# Patient Record
Sex: FEMALE | Race: BLACK OR AFRICAN AMERICAN | NOT HISPANIC OR LATINO | URBAN - METROPOLITAN AREA
[De-identification: names, ages, dates, MRNs, and addresses within clinical notes are randomized per-mention and may not be internally consistent; named-entity substitution may affect disease eponyms.]

---

## 2024-09-08 ENCOUNTER — INPATIENT (INPATIENT)
Facility: HOSPITAL | Age: 64
LOS: 18 days | Discharge: HOME | End: 2024-09-27
Attending: NEUROLOGICAL SURGERY | Admitting: NEUROLOGICAL SURGERY
Payer: SELF-PAY

## 2024-09-08 VITALS
RESPIRATION RATE: 18 BRPM | DIASTOLIC BLOOD PRESSURE: 88 MMHG | TEMPERATURE: 97 F | SYSTOLIC BLOOD PRESSURE: 176 MMHG | OXYGEN SATURATION: 93 % | HEART RATE: 57 BPM

## 2024-09-08 DIAGNOSIS — Z98.891 HISTORY OF UTERINE SCAR FROM PREVIOUS SURGERY: Chronic | ICD-10-CM

## 2024-09-08 DIAGNOSIS — I60.9 NONTRAUMATIC SUBARACHNOID HEMORRHAGE, UNSPECIFIED: ICD-10-CM

## 2024-09-08 LAB
A1C WITH ESTIMATED AVERAGE GLUCOSE RESULT: 5.8 % — HIGH (ref 4–5.6)
ALBUMIN SERPL ELPH-MCNC: 3.6 G/DL — SIGNIFICANT CHANGE UP (ref 3.4–5)
ALP SERPL-CCNC: 139 U/L — HIGH (ref 40–120)
ALT FLD-CCNC: 18 U/L — SIGNIFICANT CHANGE UP (ref 12–42)
ANION GAP SERPL CALC-SCNC: 12 MMOL/L — SIGNIFICANT CHANGE UP (ref 9–16)
ANISOCYTOSIS BLD QL: SLIGHT — SIGNIFICANT CHANGE UP
APTT BLD: 30.7 SEC — SIGNIFICANT CHANGE UP (ref 24.5–35.6)
AST SERPL-CCNC: 16 U/L — SIGNIFICANT CHANGE UP (ref 15–37)
BASE EXCESS BLDA CALC-SCNC: -1.7 MMOL/L — SIGNIFICANT CHANGE UP (ref -2–3)
BASOPHILS # BLD AUTO: 0.04 K/UL — SIGNIFICANT CHANGE UP (ref 0–0.2)
BASOPHILS NFR BLD AUTO: 0.4 % — SIGNIFICANT CHANGE UP (ref 0–2)
BILIRUB SERPL-MCNC: 0.3 MG/DL — SIGNIFICANT CHANGE UP (ref 0.2–1.2)
BLD GP AB SCN SERPL QL: NEGATIVE — SIGNIFICANT CHANGE UP
BUN SERPL-MCNC: 22 MG/DL — SIGNIFICANT CHANGE UP (ref 7–23)
CALCIUM SERPL-MCNC: 8.6 MG/DL — SIGNIFICANT CHANGE UP (ref 8.5–10.5)
CHLORIDE SERPL-SCNC: 106 MMOL/L — SIGNIFICANT CHANGE UP (ref 96–108)
CHOLEST SERPL-MCNC: 165 MG/DL — SIGNIFICANT CHANGE UP
CO2 BLDA-SCNC: 26 MMOL/L — HIGH (ref 19–24)
CO2 SERPL-SCNC: 26 MMOL/L — SIGNIFICANT CHANGE UP (ref 22–31)
CREAT SERPL-MCNC: 0.95 MG/DL — SIGNIFICANT CHANGE UP (ref 0.5–1.3)
EGFR: 66 ML/MIN/1.73M2 — SIGNIFICANT CHANGE UP
EOSINOPHIL # BLD AUTO: 0.17 K/UL — SIGNIFICANT CHANGE UP (ref 0–0.5)
EOSINOPHIL NFR BLD AUTO: 1.9 % — SIGNIFICANT CHANGE UP (ref 0–6)
ESTIMATED AVERAGE GLUCOSE: 120 MG/DL — HIGH (ref 68–114)
FERRITIN SERPL-MCNC: 32 NG/ML — SIGNIFICANT CHANGE UP (ref 13–330)
GAS PNL BLDA: SIGNIFICANT CHANGE UP
GLUCOSE BLDC GLUCOMTR-MCNC: 123 MG/DL — HIGH (ref 70–99)
GLUCOSE BLDC GLUCOMTR-MCNC: 123 MG/DL — HIGH (ref 70–99)
GLUCOSE BLDC GLUCOMTR-MCNC: 142 MG/DL — HIGH (ref 70–99)
GLUCOSE SERPL-MCNC: 154 MG/DL — HIGH (ref 70–99)
HAPTOGLOB SERPL-MCNC: 154 MG/DL — SIGNIFICANT CHANGE UP (ref 34–200)
HAV IGM SER-ACNC: SIGNIFICANT CHANGE UP
HBV CORE AB SER-ACNC: SIGNIFICANT CHANGE UP
HBV CORE IGM SER-ACNC: SIGNIFICANT CHANGE UP
HBV SURFACE AB SER-ACNC: SIGNIFICANT CHANGE UP
HBV SURFACE AG SER-ACNC: SIGNIFICANT CHANGE UP
HCO3 BLDA-SCNC: 24 MMOL/L — SIGNIFICANT CHANGE UP (ref 21–28)
HCT VFR BLD CALC: 35.6 % — SIGNIFICANT CHANGE UP (ref 34.5–45)
HCV AB S/CO SERPL IA: 0.06 S/CO — SIGNIFICANT CHANGE UP
HCV AB SERPL-IMP: SIGNIFICANT CHANGE UP
HDLC SERPL-MCNC: 58 MG/DL — SIGNIFICANT CHANGE UP
HGB BLD-MCNC: 10.3 G/DL — LOW (ref 11.5–15.5)
HIV 1+2 AB+HIV1 P24 AG SERPL QL IA: SIGNIFICANT CHANGE UP
HYPOCHROMIA BLD QL: SIGNIFICANT CHANGE UP
IMM GRANULOCYTES NFR BLD AUTO: 1.3 % — HIGH (ref 0–0.9)
INR BLD: 0.97 — SIGNIFICANT CHANGE UP (ref 0.85–1.18)
INR BLD: 1.02 — SIGNIFICANT CHANGE UP (ref 0.85–1.18)
IRON SATN MFR SERPL: 20 UG/DL — LOW (ref 30–160)
IRON SATN MFR SERPL: 5 % — LOW (ref 14–50)
LIPID PNL WITH DIRECT LDL SERPL: 99 MG/DL — SIGNIFICANT CHANGE UP
LYMPHOCYTES # BLD AUTO: 2.62 K/UL — SIGNIFICANT CHANGE UP (ref 1–3.3)
LYMPHOCYTES # BLD AUTO: 28.5 % — SIGNIFICANT CHANGE UP (ref 13–44)
MACROCYTES BLD QL: SLIGHT — SIGNIFICANT CHANGE UP
MANUAL SMEAR VERIFICATION: SIGNIFICANT CHANGE UP
MCHC RBC-ENTMCNC: 21.3 PG — LOW (ref 27–34)
MCHC RBC-ENTMCNC: 28.9 GM/DL — LOW (ref 32–36)
MCV RBC AUTO: 73.6 FL — LOW (ref 80–100)
MONOCYTES # BLD AUTO: 0.84 K/UL — SIGNIFICANT CHANGE UP (ref 0–0.9)
MONOCYTES NFR BLD AUTO: 9.2 % — SIGNIFICANT CHANGE UP (ref 2–14)
NEUTROPHILS # BLD AUTO: 5.39 K/UL — SIGNIFICANT CHANGE UP (ref 1.8–7.4)
NEUTROPHILS NFR BLD AUTO: 58.7 % — SIGNIFICANT CHANGE UP (ref 43–77)
NON HDL CHOLESTEROL: 107 MG/DL — SIGNIFICANT CHANGE UP
NRBC # BLD: 0 /100 WBCS — SIGNIFICANT CHANGE UP (ref 0–0)
OVALOCYTES BLD QL SMEAR: SLIGHT — SIGNIFICANT CHANGE UP
PCO2 BLDA: 45 MMHG — SIGNIFICANT CHANGE UP (ref 32–45)
PH BLDA: 7.34 — LOW (ref 7.35–7.45)
PLAT MORPH BLD: NORMAL — SIGNIFICANT CHANGE UP
PLATELET # BLD AUTO: 272 K/UL — SIGNIFICANT CHANGE UP (ref 150–400)
PO2 BLDA: 239 MMHG — HIGH (ref 83–108)
POLYCHROMASIA BLD QL SMEAR: SLIGHT — SIGNIFICANT CHANGE UP
POTASSIUM SERPL-MCNC: 3.3 MMOL/L — LOW (ref 3.5–5.3)
POTASSIUM SERPL-SCNC: 3.3 MMOL/L — LOW (ref 3.5–5.3)
PROT SERPL-MCNC: 8 G/DL — SIGNIFICANT CHANGE UP (ref 6.4–8.2)
PROTHROM AB SERPL-ACNC: 11.1 SEC — SIGNIFICANT CHANGE UP (ref 9.5–13)
PROTHROM AB SERPL-ACNC: 11.2 SEC — SIGNIFICANT CHANGE UP (ref 9.5–13)
RBC # BLD: 4.84 M/UL — SIGNIFICANT CHANGE UP (ref 3.8–5.2)
RBC # FLD: 16.6 % — HIGH (ref 10.3–14.5)
RBC BLD AUTO: ABNORMAL
RH IG SCN BLD-IMP: NEGATIVE — SIGNIFICANT CHANGE UP
SAO2 % BLDA: 98.2 % — HIGH (ref 94–98)
SODIUM SERPL-SCNC: 144 MMOL/L — SIGNIFICANT CHANGE UP (ref 132–145)
TIBC SERPL-MCNC: 415 UG/DL — SIGNIFICANT CHANGE UP (ref 220–430)
TRANSFERRIN SERPL-MCNC: 348 MG/DL — SIGNIFICANT CHANGE UP (ref 200–360)
TRIGL SERPL-MCNC: 37 MG/DL — SIGNIFICANT CHANGE UP
TROPONIN I, HIGH SENSITIVITY RESULT: 8.7 NG/L — SIGNIFICANT CHANGE UP
UIBC SERPL-MCNC: 395 UG/DL — HIGH (ref 110–370)
WBC # BLD: 9.18 K/UL — SIGNIFICANT CHANGE UP (ref 3.8–10.5)
WBC # FLD AUTO: 9.18 K/UL — SIGNIFICANT CHANGE UP (ref 3.8–10.5)

## 2024-09-08 PROCEDURE — 71045 X-RAY EXAM CHEST 1 VIEW: CPT | Mod: 26

## 2024-09-08 PROCEDURE — 36224 PLACE CATH CAROTD ART: CPT | Mod: 50

## 2024-09-08 PROCEDURE — 99291 CRITICAL CARE FIRST HOUR: CPT

## 2024-09-08 PROCEDURE — 36226 PLACE CATH VERTEBRAL ART: CPT | Mod: 50

## 2024-09-08 PROCEDURE — 0042T: CPT | Mod: MC

## 2024-09-08 PROCEDURE — 70450 CT HEAD/BRAIN W/O DYE: CPT | Mod: 26,77

## 2024-09-08 PROCEDURE — 71045 X-RAY EXAM CHEST 1 VIEW: CPT | Mod: 26,77

## 2024-09-08 PROCEDURE — 70496 CT ANGIOGRAPHY HEAD: CPT | Mod: 26,MC

## 2024-09-08 PROCEDURE — 36227 PLACE CATH XTRNL CAROTID: CPT | Mod: 50

## 2024-09-08 PROCEDURE — 76377 3D RENDER W/INTRP POSTPROCES: CPT | Mod: 26

## 2024-09-08 PROCEDURE — 61210 BURR HOLE IMPLT VENTR CATH: CPT | Mod: AS

## 2024-09-08 PROCEDURE — 70498 CT ANGIOGRAPHY NECK: CPT | Mod: 26,MC

## 2024-09-08 RX ORDER — CHLORHEXIDINE GLUCONATE ORAL RINSE 1.2 MG/ML
1 SOLUTION DENTAL
Refills: 0 | Status: DISCONTINUED | OUTPATIENT
Start: 2024-09-08 | End: 2024-09-27

## 2024-09-08 RX ORDER — ONDANSETRON HCL/PF 4 MG/2 ML
4 VIAL (ML) INJECTION EVERY 6 HOURS
Refills: 0 | Status: DISCONTINUED | OUTPATIENT
Start: 2024-09-08 | End: 2024-09-11

## 2024-09-08 RX ORDER — ALCOHOL ANTISEPTIC PADS
25 PADS, MEDICATED (EA) TOPICAL ONCE
Refills: 0 | Status: DISCONTINUED | OUTPATIENT
Start: 2024-09-08 | End: 2024-09-08

## 2024-09-08 RX ORDER — LEVETIRACETAM 1000 MG
1000 TABLET ORAL EVERY 12 HOURS
Refills: 0 | Status: DISCONTINUED | OUTPATIENT
Start: 2024-09-08 | End: 2024-09-11

## 2024-09-08 RX ORDER — MIDAZOLAM HCL 1 MG/ML
2 VIAL (ML) INJECTION ONCE
Refills: 0 | Status: DISCONTINUED | OUTPATIENT
Start: 2024-09-08 | End: 2024-09-08

## 2024-09-08 RX ORDER — ALCOHOL ANTISEPTIC PADS
15 PADS, MEDICATED (EA) TOPICAL ONCE
Refills: 0 | Status: DISCONTINUED | OUTPATIENT
Start: 2024-09-08 | End: 2024-09-08

## 2024-09-08 RX ORDER — ACETAMINOPHEN 325 MG
1000 TABLET ORAL ONCE
Refills: 0 | Status: COMPLETED | OUTPATIENT
Start: 2024-09-08 | End: 2024-09-08

## 2024-09-08 RX ORDER — FENTANYL CITRATE-0.9 % NACL/PF 300MCG/30
50 PATIENT CONTROLLED ANALGESIA VIAL INJECTION ONCE
Refills: 0 | Status: DISCONTINUED | OUTPATIENT
Start: 2024-09-08 | End: 2024-09-08

## 2024-09-08 RX ORDER — LEVETIRACETAM 1000 MG
1000 TABLET ORAL
Refills: 0 | Status: DISCONTINUED | OUTPATIENT
Start: 2024-09-08 | End: 2024-09-08

## 2024-09-08 RX ORDER — GLUCAGON INJECTION, SOLUTION 0.5 MG/.1ML
1 INJECTION, SOLUTION SUBCUTANEOUS ONCE
Refills: 0 | Status: DISCONTINUED | OUTPATIENT
Start: 2024-09-08 | End: 2024-09-08

## 2024-09-08 RX ORDER — PROPOFOL 10 MG/ML
10 INJECTION, EMULSION INTRAVENOUS
Qty: 1000 | Refills: 0 | Status: DISCONTINUED | OUTPATIENT
Start: 2024-09-08 | End: 2024-09-08

## 2024-09-08 RX ORDER — SENNOSIDES 8.6 MG
2 TABLET ORAL AT BEDTIME
Refills: 0 | Status: DISCONTINUED | OUTPATIENT
Start: 2024-09-08 | End: 2024-09-10

## 2024-09-08 RX ORDER — LEVETIRACETAM 1000 MG
1000 TABLET ORAL ONCE
Refills: 0 | Status: COMPLETED | OUTPATIENT
Start: 2024-09-08 | End: 2024-09-08

## 2024-09-08 RX ORDER — SODIUM CHLORIDE IRRIG SOLUTION 0.9 %
1000 SOLUTION, IRRIGATION IRRIGATION
Refills: 0 | Status: DISCONTINUED | OUTPATIENT
Start: 2024-09-08 | End: 2024-09-08

## 2024-09-08 RX ORDER — OXYCODONE HYDROCHLORIDE 30 MG/1
5 TABLET, FILM COATED, EXTENDED RELEASE ORAL EVERY 4 HOURS
Refills: 0 | Status: DISCONTINUED | OUTPATIENT
Start: 2024-09-08 | End: 2024-09-12

## 2024-09-08 RX ORDER — FENTANYL CITRATE-0.9 % NACL/PF 300MCG/30
25 PATIENT CONTROLLED ANALGESIA VIAL INJECTION ONCE
Refills: 0 | Status: DISCONTINUED | OUTPATIENT
Start: 2024-09-08 | End: 2024-09-08

## 2024-09-08 RX ORDER — CHLORHEXIDINE GLUCONATE ORAL RINSE 1.2 MG/ML
15 SOLUTION DENTAL EVERY 12 HOURS
Refills: 0 | Status: DISCONTINUED | OUTPATIENT
Start: 2024-09-08 | End: 2024-09-08

## 2024-09-08 RX ORDER — INSULIN LISPRO 100/ML
VIAL (ML) SUBCUTANEOUS EVERY 6 HOURS
Refills: 0 | Status: DISCONTINUED | OUTPATIENT
Start: 2024-09-08 | End: 2024-09-09

## 2024-09-08 RX ORDER — CHLORHEXIDINE GLUCONATE ORAL RINSE 1.2 MG/ML
1 SOLUTION DENTAL
Refills: 0 | Status: DISCONTINUED | OUTPATIENT
Start: 2024-09-08 | End: 2024-09-08

## 2024-09-08 RX ORDER — SODIUM CHLORIDE 0.9 % (FLUSH) 0.9 %
1000 SYRINGE (ML) INJECTION
Refills: 0 | Status: DISCONTINUED | OUTPATIENT
Start: 2024-09-08 | End: 2024-09-09

## 2024-09-08 RX ORDER — NIMODIPINE 30 MG/1
60 CAPSULE, LIQUID FILLED ORAL EVERY 4 HOURS
Refills: 0 | Status: DISCONTINUED | OUTPATIENT
Start: 2024-09-08 | End: 2024-09-09

## 2024-09-08 RX ORDER — CEFAZOLIN SODIUM 1 G
2000 VIAL (EA) INJECTION ONCE
Refills: 0 | Status: COMPLETED | OUTPATIENT
Start: 2024-09-08 | End: 2024-09-08

## 2024-09-08 RX ORDER — NICARDIPINE HCL 25 MG/10ML
5 AMPUL (ML) INTRAVENOUS
Qty: 40 | Refills: 0 | Status: DISCONTINUED | OUTPATIENT
Start: 2024-09-08 | End: 2024-09-10

## 2024-09-08 RX ORDER — PANTOPRAZOLE SODIUM 40 MG/1
40 TABLET, DELAYED RELEASE ORAL ONCE
Refills: 0 | Status: COMPLETED | OUTPATIENT
Start: 2024-09-08 | End: 2024-09-08

## 2024-09-08 RX ORDER — NOREPINEPHRINE BITARTRATE/D5W 16MG/250ML
0.05 PLASTIC BAG, INJECTION (ML) INTRAVENOUS
Qty: 8 | Refills: 0 | Status: DISCONTINUED | OUTPATIENT
Start: 2024-09-08 | End: 2024-09-08

## 2024-09-08 RX ORDER — ACETAMINOPHEN 325 MG
650 TABLET ORAL EVERY 6 HOURS
Refills: 0 | Status: DISCONTINUED | OUTPATIENT
Start: 2024-09-08 | End: 2024-09-12

## 2024-09-08 RX ORDER — MIDAZOLAM HCL 1 MG/ML
4 VIAL (ML) INJECTION ONCE
Refills: 0 | Status: DISCONTINUED | OUTPATIENT
Start: 2024-09-08 | End: 2024-09-08

## 2024-09-08 RX ORDER — ALCOHOL ANTISEPTIC PADS
12.5 PADS, MEDICATED (EA) TOPICAL ONCE
Refills: 0 | Status: DISCONTINUED | OUTPATIENT
Start: 2024-09-08 | End: 2024-09-08

## 2024-09-08 RX ORDER — FENTANYL CITRATE-0.9 % NACL/PF 300MCG/30
25 PATIENT CONTROLLED ANALGESIA VIAL INJECTION
Refills: 0 | Status: DISCONTINUED | OUTPATIENT
Start: 2024-09-08 | End: 2024-09-09

## 2024-09-08 RX ADMIN — Medication 50 MICROGRAM(S): at 18:12

## 2024-09-08 RX ADMIN — Medication 4 MILLIGRAM(S): at 18:03

## 2024-09-08 RX ADMIN — Medication 400 MILLIGRAM(S): at 11:51

## 2024-09-08 RX ADMIN — Medication 4 MILLIGRAM(S): at 23:16

## 2024-09-08 RX ADMIN — CHLORHEXIDINE GLUCONATE ORAL RINSE 15 MILLILITER(S): 1.2 SOLUTION DENTAL at 17:10

## 2024-09-08 RX ADMIN — PROPOFOL 7.2 MICROGRAM(S)/KG/MIN: 10 INJECTION, EMULSION INTRAVENOUS at 17:10

## 2024-09-08 RX ADMIN — Medication 50 MILLIGRAM(S): at 17:14

## 2024-09-08 RX ADMIN — Medication 50 MICROGRAM(S): at 18:45

## 2024-09-08 RX ADMIN — NIMODIPINE 60 MILLIGRAM(S): 30 CAPSULE, LIQUID FILLED ORAL at 23:27

## 2024-09-08 RX ADMIN — Medication 440 MILLIGRAM(S): at 09:52

## 2024-09-08 RX ADMIN — Medication 1000 MILLIGRAM(S): at 12:10

## 2024-09-08 RX ADMIN — Medication 2 MILLIGRAM(S): at 17:49

## 2024-09-08 RX ADMIN — Medication 50 MICROGRAM(S): at 16:44

## 2024-09-08 RX ADMIN — Medication 50 MICROGRAM(S): at 17:37

## 2024-09-08 RX ADMIN — Medication 25 MG/HR: at 11:35

## 2024-09-08 RX ADMIN — CHLORHEXIDINE GLUCONATE ORAL RINSE 1 APPLICATION(S): 1.2 SOLUTION DENTAL at 21:58

## 2024-09-08 RX ADMIN — Medication 60 MILLILITER(S): at 20:38

## 2024-09-08 RX ADMIN — Medication 4 MILLIGRAM(S): at 11:53

## 2024-09-08 RX ADMIN — Medication 400 MILLIGRAM(S): at 21:00

## 2024-09-08 RX ADMIN — PANTOPRAZOLE SODIUM 40 MILLIGRAM(S): 40 TABLET, DELAYED RELEASE ORAL at 17:10

## 2024-09-08 RX ADMIN — Medication 25 MICROGRAM(S): at 18:19

## 2024-09-08 RX ADMIN — Medication 4 MILLIGRAM(S): at 17:10

## 2024-09-08 RX ADMIN — Medication 25 MG/HR: at 17:35

## 2024-09-08 RX ADMIN — Medication 25 MG/HR: at 09:58

## 2024-09-08 NOTE — ED PROVIDER NOTE - OBJECTIVE STATEMENT
63-year-old female history of uncontrolled hypertension now coming in for acute onset headache while at work this morning.  Patient states she woke up normally and felt well and then she went to work and started having this intense headache.  Patient arrives with an NIH of 0.  Stroke code called for suspected ICH.  Patient denies chest pain, shortness of breath, fevers

## 2024-09-08 NOTE — ED PROVIDER NOTE - CRITICAL CARE ATTENDING CONTRIBUTION TO CARE
The patient was seen immediately upon arrival due to a high probability of imminent or life-threatening deterioration secondary to SAH which required my direct attention, intervention, and personal management at the bedside. I have personally provided critical care time exclusive of time spent on separately billable procedures. Time includes review of laboratory data, radiology results, discussion with consultants, discussion with the patient's family, and monitoring for potential decompensation.    63-year-old female history of uncontrolled hypertension now coming in for acute onset headache with neck pain while at work this morning.  Patient states she woke up normally and felt well and then she went to work and started having this intense headache.  Patient arrives with an NIH of 0.    Stroke code called for suspected ICH. on arrival in ambulance triage   CT + SAH  Exam unchanged -  Earl Rojo 2    Patient managed in cooperation with MATIAS Son neurosurgery consulted - keppra & cardene SBP to 140,   L & S to [facility]     @ 1030AM CTA results pending The patient was seen immediately upon arrival due to a high probability of imminent or life-threatening deterioration secondary to SAH which required my direct attention, intervention, and personal management at the bedside. I have personally provided critical care time exclusive of time spent on separately billable procedures. Time includes review of laboratory data, radiology results, discussion with consultants, discussion with the patient's family, and monitoring for potential decompensation.    63-year-old female history of uncontrolled hypertension now coming in for acute onset headache with neck pain while at work this morning.  Patient states she woke up normally and felt well and then she went to work and started having this intense headache.  Patient arrives with an NIH of 0.    Stroke code called for suspected ICH. on arrival in ambulance triage   CT + SAH  Exam unchanged -  Earl Rojo 2    Patient managed in cooperation with MATIAS Son neurosurgery consulted - keppra & cardene SBP to 140,   L & S to [facility]     @ 1030AM CTA results pending  @ approx 1045am - pt left ED with EMS L & S to [facility]   neuro exam stable / unchanged and SPB to 140's on rashard higgins spoke to  by phone to provide info for dx of SAH  and plan to transfer to [facility]

## 2024-09-08 NOTE — PROGRESS NOTE ADULT - SUBJECTIVE AND OBJECTIVE BOX
===========================  NSICU ATTENDING PROGRESS NOTE  ===========================    HPI:  Patient is a    On admission, the patient was:  GCS:  Earl-Rojo: 2  modified Liu: 3  ICH score:  NIHSS:    *** HIGH RISK OF DVT PRESENT ON ADMISSION ***      ICU Vital Signs Last 24 Hrs  T(C): 36.3 (08 Sep 2024 09:20), Max: 36.3 (08 Sep 2024 09:20)  T(F): 97.3 (08 Sep 2024 09:20), Max: 97.3 (08 Sep 2024 09:20)  HR: 81 (08 Sep 2024 10:47) (57 - 85)  BP: 150/82 (08 Sep 2024 10:47) (125/73 - 198/91)  RR: 16 (08 Sep 2024 10:47) (16 - 20)  SpO2: 96% (08 Sep 2024 10:47) (93% - 99%)      EXAMINATION:  General: Calm  HEENT:  MMM  Neuro:    Cards: S1/S2, RRR  Respiratory:  Clear, no wheeze  Abdomen: Soft, non-tender  Extremities: No edema  Skin:  Warm/dry      MEDICATIONS;   acetaminophen   IVPB .. 1000 milliGRAM(s) IV Intermittent once  niCARdipine Infusion 5 mG/Hr (25 mL/Hr) IV Continuous <Continuous>      LABS:                         10.3   9.18  )-----------( 272      ( 08 Sep 2024 09:20 )             35.6     09-08    144  |  106  |  22  ----------------------------<  154<H>  3.3<L>   |  26  |  0.95    Ca    8.6      08 Sep 2024 09:20    TPro  8.0  /  Alb  3.6  /  TBili  0.3  /  DBili  x   /  AST  16  /  ALT  18  /  AlkPhos  139<H>  09-08    LIVER FUNCTIONS - ( 08 Sep 2024 09:20 )  Alb: 3.6 g/dL / Pro: 8.0 g/dL / ALK PHOS: 139 U/L / ALT: 18 U/L / AST: 16 U/L / GGT: x            ===========================  NSICU ATTENDING PROGRESS NOTE  ===========================    HPI:  63F PMH uncontrolled HTN, does not take any medications at home presented to [facility] after developing acute onset severe HA. Once arrived, patient had episode of nausea with emesis. CTH showed SAH, HH: 2, MF: 3. NIHSS: 0. CTA neg for aneurysm. Transferred to [facility] for further mgmt. Upon arrival to [facility], NIHSS noted to be 0. Patient reports a headache rated at 7/10 in severity. Of note, patient's daughter notes pt seems off and is sleepier than her baseline. Pt denies SOB, chest pain, vision changes, nausea, weakness/numbness/tingling, dizziness, photophobia.  (08 Sep 2024 13:29)      On admission, the patient was:  GCS:  Earl-Rojo: 2  modified Liu: 3  ICH score:  NIHSS:      ICU Vital Signs Last 24 Hrs  T(C): 36.4 (08 Sep 2024 14:14), Max: 37.1 (08 Sep 2024 12:00)  T(F): 97.5 (08 Sep 2024 14:14), Max: 98.8 (08 Sep 2024 12:00)  HR: 88 (08 Sep 2024 13:00) (57 - 88)  BP: 148/62 (08 Sep 2024 13:00) (125/73 - 198/91)  BP(mean): 88 (08 Sep 2024 13:00) (88 - 128)  RR: 16 (08 Sep 2024 13:00) (16 - 20)  SpO2: 99% (08 Sep 2024 13:00) (93% - 99%)      09-08-24 @ 07:01  -  09-08-24 @ 15:39  --------------------------------------------------------  IN: 477.5 mL / OUT: 400 mL / NET: 77.5 mL      MEDICATIONS:  acetaminophen     Tablet .. 650 milliGRAM(s) Oral every 6 hours PRN  chlorhexidine 4% Liquid 1 Application(s) Topical <User Schedule>  glucagon  Injectable 1 milliGRAM(s) IntraMuscular once  insulin lispro (ADMELOG) corrective regimen sliding scale   SubCutaneous every 6 hours  levETIRAcetam 1000 milliGRAM(s) Oral two times a day  niCARdipine Infusion 5 mG/Hr (25 mL/Hr) IV Continuous <Continuous>  ondansetron Injectable 4 milliGRAM(s) IV Push every 6 hours  oxyCODONE    IR 5 milliGRAM(s) Oral every 4 hours PRN  potassium chloride    Tablet ER 40 milliEquivalent(s) Oral every 4 hours  senna 2 Tablet(s) Oral at bedtime  sodium chloride 0.9%. 1000 milliLiter(s) (60 mL/Hr) IV Continuous <Continuous>      EXAMINATION:  General: Restless  HEENT:  MMM  Neuro: Awake, at times restless, alert, oriented x 3, follows commands, R facial droop (patient states at her baseline)  Moves all extremities with power 5/5  Sensation intact  Cards: S1/S2, RRR  Respiratory: Clear, no wheeze  Abdomen: Soft, non- tender  Extremities:  no edema  Skin:  warm/dry      LABS:                     10.3   9.18  )-----------( 272      ( 08 Sep 2024 09:20 )             35.6     09-08    144  |  106  |  22  ----------------------------<  154<H>  3.3<L>   |  26  |  0.95    Ca    8.6      08 Sep 2024 09:20    TPro  8.0  /  Alb  3.6  /  TBili  0.3  /  DBili  x   /  AST  16  /  ALT  18  /  AlkPhos  139<H>  09-08    LIVER FUNCTIONS - ( 08 Sep 2024 09:20 )  Alb: 3.6 g/dL / Pro: 8.0 g/dL / ALK PHOS: 139 U/L / ALT: 18 U/L / AST: 16 U/L / GGT: x

## 2024-09-08 NOTE — PRE-ANESTHESIA EVALUATION ADULT - NSANTHPMHFT_GEN_ALL_CORE
Per primary team, 63 year old F with a PMHx uncontrolled HTN who presented to [facility] to [facility] c/o sudden onset HA. CTH showed SAH HH: 2, MF: 3. NIHSS: 0. CTA neg for aneurysm. Transferred to [facility] for further mgmt. She now presents for diagnostic cerebral angiogram. History otherwise remarkable by morbid obesity.    Otherwise, no CP/SOB/N/V/GERD. No numbness or weakness. METS < 4. Per primary team, 63 year old F with a PMHx uncontrolled HTN who presented to [facility] to [facility] c/o sudden onset HA. CTH showed SAH HH: 2, MF: 3. NIHSS: 0. CTA neg for aneurysm. Transferred to [facility] for further mgmt. She now presents for diagnostic cerebral angiogram. History otherwise remarkable by morbid obesity.    Otherwise, no CP/SOB/N/V/GERD. No numbness or weakness. METS < 4. Patient able to converse and state how she feels, but daughter primary historian for medical history. Per daughter, she is prescribed meds for HTN but has not been taking them. No prior surgical history other than C-sections.

## 2024-09-08 NOTE — PRE-ANESTHESIA EVALUATION ADULT - NSANTHOSAYNRD_GEN_A_CORE
Yes No. FRANNY screening performed.  STOP BANG Legend: 0-2 = LOW Risk; 3-4 = INTERMEDIATE Risk; 5-8 = HIGH Risk

## 2024-09-08 NOTE — H&P ADULT - NSHPPHYSICALEXAM_GEN_ALL_CORE
General: Pt is sitting up comfortably in bed, in NAD, on RA  HEENT: CN II-XII grossly intact, PERRL 3mm, EOMI B/L, face symmetric, tongue midline, neck FROM  Cardiovascular: RRR, normal S1 and S2   Respiratory: non-labored breathing, symmetric chest rise   GI: abd soft, NTND   Neuro: A&O x 3, no aphasia, speech clear, no dysmetria, no pronator drift  Strength 5/5 throughout all 4 extremities  Sensation intact to light touch throughout   Vascular: Distal pulses 2+ x4, no calf edema or erythema  Wounds: C/D/I, no evidence of hematoma General: Pt is sitting up comfortably in bed, in NAD, on RA  HEENT: CN II-XII grossly intact, PERRL 3mm, EOMI B/L, +slight R facial (patient reports as her baseline)  Cardiovascular: RRR, normal S1 and S2   Respiratory: non-labored breathing, symmetric chest rise   GI: abd soft, NTND   Neuro: A&O x 3, slightly somnolent but alert, no aphasia, speech clear, no dysmetria, no pronator drift  Strength 5/5 throughout all 4 extremities  Sensation intact to light touch throughout   Vascular: Distal pulses 2+ x4, no calf edema, +discoloration of BL calves

## 2024-09-08 NOTE — BRIEF OPERATIVE NOTE - OPERATION/FINDINGS
Patient was brought to the angio suite, placed on x-ray table, placed on standard monitor by anesthesiologist. B/l groins were prepped and draped in usual sterile fashion.   5 [place] short sheath was used in the right common femoral artery for access. A diagnostic angiogram was performed under general anesthesia care. B/l ICAs, b/l ECAs and b/l vertebral artery were injected and studied.     Findings:   There is no aneurysm, no vasculitis, or any other vascular abnormality.     Full report to follow  Patient tolerated the procedure well and at baseline neurological condition.   Right groin vascular access site was closed with vascade and applied manual compression.   There is no hematoma.   Patient was transferred to NSICU.     Above discussed with Dr. Lomas

## 2024-09-08 NOTE — PROGRESS NOTE ADULT - ASSESSMENT
63y/F with  subarachnoid hemorrhage HH2 MF3, brain compression, cerebral edema s/p angiogram, carotid & cerebral, b/l (09/08/2024, Alfredo Lomas)  Hypertension    PLAN:   NEURO:  REHAB:  physical therapy evaluation and management    EARLY MOB:      PULM:  Room air, incentive spirometry  CARDIO:  SBP goal 100-140mm Hg  ENDO:  Blood sugar goals 140-180 mg/dL, continue insulin sliding scale  GI:  PPI for GI prophylaxis  DIET:  RENAL:    HEM/ONC:  VTE Prophylaxis:     ID: afebrile, no leukocytosis  ====================   T(F): , Max: 98.8 (09-08-24 @ 12:00)    WBC Count: 9.18 (09-08)    ====================  Social: will update family    Active issues:  What's keeping patient in the ICU?  What is this patient's dispo plan?    ATTENDING ATTESTATION:  I was physically present for the key portions of the evaluation and management (E/M) service provided.  I agree with the above history, physical and plan, which I have reviewed and edited where appropriate.    Patient at high risk for neurological deterioration or death due to:  ICU delirium, aspiration PNA, DVT / PE.  Critical care time:  I have personally provided 45 minutes of critical care time, excluding time spent on separate procedures.      Plan discussed with RN, house staff. 63y/F with  subarachnoid hemorrhage HH2 MF3, brain compression, cerebral edema s/p angiogram, carotid & cerebral, b/l (09/08/2024, Alfredo Lomas)  Hypertension    PLAN:   NEURO: neurochecks q1h, PRN pain meds with acetaminophen; fent PRN; hold propofol  nimodipine 60mg PO q6h, TCDs on Day 4  repeat DSA on D15  seizure prophylaxis:  levetiracetam 1000mg IV BID, as per neurosurgery   EVD @10cm H20 open to drain  MRI brain WWO   REHAB:  physical therapy evaluation and management    EARLY MOB:      PULM:  wean to extubate  CARDIO:  SBP goal 100-140mm Hg  ENDO:  Blood sugar goals 140-180 mg/dL, continue insulin sliding scale  GI:  PPI for GI prophylaxis  DIET: NPO for now  RENAL:  IVF  HEM/ONC: Hb stable  VTE Prophylaxis: SCDs, no DVT chemoprophylaxis for now as patient is high risk for bleed (fresh bleed)  ID: afebrile, no leukocytosis  Social:  at bedside - updated    Active issues:  What's keeping patient in the ICU? intubated, EVD  What is this patient's dispo plan?    ATTENDING ATTESTATION:  I was physically present for the key portions of the evaluation and management (E/M) service provided.  I agree with the above history, physical and plan, which I have reviewed and edited where appropriate.    Patient at high risk for neurological deterioration or death due to:  ICU delirium, aspiration PNA, DVT / PE.  Critical care time:  I have personally provided 45 minutes of critical care time, excluding time spent on separate procedures.      Plan discussed with RN, house staff.

## 2024-09-08 NOTE — H&P ADULT - ASSESSMENT
63F PMHx uncontrolled HTN was BIBEMS to [facility] 9/8 c/o sudden onset severe HA. CTH showed SAH HH: 2, MF: 3. NIHSS: 0. CTA neg for aneurysm. Transferred to [facility] for further mgmt.

## 2024-09-08 NOTE — PROGRESS NOTE ADULT - ASSESSMENT
ASSESSMENT/PLAN: HH2 mF3 subarachnoid hemorrhage  Bleed day 1    NEURO:  Q1 neurochecls  Diagnosis: CT Head, CTA Head  Repeat CT head pending  Pending conventional angiogram/ DSA  Seizure Prophylaxis: Levetiracetam 1000mg IV bid for now  Delayed Cerebral Ischemia Management: Serial screening TCDs, euvolemia, nimodipine 60mg Q4  Mild hydrocephalus on initial CT head. Repeat CT head pending  Pain: PRN analgesics  Activity: [] OOB as tolerated [] Bedrest [] PT [] OT [] PMNR    PULM:  Incentive spirometry  Room air    CV:  SBP goal 100-140  TTE baseline, EKG  Cardene gtt  Brickeys    RENAL:  Fluids: NS at 80/hr  Replete K    GI:  Diet: NPO  GI prophylaxis [] not indicated [] PPI [] other:  Bowel regimen [] colace [x] senna [] other: miralax  Zofran RTC    ENDO:   Goal euglycemia (-180)  A1c:  TSH:   LDL:    HEME/ONC: Microcytic anemia  Monitor H/H  YANIQUE workup  VTE prophylaxis: [x] SCDs [] chemoprophylaxis   [x] hold chemoprophylaxis due to: SAH    ID:   Afebrile    MISC:    SOCIAL/FAMILY:  [x] awaiting [] updated at bedside [] family meeting    CODE STATUS:  [x] Full Code [] DNR [] DNI [] Palliative/Comfort Care    DISPOSITION:  [x] ICU [] Stroke Unit [] Floor [] EMU [] RCU [] PCU    Time spent: 60 critical care minutes ASSESSMENT/PLAN: HH2 mF3 subarachnoid hemorrhage  Bleed day 1    NEURO:  Q1 neurochecls  Diagnosis: CT Head, CTA Head  Repeat CT head pending  Pending conventional angiogram/ DSA 9/8  Seizure Prophylaxis: Levetiracetam 1000mg IV bid for now  Delayed Cerebral Ischemia Management: Serial screening TCDs, euvolemia, nimodipine 60mg Q4  Mild hydrocephalus on initial CT head. Repeat CT head pending  Pain: PRN analgesics  Activity: [] OOB as tolerated [] Bedrest [] PT [] OT [] PMNR    PULM:  Incentive spirometry  Room air    CV:  SBP goal 100-140  TTE baseline, EKG  Cardene gtt  Rachel    RENAL:  Fluids: NS at 80/hr  Replete lytes  Monitor Is/Os    GI:  Diet: NPO  GI prophylaxis [] not indicated [] PPI [] other:  Bowel regimen [] colace [x] senna [] other: miralax  Zofran RTC    ENDO:   Goal euglycemia (-180)  A1c:  TSH:   LDL:    HEME/ONC: Microcytic anemia  Monitor H/H  YANIQUE workup  VTE prophylaxis: [x] SCDs [] chemoprophylaxis   [x] hold chemoprophylaxis due to: SAH    ID:   Afebrile    MISC:    SOCIAL/FAMILY:  [x] awaiting [] updated at bedside [] family meeting    CODE STATUS:  [x] Full Code [] DNR [] DNI [] Palliative/Comfort Care    DISPOSITION:  [x] ICU [] Stroke Unit [] Floor [] EMU [] RCU [] PCU    Time spent: 60 critical care minutes

## 2024-09-08 NOTE — ED PROVIDER NOTE - CLINICAL SUMMARY MEDICAL DECISION MAKING FREE TEXT BOX
63-year-old female now coming in for acute onset headache while at work.  Patient woke up normally.  History of uncontrolled hypertension with noncompliance to medications.  Patient exam on arrival NIH 0 neurologically intact with no deficits.    Stroke code initiated on arrival for suspected ICH. Delay to imaging because patient demanded using the restroom prior to imaging.    Patient with SAH on CT.  Endorsed to neurosurgical team and advised nicardipine and Keppra.  Patient will be transported lights and sirens to [facility] for neurosurgical admission to ICU.

## 2024-09-08 NOTE — ED ADULT NURSE NOTE - OBJECTIVE STATEMENT
"Pt BIBEMS work complaining of headache that started this morning at 7:40 pt states that headache goes down to neck. Pt with 1 episode of vomiting to arrival. Stroke code called by MATIAS Howard. Pt states that she has not taken her blood pressure medication in 5 months."    pt stable, not in any acute distress, A&Ox4, pt following all commands, medications administered and titrated as per order, BP systolic maintained over 140. pt awaiting transfer to [facility], care ongoing.

## 2024-09-08 NOTE — H&P ADULT - PROBLEM SELECTOR PLAN 1
- Neuro/vitals q1hr  - Seizure prophylaxis: Keppra 1g BID   - DCI prophylaxis: Nimodipime 60q4  - Pain control: Tylenol, Oxy 5/10  - CT stability pending 1:30pm  - Plan for DSA

## 2024-09-08 NOTE — ED ADULT NURSE REASSESSMENT NOTE - NS ED NURSE REASSESS COMMENT FT1
Stroke code called in ambulance triage at 0917. Pt on EMS stretcher and was being carted to CT before refusing scan citing she needed to have a bowel movement prior to obtaining scan. Pt informed of the concerning nature of her presentation and the need for prompt intervention. Despite this, pt insistent on need to use restroom. Both PA and MD present and aware of situation. Pt let off stretcher in which she ambulated to the restroom and proceeded to have a large episode of diarrhea and single episode of emesis. Again made aware of the need stat imaging with potential consequence of death. Agreeable to go to scan at 0932.

## 2024-09-08 NOTE — ED ADULT TRIAGE NOTE - CHIEF COMPLAINT QUOTE
Pt BIBEMS work complaining of headache that started this morning at 7:40 pt states that headache goes down to neck. Pt with 1 episode of vomiting to arrival. Stroke code called by MATIAS Howard. Pt states that she has not taken her blood pressure medication in 5 months.

## 2024-09-08 NOTE — PROGRESS NOTE ADULT - SUBJECTIVE AND OBJECTIVE BOX
=================================  NEUROCRITICAL CARE ATTENDING NOTE  =================================    KARRIE MORALES   MRN-[id number]  Summary:  63y/F  with uncontrolled HTN, does not take any medications at home presented to [facility] after developing acute onset severe HA. Once arrived, patient had episode of nausea with emesis. CTH showed SAH, HH: 2, MF: 3. NIHSS: 0. CTA neg for aneurysm. Transferred to [facility] for further mgmt. Upon arrival to [facility], NIHSS noted to be 0. Patient reports a headache rated at 7/10 in severity. Of note, patient's daughter notes pt seems off and is sleepier than her baseline. Pt denies SOB, chest pain, vision changes, nausea, weakness/numbness/tingling, dizziness, photophobia.  (08 Sep 2024 13:29)    COURSE IN THE HOSPITAL:   s/p angiogram, carotid & cerebral, b/l (2024, Alfredo Lomas), no aneurysm seen; kept intubated post procedure, EVD inserted    Past Medical History: No pertinent past medical history Hypertension  Allergies:  No Known Allergies  Home meds:     PHYSICAL EXAMINATION  T(C): 36 ( @ 16:47), Max: 37.1 ( @ 12:00) HR: 74 ( @ 18:30) (57 - 112) BP: 148/62 ( @ 13:00) (125/73 - 198/91) RR: 12 ( @ 18:30) (12 - 23) SpO2: 98% ( @ 18:30) (91% - 100%)  NEUROLOGIC EXAMINATION:  Patient is awake, alert, fully oriented, pupils 2-3mm equal and briskly reactive to light, EOMs intact, muscle strength 5/5 on all 4s.  GENERAL:    EENT:  anicteric  CARDIOVASCULAR: (+) S1 S2, normal rate and regular rhythm  PULMONARY: clear to auscultation bilaterally  ABDOMEN: soft, nontender with normoactive bowel sounds  EXTREMITIES: no edema  SKIN: no rash    LABS:  CAPILLARY BLOOD GLUCOSE 142 123 148                         10.3   9.18  )-----------( 272      ( 08 Sep 2024 09:20 )             35.6     144  |  106  |  22  ----------------------------<  154<H>  3.3<L>   |  26  |  0.95    Ca    8.6      08 Sep 2024 09:20    TPro  8.0  /  Alb  3.6  /  TBili  0.3  /  DBili  x   /  AST  16  /  ALT  18  /  AlkPhos  139<H>   @ 07:01  -   @ 19:18  --------------------------------------------------------  IN: 942.5 mL / OUT: 2050 mL / NET: -1107.5 mL    Bacteriology:  CSF studies:  EEG:  [email or web address] 09:46AM	CT BRAIN           	Diffuse SAH, mild midline shift, ventr dilation  2024 10:12AM	CT ANGIO NECK	No high-grade stenosis or occlusion  2024 10:12AM	CT ANGIO BRAIN	Mild right ICA stenosis, no large occlusion  2024 10:12AM	CT PERFUSION     	4ml core infarct, 4ml penumbra    Other imaging:    MEDICATIONS:     ·	levETIRAcetam  IVPB 1000 IV Intermittent every 12 hours  ·	ondansetron Injectable 4 IV Push every 6 hours  ·	senna 2 Oral at bedtime  ·	insulin lispro (ADMELOG) corrective regimen sliding scale  SubCutaneous every 6 hours  ·	acetaminophen     Tablet .. 650 Oral every 6 hours PRN  ·	fentaNYL    Injectable 25 IV Push every 2 hours PRN  ·	oxyCODONE    IR 5 Oral every 4 hours PRN    IV FLUIDS:  DRIPS:  ·	propofol Infusion 10 IV Continuous <Continuous>  ·	niCARdipine Infusion 5 mG/Hr IV Continuous <Continuous>  ·	norepinephrine Infusion 0.05 MICROgram(s)/kG/Min IV Continuous <Continuous>  DIET:  Lines:  Drains:      Wounds:    CODE STATUS:  Full Code                       GOALS OF CARE:  aggressive                      DISPOSITION:  ICU =================================  NEUROCRITICAL CARE ATTENDING NOTE  =================================    KARRIE MORALES   MRN-[id number]  Summary:  63y/F  with uncontrolled HTN, does not take any medications at home presented to [facility] after developing acute onset severe HA. Once arrived, patient had episode of nausea with emesis. CTH showed SAH, HH: 2, MF: 3. NIHSS: 0. CTA neg for aneurysm. Transferred to [facility] for further mgmt. Upon arrival to [facility], NIHSS noted to be 0. Patient reports a headache rated at 7/10 in severity. Of note, patient's daughter notes pt seems off and is sleepier than her baseline. Pt denies SOB, chest pain, vision changes, nausea, weakness/numbness/tingling, dizziness, photophobia.  (08 Sep 2024 13:29)    COURSE IN THE HOSPITAL:   s/p angiogram, carotid & cerebral, b/l (2024, Alfredo Lomas), no aneurysm seen; kept intubated post procedure, EVD inserted    Past Medical History: No pertinent past medical history Hypertension  Allergies:  No Known Allergies  Home meds:     PHYSICAL EXAMINATION  T(C): 36 ( @ 16:47), Max: 37.1 ( @ 12:00) HR: 74 ( @ 18:30) (57 - 112) BP: 148/62 ( @ 13:00) (125/73 - 198/91) RR: 12 ( @ 18:30) (12 - 23) SpO2: 98% ( @ 18:30) (91% - 100%)  NEUROLOGIC EXAMINATION:  Patient  AOx1 CAMERON (+) cough/gag/corneals B UE withdraws briskly, BLE withdraws  GENERAL:  full vent support  EENT:  anicteric  CARDIOVASCULAR: (+) S1 S2, normal rate and regular rhythm  PULMONARY: clear to auscultation bilaterally  ABDOMEN: soft, nontender with normoactive bowel sounds  EXTREMITIES: no edema  SKIN: no rash    LABS:  CAPILLARY BLOOD GLUCOSE 142 123 148                         10.3   9.18  )-----------( 272      ( 08 Sep 2024 09:20 )             35.6     144  |  106  |  22  ----------------------------<  154<H>  3.3<L>   |  26  |  0.95    Ca    8.6      08 Sep 2024 09:20    TPro  8.0  /  Alb  3.6  /  TBili  0.3  /  DBili  x   /  AST  16  /  ALT  18  /  AlkPhos  139<H>      PT/INR - ( 08 Sep 2024 11:38 )   PT: 11.1 sec;   INR: 0.97    PTT - ( 08 Sep 2024 09:20 )  PTT:30.7 sec     @ 07:01  -   @ 19:18  --------------------------------------------------------  IN: 942.5 mL / OUT: 2050 mL / NET: -1107.5 mL    Bacteriology:  CSF studies:  EEG:  [email or web address] 09:46AM	CT BRAIN           	Diffuse SAH, mild midline shift, ventr dilation  2024 10:12AM	CT ANGIO NECK	No high-grade stenosis or occlusion  2024 10:12AM	CT ANGIO BRAIN	Mild right ICA stenosis, no large occlusion  2024 10:12AM	CT PERFUSION     	4ml core infarct, 4ml penumbra    Other imaging:    MEDICATIONS:     ·	levETIRAcetam  IVPB 1000 IV Intermittent every 12 hours  ·	ondansetron Injectable 4 IV Push every 6 hours  ·	senna 2 Oral at bedtime  ·	insulin lispro (ADMELOG) corrective regimen sliding scale  SubCutaneous every 6 hours  ·	acetaminophen     Tablet .. 650 Oral every 6 hours PRN  ·	fentaNYL    Injectable 25 IV Push every 2 hours PRN  ·	oxyCODONE    IR 5 Oral every 4 hours PRN    IV FLUIDS: NS@60cc/hr  DRIPS:  ·	propofol Infusion 10 IV Continuous <Continuous>   DIET: NPO  Lines:  Drains:      Wounds:    CODE STATUS:  Full Code                       GOALS OF CARE:  aggressive                      DISPOSITION:  ICU

## 2024-09-08 NOTE — H&P ADULT - HISTORY OF PRESENT ILLNESS
63F PMHx uncontrolled HTN, does not take any medications at home presented to [facility] after developing acute onset severe HA. Once arrived, patient had episode of nausea with emesis. CTH showed SAH, HH: 2, MF: 3. NIHSS: 0. CTA neg for aneurysm. Transferred to [facility] for further mgmt. Upon arrival to [facility], NIHSS noted to be 0. Patient reports a headache rated at 7/10 in severity. Of note, patient's daughter notes pt seems off and is sleepier than her baseline. Pt denies SOB, chest pain, vision changes, nausea, weakness/numbness/tingling, dizziness, photophobia.

## 2024-09-08 NOTE — ED PROVIDER NOTE - NS SC NIH INTUBATED_GEN_A_CORE
Modify Regimen: Increase spironolactone 50mg PO to BID Initiate Treatment: tretinoin 0.05% cream QOHS-QHS Discontinue Regimen: tretinoin 0.025% cream Detail Level: Simple Render In Strict Bullet Format?: No No

## 2024-09-09 LAB
ANION GAP SERPL CALC-SCNC: 10 MMOL/L — SIGNIFICANT CHANGE UP (ref 5–17)
BUN SERPL-MCNC: 10 MG/DL — SIGNIFICANT CHANGE UP (ref 7–23)
CALCIUM SERPL-MCNC: 9 MG/DL — SIGNIFICANT CHANGE UP (ref 8.4–10.5)
CHLORIDE SERPL-SCNC: 107 MMOL/L — SIGNIFICANT CHANGE UP (ref 96–108)
CO2 SERPL-SCNC: 23 MMOL/L — SIGNIFICANT CHANGE UP (ref 22–31)
CREAT ?TM UR-MCNC: 87 MG/DL — SIGNIFICANT CHANGE UP
CREAT SERPL-MCNC: 0.74 MG/DL — SIGNIFICANT CHANGE UP (ref 0.5–1.3)
EGFR: 90 ML/MIN/1.73M2 — SIGNIFICANT CHANGE UP
GLUCOSE BLDC GLUCOMTR-MCNC: 101 MG/DL — HIGH (ref 70–99)
GLUCOSE SERPL-MCNC: 98 MG/DL — SIGNIFICANT CHANGE UP (ref 70–99)
HCT VFR BLD CALC: 35.1 % — SIGNIFICANT CHANGE UP (ref 34.5–45)
HGB BLD-MCNC: 9.9 G/DL — LOW (ref 11.5–15.5)
MAGNESIUM SERPL-MCNC: 2 MG/DL — SIGNIFICANT CHANGE UP (ref 1.6–2.6)
MCHC RBC-ENTMCNC: 20.7 PG — LOW (ref 27–34)
MCHC RBC-ENTMCNC: 28.2 GM/DL — LOW (ref 32–36)
MCV RBC AUTO: 73.3 FL — LOW (ref 80–100)
NRBC # BLD: 0 /100 WBCS — SIGNIFICANT CHANGE UP (ref 0–0)
PHOSPHATE SERPL-MCNC: 4.2 MG/DL — SIGNIFICANT CHANGE UP (ref 2.5–4.5)
PLATELET # BLD AUTO: 235 K/UL — SIGNIFICANT CHANGE UP (ref 150–400)
POTASSIUM SERPL-MCNC: 4.1 MMOL/L — SIGNIFICANT CHANGE UP (ref 3.5–5.3)
POTASSIUM SERPL-SCNC: 4.1 MMOL/L — SIGNIFICANT CHANGE UP (ref 3.5–5.3)
PROT ?TM UR-MCNC: 9 MG/DL — SIGNIFICANT CHANGE UP (ref 0–12)
PROT/CREAT UR-RTO: 0.1 RATIO — SIGNIFICANT CHANGE UP (ref 0–0.2)
RBC # BLD: 4.79 M/UL — SIGNIFICANT CHANGE UP (ref 3.8–5.2)
RBC # FLD: 16.9 % — HIGH (ref 10.3–14.5)
SODIUM SERPL-SCNC: 140 MMOL/L — SIGNIFICANT CHANGE UP (ref 135–145)
T3 SERPL-MCNC: 73 NG/DL — LOW (ref 80–200)
T4 AB SER-ACNC: 6.09 UG/DL — SIGNIFICANT CHANGE UP (ref 4.5–11.7)
T4 FREE SERPL-MCNC: 1 NG/DL — SIGNIFICANT CHANGE UP (ref 0.93–1.7)
TSH SERPL-MCNC: 0.98 UIU/ML — SIGNIFICANT CHANGE UP (ref 0.27–4.2)
WBC # BLD: 11.69 K/UL — HIGH (ref 3.8–10.5)
WBC # FLD AUTO: 11.69 K/UL — HIGH (ref 3.8–10.5)

## 2024-09-09 PROCEDURE — 36620 INSERTION CATHETER ARTERY: CPT

## 2024-09-09 PROCEDURE — 99231 SBSQ HOSP IP/OBS SF/LOW 25: CPT | Mod: 25

## 2024-09-09 PROCEDURE — 93970 EXTREMITY STUDY: CPT | Mod: 26

## 2024-09-09 PROCEDURE — 99291 CRITICAL CARE FIRST HOUR: CPT

## 2024-09-09 RX ORDER — OXYCODONE HYDROCHLORIDE 30 MG/1
5 TABLET, FILM COATED, EXTENDED RELEASE ORAL ONCE
Refills: 0 | Status: DISCONTINUED | OUTPATIENT
Start: 2024-09-09 | End: 2024-09-09

## 2024-09-09 RX ORDER — ENOXAPARIN SODIUM 150 MG/ML
40 INJECTION SUBCUTANEOUS EVERY 12 HOURS
Refills: 0 | Status: DISCONTINUED | OUTPATIENT
Start: 2024-09-09 | End: 2024-09-22

## 2024-09-09 RX ORDER — HYDRALAZINE HYDROCHLORIDE 100 MG/1
10 TABLET ORAL ONCE
Refills: 0 | Status: COMPLETED | OUTPATIENT
Start: 2024-09-09 | End: 2024-09-09

## 2024-09-09 RX ORDER — NIMODIPINE 30 MG/1
60 CAPSULE, LIQUID FILLED ORAL EVERY 4 HOURS
Refills: 0 | Status: DISCONTINUED | OUTPATIENT
Start: 2024-09-09 | End: 2024-09-14

## 2024-09-09 RX ORDER — FERROUS SULFATE 325(65) MG
300 TABLET ORAL
Refills: 0 | Status: DISCONTINUED | OUTPATIENT
Start: 2024-09-09 | End: 2024-09-27

## 2024-09-09 RX ORDER — FERROUS SULFATE 325(65) MG
300 TABLET ORAL
Refills: 0 | Status: DISCONTINUED | OUTPATIENT
Start: 2024-09-09 | End: 2024-09-09

## 2024-09-09 RX ORDER — ENALAPRIL MALEATE 5 MG
1.25 TABLET ORAL ONCE
Refills: 0 | Status: COMPLETED | OUTPATIENT
Start: 2024-09-09 | End: 2024-09-09

## 2024-09-09 RX ORDER — HYDRALAZINE HYDROCHLORIDE 100 MG/1
25 TABLET ORAL ONCE
Refills: 0 | Status: COMPLETED | OUTPATIENT
Start: 2024-09-09 | End: 2024-09-09

## 2024-09-09 RX ORDER — SODIUM CHLORIDE IRRIG SOLUTION 0.9 %
1500 SOLUTION, IRRIGATION IRRIGATION ONCE
Refills: 0 | Status: COMPLETED | OUTPATIENT
Start: 2024-09-09 | End: 2024-09-09

## 2024-09-09 RX ADMIN — Medication 4 MILLIGRAM(S): at 17:21

## 2024-09-09 RX ADMIN — Medication 250 MILLILITER(S): at 12:15

## 2024-09-09 RX ADMIN — NIMODIPINE 60 MILLIGRAM(S): 30 CAPSULE, LIQUID FILLED ORAL at 04:42

## 2024-09-09 RX ADMIN — Medication 650 MILLIGRAM(S): at 07:15

## 2024-09-09 RX ADMIN — Medication 400 MILLIGRAM(S): at 09:44

## 2024-09-09 RX ADMIN — Medication 10 MILLIGRAM(S): at 09:44

## 2024-09-09 RX ADMIN — Medication 650 MILLIGRAM(S): at 21:20

## 2024-09-09 RX ADMIN — ENOXAPARIN SODIUM 40 MILLIGRAM(S): 150 INJECTION SUBCUTANEOUS at 22:00

## 2024-09-09 RX ADMIN — OXYCODONE HYDROCHLORIDE 5 MILLIGRAM(S): 30 TABLET, FILM COATED, EXTENDED RELEASE ORAL at 22:22

## 2024-09-09 RX ADMIN — OXYCODONE HYDROCHLORIDE 5 MILLIGRAM(S): 30 TABLET, FILM COATED, EXTENDED RELEASE ORAL at 17:21

## 2024-09-09 RX ADMIN — CHLORHEXIDINE GLUCONATE ORAL RINSE 1 APPLICATION(S): 1.2 SOLUTION DENTAL at 04:45

## 2024-09-09 RX ADMIN — HYDRALAZINE HYDROCHLORIDE 10 MILLIGRAM(S): 100 TABLET ORAL at 18:43

## 2024-09-09 RX ADMIN — Medication 4 MILLIGRAM(S): at 23:00

## 2024-09-09 RX ADMIN — Medication 650 MILLIGRAM(S): at 22:22

## 2024-09-09 RX ADMIN — Medication 4 MILLIGRAM(S): at 12:15

## 2024-09-09 RX ADMIN — Medication 650 MILLIGRAM(S): at 08:00

## 2024-09-09 RX ADMIN — HYDRALAZINE HYDROCHLORIDE 25 MILLIGRAM(S): 100 TABLET ORAL at 22:54

## 2024-09-09 RX ADMIN — NIMODIPINE 60 MILLIGRAM(S): 30 CAPSULE, LIQUID FILLED ORAL at 21:21

## 2024-09-09 RX ADMIN — NIMODIPINE 60 MILLIGRAM(S): 30 CAPSULE, LIQUID FILLED ORAL at 17:21

## 2024-09-09 RX ADMIN — OXYCODONE HYDROCHLORIDE 5 MILLIGRAM(S): 30 TABLET, FILM COATED, EXTENDED RELEASE ORAL at 21:20

## 2024-09-09 RX ADMIN — Medication 17 GRAM(S): at 12:15

## 2024-09-09 RX ADMIN — Medication 400 MILLIGRAM(S): at 22:00

## 2024-09-09 RX ADMIN — NIMODIPINE 60 MILLIGRAM(S): 30 CAPSULE, LIQUID FILLED ORAL at 14:41

## 2024-09-09 RX ADMIN — Medication 2 TABLET(S): at 21:21

## 2024-09-09 RX ADMIN — Medication 25 MG/HR: at 19:44

## 2024-09-09 RX ADMIN — OXYCODONE HYDROCHLORIDE 5 MILLIGRAM(S): 30 TABLET, FILM COATED, EXTENDED RELEASE ORAL at 18:00

## 2024-09-09 RX ADMIN — NIMODIPINE 60 MILLIGRAM(S): 30 CAPSULE, LIQUID FILLED ORAL at 09:57

## 2024-09-09 RX ADMIN — Medication 4 MILLIGRAM(S): at 04:44

## 2024-09-09 RX ADMIN — Medication 1.25 MILLIGRAM(S): at 12:40

## 2024-09-09 RX ADMIN — NIMODIPINE 60 MILLIGRAM(S): 30 CAPSULE, LIQUID FILLED ORAL at 07:15

## 2024-09-09 RX ADMIN — Medication 650 MILLIGRAM(S): at 14:00

## 2024-09-09 RX ADMIN — Medication 650 MILLIGRAM(S): at 13:30

## 2024-09-09 NOTE — PROGRESS NOTE ADULT - SUBJECTIVE AND OBJECTIVE BOX
INTERVAL HISTORY: HPI:  63F PMHx uncontrolled HTN, does not take any medications at home presented to [facility] after developing acute onset severe HA. Once arrived, patient had episode of nausea with emesis. CTH showed SAH, HH: 2, MF: 3. NIHSS: 0. CTA neg for aneurysm. Transferred to [facility] for further mgmt. Upon arrival to [facility], NIHSS noted to be 0. Patient reports a headache rated at 7/10 in severity. Of note, patient's daughter notes pt seems off and is sleepier than her baseline. Pt denies SOB, chest pain, vision changes, nausea, weakness/numbness/tingling, dizziness, photophobia.  (08 Sep 2024 13:29)      MEDICATIONS  (STANDING):  chlorhexidine 2% Cloths 1 Application(s) Topical <User Schedule>  insulin lispro (ADMELOG) corrective regimen sliding scale   SubCutaneous every 6 hours  levETIRAcetam  IVPB 1000 milliGRAM(s) IV Intermittent every 12 hours  niCARdipine Infusion 5 mG/Hr (25 mL/Hr) IV Continuous <Continuous>  niMODipine 60 milliGRAM(s) Oral every 4 hours  ondansetron Injectable 4 milliGRAM(s) IV Push every 6 hours  polyethylene glycol 3350 17 Gram(s) Oral daily  senna 2 Tablet(s) Oral at bedtime  sodium chloride 0.9%. 1000 milliLiter(s) (60 mL/Hr) IV Continuous <Continuous>    MEDICATIONS  (PRN):  acetaminophen     Tablet .. 650 milliGRAM(s) Oral every 6 hours PRN Temp greater or equal to 38C (100.4F), Mild Pain (1 - 3)  fentaNYL    Injectable 25 MICROGram(s) IV Push every 2 hours PRN Pain or vent dyssynchrony  oxyCODONE    IR 5 milliGRAM(s) Oral every 4 hours PRN Moderate Pain (4 - 6)      Drug Dosing Weight  Height (cm): 161.3 (08 Sep 2024 11:22)  Weight (kg): 120 (08 Sep 2024 11:22)  BMI (kg/m2): 46.1 (08 Sep 2024 11:22)  BSA (m2): 2.19 (08 Sep 2024 11:22)    PAST MEDICAL & SURGICAL HISTORY:  Hypertension      H/O  section          REVIEW OF SYSTEMS: [ ] Unable to Assess due to neurologic exam   [ ] All ROS addressed below are non-contributory, except:  Neuro: [ ] Headache [ ] Back pain [ ] Numbness [ ] Weakness [ ] Ataxia [ ] Dizziness [ ] Aphasia [ ] Dysarthria [ ] Visual disturbance  Resp: [ ] Shortness of breath/dyspnea, [ ] Orthopnea [ ] Cough  CV: [ ] Chest pain [ ] Palpitation [ ] Lightheadedness [ ] Syncope  Renal: [ ] Thirst [ ] Edema  GI: [ ] Nausea [ ] Emesis [ ] Abdominal pain [ ] Constipation [ ] Diarrhea  Hem: [ ] Hematemesis [ ] bright red blood per rectum  ID: [ ] Fever [ ] Chills [ ] Dysuria  ENT: [ ] Rhinorrhea    PHYSICAL EXAM:    General: No Acute Distress     Neurological: Awake, alert oriented to person, place and time, Following Commands, PERRL, EOMI, Face Symmetrical, Speech Fluent, Moving all extremities, Muscle Strength normal in all four extremities, No Drift, Sensation to Light Touch Intact    Pulmonary: Clear to Auscultation, No Rales, No Rhonchi, No Wheezes     Cardiovascular: S1, S2, Regular Rate and Rhythm     Gastrointestinal: Soft, Nontender, Nondistended     Extremities: No calf tenderness     Incision:     ICU Vital Signs Last 24 Hrs  T(C): 36.5 (09 Sep 2024 05:02), Max: 37.1 (08 Sep 2024 12:00)  T(F): 97.7 (09 Sep 2024 05:02), Max: 98.8 (08 Sep 2024 12:00)  HR: 73 (09 Sep 2024 08:00) (59 - 112)  BP: 140/63 (09 Sep 2024 08:00) (125/73 - 198/91)  BP(mean): 90 (09 Sep 2024 08:00) (81 - 128)  ABP: 118/45 (08 Sep 2024 19:00) (103/41 - 153/60)  ABP(mean): 70 (08 Sep 2024 19:00) (63 - 94)  RR: 17 (09 Sep 2024 08:00) (12 - 24)  SpO2: 96% (09 Sep 2024 08:00) (91% - 100%)    O2 Parameters below as of 09 Sep 2024 08:00  Patient On (Oxygen Delivery Method): room air          ABG - ( 08 Sep 2024 16:35 )  pH, Arterial: 7.34  pH, Blood: x     /  pCO2: 45    /  pO2: 239   / HCO3: 24    / Base Excess: -1.7  /  SaO2: 98.2              I&O's Detail    08 Sep 2024 07:01  -  09 Sep 2024 07:00  --------------------------------------------------------  IN:    IV PiggyBack: 100 mL    NiCARdipine: 602.5 mL    Oral Fluid: 300 mL    sodium chloride 0.9%: 1260 mL  Total IN: 2262.5 mL    OUT:    External Ventricular Device (mL): 107 mL    Indwelling Catheter - Urethral (mL): 3700 mL    Norepinephrine: 0 mL    Propofol: 0 mL    Voided (mL): 400 mL  Total OUT: 4207 mL    Total NET: -1944.5 mL      09 Sep 2024 07:01  -  09 Sep 2024 09:24  --------------------------------------------------------  IN:    NiCARdipine: 12.5 mL    sodium chloride 0.9%: 60 mL  Total IN: 72.5 mL    OUT:    External Ventricular Device (mL): 5 mL    Indwelling Catheter - Urethral (mL): 150 mL  Total OUT: 155 mL    Total NET: -82.5 mL      LABS:  CBC Full  -  ( 09 Sep 2024 04:28 )  WBC Count : 11.69 K/uL  RBC Count : 4.79 M/uL  Hemoglobin : 9.9 g/dL  Hematocrit : 35.1 %  Platelet Count - Automated : 235 K/uL  Mean Cell Volume : 73.3 fl  Mean Cell Hemoglobin : 20.7 pg  Mean Cell Hemoglobin Concentration : 28.2 gm/dL  Auto Neutrophil # : x  Auto Lymphocyte # : x  Auto Monocyte # : x  Auto Eosinophil # : x  Auto Basophil # : x  Auto Neutrophil % : x  Auto Lymphocyte % : x  Auto Monocyte % : x  Auto Eosinophil % : x  Auto Basophil % : x        140  |  107  |  10  ----------------------------<  98  4.1   |  23  |  0.74    Ca    9.0      09 Sep 2024 04:28  Phos  4.2       Mg     2.0         TPro  8.0  /  Alb  3.6  /  TBili  0.3  /  DBili  x   /  AST  16  /  ALT  18  /  AlkPhos  139<H>      PT/INR - ( 08 Sep 2024 11:38 )   PT: 11.1 sec;   INR: 0.97          PTT - ( 08 Sep 2024 09:20 )  PTT:30.7 sec  Urinalysis Basic - ( 09 Sep 2024 04:28 )    Color: x / Appearance: x / SG: x / pH: x  Gluc: 98 mg/dL / Ketone: x  / Bili: x / Urobili: x   Blood: x / Protein: x / Nitrite: x   Leuk Esterase: x / RBC: x / WBC x   Sq Epi: x / Non Sq Epi: x / Bacteria: x        RADIOLOGY & ADDITIONAL STUDIES:

## 2024-09-09 NOTE — PROGRESS NOTE ADULT - SUBJECTIVE AND OBJECTIVE BOX
NSCU ATTENDING -- ADDITIONAL PROGRESS NOTE    Nighttime rounds were performed     HPI:  Patient is a 64y/o F  with uncontrolled HTN, does not take any medications at home presented to [facility] after developing acute onset severe HA. Once arrived, patient had episode of nausea with emesis. CTH showed SAH, HH: 2, MF: 3. NIHSS: 0. CTA neg for aneurysm. Transferred to [facility] for further mgmt. Upon arrival to [facility], NIHSS noted to be 0. Patient reports a headache rated at 7/10 in severity. Of note, patient's daughter notes pt seems off and is sleepier than her baseline. Pt denies SOB, c      ICU Vital Signs Last 24 Hrs  T(C): 36.7 (09 Sep 2024 18:01), Max: 37 (09 Sep 2024 00:34)  T(F): 98.1 (09 Sep 2024 18:01), Max: 98.6 (09 Sep 2024 00:34)  HR: 79 (09 Sep 2024 19:00) (59 - 105)  BP: 151/67 (09 Sep 2024 16:00) (114/56 - 157/68)  BP(mean): 96 (09 Sep 2024 16:00) (79 - 98)  ABP: 144/49 (09 Sep 2024 19:00) (144/49 - 160/60)  ABP(mean): 80 (09 Sep 2024 19:00) (80 - 100)  RR: 18 (09 Sep 2024 19:00) (15 - 27)  SpO2: 97% (09 Sep 2024 19:00) (95% - 100%)      09-08-24 @ 07:01  -  09-09-24 @ 07:00  --------------------------------------------------------  IN: 2262.5 mL / OUT: 4207 mL / NET: -1944.5 mL    09-09-24 @ 07:01  -  09-09-24 @ 19:40  --------------------------------------------------------  IN: 2545 mL / OUT: 958 mL / NET: 1587 mL      Mode: AC/ CMV (Assist Control/ Continuous Mandatory Ventilation), RR (machine): 12, TV (machine): 480, FiO2: 40, PEEP: 5, ITime: 1, MAP: 8.2, PIP: 19      PHYSICAL EXAMINATION  NEURO: Calm  GENERAL: : Awak, alert, oriented x 3, follows commands, R facial droop (baseline)  Moves all extremities with power 5/5  Sensation intact   HEENT: Anicteric  CARDIOVASCULAR: S1/S2, RRR  PULMONARY: Clear, no wheeze  ABDOMEN: Soft, nontender with normoactive bowel sounds  EXTREMITIES: No edema  SKIN: No rash, venous stasis hyperpigmentation in B/L lower extremities    MEDICATIONS:   acetaminophen     Tablet .. 650 milliGRAM(s) Oral every 6 hours PRN  ascorbic acid 500 milliGRAM(s) Oral <User Schedule>  chlorhexidine 2% Cloths 1 Application(s) Topical <User Schedule>  enoxaparin Injectable 40 milliGRAM(s) SubCutaneous every 12 hours  ferrous    sulfate Liquid 300 milliGRAM(s) Oral <User Schedule>  levETIRAcetam  IVPB 1000 milliGRAM(s) IV Intermittent every 12 hours  lisinopril 10 milliGRAM(s) Oral daily  niCARdipine Infusion 5 mG/Hr (25 mL/Hr) IV Continuous <Continuous>  niMODipine 60 milliGRAM(s) Oral every 4 hours  ondansetron Injectable 4 milliGRAM(s) IV Push every 6 hours  oxyCODONE    IR 5 milliGRAM(s) Oral every 4 hours PRN  polyethylene glycol 3350 17 Gram(s) Oral daily  senna 2 Tablet(s) Oral at bedtime      LABS:                    9.9    11.69 )-----------( 235      ( 09 Sep 2024 04:28 )             35.1     09-09    140  |  107  |  10  ----------------------------<  98  4.1   |  23  |  0.74    Ca    9.0      09 Sep 2024 04:28  Phos  4.2     09-09  Mg     2.0     09-09    TPro  8.0  /  Alb  3.6  /  TBili  0.3  /  DBili  x   /  AST  16  /  ALT  18  /  AlkPhos  139<H>  09-08    LIVER FUNCTIONS - ( 08 Sep 2024 09:20 )  Alb: 3.6 g/dL / Pro: 8.0 g/dL / ALK PHOS: 139 U/L / ALT: 18 U/L / AST: 16 U/L / GGT: x           ABG - ( 08 Sep 2024 16:35 )  pH, Arterial: 7.34  pH, Blood: x     /  pCO2: 45    /  pO2: 239   / HCO3: 24    / Base Excess: -1.7  /  SaO2: 98.2        ASSESSMENT:   63y/F with  subarachnoid hemorrhage HH2 MF3, brain compression, cerebral edema s/p angiogram, carotid & cerebral, b/l (09/08/2024, Alfredo Lomas)  Hypertension    PLAN:   NEURO:   Neurochecks Q1h  DCI ppx: Nimodipine 60mg PO Q4h, TCDs   Repeat DSA 1 week  seizure prophylaxis: Levetiracetam 1000mg IV BID, as per neurosurgery   EVD @10cm H20 open to drain. Monitor ICPs/CPP/ color/ output  MRI brain with and without contrast   REHAB:  physical therapy evaluation and management    EARLY MOB:      PULM:   Wean to room air   Incentive spirometry    CARDIAC:   SBP goal 100-140mm Hg    ENDO:   Blood sugar goals 140-180 mg/dL  Insulin sliding scale    GI:  Diet    RENAL:      HEM/ONC:   Monitor H/H  VTE Prophylaxis: Lovenox  LE dopplers    ID:   Afebrile, no leukocytosis      Additional critical care time:  45 minutes      NSCU ATTENDING -- ADDITIONAL PROGRESS NOTE    Nighttime rounds were performed     HPI:  Patient is a 64y/o F  with uncontrolled HTN, does not take any medications at home presented to [facility] after developing acute onset severe HA. Once arrived, patient had episode of nausea with emesis. CTH showed SAH, HH: 2, MF: 3. NIHSS: 0. CTA neg for aneurysm. Transferred to [facility] for further mgmt. Upon arrival to [facility], NIHSS noted to be 0. Patient reports a headache rated at 7/10 in severity. Of note, patient's daughter notes pt seems off and is sleepier than her baseline.       ICU Vital Signs Last 24 Hrs  T(C): 36.7 (09 Sep 2024 18:01), Max: 37 (09 Sep 2024 00:34)  T(F): 98.1 (09 Sep 2024 18:01), Max: 98.6 (09 Sep 2024 00:34)  HR: 79 (09 Sep 2024 19:00) (59 - 105)  BP: 151/67 (09 Sep 2024 16:00) (114/56 - 157/68)  BP(mean): 96 (09 Sep 2024 16:00) (79 - 98)  ABP: 144/49 (09 Sep 2024 19:00) (144/49 - 160/60)  ABP(mean): 80 (09 Sep 2024 19:00) (80 - 100)  RR: 18 (09 Sep 2024 19:00) (15 - 27)  SpO2: 97% (09 Sep 2024 19:00) (95% - 100%)      09-08-24 @ 07:01  -  09-09-24 @ 07:00  --------------------------------------------------------  IN: 2262.5 mL / OUT: 4207 mL / NET: -1944.5 mL    09-09-24 @ 07:01  -  09-09-24 @ 19:40  --------------------------------------------------------  IN: 2545 mL / OUT: 958 mL / NET: 1587 mL      Mode: AC/ CMV (Assist Control/ Continuous Mandatory Ventilation), RR (machine): 12, TV (machine): 480, FiO2: 40, PEEP: 5, ITime: 1, MAP: 8.2, PIP: 19      PHYSICAL EXAMINATION  NEURO: Calm  GENERAL: : Awak, alert, oriented x 3, follows commands, R facial droop (baseline)  Moves all extremities with power 5/5  Sensation intact   HEENT: Anicteric  CARDIOVASCULAR: S1/S2, RRR  PULMONARY: Clear, no wheeze  ABDOMEN: Soft, nontender with normoactive bowel sounds  EXTREMITIES: No edema  SKIN: No rash, venous stasis hyperpigmentation in B/L lower extremities    MEDICATIONS:   acetaminophen     Tablet .. 650 milliGRAM(s) Oral every 6 hours PRN  ascorbic acid 500 milliGRAM(s) Oral <User Schedule>  chlorhexidine 2% Cloths 1 Application(s) Topical <User Schedule>  enoxaparin Injectable 40 milliGRAM(s) SubCutaneous every 12 hours  ferrous    sulfate Liquid 300 milliGRAM(s) Oral <User Schedule>  levETIRAcetam  IVPB 1000 milliGRAM(s) IV Intermittent every 12 hours  lisinopril 10 milliGRAM(s) Oral daily  niCARdipine Infusion 5 mG/Hr (25 mL/Hr) IV Continuous <Continuous>  niMODipine 60 milliGRAM(s) Oral every 4 hours  ondansetron Injectable 4 milliGRAM(s) IV Push every 6 hours  oxyCODONE    IR 5 milliGRAM(s) Oral every 4 hours PRN  polyethylene glycol 3350 17 Gram(s) Oral daily  senna 2 Tablet(s) Oral at bedtime      LABS:                    9.9    11.69 )-----------( 235      ( 09 Sep 2024 04:28 )             35.1     09-09    140  |  107  |  10  ----------------------------<  98  4.1   |  23  |  0.74    Ca    9.0      09 Sep 2024 04:28  Phos  4.2     09-09  Mg     2.0     09-09    TPro  8.0  /  Alb  3.6  /  TBili  0.3  /  DBili  x   /  AST  16  /  ALT  18  /  AlkPhos  139<H>  09-08    LIVER FUNCTIONS - ( 08 Sep 2024 09:20 )  Alb: 3.6 g/dL / Pro: 8.0 g/dL / ALK PHOS: 139 U/L / ALT: 18 U/L / AST: 16 U/L / GGT: x           ABG - ( 08 Sep 2024 16:35 )  pH, Arterial: 7.34  pH, Blood: x     /  pCO2: 45    /  pO2: 239   / HCO3: 24    / Base Excess: -1.7  /  SaO2: 98.2        ASSESSMENT:   63y/F with  subarachnoid hemorrhage HH2 MF3, brain compression, cerebral edema s/p angiogram, carotid & cerebral, b/l (09/08/2024, Alfrdeo Lomas)  Hypertension    PLAN:   NEURO:   Neurochecks Q1h  DCI ppx: Nimodipine 60mg PO Q4h, TCDs   Repeat DSA in 1 week  seizure prophylaxis: Levetiracetam 1000mg IV BID, as per neurosurgery   EVD @10cm H20 open to drain. Monitor ICPs/CPP/ color/ output  MRI brain with and without contrast   Activity: Bedrest. Recommend PT/OT 9/10    PULM:   Wean to room air   Incentive spirometry    CARDIAC:   SBP goal 100-140mm Hg  TTE pending  Wean cardene (start hydralazine 25mg tid, increase lisinopril to 20mg)    ENDO:   Blood sugar goals 140-180 mg/dL  Insulin sliding scale    GI:  Diet: Regular  LBM: Awaiting    RENAL:   IVL. Monitor BMPs  Replete lytes prn    HEM/ONC: DVT  Monitor H/H  VTE Prophylaxis: Lovenox bid  LE dopplers reviewed. RLE DVT    ID:   Afebrile, no leukocytosis      Additional critical care time:  45 minutes

## 2024-09-09 NOTE — PROGRESS NOTE ADULT - SUBJECTIVE AND OBJECTIVE BOX
HPI: 63F PMHx uncontrolled HTN was BIBEMS to [facility] 9/8 c/o sudden onset severe HA. CTH showed SAH HH: 2, MF: 3. NIHSS: 0. CTA neg for aneurysm. Transferred to [facility] for further mgmt. S/p DSA negative for anueyrsm (9/8/24). S/p R frontal EVD (9/8/24).     S/Overnight events: NGT placed. Started nimodipine 60q4. Extubated to face mask.     Hospital Course:   9/8: Transfer to [facility] for further mgmt of SAH. Patient taken urgently to angio. ET tube pulled back 2cm. EVD placed, open @ 10. Stability scan stable, EVD in position, NGT placed. Started nimodipine 60q4. Extubated to face mask.     Vital Signs Last 24 Hrs  T(C): 36.6 (08 Sep 2024 22:16), Max: 37.1 (08 Sep 2024 12:00)  T(F): 97.9 (08 Sep 2024 22:16), Max: 98.8 (08 Sep 2024 12:00)  HR: 59 (09 Sep 2024 00:00) (57 - 112)  BP: 128/57 (09 Sep 2024 00:00) (125/73 - 198/91)  BP(mean): 82 (09 Sep 2024 00:00) (82 - 128)  RR: 22 (09 Sep 2024 00:00) (12 - 23)  SpO2: 99% (09 Sep 2024 00:00) (91% - 100%)    Parameters below as of 09 Sep 2024 00:00  Patient On (Oxygen Delivery Method): mask, aerosol    O2 Concentration (%): 40    I&O's Detail    08 Sep 2024 07:01  -  09 Sep 2024 00:23  --------------------------------------------------------  IN:    IV PiggyBack: 100 mL    NiCARdipine: 525 mL    sodium chloride 0.9%: 900 mL  Total IN: 1525 mL    OUT:    External Ventricular Device (mL): 51 mL    Indwelling Catheter - Urethral (mL): 2500 mL    Norepinephrine: 0 mL    Propofol: 0 mL    Voided (mL): 400 mL  Total OUT: 2951 mL    Total NET: -1426 mL        I&O's Summary    08 Sep 2024 07:01  -  09 Sep 2024 00:23  --------------------------------------------------------  IN: 1525 mL / OUT: 2951 mL / NET: -1426 mL        PHYSICAL EXAM:  Constitutional: NAD, resting comfortably in bed.   HEENT: Pupils equal, round, reactive to light.  Respiratory: +Face mask. No respiratory distress, lungs clear to auscultation bilaterally.   Cardiovascular: RRR, S1, S2.   Gastrointestinal: +NGT. Abdomen soft, non tender, nondistended.  Neurological:  AAOX1. Opens eyes to noxious.   Motor: Moves all extremities to noxious. Attempts hand  b/l 1/5.  Pronator Drift: unable to assess  Extremities: Warm, well perfused.  Wounds: R groin puncture.     TUBES/LINES:  [] CVC  [x] A-line  [] Lumbar Drain  [x] Ventriculostomy  [x] Ambriz  [x] Other - NGT    DIET:  [x] NPO  [] Mechanical  [] Tube feeds    LABS:                        10.3   9.18  )-----------( 272      ( 08 Sep 2024 09:20 )             35.6     09-08    144  |  106  |  22  ----------------------------<  154<H>  3.3<L>   |  26  |  0.95    Ca    8.6      08 Sep 2024 09:20    TPro  8.0  /  Alb  3.6  /  TBili  0.3  /  DBili  x   /  AST  16  /  ALT  18  /  AlkPhos  139<H>  09-08    PT/INR - ( 08 Sep 2024 11:38 )   PT: 11.1 sec;   INR: 0.97          PTT - ( 08 Sep 2024 09:20 )  PTT:30.7 sec  Urinalysis Basic - ( 08 Sep 2024 09:20 )    Color: x / Appearance: x / SG: x / pH: x  Gluc: 154 mg/dL / Ketone: x  / Bili: x / Urobili: x   Blood: x / Protein: x / Nitrite: x   Leuk Esterase: x / RBC: x / WBC x   Sq Epi: x / Non Sq Epi: x / Bacteria: x          CAPILLARY BLOOD GLUCOSE      POCT Blood Glucose.: 123 mg/dL (08 Sep 2024 23:12)  POCT Blood Glucose.: 142 mg/dL (08 Sep 2024 16:58)  POCT Blood Glucose.: 123 mg/dL (08 Sep 2024 11:49)  POCT Blood Glucose.: 148 mg/dL (08 Sep 2024 09:20)      Drug Levels: [] N/A    CSF Analysis: [] N/A      Allergies    No Known Allergies    Intolerances      MEDICATIONS:  Antibiotics:    Neuro:  acetaminophen     Tablet .. 650 milliGRAM(s) Oral every 6 hours PRN  fentaNYL    Injectable 25 MICROGram(s) IV Push every 2 hours PRN  levETIRAcetam  IVPB 1000 milliGRAM(s) IV Intermittent every 12 hours  ondansetron Injectable 4 milliGRAM(s) IV Push every 6 hours  oxyCODONE    IR 5 milliGRAM(s) Oral every 4 hours PRN    Anticoagulation:    OTHER:  chlorhexidine 2% Cloths 1 Application(s) Topical <User Schedule>  insulin lispro (ADMELOG) corrective regimen sliding scale   SubCutaneous every 6 hours  niCARdipine Infusion 5 mG/Hr IV Continuous <Continuous>  niMODipine 60 milliGRAM(s) Oral every 4 hours  polyethylene glycol 3350 17 Gram(s) Oral daily  senna 2 Tablet(s) Oral at bedtime    IVF:  sodium chloride 0.9%. 1000 milliLiter(s) IV Continuous <Continuous>    CULTURES:    RADIOLOGY & ADDITIONAL TESTS:      ASSESSMENT:  63F PMHx uncontrolled HTN was BIBEMS to [facility] 9/8 c/o sudden onset severe HA. CTH showed SAH HH: 2, MF: 3. NIHSS: 0. CTA neg for aneurysm. Transferred to [facility] for further mgmt. S/p DSA negative for anueyrsm (9/8/24). S/p R frontal EVD (9/8/24).     Neuro:  - Neuro/vitals q1hr  - Seizure prophylaxis: Keppra 1g BID   - Sedation: Propofol RASS -2 to -3  - R frontal EVD @ 63bdD1C, monitor output   - DCI prophylaxis: Nimodipime 60q4  - Pain control: Tylenol, fent pushes  - Plan for DSA  - stability CTH post-EVD complete 9/8, f/u read  - MR brain and neck w/wo contrast   - Repeat DSA 9/15    Cards:   - SBP <140, levo gtt  - TTE pending   - uncontrolled HTN    Pulm:   - Extubated to face mask  - IS    GI:  - NPO, +NGT  - Bowel regimen  - Nausea: Zofran 4q6 standing     Renal:  - IVF while NPO  - Voiding     Endo:  - ISS while NPO  - F/U A1c    Heme:   - DVT prophylaxis: SCDs   - Microcytic anemia, f/u workup    ID:   - Afebrile     Dispo: NSICU, full code    Discussed with Dr. Butler and Dr. D'Amico

## 2024-09-09 NOTE — PROGRESS NOTE ADULT - ASSESSMENT
Assessment: 63f hx HTN, iron deficiency anemia admit for DSA negative SAH       NEURO:  -neuro check q1  -pain management w/ tylenol & PRN oxycodone   seizure ppx w/ keppra 1g BID   -hydrocephalus s/p EVD (placed 9/8) @ [id number]  -nimodipine 60mg q4  Activity: bed reset    Pending MR brain/ Neck w/ & w/o contrast       PULMONARY:  saturating well on RA,   -continue to monitor on pulse o2   -incentive spirometry 10q/hr when awake     CARDIOVASCULAR:  monitor on telemetry   vitals q1  sbp goal 100-140; nicardipine ggt (wean off) ; start lisinopril 10mg QD   TTE ordered and pending    GASTROENTEROLOGY:  remove NGT   bedside speech & swallow if pass can start advancing diet as tolerated.   ensure BMs w/ Miralax & senna  GI ppx : n/a  Daily stool count, LBM prior to arrival      RENAL/:  -check BMP qd  -strict i/o's ; d/c Ambriz, initiate TOV   -Na goal 135-145  -check urine protein: creatinine ratio   d/c IVF fluids; 1.5L over 6hrs; plasmalyte maintain euvolemia; encourage PO intake      ENDOCRINE:  pre-diabetes   A1c- 5.8%  d/c HISS   TSH- WNL      HEME/ONC:  DVT ppx: SCDs + SQL  microcytic anemia; start iron + vitamin C     INFECTIOUS:   Monitor for fevers

## 2024-09-10 LAB
ANION GAP SERPL CALC-SCNC: 9 MMOL/L — SIGNIFICANT CHANGE UP (ref 5–17)
BUN SERPL-MCNC: 13 MG/DL — SIGNIFICANT CHANGE UP (ref 7–23)
CALCIUM SERPL-MCNC: 8.4 MG/DL — SIGNIFICANT CHANGE UP (ref 8.4–10.5)
CHLORIDE SERPL-SCNC: 105 MMOL/L — SIGNIFICANT CHANGE UP (ref 96–108)
CO2 SERPL-SCNC: 25 MMOL/L — SIGNIFICANT CHANGE UP (ref 22–31)
CREAT SERPL-MCNC: 0.85 MG/DL — SIGNIFICANT CHANGE UP (ref 0.5–1.3)
EGFR: 76 ML/MIN/1.73M2 — SIGNIFICANT CHANGE UP
GLUCOSE SERPL-MCNC: 118 MG/DL — HIGH (ref 70–99)
HCT VFR BLD CALC: 31.3 % — LOW (ref 34.5–45)
HGB BLD-MCNC: 8.8 G/DL — LOW (ref 11.5–15.5)
MAGNESIUM SERPL-MCNC: 2.1 MG/DL — SIGNIFICANT CHANGE UP (ref 1.6–2.6)
MCHC RBC-ENTMCNC: 20.5 PG — LOW (ref 27–34)
MCHC RBC-ENTMCNC: 28.1 GM/DL — LOW (ref 32–36)
MCV RBC AUTO: 72.8 FL — LOW (ref 80–100)
NRBC # BLD: 0 /100 WBCS — SIGNIFICANT CHANGE UP (ref 0–0)
PHOSPHATE SERPL-MCNC: 3.5 MG/DL — SIGNIFICANT CHANGE UP (ref 2.5–4.5)
PLATELET # BLD AUTO: 224 K/UL — SIGNIFICANT CHANGE UP (ref 150–400)
POTASSIUM SERPL-MCNC: 3.9 MMOL/L — SIGNIFICANT CHANGE UP (ref 3.5–5.3)
POTASSIUM SERPL-SCNC: 3.9 MMOL/L — SIGNIFICANT CHANGE UP (ref 3.5–5.3)
RBC # BLD: 4.3 M/UL — SIGNIFICANT CHANGE UP (ref 3.8–5.2)
RBC # FLD: 17.2 % — HIGH (ref 10.3–14.5)
SODIUM SERPL-SCNC: 139 MMOL/L — SIGNIFICANT CHANGE UP (ref 135–145)
WBC # BLD: 8.68 K/UL — SIGNIFICANT CHANGE UP (ref 3.8–10.5)
WBC # FLD AUTO: 8.68 K/UL — SIGNIFICANT CHANGE UP (ref 3.8–10.5)

## 2024-09-10 PROCEDURE — 93306 TTE W/DOPPLER COMPLETE: CPT | Mod: 26

## 2024-09-10 PROCEDURE — 99291 CRITICAL CARE FIRST HOUR: CPT

## 2024-09-10 RX ORDER — HYDRALAZINE HYDROCHLORIDE 100 MG/1
25 TABLET ORAL EVERY 8 HOURS
Refills: 0 | Status: DISCONTINUED | OUTPATIENT
Start: 2024-09-10 | End: 2024-09-11

## 2024-09-10 RX ORDER — SENNOSIDES 8.6 MG
2 TABLET ORAL
Refills: 0 | Status: DISCONTINUED | OUTPATIENT
Start: 2024-09-10 | End: 2024-09-14

## 2024-09-10 RX ADMIN — HYDRALAZINE HYDROCHLORIDE 25 MILLIGRAM(S): 100 TABLET ORAL at 21:05

## 2024-09-10 RX ADMIN — OXYCODONE HYDROCHLORIDE 5 MILLIGRAM(S): 30 TABLET, FILM COATED, EXTENDED RELEASE ORAL at 05:38

## 2024-09-10 RX ADMIN — Medication 4 MILLIGRAM(S): at 11:47

## 2024-09-10 RX ADMIN — NIMODIPINE 60 MILLIGRAM(S): 30 CAPSULE, LIQUID FILLED ORAL at 10:45

## 2024-09-10 RX ADMIN — OXYCODONE HYDROCHLORIDE 5 MILLIGRAM(S): 30 TABLET, FILM COATED, EXTENDED RELEASE ORAL at 01:00

## 2024-09-10 RX ADMIN — OXYCODONE HYDROCHLORIDE 5 MILLIGRAM(S): 30 TABLET, FILM COATED, EXTENDED RELEASE ORAL at 00:17

## 2024-09-10 RX ADMIN — Medication 650 MILLIGRAM(S): at 17:38

## 2024-09-10 RX ADMIN — Medication 2 TABLET(S): at 17:53

## 2024-09-10 RX ADMIN — Medication 4 MILLIGRAM(S): at 17:53

## 2024-09-10 RX ADMIN — Medication 650 MILLIGRAM(S): at 16:18

## 2024-09-10 RX ADMIN — NIMODIPINE 60 MILLIGRAM(S): 30 CAPSULE, LIQUID FILLED ORAL at 05:38

## 2024-09-10 RX ADMIN — Medication 500 MILLIGRAM(S): at 05:38

## 2024-09-10 RX ADMIN — ENOXAPARIN SODIUM 40 MILLIGRAM(S): 150 INJECTION SUBCUTANEOUS at 10:45

## 2024-09-10 RX ADMIN — Medication 650 MILLIGRAM(S): at 05:38

## 2024-09-10 RX ADMIN — Medication 17 GRAM(S): at 17:52

## 2024-09-10 RX ADMIN — Medication 400 MILLIGRAM(S): at 09:06

## 2024-09-10 RX ADMIN — Medication 4 MILLIGRAM(S): at 05:38

## 2024-09-10 RX ADMIN — NIMODIPINE 60 MILLIGRAM(S): 30 CAPSULE, LIQUID FILLED ORAL at 02:09

## 2024-09-10 RX ADMIN — Medication 20 MILLIGRAM(S): at 09:05

## 2024-09-10 RX ADMIN — NIMODIPINE 60 MILLIGRAM(S): 30 CAPSULE, LIQUID FILLED ORAL at 14:48

## 2024-09-10 RX ADMIN — OXYCODONE HYDROCHLORIDE 5 MILLIGRAM(S): 30 TABLET, FILM COATED, EXTENDED RELEASE ORAL at 22:06

## 2024-09-10 RX ADMIN — Medication 300 MILLIGRAM(S): at 05:38

## 2024-09-10 RX ADMIN — Medication 400 MILLIGRAM(S): at 21:06

## 2024-09-10 RX ADMIN — Medication 650 MILLIGRAM(S): at 06:40

## 2024-09-10 RX ADMIN — OXYCODONE HYDROCHLORIDE 5 MILLIGRAM(S): 30 TABLET, FILM COATED, EXTENDED RELEASE ORAL at 21:05

## 2024-09-10 RX ADMIN — NIMODIPINE 60 MILLIGRAM(S): 30 CAPSULE, LIQUID FILLED ORAL at 21:05

## 2024-09-10 RX ADMIN — HYDRALAZINE HYDROCHLORIDE 25 MILLIGRAM(S): 100 TABLET ORAL at 14:48

## 2024-09-10 RX ADMIN — NIMODIPINE 60 MILLIGRAM(S): 30 CAPSULE, LIQUID FILLED ORAL at 17:53

## 2024-09-10 RX ADMIN — ENOXAPARIN SODIUM 40 MILLIGRAM(S): 150 INJECTION SUBCUTANEOUS at 21:05

## 2024-09-10 RX ADMIN — OXYCODONE HYDROCHLORIDE 5 MILLIGRAM(S): 30 TABLET, FILM COATED, EXTENDED RELEASE ORAL at 06:40

## 2024-09-10 RX ADMIN — CHLORHEXIDINE GLUCONATE ORAL RINSE 1 APPLICATION(S): 1.2 SOLUTION DENTAL at 05:39

## 2024-09-10 NOTE — PROGRESS NOTE ADULT - SUBJECTIVE AND OBJECTIVE BOX
INTERVAL HISTORY: HPI:  63F PMHx uncontrolled HTN, does not take any medications at home presented to [facility] after developing acute onset severe HA. Once arrived, patient had episode of nausea with emesis. CTH showed SAH, HH: 2, MF: 3. NIHSS: 0. CTA neg for aneurysm. Transferred to [facility] for further mgmt. Upon arrival to [facility], NIHSS noted to be 0. Patient reports a headache rated at 7/10 in severity. Of note, patient's daughter notes pt seems off and is sleepier than her baseline. Pt denies SOB, chest pain, vision changes, nausea, weakness/numbness/tingling, dizziness, photophobia.  (08 Sep 2024 13:29)      Drug Dosing Weight  Height (cm): 161.3 (08 Sep 2024 11:22)  Weight (kg): 120 (08 Sep 2024 11:22)  BMI (kg/m2): 46.1 (08 Sep 2024 11:22)  BSA (m2): 2.19 (08 Sep 2024 11:22)    PAST MEDICAL & SURGICAL HISTORY:  Hypertension  H/O  section          REVIEW OF SYSTEMS: [ ] Unable to Assess due to neurologic exam   [ ] All ROS addressed below are non-contributory, except:  Neuro: [ ] Headache [ ] Back pain [ ] Numbness [ ] Weakness [ ] Ataxia [ ] Dizziness [ ] Aphasia [ ] Dysarthria [ ] Visual disturbance  Resp: [ ] Shortness of breath/dyspnea, [ ] Orthopnea [ ] Cough  CV: [ ] Chest pain [ ] Palpitation [ ] Lightheadedness [ ] Syncope  Renal: [ ] Thirst [ ] Edema  GI: [ ] Nausea [ ] Emesis [ ] Abdominal pain [ ] Constipation [ ] Diarrhea  Hem: [ ] Hematemesis [ ] bright red blood per rectum  ID: [ ] Fever [ ] Chills [ ] Dysuria  ENT: [ ] Rhinorrhea    PHYSICAL EXAM:    General: No Acute Distress   Neurological: Awake, alert oriented to person, place and time, Following Commands, PERRL, EOMI, R facial, Speech Fluent, Moving all extremities, Muscle Strength normal in all four extremities, No Drift, Sensation to Light Touch Intact  Pulmonary: Clear to Auscultation, No Rales, No Rhonchi, No Wheezes   Cardiovascular: S1, S2, Regular Rate and Rhythm   Gastrointestinal: Soft, Nontender, Nondistended   Extremities: No calf tenderness         ICU Vital Signs Last 24 Hrs  T(C): 36.6 (10 Sep 2024 09:03), Max: 37.4 (10 Sep 2024 01:07)  T(F): 97.8 (10 Sep 2024 09:03), Max: 99.4 (10 Sep 2024 01:07)  HR: 68 (10 Sep 2024 09:) (60 - 85)  BP: 151/67 (09 Sep 2024 16:00) (114/56 - 151/67)  BP(mean): 96 (09 Sep 2024 16:00) (79 - 96)  ABP: 131/59 (10 Sep 2024 09:00) (110/47 - 160/60)  ABP(mean): 85 (10 Sep 2024 09:) (69 - 100)  RR: 16 (10 Sep 2024 09:) (15 - 27)  SpO2: 97% (10 Sep 2024 09:) (94% - 100%)      24 @ 07:  -  09-10-24 @ 07:00  --------------------------------------------------------  IN: 3497.5 mL / OUT: 1800 mL / NET: 1697.5 mL    09-10-24 @ 07:01  -  09-10-24 @ 09:25  --------------------------------------------------------  IN: 100 mL / OUT: 212 mL / NET: -112 mL            acetaminophen     Tablet .. 650 milliGRAM(s) Oral every 6 hours PRN  ascorbic acid 500 milliGRAM(s) Oral <User Schedule>  chlorhexidine 2% Cloths 1 Application(s) Topical <User Schedule>  enoxaparin Injectable 40 milliGRAM(s) SubCutaneous every 12 hours  ferrous    sulfate Liquid 300 milliGRAM(s) Oral <User Schedule>  levETIRAcetam  IVPB 1000 milliGRAM(s) IV Intermittent every 12 hours  lisinopril 20 milliGRAM(s) Oral daily  niCARdipine Infusion 5 mG/Hr (25 mL/Hr) IV Continuous <Continuous>  niMODipine 60 milliGRAM(s) Oral every 4 hours  ondansetron Injectable 4 milliGRAM(s) IV Push every 6 hours  oxyCODONE    IR 5 milliGRAM(s) Oral every 4 hours PRN  polyethylene glycol 3350 17 Gram(s) Oral daily  senna 2 Tablet(s) Oral at bedtime      LABS:  Na: 139 (09-10 @ 05:05), 140 (:28), 144 (:20)  K: 3.9 (09-10 @ 05:05), 4.1 (:), 3.3 (:20)  Cl: 105 (09-10 @ 05:05), 107 (:), 106 (:20)  CO2: 25 (09-10 @ 05:05), 23 (:), 26 (:20)  BUN: 13 (09-10 @ 05:05), 10 (:), 22 (:20)  Cr: 0.85 (09-10 @ 05:05), 0.74 (:), 0.95 (:20)  Glu: 118(09-10 @ 05:05), 98(:28), 154(:20)    Hgb: 8.8 (09-10 @ 05:05), 9.9 (:28), 10.3 (:20)  Hct: 31.3 (09-10 @ 05:05), 35.1 (:28), 35.6 (:20)  WBC: 8.68 (09-10 @ 05:05), 11.69 (:28), 9.18 (:20)  Plt: 224 (09-10 @ 05:05), 235 (:28), 272 (09-08 @ 09:20)    INR: 0.97 24 @ 11:38, 1.02 24 @ 09:20  PTT: 30.7 24 @ 09:20            ABG - ( 08 Sep 2024 16:35 )  pH, Arterial: 7.34  pH, Blood: x     /  pCO2: 45    /  pO2: 239   / HCO3: 24    / Base Excess: -1.7  /  SaO2: 98.2

## 2024-09-10 NOTE — PROGRESS NOTE ADULT - ASSESSMENT
Assessment: 63f hx HTN, iron deficiency anemia admit for DSA negative SAH       NEURO:  -neuro check q1  -pain management w/ tylenol & PRN oxycodone   seizure ppx w/ keppra 1g BID   -hydrocephalus s/p EVD (placed 9/8) @ [id number]  -nimodipine 60mg q4  Activity: bed reset    Pending MR brain/ Neck w/ & w/o contrast       PULMONARY:  saturating well on RA,   -continue to monitor on pulse o2   -incentive spirometry 10q/hr when awake     CARDIOVASCULAR:  monitor on telemetry   vitals q1  sbp goal 100-140; nicardipine ggt (wean off) ; c/w lisinopril 20mg QD   TTE ordered and pending    GASTROENTEROLOGY:  remove NGT   bedside speech & swallow if pass can start advancing diet as tolerated.   ensure BMs w/ Miralax & senna  GI ppx : n/a  Daily stool count, LBM prior to arrival      RENAL/:  -check BMP qd  -strict i/o's ;  -Na goal 135-145      ENDOCRINE:  pre-diabetes   A1c- 5.8%  TSH- WNL      HEME/ONC:  DVT ppx: SCDs + SQL  microcytic anemia; start iron + vitamin C     INFECTIOUS:   Monitor for fevers    Assessment: 63f hx HTN, iron deficiency anemia admit for DSA negative SAH       NEURO:  -neuro check q1  -pain management w/ tylenol & PRN oxycodone   seizure ppx w/ keppra 1g BID   -hydrocephalus s/p EVD (placed 9/8) @ [id number]  -nimodipine 60mg q4  Activity: bed reset    Pending MR brain/ Neck w/ & w/o contrast       PULMONARY:  saturating well on RA,   -continue to monitor on pulse o2   -incentive spirometry 10q/hr when awake     CARDIOVASCULAR:  monitor on telemetry   vitals q1  sbp goal 100-140; nicardipine ggt (wean off) ; c/w lisinopril 20mg QD   TTE ordered and pending    GASTROENTEROLOGY:  remove NGT   bedside speech & swallow if pass can start advancing diet as tolerated.   ensure BMs w/ Miralax & senna  GI ppx : n/a  Daily stool count, LBM prior to arrival      RENAL/:  -check BMP qd  -strict i/o's ;  -Na goal 135-145      ENDOCRINE:  pre-diabetes   A1c- 5.8%  TSH- WNL      HEME/ONC:  DVT ppx: SCDs + SQL  microcytic anemia; start iron + vitamin C   R BK DVT repeat doppler in 1 week     INFECTIOUS:   Monitor for fevers

## 2024-09-10 NOTE — PROGRESS NOTE ADULT - SUBJECTIVE AND OBJECTIVE BOX
HPI:  63F PMHx uncontrolled HTN, does not take any medications at home presented to [facility] after developing acute onset severe HA. Once arrived, patient had episode of nausea with emesis. CTH showed SAH, HH: 2, MF: 3. NIHSS: 0. CTA neg for aneurysm. Transferred to [facility] for further mgmt. Upon arrival to [facility], NIHSS noted to be 0. Patient reports a headache rated at 7/10 in severity. Of note, patient's daughter notes pt seems off and is sleepier than her baseline. Pt denies SOB, chest pain, vision changes, nausea, weakness/numbness/tingling, dizziness, photophobia.  (08 Sep 2024 13:29)      HOSPITAL COURSE:  : Transfer to [facility] for further mgmt of SAH. Patient taken urgently to angio. ET tube pulled back 2cm. EVD placed, open @ 10. Stability scan stable, EVD in position, NGT placed. Started nimodipine 60q4. Extubated to face mask.  : BD1. Started iron/vit C q other day for microcytic anermia. Started lisinopril 10mg daily. 1.L liter bolus for euvolemia. Ambriz removed, f/u TOV, started on prophylactic SQL 40mg BID (weight based). Passed TOV. Off cardene gtt. cardene gtt resumed. started hydral 25 q8, inc lisinopril dose from 10mg to 20mg daily.   9/10: BD2. BILL overnight.     Vital Signs Last 24 Hrs  T(C): 36.8 (10 Sep 2024 14:27), Max: 37.4 (10 Sep 2024 01:07)  T(F): 98.2 (10 Sep 2024 14:27), Max: 99.4 (10 Sep 2024 01:07)  HR: 63 (10 Sep 2024 15:00) (56 - 85)  BP: 151/67 (09 Sep 2024 16:00) (151/67 - 151/67)  BP(mean): 96 (09 Sep 2024 16:00) (96 - 96)  RR: 21 (10 Sep 2024 15:00) (15 - 27)  SpO2: 98% (10 Sep 2024 15:00) (94% - 100%)    Parameters below as of 10 Sep 2024 15:00  Patient On (Oxygen Delivery Method): room air        I&O's Summary    09 Sep 2024 07:01  -  10 Sep 2024 07:00  --------------------------------------------------------  IN: 3497.5 mL / OUT: 1800 mL / NET: 1697.5 mL    10 Sep 2024 07:  -  10 Sep 2024 15:12  --------------------------------------------------------  IN: 200 mL / OUT: 292 mL / NET: -92 mL        PHYSICAL EXAM:  T(C): 36.8 (09-10-24 @ 14:27), Max: 37.4 (09-10-24 @ 01:07)  HR: 63 (09-10-24 @ 15:00) (56 - 85)  BP: 151/67 (24 @ 16:00) (151/67 - 151/67)  RR: 21 (09-10-24 @ 15:00) (15 - 27)  SpO2: 98% (09-10-24 @ 15:00) (94% - 100%)    GENERAL: Well groomed, no apparent distress   HEENT: PERRLA and symmetric, EOMI, No conjunctival or scleral injection, non-icteric  RESP: No respiratory distress, no use of accessory muscles; No rales, no rhonchi, no wheezes.   CV: RRR, +S1S2   GI: Soft, Nontender, Nondistnded, no rebound, no guarding  SKIN: R. groin site with steri strips ;no ecchymosis or hematoma. No rashes or ulcers noted; no subcutaneous nodules or induration palpable.  NEURO: A&O X3. R. Facial palsy @ baseline. CN II-XI intact; normal reflexes in upper and lower extremities, sensation intact in upper and lower extremities b/l to light touch     TUBES/LINES:  [] Ambriz  [] Trach  [] NGT  [] PEG  [] Wound Drains  [] Others    DIET:  [] NPO  [] Mechanical  [] Tube feeds    LABS:                        8.8    8.68  )-----------( 224      ( 10 Sep 2024 05:05 )             31.3     09-10    139  |  105  |  13  ----------------------------<  118<H>  3.9   |  25  |  0.85    Ca    8.4      10 Sep 2024 05:05  Phos  3.5     09-10  Mg     2.1     09-10        Urinalysis Basic - ( 10 Sep 2024 05:05 )    Color: x / Appearance: x / SG: x / pH: x  Gluc: 118 mg/dL / Ketone: x  / Bili: x / Urobili: x   Blood: x / Protein: x / Nitrite: x   Leuk Esterase: x / RBC: x / WBC x   Sq Epi: x / Non Sq Epi: x / Bacteria: x          CAPILLARY BLOOD GLUCOSE          Drug Levels: [] N/A    CSF Analysis: [] N/A      Allergies    No Known Allergies    Intolerances      MEDICATIONS:  Antibiotics:    Neuro:  acetaminophen     Tablet .. 650 milliGRAM(s) Oral every 6 hours PRN  levETIRAcetam  IVPB 1000 milliGRAM(s) IV Intermittent every 12 hours  ondansetron Injectable 4 milliGRAM(s) IV Push every 6 hours  oxyCODONE    IR 5 milliGRAM(s) Oral every 4 hours PRN    Anticoagulation:  enoxaparin Injectable 40 milliGRAM(s) SubCutaneous every 12 hours    OTHER:  chlorhexidine 2% Cloths 1 Application(s) Topical <User Schedule>  hydrALAZINE 25 milliGRAM(s) Oral every 8 hours  lisinopril 20 milliGRAM(s) Oral daily  niCARdipine Infusion 5 mG/Hr IV Continuous <Continuous>  niMODipine 60 milliGRAM(s) Oral every 4 hours  polyethylene glycol 3350 17 Gram(s) Oral two times a day  senna 2 Tablet(s) Oral two times a day    IVF:  ascorbic acid 500 milliGRAM(s) Oral <User Schedule>  ferrous    sulfate Liquid 300 milliGRAM(s) Oral <User Schedule>    CULTURES:    RADIOLOGY & ADDITIONAL TESTS:      ASSESSMENT:  63F PMHx uncontrolled HT, R samuels's palsy, was BIBEMS to [facility]  c/o sudden onset severe HA. CTH showed SAH HH: 2, MF: 3. NIHSS: 0. CTA neg for aneurysm. Transferred to [facility] for further mgmt. S/p DSA negative for anueyrsm (24). S/p R frontal EVD (24).     HTN    No pertinent family history in first degree relatives    Handoff    No pertinent past medical history    Hypertension    SAH (subarachnoid hemorrhage)    SAH (subarachnoid hemorrhage)    SAH (subarachnoid hemorrhage)    Subarachnoid hemorrhage    Angiogram, carotid and cerebral, bilateral    No significant past surgical history    No significant past surgical history    H/O  section    HTN    Hypertension    [email or web address]        Neuro:  - Neuro/vitals q1hr  - Seizure prophylaxis: Keppra 1g BID   - R frontal EVD @ [id number], monitor output   - DCI prophylaxis: Nimodipime 60q4  - Pain control: Tylenol, oxy prn  - Plan for rpt DSA 9/15  - stability CTH post-EVD complete , f/u read  - MR brain and neck w/wo contrast     Cards:   - SBP <140, cardene gtt   - hx uncontrolled HTN: hydralazine 25mg q8, lisinopril 20mg qd   - TTE pending     Pulm:   - Extubated to face mask now on RA  - IS    GI:  - regular diet  - Bowel regimen  - Nausea: Zofran 4q6 standing     Renal:  - IVL  - voiding   - euvolemia goal     Endo:  - A1c 5.8    Heme:   - DVT prophylaxis: SCDs to LLE only, ppx SQL 40 BID (weight based)   - Microcytic anemia: iron/vit C q other day   - LE dopplers : R posterior tibial DVT- surveillace dopplers in 1 week 9/15    ID:   - Afebrile     Dispo: NSICU, full code, bedrest     Discussed with Dr. Butler and Dr. D'Amico     DVT PROPHYLAXIS:  [] Venodynes                                [] Heparin/Lovenox      Assessment:  Present when checked    []  GCS  E   V  M     Heart Failure: []Acute, [] acute on chronic , []chronic  Heart Failure:  [] Diastolic (HFpEF), [] Systolic (HFrEF), []Combined (HFpEF and HFrEF), [] RHF, [] Pulm HTN, [] Other    [] RODRIGO, [] ATN, [] AIN, [] other  [] CKD1, [] CKD2, [] CKD 3, [] CKD 4, [] CKD 5, []ESRD    Encephalopathy: [] Metabolic, [] Hepatic, [] toxic, [] Neurological, [] Other    Abnormal Nurtitional Status: [] malnurtition (see nutrition note), [ ]underweight: BMI < 19, [] morbid obesity: BMI >40, [] Cachexia    [] Sepsis  [] hypovolemic shock,[] cardiogenic shock, [] hemorrhagic shock, [] neuogenic shock  [] Acute Respiratory Failure  []Cerebral edema, [] Brain compression/ herniation,   [] Functional quadriplegia  [] Acute blood loss anemia   HPI:  63F PMHx uncontrolled HTN, does not take any medications at home presented to [facility] after developing acute onset severe HA. Once arrived, patient had episode of nausea with emesis. CTH showed SAH, HH: 2, MF: 3. NIHSS: 0. CTA neg for aneurysm. Transferred to [facility] for further mgmt. Upon arrival to [facility], NIHSS noted to be 0. Patient reports a headache rated at 7/10 in severity. Of note, patient's daughter notes pt seems off and is sleepier than her baseline. Pt denies SOB, chest pain, vision changes, nausea, weakness/numbness/tingling, dizziness, photophobia.  (08 Sep 2024 13:29)      HOSPITAL COURSE:  9/8: Transfer to [facility] for further mgmt of SAH. Patient taken urgently to angio. ET tube pulled back 2cm. EVD placed, open @ 10. Stability scan stable, EVD in position, NGT placed. Started nimodipine 60q4. Extubated to face mask.  9/9: BD1. Started iron/vit C q other day for microcytic anermia. Started lisinopril 10mg daily. 1.L liter bolus for euvolemia. Ambriz removed, f/u TOV, started on prophylactic SQL 40mg BID (weight based). Passed TOV. Off cardene gtt. cardene gtt resumed. started hydral 25 q8, inc lisinopril dose from 10mg to 20mg daily.   9/10: BD2. BILL overnight.     Vital Signs Last 24 Hrs  T(C): 36.8 (10 Sep 2024 14:27), Max: 37.4 (10 Sep 2024 01:07)  T(F): 98.2 (10 Sep 2024 14:27), Max: 99.4 (10 Sep 2024 01:07)  HR: 63 (10 Sep 2024 15:00) (56 - 85)  BP: 151/67 (09 Sep 2024 16:00) (151/67 - 151/67)  BP(mean): 96 (09 Sep 2024 16:00) (96 - 96)  RR: 21 (10 Sep 2024 15:00) (15 - 27)  SpO2: 98% (10 Sep 2024 15:00) (94% - 100%)    Parameters below as of 10 Sep 2024 15:00  Patient On (Oxygen Delivery Method): room air        I&O's Summary    09 Sep 2024 07:01  -  10 Sep 2024 07:00  --------------------------------------------------------  IN: 3497.5 mL / OUT: 1800 mL / NET: 1697.5 mL    10 Sep 2024 07:01  -  10 Sep 2024 15:12  --------------------------------------------------------  IN: 200 mL / OUT: 292 mL / NET: -92 mL        PHYSICAL EXAM:  T(C): 36.8 (09-10-24 @ 14:27), Max: 37.4 (09-10-24 @ 01:07)  HR: 63 (09-10-24 @ 15:00) (56 - 85)  BP: 151/67 (09-09-24 @ 16:00) (151/67 - 151/67)  RR: 21 (09-10-24 @ 15:00) (15 - 27)  SpO2: 98% (09-10-24 @ 15:00) (94% - 100%)      GENERAL: Well groomed, no apparent distress  RESP: No respiratory distress, no use of accessory muscles  CV: RRR  GI: Soft, Nontender, Nondistended  NEURO: A&O X3. eyes openm following commands, R Facial palsy @ baseline. PERRL, EOMI, strength 5/5 in UE and LE b/l, normal reflexes in upper and lower extremities, sensation intact in upper and lower extremities b/l to light touch   Incision/wound: R. groin site with steri strips, no ecchymosis or hematoma.   Drains: + right frontal EVD open at [id number]      TUBES/LINES:  [] Ambriz  [] Trach  [] NGT  [] PEG  [] Wound Drains  [] Others    DIET:  [] NPO  [] Mechanical  [] Tube feeds    LABS:                        8.8    8.68  )-----------( 224      ( 10 Sep 2024 05:05 )             31.3     09-10    139  |  105  |  13  ----------------------------<  118<H>  3.9   |  25  |  0.85    Ca    8.4      10 Sep 2024 05:05  Phos  3.5     09-10  Mg     2.1     09-10        Urinalysis Basic - ( 10 Sep 2024 05:05 )    Color: x / Appearance: x / SG: x / pH: x  Gluc: 118 mg/dL / Ketone: x  / Bili: x / Urobili: x   Blood: x / Protein: x / Nitrite: x   Leuk Esterase: x / RBC: x / WBC x   Sq Epi: x / Non Sq Epi: x / Bacteria: x          CAPILLARY BLOOD GLUCOSE          Drug Levels: [] N/A    CSF Analysis: [] N/A      Allergies    No Known Allergies    Intolerances      MEDICATIONS:  Antibiotics:    Neuro:  acetaminophen     Tablet .. 650 milliGRAM(s) Oral every 6 hours PRN  levETIRAcetam  IVPB 1000 milliGRAM(s) IV Intermittent every 12 hours  ondansetron Injectable 4 milliGRAM(s) IV Push every 6 hours  oxyCODONE    IR 5 milliGRAM(s) Oral every 4 hours PRN    Anticoagulation:  enoxaparin Injectable 40 milliGRAM(s) SubCutaneous every 12 hours    OTHER:  chlorhexidine 2% Cloths 1 Application(s) Topical <User Schedule>  hydrALAZINE 25 milliGRAM(s) Oral every 8 hours  lisinopril 20 milliGRAM(s) Oral daily  niCARdipine Infusion 5 mG/Hr IV Continuous <Continuous>  niMODipine 60 milliGRAM(s) Oral every 4 hours  polyethylene glycol 3350 17 Gram(s) Oral two times a day  senna 2 Tablet(s) Oral two times a day    IVF:  ascorbic acid 500 milliGRAM(s) Oral <User Schedule>  ferrous    sulfate Liquid 300 milliGRAM(s) Oral <User Schedule>    CULTURES:    RADIOLOGY & ADDITIONAL TESTS:      ASSESSMENT:  63F PMHx uncontrolled HT, R samuels's palsy, was BIBEMS to [facility] 9/8 c/o sudden onset severe HA. CTH showed SAH HH: 2, MF: 3. NIHSS: 0. CTA neg for aneurysm. Transferred to [facility] for further mgmt. S/p DSA negative for anueyrsm (9/8/24). S/p R frontal EVD (9/8/24).         Neuro:  - Neuro/vitals q1hr  - Seizure prophylaxis: Keppra 1g BID   - R frontal EVD @ 10luY6D, monitor output   - DCI prophylaxis: Nimodipime 60q4  - Pain control: Tylenol, oxy prn  - Plan for rpt DSA 9/15  - stability CTH post-EVD complete 9/8, f/u read  - MR brain and neck w/wo contrast     Cards:   - SBP <140, cardene gtt   - hx uncontrolled HTN: hydralazine 25mg q8, lisinopril 20mg qd   - TTE pending     Pulm:   - Extubated to face mask now on RA  - IS    GI:  - regular diet  - Bowel regimen  - Nausea: Zofran 4q6 standing     Renal:  - IVL  - voiding   - euvolemia goal     Endo:  - A1c 5.8    Heme:   - DVT prophylaxis: SCDs to LLE only, ppx SQL 40 BID (weight based)   - Microcytic anemia: iron/vit C q other day   - LE dopplers 9/9: R posterior tibial DVT- surveillace dopplers in 1 week 9/15    ID:   - Afebrile     Dispo: NSICU, full code, OOB, PT/OT    Discussed with Dr. Butler and Dr. D'Amico         Assessment:  Present when checked    []  GCS  E   V  M     Heart Failure: []Acute, [] acute on chronic , []chronic  Heart Failure:  [] Diastolic (HFpEF), [] Systolic (HFrEF), []Combined (HFpEF and HFrEF), [] RHF, [] Pulm HTN, [] Other    [] RODRIGO, [] ATN, [] AIN, [] other  [] CKD1, [] CKD2, [] CKD 3, [] CKD 4, [] CKD 5, []ESRD    Encephalopathy: [] Metabolic, [] Hepatic, [] toxic, [] Neurological, [] Other    Abnormal Nurtitional Status: [] malnurtition (see nutrition note), [ ]underweight: BMI < 19, [] morbid obesity: BMI >40, [] Cachexia    [] Sepsis  [] hypovolemic shock,[] cardiogenic shock, [] hemorrhagic shock, [] neuogenic shock  [] Acute Respiratory Failure  []Cerebral edema, [] Brain compression/ herniation,   [] Functional quadriplegia  [] Acute blood loss anemia

## 2024-09-10 NOTE — PROGRESS NOTE ADULT - SUBJECTIVE AND OBJECTIVE BOX
NSCU ATTENDING -- ADDITIONAL PROGRESS NOTE    Nighttime rounds were performed     HPI:  Patient is a 64y/o F  with uncontrolled HTN, does not take any medications at home presented to [facility] after developing acute onset severe HA. Once arrived, patient had episode of nausea with emesis. CTH showed SAH, HH: 2, MF: 3. NIHSS: 0. CTA neg for aneurysm. Transferred to [facility] for further mgmt. Upon arrival to [facility], NIHSS noted to be 0. Patient reports a headache rated at 7/10 in severity. Of note, patient's daughter notes pt seems off and is sleepier than her baseline.           ICU Vital Signs Last 24 Hrs  T(C): 36.8 (10 Sep 2024 14:27), Max: 37.4 (10 Sep 2024 01:07)  T(F): 98.2 (10 Sep 2024 14:27), Max: 99.4 (10 Sep 2024 01:07)  HR: 70 (10 Sep 2024 20:00) (56 - 85)  ABP: 141/58 (10 Sep 2024 20:00) (110/47 - 145/53)  ABP(mean): 85 (10 Sep 2024 20:00) (69 - 91)  RR: 19 (10 Sep 2024 20:00) (14 - 22)  SpO2: 97% (10 Sep 2024 20:00) (94% - 100%)      09-09-24 @ 07:01  -  09-10-24 @ 07:00  --------------------------------------------------------  IN: 3497.5 mL / OUT: 1800 mL / NET: 1697.5 mL    09-10-24 @ 07:01  -  09-10-24 @ 20:36  --------------------------------------------------------  IN: 350 mL / OUT: 519 mL / NET: -169 mL      PHYSICAL EXAMINATION  NEURO: Calm  GENERAL:  Awake, alert, oriented x 3, follows commands, R facial droop (baseline)  Moves all extremities with power 5/5  Sensation intact   HEENT: Anicteric  CARDIOVASCULAR: S1/S2, RRR  PULMONARY: Clear, no wheeze  ABDOMEN: Soft, nontender with normoactive bowel sounds  EXTREMITIES: No edema  SKIN: No rash, venous stasis hyperpigmentation in B/L lower extremities      MEDICATIONS:   acetaminophen     Tablet .. 650 milliGRAM(s) Oral every 6 hours PRN  ascorbic acid 500 milliGRAM(s) Oral <User Schedule>  chlorhexidine 2% Cloths 1 Application(s) Topical <User Schedule>  enoxaparin Injectable 40 milliGRAM(s) SubCutaneous every 12 hours  ferrous    sulfate Liquid 300 milliGRAM(s) Oral <User Schedule>  hydrALAZINE 25 milliGRAM(s) Oral every 8 hours  levETIRAcetam  IVPB 1000 milliGRAM(s) IV Intermittent every 12 hours  lisinopril 20 milliGRAM(s) Oral daily  niCARdipine Infusion 5 mG/Hr (25 mL/Hr) IV Continuous <Continuous>  niMODipine 60 milliGRAM(s) Oral every 4 hours  ondansetron Injectable 4 milliGRAM(s) IV Push every 6 hours  oxyCODONE    IR 5 milliGRAM(s) Oral every 4 hours PRN  polyethylene glycol 3350 17 Gram(s) Oral two times a day  senna 2 Tablet(s) Oral two times a day      LABS:                      8.8    8.68  )-----------( 224      ( 10 Sep 2024 05:05 )             31.3     09-10    139  |  105  |  13  ----------------------------<  118<H>  3.9   |  25  |  0.85    Ca    8.4      10 Sep 2024 05:05  Phos  3.5     09-10  Mg     2.1     09-10        ASSESSMENT:   64y/o F with:  Subarachnoid hemorrhage HH2 MF3, brain compression, cerebral edema s/p angiogram, carotid & cerebral, b/l (09/08/2024, Alfredo Lomas)  Hypertension      PLAN:   NEURO:   Neurochecks Q1h  DCI ppx: Nimodipine 60mg PO Q4h, TCDs   Repeat DSA in 1 week  seizure prophylaxis: Levetiracetam 1000mg IV BID, as per neurosurgery   EVD @10cm H20 open to drain. Monitor ICPs/CPP/ color/ output  MRI brain and CT spine with and without contrast   Activity: Bedrest. Recommend PT/OT 9/10    PULM:   Wean to room air   Incentive spirometry    CARDIAC:   SBP goal 100-140mm Hg  TTE  reviewed  Wean cardene; continue hydralazine 25mg tid, lisinopril to 20mg    ENDO:   Blood sugar goals 140-180 mg/dL  Insulin sliding scale    GI:  Diet: Regular  LBM: Awaiting    RENAL:   IVL. Monitor BMPs  Replete lytes prn    HEM/ONC: DVT  Monitor H/H  VTE Prophylaxis: Lovenox bid  LE dopplers reviewed. RLE DVT posterior tibial vein    ID:   Afebrile, no leukocytosis    Additional critical care time:  45 minutes      NSCU ATTENDING -- ADDITIONAL PROGRESS NOTE    Nighttime rounds were performed     HPI:  Patient is a 64y/o F  with uncontrolled HTN, does not take any medications at home presented to [facility] after developing acute onset severe HA. Once arrived, patient had episode of nausea with emesis. CTH showed SAH, HH: 2, MF: 3. NIHSS: 0. CTA neg for aneurysm. Transferred to [facility] for further mgmt. Upon arrival to [facility], NIHSS noted to be 0. Patient reports a headache rated at 7/10 in severity. Of note, patient's daughter notes pt seems off and is sleepier than her baseline.       ICU Vital Signs Last 24 Hrs  T(C): 36.8 (10 Sep 2024 14:27), Max: 37.4 (10 Sep 2024 01:07)  T(F): 98.2 (10 Sep 2024 14:27), Max: 99.4 (10 Sep 2024 01:07)  HR: 70 (10 Sep 2024 20:00) (56 - 85)  ABP: 141/58 (10 Sep 2024 20:00) (110/47 - 145/53)  ABP(mean): 85 (10 Sep 2024 20:00) (69 - 91)  RR: 19 (10 Sep 2024 20:00) (14 - 22)  SpO2: 97% (10 Sep 2024 20:00) (94% - 100%)      09-09-24 @ 07:01  -  09-10-24 @ 07:00  --------------------------------------------------------  IN: 3497.5 mL / OUT: 1800 mL / NET: 1697.5 mL    09-10-24 @ 07:01  -  09-10-24 @ 20:36  --------------------------------------------------------  IN: 350 mL / OUT: 519 mL / NET: -169 mL      PHYSICAL EXAMINATION  NEURO: Calm  GENERAL:  Awake, alert, oriented x 3, follows commands, R facial droop (baseline)  Moves all extremities with power 5/5  Sensation intact   HEENT: Anicteric  CARDIOVASCULAR: S1/S2, RRR  PULMONARY: Clear, no wheeze  ABDOMEN: Soft, nontender with normoactive bowel sounds  EXTREMITIES: No edema  SKIN: No rash, venous stasis hyperpigmentation in B/L lower extremities      MEDICATIONS:   acetaminophen     Tablet .. 650 milliGRAM(s) Oral every 6 hours PRN  ascorbic acid 500 milliGRAM(s) Oral <User Schedule>  chlorhexidine 2% Cloths 1 Application(s) Topical <User Schedule>  enoxaparin Injectable 40 milliGRAM(s) SubCutaneous every 12 hours  ferrous    sulfate Liquid 300 milliGRAM(s) Oral <User Schedule>  hydrALAZINE 25 milliGRAM(s) Oral every 8 hours  levETIRAcetam  IVPB 1000 milliGRAM(s) IV Intermittent every 12 hours  lisinopril 20 milliGRAM(s) Oral daily  niCARdipine Infusion 5 mG/Hr (25 mL/Hr) IV Continuous <Continuous>  niMODipine 60 milliGRAM(s) Oral every 4 hours  ondansetron Injectable 4 milliGRAM(s) IV Push every 6 hours  oxyCODONE    IR 5 milliGRAM(s) Oral every 4 hours PRN  polyethylene glycol 3350 17 Gram(s) Oral two times a day  senna 2 Tablet(s) Oral two times a day      LABS:                      8.8    8.68  )-----------( 224      ( 10 Sep 2024 05:05 )             31.3     09-10    139  |  105  |  13  ----------------------------<  118<H>  3.9   |  25  |  0.85    Ca    8.4      10 Sep 2024 05:05  Phos  3.5     09-10  Mg     2.1     09-10        ASSESSMENT:   64y/o F with:  Subarachnoid hemorrhage HH2 MF3, brain compression, cerebral edema s/p angiogram, carotid & cerebral, b/l (09/08/2024, Alfredo Lomas)  Hypertension      PLAN:   NEURO:   Neurochecks Q1h  DCI ppx: Nimodipine 60mg PO Q4h, TCDs   Repeat DSA in 1 week  seizure prophylaxis: Levetiracetam 1000mg IV BID, as per neurosurgery   EVD @10cm H20 open to drain. Monitor ICPs/CPP/ color/ output  MRI brain and CT spine with and without contrast   Activity: Bedrest. Recommend PT/OT 9/10    PULM:   Wean to room air   Incentive spirometry    CARDIAC:   SBP goal 100-140mm Hg  TTE  reviewed. Normal EF. Does have LVH  Off cardene; continue hydralazine 25mg tid, lisinopril to 20mg    ENDO:   Blood sugar goals 140-180 mg/dL  Insulin sliding scale    GI:  Diet: Regular  LBM: Awaiting    RENAL:   IVL. Monitor BMPs  Replete lytes prn    HEM/ONC: DVT  Monitor H/H  VTE Prophylaxis: Lovenox bid  LE dopplers reviewed. RLE DVT posterior tibial vein    ID:   Afebrile, no leukocytosis    Additional critical care time:  45 minutes

## 2024-09-11 LAB
ANION GAP SERPL CALC-SCNC: 7 MMOL/L — SIGNIFICANT CHANGE UP (ref 5–17)
BUN SERPL-MCNC: 23 MG/DL — SIGNIFICANT CHANGE UP (ref 7–23)
CALCIUM SERPL-MCNC: 8.7 MG/DL — SIGNIFICANT CHANGE UP (ref 8.4–10.5)
CHLORIDE SERPL-SCNC: 106 MMOL/L — SIGNIFICANT CHANGE UP (ref 96–108)
CO2 SERPL-SCNC: 25 MMOL/L — SIGNIFICANT CHANGE UP (ref 22–31)
CREAT SERPL-MCNC: 1.07 MG/DL — SIGNIFICANT CHANGE UP (ref 0.5–1.3)
EGFR: 58 ML/MIN/1.73M2 — LOW
GLUCOSE SERPL-MCNC: 123 MG/DL — HIGH (ref 70–99)
HCT VFR BLD CALC: 31 % — LOW (ref 34.5–45)
HGB BLD-MCNC: 8.7 G/DL — LOW (ref 11.5–15.5)
MAGNESIUM SERPL-MCNC: 2.3 MG/DL — SIGNIFICANT CHANGE UP (ref 1.6–2.6)
MCHC RBC-ENTMCNC: 20.4 PG — LOW (ref 27–34)
MCHC RBC-ENTMCNC: 28.1 GM/DL — LOW (ref 32–36)
MCV RBC AUTO: 72.8 FL — LOW (ref 80–100)
NRBC # BLD: 0 /100 WBCS — SIGNIFICANT CHANGE UP (ref 0–0)
PHOSPHATE SERPL-MCNC: 4.4 MG/DL — SIGNIFICANT CHANGE UP (ref 2.5–4.5)
PLATELET # BLD AUTO: 232 K/UL — SIGNIFICANT CHANGE UP (ref 150–400)
POTASSIUM SERPL-MCNC: 4.2 MMOL/L — SIGNIFICANT CHANGE UP (ref 3.5–5.3)
POTASSIUM SERPL-SCNC: 4.2 MMOL/L — SIGNIFICANT CHANGE UP (ref 3.5–5.3)
RBC # BLD: 4.26 M/UL — SIGNIFICANT CHANGE UP (ref 3.8–5.2)
RBC # FLD: 17.2 % — HIGH (ref 10.3–14.5)
SODIUM SERPL-SCNC: 138 MMOL/L — SIGNIFICANT CHANGE UP (ref 135–145)
WBC # BLD: 9.66 K/UL — SIGNIFICANT CHANGE UP (ref 3.8–10.5)
WBC # FLD AUTO: 9.66 K/UL — SIGNIFICANT CHANGE UP (ref 3.8–10.5)

## 2024-09-11 PROCEDURE — 99291 CRITICAL CARE FIRST HOUR: CPT

## 2024-09-11 RX ORDER — LABETALOL HYDROCHLORIDE 200 MG/1
5 TABLET, FILM COATED ORAL ONCE
Refills: 0 | Status: COMPLETED | OUTPATIENT
Start: 2024-09-11 | End: 2024-09-11

## 2024-09-11 RX ORDER — ONDANSETRON HCL/PF 4 MG/2 ML
4 VIAL (ML) INJECTION EVERY 6 HOURS
Refills: 0 | Status: DISCONTINUED | OUTPATIENT
Start: 2024-09-11 | End: 2024-09-27

## 2024-09-11 RX ORDER — SODIUM CHLORIDE 0.9 % (FLUSH) 0.9 %
500 SYRINGE (ML) INJECTION ONCE
Refills: 0 | Status: COMPLETED | OUTPATIENT
Start: 2024-09-11 | End: 2024-09-11

## 2024-09-11 RX ORDER — CLONIDINE HYDROCHLORIDE 0.2 MG/1
0.1 TABLET ORAL ONCE
Refills: 0 | Status: COMPLETED | OUTPATIENT
Start: 2024-09-11 | End: 2024-09-11

## 2024-09-11 RX ORDER — BISACODYL 5 MG/1
5 TABLET, COATED ORAL AT BEDTIME
Refills: 0 | Status: DISCONTINUED | OUTPATIENT
Start: 2024-09-11 | End: 2024-09-14

## 2024-09-11 RX ORDER — HYDRALAZINE HYDROCHLORIDE 100 MG/1
10 TABLET ORAL ONCE
Refills: 0 | Status: COMPLETED | OUTPATIENT
Start: 2024-09-11 | End: 2024-09-11

## 2024-09-11 RX ORDER — LEVETIRACETAM 1000 MG
1000 TABLET ORAL
Refills: 0 | Status: DISCONTINUED | OUTPATIENT
Start: 2024-09-11 | End: 2024-09-14

## 2024-09-11 RX ORDER — LACTULOSE 10 G/15ML
10 SOLUTION ORAL; RECTAL ONCE
Refills: 0 | Status: COMPLETED | OUTPATIENT
Start: 2024-09-12 | End: 2024-09-12

## 2024-09-11 RX ORDER — BISACODYL 5 MG/1
10 TABLET, COATED ORAL ONCE
Refills: 0 | Status: DISCONTINUED | OUTPATIENT
Start: 2024-09-11 | End: 2024-09-11

## 2024-09-11 RX ORDER — ACETAMINOPHEN 325 MG
1000 TABLET ORAL ONCE
Refills: 0 | Status: COMPLETED | OUTPATIENT
Start: 2024-09-11 | End: 2024-09-11

## 2024-09-11 RX ADMIN — LABETALOL HYDROCHLORIDE 5 MILLIGRAM(S): 200 TABLET, FILM COATED ORAL at 17:48

## 2024-09-11 RX ADMIN — BISACODYL 5 MILLIGRAM(S): 5 TABLET, COATED ORAL at 13:00

## 2024-09-11 RX ADMIN — NIMODIPINE 60 MILLIGRAM(S): 30 CAPSULE, LIQUID FILLED ORAL at 21:23

## 2024-09-11 RX ADMIN — Medication 400 MILLIGRAM(S): at 09:18

## 2024-09-11 RX ADMIN — Medication 20 MILLIGRAM(S): at 06:15

## 2024-09-11 RX ADMIN — NIMODIPINE 60 MILLIGRAM(S): 30 CAPSULE, LIQUID FILLED ORAL at 17:03

## 2024-09-11 RX ADMIN — OXYCODONE HYDROCHLORIDE 5 MILLIGRAM(S): 30 TABLET, FILM COATED, EXTENDED RELEASE ORAL at 17:06

## 2024-09-11 RX ADMIN — HYDRALAZINE HYDROCHLORIDE 10 MILLIGRAM(S): 100 TABLET ORAL at 08:40

## 2024-09-11 RX ADMIN — Medication 1000 MILLIGRAM(S): at 20:41

## 2024-09-11 RX ADMIN — Medication 4 MILLIGRAM(S): at 00:06

## 2024-09-11 RX ADMIN — LABETALOL HYDROCHLORIDE 5 MILLIGRAM(S): 200 TABLET, FILM COATED ORAL at 18:19

## 2024-09-11 RX ADMIN — Medication 17 GRAM(S): at 06:14

## 2024-09-11 RX ADMIN — OXYCODONE HYDROCHLORIDE 5 MILLIGRAM(S): 30 TABLET, FILM COATED, EXTENDED RELEASE ORAL at 17:50

## 2024-09-11 RX ADMIN — Medication 17 GRAM(S): at 17:03

## 2024-09-11 RX ADMIN — HYDRALAZINE HYDROCHLORIDE 25 MILLIGRAM(S): 100 TABLET ORAL at 06:15

## 2024-09-11 RX ADMIN — OXYCODONE HYDROCHLORIDE 5 MILLIGRAM(S): 30 TABLET, FILM COATED, EXTENDED RELEASE ORAL at 06:15

## 2024-09-11 RX ADMIN — CHLORHEXIDINE GLUCONATE ORAL RINSE 1 APPLICATION(S): 1.2 SOLUTION DENTAL at 06:15

## 2024-09-11 RX ADMIN — NIMODIPINE 60 MILLIGRAM(S): 30 CAPSULE, LIQUID FILLED ORAL at 09:19

## 2024-09-11 RX ADMIN — Medication 400 MILLIGRAM(S): at 21:22

## 2024-09-11 RX ADMIN — Medication 250 MILLILITER(S): at 22:10

## 2024-09-11 RX ADMIN — NIMODIPINE 60 MILLIGRAM(S): 30 CAPSULE, LIQUID FILLED ORAL at 13:41

## 2024-09-11 RX ADMIN — ENOXAPARIN SODIUM 40 MILLIGRAM(S): 150 INJECTION SUBCUTANEOUS at 09:19

## 2024-09-11 RX ADMIN — Medication 400 MILLIGRAM(S): at 19:45

## 2024-09-11 RX ADMIN — CLONIDINE HYDROCHLORIDE 0.1 MILLIGRAM(S): 0.2 TABLET ORAL at 18:20

## 2024-09-11 RX ADMIN — NIMODIPINE 60 MILLIGRAM(S): 30 CAPSULE, LIQUID FILLED ORAL at 06:14

## 2024-09-11 RX ADMIN — Medication 2 TABLET(S): at 17:03

## 2024-09-11 RX ADMIN — BISACODYL 5 MILLIGRAM(S): 5 TABLET, COATED ORAL at 21:23

## 2024-09-11 RX ADMIN — HYDRALAZINE HYDROCHLORIDE 10 MILLIGRAM(S): 100 TABLET ORAL at 10:49

## 2024-09-11 RX ADMIN — Medication 650 MILLIGRAM(S): at 02:00

## 2024-09-11 RX ADMIN — NIMODIPINE 60 MILLIGRAM(S): 30 CAPSULE, LIQUID FILLED ORAL at 01:12

## 2024-09-11 RX ADMIN — HYDRALAZINE HYDROCHLORIDE 25 MILLIGRAM(S): 100 TABLET ORAL at 13:41

## 2024-09-11 RX ADMIN — Medication 650 MILLIGRAM(S): at 03:00

## 2024-09-11 RX ADMIN — ENOXAPARIN SODIUM 40 MILLIGRAM(S): 150 INJECTION SUBCUTANEOUS at 21:22

## 2024-09-11 RX ADMIN — OXYCODONE HYDROCHLORIDE 5 MILLIGRAM(S): 30 TABLET, FILM COATED, EXTENDED RELEASE ORAL at 07:04

## 2024-09-11 RX ADMIN — Medication 2 TABLET(S): at 06:14

## 2024-09-11 NOTE — PHYSICAL THERAPY INITIAL EVALUATION ADULT - ADDITIONAL COMMENTS
Pt is right hand dominant and wears glasses for reading and distance. She lives with her  and son in a home with "a few" steps to enter. She reports being independent at baseline for all functional mobility and ADLs without use of AD/DME. She works as a .

## 2024-09-11 NOTE — OCCUPATIONAL THERAPY INITIAL EVALUATION ADULT - GENERAL OBSERVATIONS, REHAB EVAL
OT IE complete. MRS 4. MATIAS Pearson, SERGIO Williamson clearing pt. for session. PT Amarilis present. Pt. received semi-supine in bed, +a-line, + L SCD, +tele, +heplock, +EVD (clamped by SERGIO Williamson), +heplock w. family bedside, agreeable to therapy session.

## 2024-09-11 NOTE — OCCUPATIONAL THERAPY INITIAL EVALUATION ADULT - PLANNED THERAPY INTERVENTIONS, OT EVAL
ADL retraining/IADL retraining/balance training/cognitive, visual perceptual/neuromuscular re-education/parent/caregiver training.../strengthening/transfer training

## 2024-09-11 NOTE — PHYSICAL THERAPY INITIAL EVALUATION ADULT - SITTING BALANCE: STATIC
fair minus Pt dangled at EOB ~10 minutes initially requiring modA for postural control progressing to CGA/poor plus

## 2024-09-11 NOTE — PROGRESS NOTE ADULT - SUBJECTIVE AND OBJECTIVE BOX
INTERVAL HISTORY: HPI:  63F PMHx uncontrolled HTN, does not take any medications at home presented to [facility] after developing acute onset severe HA. Once arrived, patient had episode of nausea with emesis. CTH showed SAH, HH: 2, MF: 3. NIHSS: 0. CTA neg for aneurysm. Transferred to [facility] for further mgmt. Upon arrival to [facility], NIHSS noted to be 0. Patient reports a headache rated at 7/10 in severity. Of note, patient's daughter notes pt seems off and is sleepier than her baseline. Pt denies SOB, chest pain, vision changes, nausea, weakness/numbness/tingling, dizziness, photophobia.  (08 Sep 2024 13:29)      Drug Dosing Weight  Height (cm): 161.3 (08 Sep 2024 11:22)  Weight (kg): 120 (08 Sep 2024 11:22)  BMI (kg/m2): 46.1 (08 Sep 2024 11:22)  BSA (m2): 2.19 (08 Sep 2024 11:22)    PAST MEDICAL & SURGICAL HISTORY:  Hypertension  H/O  section          REVIEW OF SYSTEMS: [ ] Unable to Assess due to neurologic exam   [ ] All ROS addressed below are non-contributory, except:  Neuro: [ ] Headache [ ] Back pain [ ] Numbness [ ] Weakness [ ] Ataxia [ ] Dizziness [ ] Aphasia [ ] Dysarthria [ ] Visual disturbance  Resp: [ ] Shortness of breath/dyspnea, [ ] Orthopnea [ ] Cough  CV: [ ] Chest pain [ ] Palpitation [ ] Lightheadedness [ ] Syncope  Renal: [ ] Thirst [ ] Edema  GI: [ ] Nausea [ ] Emesis [ ] Abdominal pain [ ] Constipation [ ] Diarrhea  Hem: [ ] Hematemesis [ ] bright red blood per rectum  ID: [ ] Fever [ ] Chills [ ] Dysuria  ENT: [ ] Rhinorrhea    PHYSICAL EXAM:    General: No Acute Distress   Neurological: Awake, alert oriented to person, place and time, Following Commands, PERRL, EOMI, R facial, Speech Fluent, Moving all extremities, Muscle Strength normal in all four extremities, No Drift, Sensation to Light Touch Intact  Pulmonary: Clear to Auscultation, No Rales, No Rhonchi, No Wheezes   Cardiovascular: S1, S2, Regular Rate and Rhythm   Gastrointestinal: Soft, Nontender, Nondistended   Extremities: No calf tenderness               ICU Vital Signs Last 24 Hrs  T(C): 36.7 (11 Sep 2024 05:16), Max: 36.8 (10 Sep 2024 14:27)  T(F): 98.1 (11 Sep 2024 05:16), Max: 98.2 (10 Sep 2024 14:27)  HR: 70 (11 Sep 2024 10:00) (56 - 84)  BP: --  BP(mean): --  ABP: 132/46 (11 Sep 2024 10:00) (120/55 - 159/58)  ABP(mean): 73 (11 Sep 2024 10:00) (72 - 94)  RR: 18 (11 Sep 2024 10:00) (14 - 19)  SpO2: 97% (11 Sep 2024 10:00) (96% - 98%)      09-10-24 @ 07:01  -  24 @ 07:00  --------------------------------------------------------  IN: 1090 mL / OUT: 1004 mL / NET: 86 mL    24 @ 07:01  -  24 @ 10:20  --------------------------------------------------------  IN: 100 mL / OUT: 211 mL / NET: -111 mL            acetaminophen     Tablet .. 650 milliGRAM(s) Oral every 6 hours PRN  ascorbic acid 500 milliGRAM(s) Oral <User Schedule>  chlorhexidine 2% Cloths 1 Application(s) Topical <User Schedule>  enoxaparin Injectable 40 milliGRAM(s) SubCutaneous every 12 hours  ferrous    sulfate Liquid 300 milliGRAM(s) Oral <User Schedule>  hydrALAZINE 25 milliGRAM(s) Oral every 8 hours  levETIRAcetam  IVPB 1000 milliGRAM(s) IV Intermittent every 12 hours  lisinopril 20 milliGRAM(s) Oral daily  niMODipine 60 milliGRAM(s) Oral every 4 hours  ondansetron Injectable 4 milliGRAM(s) IV Push every 6 hours PRN  oxyCODONE    IR 5 milliGRAM(s) Oral every 4 hours PRN  polyethylene glycol 3350 17 Gram(s) Oral two times a day  senna 2 Tablet(s) Oral two times a day      LABS:  Na: 138 ( 05:23), 139 (09-10 @ 05:05), 140 (:)  K: 4.2 (:23), 3.9 (09-10 @ 05:05), 4.1 (:)  Cl: 106 ( 05:23), 105 (09-10 @ 05:05), 107 ()  CO2: 25 ( 05:23), 25 (09-10 @ 05:05), 23 (:28)  BUN: 23 (:23), 13 (09-10 @ 05:05), 10 ()  Cr: 1.07 (:23), 0.85 (09-10 @ 05:05), 0.74 (:)  Glu: 123( 05:23), 118(09-10 @ 05:05), 98(:)    Hgb: 8.7 ( 05:23), 8.8 (09-10 @ 05:05), 9.9 (:)  Hct: 31.0 ( 05:23), 31.3 (09-10 @ 05:05), 35.1 (:28)  WBC: 9.66 ( 05:23), 8.68 (09-10 @ 05:05), 11.69 (:)  Plt: 232 ( 05:23), 224 (09-10 @ 05:05), 235 (:28)    INR: 0.97 24 @ 11:38

## 2024-09-11 NOTE — OCCUPATIONAL THERAPY INITIAL EVALUATION ADULT - VISUAL ASSESSMENT: TRACKING
Chief Complaint: _POD1 removal of hardware and placement of antibiotic spacer    Subjective: _ Pain is manageable. N/T subsiding. Has not yet eaten but has voided.  Denies CP, SOB, N/V, calf/ leg pain.    Objective: _    Vitals: _AVSS. Drain output 60 cc in the last shift. 140 total since surgery.  Musculoskeletal: _Incision CDI. Splint in place. Compartments are swollen but soft. Decreased firing and subjective sensation of the deltoid and biceps. Motor and sensory function of the r/m/u nerves are intact with +2 radial pulse.  Yoly's negative R/L.  Mental Status:_Alert    Labs: _ stable H/H  Fresh frozen showed 6-10 WBCs per HPF. Cultures pending.     Assessment: _Stable from ortho.    Plan: _ Remain in splint and sling. Drain in for now and can be pulled later today.   PICC today and await ID recommendations for outpatient IV abx.  Patient would like to go home today if possible.                Electronically Signed On 10.31.2018 08:01  ___________________________________________________   Orly Bee     sluggish and choppy - vision versus cognition

## 2024-09-11 NOTE — PHYSICAL THERAPY INITIAL EVALUATION ADULT - PLANNED THERAPY INTERVENTIONS, PT EVAL
balance training/bed mobility training/gait training/neuromuscular re-education/postural re-education/ROM/transfer training

## 2024-09-11 NOTE — PROGRESS NOTE ADULT - SUBJECTIVE AND OBJECTIVE BOX
S/Overnight events:  BD 3. POD 3 angio. BILL overnight.     Hospital Course:   : Transfer to [facility] for further mgmt of SAH. Patient taken urgently to angio. ET tube pulled back 2cm. EVD placed, open @ 10. Stability scan stable, EVD in position, NGT placed. Started nimodipine 60q4. Extubated to face mask. POD 0 DSA neg for aneurysm.   : BD1. POD 1 angio. Started iron/vit C q other day for microcytic anermia. Started lisinopril 10mg daily. 1.L liter bolus for euvolemia. Ambriz removed, f/u TOV, started on prophylactic SQL 40mg BID (weight based). Passed TOV. Off cardene gtt. cardene gtt resumed. started hydral 25 q8, inc lisinopril dose from 10mg to 20mg daily.   9/10: BD2. POD 2 angio. BILL overnight. TTE showing EF 70%, mild symmetric LVH. DC cardene.     Vital Signs Last 24 Hrs  T(C): 36.8 (11 Sep 2024 01:04), Max: 36.8 (10 Sep 2024 14:27)  T(F): 98.2 (11 Sep 2024 01:04), Max: 98.2 (10 Sep 2024 14:27)  HR: 63 (11 Sep 2024 00:00) (56 - 75)  BP: --  BP(mean): --  RR: 16 (11 Sep 2024 00:00) (14 - 19)  SpO2: 96% (11 Sep 2024 00:00) (94% - 98%)    Parameters below as of 11 Sep 2024 01:00  Patient On (Oxygen Delivery Method): room air    I&O's Detail    09 Sep 2024 07:01  -  10 Sep 2024 07:00  --------------------------------------------------------  IN:    IV PiggyBack: 100 mL    multiple electrolytes Injection Type 1 Bolus: 1500 mL    NiCARdipine: 237.5 mL    Oral Fluid: 1540 mL    sodium chloride 0.9%: 120 mL  Total IN: 3497.5 mL    OUT:    External Ventricular Device (mL): 175 mL    Indwelling Catheter - Urethral (mL): 675 mL    Voided (mL): 950 mL  Total OUT: 1800 mL    Total NET: 1697.5 mL      10 Sep 2024 07:01  -  11 Sep 2024 01:31  --------------------------------------------------------  IN:    IV PiggyBack: 200 mL    Oral Fluid: 450 mL  Total IN: 650 mL    OUT:    External Ventricular Device (mL): 105 mL    NiCARdipine: 0 mL    Voided (mL): 450 mL  Total OUT: 555 mL    Total NET: 95 mL    I&O's Summary    09 Sep 2024 07:01  -  10 Sep 2024 07:00  --------------------------------------------------------  IN: 3497.5 mL / OUT: 1800 mL / NET: 1697.5 mL    10 Sep 2024 07:01  -  11 Sep 2024 01:31  --------------------------------------------------------  IN: 650 mL / OUT: 555 mL / NET: 95 mL    PHYSICAL EXAM:  General: Pt is sitting up comfortably in bed, in NAD, on RA  HEENT: PERRL 3mm, EOMI B/L, +Right facial droop (baseline), +EVD in place   Cardiovascular: RRR, normal S1 and S2   Respiratory: non-labored breathing, symmetric chest rise   GI: abd soft, NTND   Neuro: A&O x 3, no aphasia, speech clear, no dysmetria, no pronator drift  Strength 5/5 throughout all 4 extremities  Sensation intact to light touch throughout   Vascular: Distal pulses 2+ x4, no calf edema or erythema  Wounds: R groin C/D/I, no evidence of hematoma; EVD insertion site and incision C/D/I with chlorhexidine dressing in place     TUBES/LINES:  [] CVC  [X] A-line  [] Lumbar Drain  [X] Ventriculostomy  [] Other    DIET:  [] NPO  [X] Mechanical  [] Tube feeds    LABS:    Urinalysis Basic - ( 10 Sep 2024 05:05 )    Color: x / Appearance: x / SG: x / pH: x  Gluc: 118 mg/dL / Ketone: x  / Bili: x / Urobili: x   Blood: x / Protein: x / Nitrite: x   Leuk Esterase: x / RBC: x / WBC x   Sq Epi: x / Non Sq Epi: x / Bacteria: x    Allergies    No Known Allergies    Intolerances      MEDICATIONS:  Antibiotics:    Neuro:  acetaminophen     Tablet .. 650 milliGRAM(s) Oral every 6 hours PRN  levETIRAcetam  IVPB 1000 milliGRAM(s) IV Intermittent every 12 hours  ondansetron Injectable 4 milliGRAM(s) IV Push every 6 hours PRN  oxyCODONE    IR 5 milliGRAM(s) Oral every 4 hours PRN    Anticoagulation:  enoxaparin Injectable 40 milliGRAM(s) SubCutaneous every 12 hours    OTHER:  chlorhexidine 2% Cloths 1 Application(s) Topical <User Schedule>  hydrALAZINE 25 milliGRAM(s) Oral every 8 hours  lisinopril 20 milliGRAM(s) Oral daily  niMODipine 60 milliGRAM(s) Oral every 4 hours  polyethylene glycol 3350 17 Gram(s) Oral two times a day  senna 2 Tablet(s) Oral two times a day    IVF:  ascorbic acid 500 milliGRAM(s) Oral <User Schedule>  ferrous    sulfate Liquid 300 milliGRAM(s) Oral <User Schedule>    ASSESSMENT:  63F PMHx uncontrolled HT, R samuels's palsy, was BIBEMS to [facility]  c/o sudden onset severe HA. CTH showed SAH HH: 2, MF: 3. NIHSS: 0. CTA neg for aneurysm. Transferred to [facility] for further mgmt. S/p DSA negative for anueyrsm (24). S/p R frontal EVD (24).     HTN    No pertinent family history in first degree relatives    Handoff    No pertinent past medical history    Hypertension    SAH (subarachnoid hemorrhage)    SAH (subarachnoid hemorrhage)    SAH (subarachnoid hemorrhage)    Subarachnoid hemorrhage    Angiogram, carotid and cerebral, bilateral    No significant past surgical history    No significant past surgical history    H/O  section    HTN    Hypertension    [email or web address]    PLAN:  Neuro:  - Neuro/vitals q1hr  - Seizure prophylaxis: Keppra 1g BID   - R frontal EVD @ 26ogS1E, monitor output   - DCI prophylaxis: Nimodipime 60q4  - Pain control: Tylenol, oxy prn  - Plan for rpt DSA 9/15  - stability CTH post-EVD complete , stable  - MR brain and neck w/wo contrast     Cards:   - SBP <140  - hx uncontrolled HTN: hydralazine 25mg q8, lisinopril 20mg qd   - TTE 9/10: EF 70%, mild symmetric LVH    Pulm:   - RA  - IS    GI:  - regular diet  - Bowel regimen  - Nausea: Zofran 4q6 prn     Renal:  - IVL  - voiding   - euvolemia goal     Endo:  - A1c 5.8    Heme:   - DVT prophylaxis: SCDs to LLE only, ppx SQL 40 BID (weight based)   - Microcytic anemia: iron/vit C q other day   - LE dopplers : R posterior tibial DVT- surveillace dopplers in 1 week 9/15    ID:   - Afebrile     Dispo: NSICU, full code, bedrest     Discussed with Dr. Butler and Dr. Morse

## 2024-09-11 NOTE — PHYSICAL THERAPY INITIAL EVALUATION ADULT - IMPAIRMENTS FOUND, PT EVAL
aerobic capacity/endurance/arousal, attention, and cognition/cognitive impairment/cranial and peripheral nerve integrity/fine motor/gait, locomotion, and balance/poor safety awareness/posture

## 2024-09-11 NOTE — OCCUPATIONAL THERAPY INITIAL EVALUATION ADULT - MODALITIES TREATMENT COMMENTS
Cranial Nerves II - XII: II: PERRLA; visual fields are full to confrontation III, IV, VI: EOMI, no nystagmus appreciated V: facial sensation impaired R side to light touch V1-V3 b/l VII: no ptosis, R facial droop, symmetric eyebrow raise and closure VIII: hearing intact to finger rub b/l  XI: head turning and shoulder shrug intact b/l XII: tongue protrusion midline, asymetrical cheek puff (R). Visual Quadrant test- WNL, Vision H test- pt. often loosing focus on finger, choppy scanning but grossly can reach all planes. *of note, pt. has hx of R bell's palsy- R facial droop and sensation impairment may be baseline

## 2024-09-11 NOTE — OCCUPATIONAL THERAPY INITIAL EVALUATION ADULT - MODIFIED CLINICAL TEST OF SENSORY INTEGRATION IN BALANCE TEST
Pt. tolerated dangle at EOB ~10 minutes, initially w. Mod A, then transition to CGA w. time. Pt. tolerated STS x2

## 2024-09-11 NOTE — PHYSICAL THERAPY INITIAL EVALUATION ADULT - IMPAIRED TRANSFERS: SIT/STAND, REHAB EVAL
dec aerobic capacity/endurance/impaired balance/cognition/impaired coordination/decreased flexibility/impaired postural control

## 2024-09-11 NOTE — PHYSICAL THERAPY INITIAL EVALUATION ADULT - SHORT TERM MEMORY, REHAB EVAL
Pt frequently re-oriented to hospital name/impaired Pt frequently re-oriented to hospital name but unable to verbalize at end of session/impaired

## 2024-09-11 NOTE — PHYSICAL THERAPY INITIAL EVALUATION ADULT - GENERAL OBSERVATIONS, REHAB EVAL
Pt received semi supine in bed in NAD, +EVD (Clamped prior to mobility by SERGIO Williamson), +tele, +A-line, +IV, +pulse ox, +primafit, +b/l SCDs, SERGIO Williamson cleared Pt for PT treatment. Pt is agreeable and motivated to participate in PT treatment. MRS 4. Pt received semi supine in bed in NAD, +EVD (Clamped prior to mobility by SERGIO Williamson), +tele, +A-line, +IV, +pulse ox, +primafit, +b/l SCDs, +family present, +OT Anuj present, SERGIO Williamson cleared Pt for PT treatment. Pt is agreeable and motivated to participate in PT treatment.

## 2024-09-11 NOTE — OCCUPATIONAL THERAPY INITIAL EVALUATION ADULT - DIAGNOSIS, OT EVAL
Pt. admitted to [facility] for SAH management, POD 3 (9/8) diagnostic angio. Upon assessment, pt. demo impaired cognition, balance, postural control and activity tolerance impacting engagement in ADLs and functional mob/transfers.

## 2024-09-11 NOTE — PHYSICAL THERAPY INITIAL EVALUATION ADULT - PERTINENT HX OF CURRENT PROBLEM, REHAB EVAL
63F PMHx uncontrolled HT, R samuels's palsy, was BIBEMS to [facility] 9/8 c/o sudden onset severe HA. CTH showed SAH HH: 2, MF: 3. NIHSS: 0. CTA neg for aneurysm. Transferred to [facility] for further mgmt. S/p DSA negative for anueyrsm (9/8/24). S/p R frontal EVD (9/8/24).

## 2024-09-11 NOTE — PROGRESS NOTE ADULT - SUBJECTIVE AND OBJECTIVE BOX
NSCU ATTENDING -- ADDITIONAL PROGRESS NOTE    Nighttime rounds were performed     HPI:  Patient is a 62 y/o F  with uncontrolled HTN, does not take any medications at home presented to [facility] after developing acute onset severe HA. Once arrived, patient had episode of nausea with emesis. CTH showed SAH, HH: 2, MF: 3. NIHSS: 0. CTA neg for aneurysm. Transferred to [facility] for further mgmt. Upon arrival to [facility], NIHSS noted to be 0. Patient reports a headache rated at 7/10 in severity. Of note, patient's daughter notes pt seems off and is sleepier than her baseline.       ICU Vital Signs Last 24 Hrs  T(C): 37.2 (11 Sep 2024 17:00), Max: 37.2 (11 Sep 2024 17:00)  T(F): 98.9 (11 Sep 2024 17:00), Max: 98.9 (11 Sep 2024 17:00)  HR: 65 (11 Sep 2024 19:00) (57 - 84)  BP: 119/55 (11 Sep 2024 12:04) (119/55 - 148/61)  BP(mean): 79 (11 Sep 2024 12:04) (79 - 89)  ABP: 158/56 (11 Sep 2024 19:00) (122/42 - 169/62)  ABP(mean): 92 (11 Sep 2024 19:00) (67 - 99)  RR: 20 (11 Sep 2024 19:00) (14 - 21)  SpO2: 97% (11 Sep 2024 19:00) (96% - 98%)      09-10-24 @ 07:01  -  09-11-24 @ 07:00  --------------------------------------------------------  IN: 1090 mL / OUT: 1004 mL / NET: 86 mL    09-11-24 @ 07:01  -  09-11-24 @ 20:01  --------------------------------------------------------  IN: 100 mL / OUT: 1217 mL / NET: -1117 mL      PHYSICAL EXAMINATION  NEURO: Calm  GENERAL:  Awake, alert, oriented x 3, follows commands, R facial droop (baseline)  Moves all extremities with power 5/5  Sensation intact   HEENT: Anicteric  CARDIOVASCULAR: S1/S2, RRR  PULMONARY: Clear, no wheeze  ABDOMEN: Soft, nontender with normoactive bowel sounds  EXTREMITIES: No edema  SKIN: No rash, venous stasis hyperpigmentation in B/L lower extremities      MEDICATIONS:   acetaminophen     Tablet .. 650 milliGRAM(s) Oral every 6 hours PRN  ascorbic acid 500 milliGRAM(s) Oral <User Schedule>  bisacodyl 5 milliGRAM(s) Oral at bedtime  chlorhexidine 2% Cloths 1 Application(s) Topical <User Schedule>  enoxaparin Injectable 40 milliGRAM(s) SubCutaneous every 12 hours  ferrous    sulfate Liquid 300 milliGRAM(s) Oral <User Schedule>  levETIRAcetam  IVPB 1000 milliGRAM(s) IV Intermittent every 12 hours  lisinopril 20 milliGRAM(s) Oral daily  niMODipine 60 milliGRAM(s) Oral every 4 hours  ondansetron Injectable 4 milliGRAM(s) IV Push every 6 hours PRN  oxyCODONE    IR 5 milliGRAM(s) Oral every 4 hours PRN  polyethylene glycol 3350 17 Gram(s) Oral two times a day  senna 2 Tablet(s) Oral two times a day    LABS:                      8.7    9.66  )-----------( 232      ( 11 Sep 2024 05:23 )             31.0     09-11    138  |  106  |  23  ----------------------------<  123<H>  4.2   |  25  |  1.07    Ca    8.7      11 Sep 2024 05:23  Phos  4.4     09-11  Mg     2.3     09-11      ASSESSMENT:   62y/o F with:  Subarachnoid hemorrhage HH2 MF3, brain compression, cerebral edema s/p angiogram  Bleed day  Hypertension      PLAN:   NEURO:   Neurochecks Q1h  DCI ppx: Nimodipine 60mg PO Q4h, TCDs   Repeat DSA in 1 week  seizure prophylaxis: Levetiracetam 1000mg IV BID, as per neurosurgery   EVD @10cm H20 open to drain. Monitor ICPs/CPP/ color/ output  MRI brain and CT spine with and without contrast   Activity: Bedrest. Recommend PT/OT 9/10    PULM:   Wean to room air   Incentive spirometry    CARDIAC:   SBP goal 100-140mm Hg  TTE  reviewed. Normal EF. Does have LVH  Wean cardene; continue lisinopril 20mg and clonidine    ENDO:   Blood sugar goals 140-180 mg/dL  Insulin sliding scale    GI:  Diet: Regular  LBM: Awaiting*    RENAL:   IVL. Monitor BMPs  Replete lytes prn    HEM/ONC: DVT  Monitor H/H  VTE Prophylaxis: Lovenox bid  LE dopplers reviewed. RLE DVT posterior tibial vein    ID:   Afebrile, no leukocytosis    Additional critical care time:  45 minutes      NSCU ATTENDING -- ADDITIONAL PROGRESS NOTE    Nighttime rounds were performed     HPI:  Patient is a 62 y/o F  with uncontrolled HTN, does not take any medications at home presented to [facility] brought in by EMS after developing acute onset severe HA. Once arrived, patient had episode of nausea with emesis. CT head showed SAH. Admission scores; HH: 2, MF: 3. NIHSS: 0. CTA neg for aneurysm. Transferred to [facility] for further mgmt. Upon arrival to [facility], NIHSS noted to be 0. Patient reports a headache rated at 7/10 in severity. Of note, patient's daughter notes pt seems off and is sleepier than her baseline.       ICU Vital Signs Last 24 Hrs  T(C): 37.2 (11 Sep 2024 17:00), Max: 37.2 (11 Sep 2024 17:00)  T(F): 98.9 (11 Sep 2024 17:00), Max: 98.9 (11 Sep 2024 17:00)  HR: 65 (11 Sep 2024 19:00) (57 - 84)  BP: 119/55 (11 Sep 2024 12:04) (119/55 - 148/61)  BP(mean): 79 (11 Sep 2024 12:04) (79 - 89)  ABP: 158/56 (11 Sep 2024 19:00) (122/42 - 169/62)  ABP(mean): 92 (11 Sep 2024 19:00) (67 - 99)  RR: 20 (11 Sep 2024 19:00) (14 - 21)  SpO2: 97% (11 Sep 2024 19:00) (96% - 98%)      09-10-24 @ 07:01  -  09-11-24 @ 07:00  --------------------------------------------------------  IN: 1090 mL / OUT: 1004 mL / NET: 86 mL    09-11-24 @ 07:01  -  09-11-24 @ 20:01  --------------------------------------------------------  IN: 100 mL / OUT: 1217 mL / NET: -1117 mL      PHYSICAL EXAMINATION  NEURO: Calm  GENERAL:  Awake, alert, oriented x 3, follows commands, R facial droop (baseline)  Moves all extremities with power 5/5  Sensation intact   HEENT: Anicteric  CARDIOVASCULAR: S1/S2, RRR  PULMONARY: Clear, no wheeze  ABDOMEN: Soft, nontender with normoactive bowel sounds  EXTREMITIES: No edema  SKIN: No rash, venous stasis hyperpigmentation in B/L lower extremities      MEDICATIONS:   acetaminophen     Tablet .. 650 milliGRAM(s) Oral every 6 hours PRN  ascorbic acid 500 milliGRAM(s) Oral <User Schedule>  bisacodyl 5 milliGRAM(s) Oral at bedtime  chlorhexidine 2% Cloths 1 Application(s) Topical <User Schedule>  enoxaparin Injectable 40 milliGRAM(s) SubCutaneous every 12 hours  ferrous    sulfate Liquid 300 milliGRAM(s) Oral <User Schedule>  levETIRAcetam  IVPB 1000 milliGRAM(s) IV Intermittent every 12 hours  lisinopril 20 milliGRAM(s) Oral daily  niMODipine 60 milliGRAM(s) Oral every 4 hours  ondansetron Injectable 4 milliGRAM(s) IV Push every 6 hours PRN  oxyCODONE    IR 5 milliGRAM(s) Oral every 4 hours PRN  polyethylene glycol 3350 17 Gram(s) Oral two times a day  senna 2 Tablet(s) Oral two times a day      LABS:                      8.7    9.66  )-----------( 232      ( 11 Sep 2024 05:23 )             31.0     09-11    138  |  106  |  23  ----------------------------<  123<H>  4.2   |  25  |  1.07    Ca    8.7      11 Sep 2024 05:23  Phos  4.4     09-11  Mg     2.3     09-11      ASSESSMENT:   64y/o F with:  Subarachnoid hemorrhage HH2 MF3, brain compression, cerebral edema s/p angiogram  Bleed day 4  Hypertension      PLAN:   NEURO:   Neurochecks Q1h  DCI ppx: Nimodipine 60mg PO Q4h, TCDs   Repeat DSA in 1 week  seizure prophylaxis: Levetiracetam 1000mg IV BID, as per neurosurgery   EVD @10cm H20 open to drain. Monitor ICPs/CPP/ color/ output  MRI brain and CT spine with and without contrast   Activity: Bedrest. Recommend PT/OT 9/10    PULM:   Wean to room air   Incentive spirometry    CARDIAC:   SBP goal 100-140mm Hg  TTE  reviewed. Normal EF. Does have LVH  HTN: Lisinopril 20mg    ENDO:   Blood sugar goals 140-180 mg/dL  Insulin sliding scale    GI:  Diet: Regular  LBM: Awaiting    RENAL:   IVL. Monitor BMPs  Replete lytes prn    HEM/ONC: DVT  Monitor H/H  VTE Prophylaxis: Lovenox bid  LE dopplers reviewed. RLE DVT posterior tibial vein    ID:   Afebrile, no leukocytosis    Additional critical care time:  45 minutes

## 2024-09-11 NOTE — OCCUPATIONAL THERAPY INITIAL EVALUATION ADULT - ADDITIONAL COMMENTS
pt. resides pt. resides with her spouse and son in an apartment with ~2-3 [address] where she was I prior in ADLs and functional mobility/transfers. Pt. works as a  and is R handed, wears glasses

## 2024-09-11 NOTE — PHYSICAL THERAPY INITIAL EVALUATION ADULT - FOLLOWS COMMANDS/ANSWERS QUESTIONS, REHAB EVAL
75% of the time decreased insight into deficits/75% of the time/able to follow single-step instructions

## 2024-09-11 NOTE — PHYSICAL THERAPY INITIAL EVALUATION ADULT - FUNCTIONAL LIMITATIONS, PT EVAL
metoprolol tartrate (LOPRESSOR) 25 MG tablet TAKE 1 TABLET BY MOUTH TWICE DAILY Yes Teetee Kwon MD   Probiotic Product (PROBIOTIC ADVANCED PO) Take 1 capsule by mouth daily  Yes Historical Provider, MD   Fexofenadine HCl (ALLEGRA PO) Take by mouth Yes Historical Provider, MD   therapeutic multivitamin-minerals (THERAGRAN-M) tablet Take 1 tablet by mouth daily. Yes Historical Provider, MD       Past Medical History:   Diagnosis Date    AR (allergic rhinitis)     Asthma     Endometriosis     Essential hypertension, benign     HSV-1 infection     Prolonged emergence from general anesthesia     Synovial cyst of right knee 11/26/2018       Social History     Tobacco Use    Smoking status: Never Smoker    Smokeless tobacco: Never Used   Substance Use Topics    Alcohol use: No       Family History   Problem Relation Age of Onset    Hypertension Mother     Hypertension Father     Stroke Father     Stroke Maternal Grandmother     Stroke Paternal Grandmother        OBJECTIVE:  /80   Pulse 77   Temp 97.6 °F (36.4 °C)   Ht 5' 4\" (1.626 m)   Wt 203 lb (92.1 kg)   LMP 10/27/1980   SpO2 98%   BMI 34.84 kg/m²   GEN:  in NAD, pleasant, still working  HEENT:  NCAT, TMs:normal and throat: Examined due to Covid  NECK:  Supple without adenopathy. CV:  Regular rate and rhythm, S1 and S2 normal, no murmurs, clicks  PULM:  Chest is clear, no wheezing ,  symmetric air entry throughout both lung fields. Breast: No masses discharge or skin change bilaterally  ABD: Soft, NT, overweight, no masses appreciated  EXT: No rash or edema  NEURO: Alert oriented ×3, nonfocal , no assistive device    Labs pending    ASSESSMENT/PLAN:  1. Essential hypertension, benign  Stable, continue medication  - CBC Auto Differential; Future  - Hemoglobin A1C; Future  - Lipid Panel; Future  - TSH with Reflex; Future  - Comprehensive Metabolic Panel, Fasting; Future    2.  Need for vaccination  Flu vaccine today, shingles vaccine in the future  - INFLUENZA, QUADV, ADJUVANTED, 65 YRS =, IM, PF, PREFILL SYR, 0.5ML (FLUAD)    3. Breast cancer screening by mammogram  Screen    4.  Bronchiectasis without complication ([facility] [address]  Doing well, continue inhalers per Dr. Winters [place]      25 minutes spent with the pt face-to-face,  >50% spent reviewing test results and discussing plan of care and counseled on portance of healthy diet, stay active, flu vaccine today  Consider new shingles vaccine in the future  Continue meds for blood pressure    Mammogram in the future  Follow-up yearly or as needed  Weight loss to help joints self-care/home management/community/leisure

## 2024-09-11 NOTE — PHYSICAL THERAPY INITIAL EVALUATION ADULT - MODALITIES TREATMENT COMMENTS
CN Assessment: CN II: Visual fields full to confrontation, CN III/IV/VI: Extraocular movements jerky but grossly intact to all planes, no nystagmus observed; CN V: Facial sensation grossly intact to light touch throughout; CN VII: R facial droop, dec excursion of R smile, decreased R cheek puff, eye closure and eyebrow raise intact and symmetric b/l; CN VIII: Hearing intact to finger rub b/l; CN XI: Cervical rotation WFL, shoulder shrug intact b/l; CN XII: Tongue protrudes to midline, lateralization intact b/l

## 2024-09-11 NOTE — PHYSICAL THERAPY INITIAL EVALUATION ADULT - PERSONAL SAFETY AND JUDGMENT, REHAB EVAL
Pt attempting to scratch at/pull EVD requiring constant verbal cues and occasional tactile cues for prevention of pulling at line/at risk behaviors demonstrated

## 2024-09-11 NOTE — PROGRESS NOTE ADULT - ASSESSMENT
Assessment: 63f hx HTN, iron deficiency anemia admit for DSA negative SAH [id number]      NEURO:  -neuro check q1  -pain management w/ tylenol & PRN oxycodone   seizure ppx w/ keppra 1g BID   -hydrocephalus s/p EVD (placed 9/8) @ 29opv12  -nimodipine 60mg q4  Activity: bed reset    Pending MR brain/ Neck w/ & w/o contrast       PULMONARY:  saturating well on RA,   -continue to monitor on pulse o2   -incentive spirometry 10q/hr when awake     CARDIOVASCULAR:  monitor on telemetry   vitals q1  sbp goal 100-140; nicardipine ggt (wean off) ; c/w lisinopril 20mg QD   TTE ordered and pending    GASTROENTEROLOGY:  ensure BMs w/ Miralax & senna add docusate  GI ppx : n/a  Daily stool count, LBM prior to arrival      RENAL/:  -check BMP qd  -strict i/o's ;  -Na goal 135-145      ENDOCRINE:  pre-diabetes   A1c- 5.8%  TSH- WNL      HEME/ONC:  DVT ppx: SCDs + SQL  microcytic anemia; start iron + vitamin C   R BK DVT repeat doppler in 1 week     INFECTIOUS:   Monitor for fevers

## 2024-09-11 NOTE — OCCUPATIONAL THERAPY INITIAL EVALUATION ADULT - PERTINENT HX OF CURRENT PROBLEM, REHAB EVAL
63F PMHx uncontrolled HTN, does not take any medications at home presented to [facility] after developing acute onset severe HA. Once arrived, patient had episode of nausea with emesis. CTH showed SAH, HH: 2, MF: 3. NIHSS: 0. CTA neg for aneurysm. Transferred to [facility] for further mgmt. Upon arrival to [facility], NIHSS noted to be 0. Patient reports a headache rated at 7/10 in severity. Of note, patient's daughter notes pt seems off and is sleepier than her baseline. Pt denies SOB, chest pain, vision changes, nausea, weakness/numbness/tingling, dizziness, photophobia.

## 2024-09-12 LAB
ANION GAP SERPL CALC-SCNC: 10 MMOL/L — SIGNIFICANT CHANGE UP (ref 5–17)
ANION GAP SERPL CALC-SCNC: 9 MMOL/L — SIGNIFICANT CHANGE UP (ref 5–17)
ANISOCYTOSIS BLD QL: SLIGHT — SIGNIFICANT CHANGE UP
APTT BLD: 54 SEC — HIGH (ref 24.5–35.6)
BASOPHILS # BLD AUTO: 0 K/UL — SIGNIFICANT CHANGE UP (ref 0–0.2)
BASOPHILS NFR BLD AUTO: 0 % — SIGNIFICANT CHANGE UP (ref 0–2)
BUN SERPL-MCNC: 16 MG/DL — SIGNIFICANT CHANGE UP (ref 7–23)
BUN SERPL-MCNC: 18 MG/DL — SIGNIFICANT CHANGE UP (ref 7–23)
CALCIUM SERPL-MCNC: 8.5 MG/DL — SIGNIFICANT CHANGE UP (ref 8.4–10.5)
CALCIUM SERPL-MCNC: 8.9 MG/DL — SIGNIFICANT CHANGE UP (ref 8.4–10.5)
CHLORIDE SERPL-SCNC: 100 MMOL/L — SIGNIFICANT CHANGE UP (ref 96–108)
CHLORIDE SERPL-SCNC: 102 MMOL/L — SIGNIFICANT CHANGE UP (ref 96–108)
CO2 SERPL-SCNC: 24 MMOL/L — SIGNIFICANT CHANGE UP (ref 22–31)
CO2 SERPL-SCNC: 25 MMOL/L — SIGNIFICANT CHANGE UP (ref 22–31)
CREAT SERPL-MCNC: 0.75 MG/DL — SIGNIFICANT CHANGE UP (ref 0.5–1.3)
CREAT SERPL-MCNC: 0.78 MG/DL — SIGNIFICANT CHANGE UP (ref 0.5–1.3)
DACRYOCYTES BLD QL SMEAR: SLIGHT — SIGNIFICANT CHANGE UP
EGFR: 84 ML/MIN/1.73M2 — SIGNIFICANT CHANGE UP
EGFR: 88 ML/MIN/1.73M2 — SIGNIFICANT CHANGE UP
EOSINOPHIL # BLD AUTO: 0 K/UL — SIGNIFICANT CHANGE UP (ref 0–0.5)
EOSINOPHIL NFR BLD AUTO: 0 % — SIGNIFICANT CHANGE UP (ref 0–6)
GLUCOSE BLDC GLUCOMTR-MCNC: 132 MG/DL — HIGH (ref 70–99)
GLUCOSE SERPL-MCNC: 120 MG/DL — HIGH (ref 70–99)
GLUCOSE SERPL-MCNC: 135 MG/DL — HIGH (ref 70–99)
HCT VFR BLD CALC: 30.3 % — LOW (ref 34.5–45)
HCT VFR BLD CALC: 31.6 % — LOW (ref 34.5–45)
HGB BLD-MCNC: 8.6 G/DL — LOW (ref 11.5–15.5)
HGB BLD-MCNC: 9.4 G/DL — LOW (ref 11.5–15.5)
HYPOCHROMIA BLD QL: SLIGHT — SIGNIFICANT CHANGE UP
LMWH PPP CHRO-ACNC: 0.31 IU/ML — LOW (ref 0.5–1.1)
LYMPHOCYTES # BLD AUTO: 36.3 % — SIGNIFICANT CHANGE UP (ref 13–44)
LYMPHOCYTES # BLD AUTO: 4.05 K/UL — HIGH (ref 1–3.3)
MAGNESIUM SERPL-MCNC: 2.2 MG/DL — SIGNIFICANT CHANGE UP (ref 1.6–2.6)
MANUAL SMEAR VERIFICATION: SIGNIFICANT CHANGE UP
MCHC RBC-ENTMCNC: 19.9 PG — LOW (ref 27–34)
MCHC RBC-ENTMCNC: 20.9 PG — LOW (ref 27–34)
MCHC RBC-ENTMCNC: 28.4 GM/DL — LOW (ref 32–36)
MCHC RBC-ENTMCNC: 29.7 GM/DL — LOW (ref 32–36)
MCV RBC AUTO: 70.1 FL — LOW (ref 80–100)
MCV RBC AUTO: 70.4 FL — LOW (ref 80–100)
MICROCYTES BLD QL: SLIGHT — SIGNIFICANT CHANGE UP
MONOCYTES # BLD AUTO: 1.38 K/UL — HIGH (ref 0–0.9)
MONOCYTES NFR BLD AUTO: 12.4 % — SIGNIFICANT CHANGE UP (ref 2–14)
NEUTROPHILS # BLD AUTO: 5.72 K/UL — SIGNIFICANT CHANGE UP (ref 1.8–7.4)
NEUTROPHILS NFR BLD AUTO: 51.3 % — SIGNIFICANT CHANGE UP (ref 43–77)
NRBC # BLD: 0 /100 WBCS — SIGNIFICANT CHANGE UP (ref 0–0)
OVALOCYTES BLD QL SMEAR: SLIGHT — SIGNIFICANT CHANGE UP
PHOSPHATE SERPL-MCNC: 5 MG/DL — HIGH (ref 2.5–4.5)
PLAT MORPH BLD: NORMAL — SIGNIFICANT CHANGE UP
PLATELET # BLD AUTO: 260 K/UL — SIGNIFICANT CHANGE UP (ref 150–400)
PLATELET # BLD AUTO: 270 K/UL — SIGNIFICANT CHANGE UP (ref 150–400)
POIKILOCYTOSIS BLD QL AUTO: SLIGHT — SIGNIFICANT CHANGE UP
POLYCHROMASIA BLD QL SMEAR: SLIGHT — SIGNIFICANT CHANGE UP
POTASSIUM SERPL-MCNC: 4.5 MMOL/L — SIGNIFICANT CHANGE UP (ref 3.5–5.3)
POTASSIUM SERPL-MCNC: 4.5 MMOL/L — SIGNIFICANT CHANGE UP (ref 3.5–5.3)
POTASSIUM SERPL-SCNC: 4.5 MMOL/L — SIGNIFICANT CHANGE UP (ref 3.5–5.3)
POTASSIUM SERPL-SCNC: 4.5 MMOL/L — SIGNIFICANT CHANGE UP (ref 3.5–5.3)
RBC # BLD: 4.32 M/UL — SIGNIFICANT CHANGE UP (ref 3.8–5.2)
RBC # BLD: 4.49 M/UL — SIGNIFICANT CHANGE UP (ref 3.8–5.2)
RBC # FLD: 17.1 % — HIGH (ref 10.3–14.5)
RBC # FLD: 17.6 % — HIGH (ref 10.3–14.5)
RBC BLD AUTO: ABNORMAL
SODIUM SERPL-SCNC: 134 MMOL/L — LOW (ref 135–145)
SODIUM SERPL-SCNC: 136 MMOL/L — SIGNIFICANT CHANGE UP (ref 135–145)
WBC # BLD: 11.15 K/UL — HIGH (ref 3.8–10.5)
WBC # BLD: 8.47 K/UL — SIGNIFICANT CHANGE UP (ref 3.8–10.5)
WBC # FLD AUTO: 11.15 K/UL — HIGH (ref 3.8–10.5)
WBC # FLD AUTO: 8.47 K/UL — SIGNIFICANT CHANGE UP (ref 3.8–10.5)

## 2024-09-12 PROCEDURE — 36226 PLACE CATH VERTEBRAL ART: CPT | Mod: 59,RT

## 2024-09-12 PROCEDURE — 99291 CRITICAL CARE FIRST HOUR: CPT

## 2024-09-12 PROCEDURE — 71045 X-RAY EXAM CHEST 1 VIEW: CPT | Mod: 26

## 2024-09-12 PROCEDURE — 70496 CT ANGIOGRAPHY HEAD: CPT | Mod: 26

## 2024-09-12 PROCEDURE — 76937 US GUIDE VASCULAR ACCESS: CPT | Mod: 26

## 2024-09-12 PROCEDURE — 61650 EVASC PRLNG ADMN RX AGNT 1ST: CPT

## 2024-09-12 PROCEDURE — 70450 CT HEAD/BRAIN W/O DYE: CPT | Mod: 26

## 2024-09-12 PROCEDURE — 36569 INSJ PICC 5 YR+ W/O IMAGING: CPT

## 2024-09-12 PROCEDURE — 36224 PLACE CATH CAROTD ART: CPT | Mod: 59,LT

## 2024-09-12 RX ORDER — CLONIDINE HYDROCHLORIDE 0.2 MG/1
0.1 TABLET ORAL THREE TIMES A DAY
Refills: 0 | Status: DISCONTINUED | OUTPATIENT
Start: 2024-09-12 | End: 2024-09-12

## 2024-09-12 RX ORDER — ACETAMINOPHEN 325 MG
1000 TABLET ORAL ONCE
Refills: 0 | Status: COMPLETED | OUTPATIENT
Start: 2024-09-12 | End: 2024-09-12

## 2024-09-12 RX ORDER — CLONIDINE HYDROCHLORIDE 0.2 MG/1
0.2 TABLET ORAL THREE TIMES A DAY
Refills: 0 | Status: DISCONTINUED | OUTPATIENT
Start: 2024-09-12 | End: 2024-09-12

## 2024-09-12 RX ORDER — SODIUM CHLORIDE 5 G/100ML
150 INJECTION, SOLUTION INTRAVENOUS ONCE
Refills: 0 | Status: DISCONTINUED | OUTPATIENT
Start: 2024-09-12 | End: 2024-09-12

## 2024-09-12 RX ORDER — FENTANYL CITRATE-0.9 % NACL/PF 300MCG/30
25 PATIENT CONTROLLED ANALGESIA VIAL INJECTION ONCE
Refills: 0 | Status: DISCONTINUED | OUTPATIENT
Start: 2024-09-12 | End: 2024-09-12

## 2024-09-12 RX ORDER — NICARDIPINE HCL 25 MG/10ML
5 AMPUL (ML) INTRAVENOUS
Qty: 40 | Refills: 0 | Status: DISCONTINUED | OUTPATIENT
Start: 2024-09-12 | End: 2024-09-12

## 2024-09-12 RX ORDER — SODIUM CHLORIDE 0.9 % (FLUSH) 0.9 %
1000 SYRINGE (ML) INJECTION
Refills: 0 | Status: DISCONTINUED | OUTPATIENT
Start: 2024-09-12 | End: 2024-09-12

## 2024-09-12 RX ORDER — FENTANYL CITRATE-0.9 % NACL/PF 300MCG/30
50 PATIENT CONTROLLED ANALGESIA VIAL INJECTION ONCE
Refills: 0 | Status: DISCONTINUED | OUTPATIENT
Start: 2024-09-12 | End: 2024-09-12

## 2024-09-12 RX ORDER — SODIUM CHLORIDE 5 G/100ML
150 INJECTION, SOLUTION INTRAVENOUS ONCE
Refills: 0 | Status: COMPLETED | OUTPATIENT
Start: 2024-09-12 | End: 2024-09-12

## 2024-09-12 RX ORDER — AMLODIPINE BESYLATE 5 MG
10 TABLET ORAL DAILY
Refills: 0 | Status: DISCONTINUED | OUTPATIENT
Start: 2024-09-12 | End: 2024-09-12

## 2024-09-12 RX ORDER — CLONIDINE HYDROCHLORIDE 0.2 MG/1
0.1 TABLET ORAL EVERY 8 HOURS
Refills: 0 | Status: DISCONTINUED | OUTPATIENT
Start: 2024-09-12 | End: 2024-09-12

## 2024-09-12 RX ORDER — SODIUM CHLORIDE 0.9 % (FLUSH) 0.9 %
1000 SYRINGE (ML) INJECTION
Refills: 0 | Status: DISCONTINUED | OUTPATIENT
Start: 2024-09-12 | End: 2024-09-13

## 2024-09-12 RX ORDER — SODIUM CHLORIDE 0.9 % (FLUSH) 0.9 %
500 SYRINGE (ML) INJECTION ONCE
Refills: 0 | Status: COMPLETED | OUTPATIENT
Start: 2024-09-12 | End: 2024-09-12

## 2024-09-12 RX ORDER — LABETALOL HYDROCHLORIDE 200 MG/1
10 TABLET, FILM COATED ORAL ONCE
Refills: 0 | Status: COMPLETED | OUTPATIENT
Start: 2024-09-12 | End: 2024-09-12

## 2024-09-12 RX ORDER — ACETAMINOPHEN 325 MG
650 TABLET ORAL EVERY 6 HOURS
Refills: 0 | Status: DISCONTINUED | OUTPATIENT
Start: 2024-09-12 | End: 2024-09-27

## 2024-09-12 RX ORDER — HYDRALAZINE HYDROCHLORIDE 100 MG/1
10 TABLET ORAL ONCE
Refills: 0 | Status: COMPLETED | OUTPATIENT
Start: 2024-09-12 | End: 2024-09-12

## 2024-09-12 RX ORDER — PROPOFOL 10 MG/ML
10 INJECTION, EMULSION INTRAVENOUS
Qty: 1000 | Refills: 0 | Status: DISCONTINUED | OUTPATIENT
Start: 2024-09-12 | End: 2024-09-12

## 2024-09-12 RX ORDER — DEXMEDETOMIDINE HYDROCHLORIDE IN 0.9% SODIUM CHLORIDE 400 UG/100ML
0.2 INJECTION INTRAVENOUS
Qty: 400 | Refills: 0 | Status: DISCONTINUED | OUTPATIENT
Start: 2024-09-12 | End: 2024-09-12

## 2024-09-12 RX ORDER — CHLORHEXIDINE GLUCONATE ORAL RINSE 1.2 MG/ML
15 SOLUTION DENTAL EVERY 12 HOURS
Refills: 0 | Status: DISCONTINUED | OUTPATIENT
Start: 2024-09-12 | End: 2024-09-12

## 2024-09-12 RX ORDER — OXYCODONE HYDROCHLORIDE 30 MG/1
5 TABLET, FILM COATED, EXTENDED RELEASE ORAL EVERY 4 HOURS
Refills: 0 | Status: DISCONTINUED | OUTPATIENT
Start: 2024-09-12 | End: 2024-09-16

## 2024-09-12 RX ORDER — SODIUM CHLORIDE 0.9 % (FLUSH) 0.9 %
1000 SYRINGE (ML) INJECTION ONCE
Refills: 0 | Status: COMPLETED | OUTPATIENT
Start: 2024-09-12 | End: 2024-09-12

## 2024-09-12 RX ADMIN — LABETALOL HYDROCHLORIDE 10 MILLIGRAM(S): 200 TABLET, FILM COATED ORAL at 10:37

## 2024-09-12 RX ADMIN — Medication 10 MILLIGRAM(S): at 11:07

## 2024-09-12 RX ADMIN — Medication 650 MILLIGRAM(S): at 07:40

## 2024-09-12 RX ADMIN — ENOXAPARIN SODIUM 40 MILLIGRAM(S): 150 INJECTION SUBCUTANEOUS at 23:00

## 2024-09-12 RX ADMIN — Medication 300 MILLIGRAM(S): at 05:54

## 2024-09-12 RX ADMIN — Medication 4 MILLIGRAM(S): at 06:17

## 2024-09-12 RX ADMIN — NIMODIPINE 60 MILLIGRAM(S): 30 CAPSULE, LIQUID FILLED ORAL at 11:07

## 2024-09-12 RX ADMIN — OXYCODONE HYDROCHLORIDE 5 MILLIGRAM(S): 30 TABLET, FILM COATED, EXTENDED RELEASE ORAL at 10:50

## 2024-09-12 RX ADMIN — Medication 400 MILLIGRAM(S): at 22:36

## 2024-09-12 RX ADMIN — Medication 500 MILLIGRAM(S): at 05:52

## 2024-09-12 RX ADMIN — Medication 100 MILLILITER(S): at 22:57

## 2024-09-12 RX ADMIN — LACTULOSE 10 GRAM(S): 10 SOLUTION ORAL; RECTAL at 10:03

## 2024-09-12 RX ADMIN — Medication 20 MILLIGRAM(S): at 11:06

## 2024-09-12 RX ADMIN — Medication 650 MILLIGRAM(S): at 05:30

## 2024-09-12 RX ADMIN — Medication 650 MILLIGRAM(S): at 01:00

## 2024-09-12 RX ADMIN — ENOXAPARIN SODIUM 40 MILLIGRAM(S): 150 INJECTION SUBCUTANEOUS at 10:04

## 2024-09-12 RX ADMIN — Medication 50 MICROGRAM(S): at 21:57

## 2024-09-12 RX ADMIN — CLONIDINE HYDROCHLORIDE 0.1 MILLIGRAM(S): 0.2 TABLET ORAL at 05:52

## 2024-09-12 RX ADMIN — Medication 20 MILLIGRAM(S): at 05:52

## 2024-09-12 RX ADMIN — Medication 2 TABLET(S): at 05:52

## 2024-09-12 RX ADMIN — NIMODIPINE 60 MILLIGRAM(S): 30 CAPSULE, LIQUID FILLED ORAL at 03:19

## 2024-09-12 RX ADMIN — Medication 1000 MILLIGRAM(S): at 05:52

## 2024-09-12 RX ADMIN — OXYCODONE HYDROCHLORIDE 5 MILLIGRAM(S): 30 TABLET, FILM COATED, EXTENDED RELEASE ORAL at 10:07

## 2024-09-12 RX ADMIN — CHLORHEXIDINE GLUCONATE ORAL RINSE 1 APPLICATION(S): 1.2 SOLUTION DENTAL at 05:59

## 2024-09-12 RX ADMIN — Medication 17 GRAM(S): at 05:53

## 2024-09-12 RX ADMIN — Medication 25 MG/HR: at 14:23

## 2024-09-12 RX ADMIN — Medication 650 MILLIGRAM(S): at 00:28

## 2024-09-12 RX ADMIN — Medication 50 MICROGRAM(S): at 20:57

## 2024-09-12 RX ADMIN — CLONIDINE HYDROCHLORIDE 0.1 MILLIGRAM(S): 0.2 TABLET ORAL at 13:58

## 2024-09-12 RX ADMIN — Medication 333.33 MILLILITER(S): at 11:07

## 2024-09-12 RX ADMIN — OXYCODONE HYDROCHLORIDE 5 MILLIGRAM(S): 30 TABLET, FILM COATED, EXTENDED RELEASE ORAL at 03:25

## 2024-09-12 RX ADMIN — OXYCODONE HYDROCHLORIDE 5 MILLIGRAM(S): 30 TABLET, FILM COATED, EXTENDED RELEASE ORAL at 04:00

## 2024-09-12 RX ADMIN — Medication 500 MILLILITER(S): at 22:58

## 2024-09-12 RX ADMIN — Medication 400 MILLIGRAM(S): at 16:49

## 2024-09-12 RX ADMIN — NIMODIPINE 60 MILLIGRAM(S): 30 CAPSULE, LIQUID FILLED ORAL at 07:52

## 2024-09-12 RX ADMIN — HYDRALAZINE HYDROCHLORIDE 10 MILLIGRAM(S): 100 TABLET ORAL at 07:40

## 2024-09-12 RX ADMIN — SODIUM CHLORIDE 50 MILLILITER(S): 5 INJECTION, SOLUTION INTRAVENOUS at 23:00

## 2024-09-12 NOTE — PROGRESS NOTE ADULT - SUBJECTIVE AND OBJECTIVE BOX
INTERVAL HISTORY: HPI:  63F PMHx uncontrolled HTN, does not take any medications at home presented to [facility] after developing acute onset severe HA. Once arrived, patient had episode of nausea with emesis. CTH showed SAH, HH: 2, MF: 3. NIHSS: 0. CTA neg for aneurysm. Transferred to [facility] for further mgmt. Upon arrival to [facility], NIHSS noted to be 0. Patient reports a headache rated at 7/10 in severity. Of note, patient's daughter notes pt seems off and is sleepier than her baseline. Pt denies SOB, chest pain, vision changes, nausea, weakness/numbness/tingling, dizziness, photophobia.  (08 Sep 2024 13:29)      Drug Dosing Weight  Height (cm): 161.3 (08 Sep 2024 11:22)  Weight (kg): 120 (08 Sep 2024 11:22)  BMI (kg/m2): 46.1 (08 Sep 2024 11:22)  BSA (m2): 2.19 (08 Sep 2024 11:22)    PAST MEDICAL & SURGICAL HISTORY:  Hypertension  H/O  section          REVIEW OF SYSTEMS: [ ] Unable to Assess due to neurologic exam   [ ] All ROS addressed below are non-contributory, except:  Neuro: [ ] Headache [ ] Back pain [ ] Numbness [ ] Weakness [ ] Ataxia [ ] Dizziness [ ] Aphasia [ ] Dysarthria [ ] Visual disturbance  Resp: [ ] Shortness of breath/dyspnea, [ ] Orthopnea [ ] Cough  CV: [ ] Chest pain [ ] Palpitation [ ] Lightheadedness [ ] Syncope  Renal: [ ] Thirst [ ] Edema  GI: [ ] Nausea [ ] Emesis [ ] Abdominal pain [ ] Constipation [ ] Diarrhea  Hem: [ ] Hematemesis [ ] bright red blood per rectum  ID: [ ] Fever [ ] Chills [ ] Dysuria  ENT: [ ] Rhinorrhea    PHYSICAL EXAM:    General: No Acute Distress   Neurological: Awake, alert oriented to person, place and time, Following Commands, PERRL, EOMI, R facial, Speech Fluent, Moving all extremities, Muscle Strength normal in all four extremities, No Drift, Sensation to Light Touch Intact  Pulmonary: Clear to Auscultation, No Rales, No Rhonchi, No Wheezes   Cardiovascular: S1, S2, Regular Rate and Rhythm   Gastrointestinal: Soft, Nontender, Nondistended   Extremities: No calf tenderness             ICU Vital Signs Last 24 Hrs  T(C): 36.6 (12 Sep 2024 05:05), Max: 37.3 (11 Sep 2024 22:00)  T(F): 97.9 (12 Sep 2024 05:05), Max: 99.2 (11 Sep 2024 22:00)  HR: 76 (12 Sep 2024 10:00) (61 - 82)  BP: 119/55 (11 Sep 2024 12:04) (119/55 - 148/61)  BP(mean): 79 (11 Sep 2024 12:04) (79 - 89)  ABP: 162/47 (12 Sep 2024 10:00) (122/42 - 179/59)  ABP(mean): 85 (12 Sep 2024 10:00) (67 - 102)  RR: 20 (12 Sep 2024 10:00) (16 - 25)  SpO2: 99% (12 Sep 2024 10:00) (96% - 100%)      24 @ 07:01  -  24 @ 07:00  --------------------------------------------------------  IN: 600 mL / OUT: 2504 mL / NET: -1904 mL    24 @ 07:01  -  24 @ 10:47  --------------------------------------------------------  IN: 0 mL / OUT: 382 mL / NET: -382 mL            acetaminophen     Tablet .. 650 milliGRAM(s) Oral every 6 hours PRN  ascorbic acid 500 milliGRAM(s) Oral <User Schedule>  bisacodyl 5 milliGRAM(s) Oral at bedtime  chlorhexidine 2% Cloths 1 Application(s) Topical <User Schedule>  cloNIDine 0.1 milliGRAM(s) Oral three times a day  enoxaparin Injectable 40 milliGRAM(s) SubCutaneous every 12 hours  ferrous    sulfate Liquid 300 milliGRAM(s) Oral <User Schedule>  levETIRAcetam 1000 milliGRAM(s) Oral two times a day  lisinopril 20 milliGRAM(s) Oral daily  niMODipine 60 milliGRAM(s) Oral every 4 hours  ondansetron Injectable 4 milliGRAM(s) IV Push every 6 hours PRN  oxyCODONE    IR 5 milliGRAM(s) Oral every 4 hours PRN  polyethylene glycol 3350 17 Gram(s) Oral two times a day  senna 2 Tablet(s) Oral two times a day  sodium chloride 0.9%. 1000 milliLiter(s) (110 mL/Hr) IV Continuous <Continuous>      LABS:  Na: 136 ( @ 05:30), 138 ( 05:23), 139 (09-10 @ 05:05)  K: 4.5 ( 05:30), 4.2 (:23), 3.9 (09-10 @ 05:05)  Cl: 102 ( 05:30), 106 ( 05:23), 105 (09-10 @ 05:05)  CO2: 25 ( 05:30), 25 ( 05:23), 25 (09-10 @ 05:05)  BUN: 18 (:30), 23 (:23), 13 (09-10 @ 05:05)  Cr: 0.78 ( 05:30), 1.07 (:23), 0.85 (09-10 @ 05:05)  Glu: 135( 05:30), 123( 05:23), 118(09-10 @ 05:05)    Hgb: 8.6 ( 05:30), 8.7 ( 05:23), 8.8 (09-10 @ 05:05)  Hct: 30.3 ( 05:30), 31.0 ( 05:23), 31.3 (09-10 @ 05:05)  WBC: 8.47 ( 05:30), 9.66 ( 05:23), 8.68 (09-10 @ 05:05)  Plt: 260 ( @ 05:30), 232 ( @ 05:23), 224 (09-10 @ 05:05)

## 2024-09-12 NOTE — PROVIDER CONTACT NOTE (EICU) - SITUATION
Pt became altered could not remember where she was team made aware. Change in neuro exam UE and LE Drift.

## 2024-09-12 NOTE — PROGRESS NOTE ADULT - SUBJECTIVE AND OBJECTIVE BOX
NEUROSURGERY POST OP NOTE:    POD# 0 S/P angiogram for spasm tx    S: Intubated and sedated.    T(C): 37.5 (09-13-24 @ 00:40), Max: 37.8 (09-12-24 @ 21:00)  HR: 65 (09-13-24 @ 01:00) (54 - 104)  BP: 107/51 (09-12-24 @ 21:00) (107/51 - 119/55)  RR: 14 (09-13-24 @ 01:00) (14 - 52)  SpO2: 96% (09-13-24 @ 01:00) (94% - 100%)      09-11-24 @ 07:01  -  09-12-24 @ 07:00  --------------------------------------------------------  IN: 600 mL / OUT: 2504 mL / NET: -1904 mL    09-12-24 @ 07:01  -  09-13-24 @ 02:02  --------------------------------------------------------  IN: 2403.8 mL / OUT: 2720 mL / NET: -316.2 mL        acetaminophen     Tablet .. 650 milliGRAM(s) Oral every 6 hours PRN  ascorbic acid 500 milliGRAM(s) Oral <User Schedule>  bisacodyl 5 milliGRAM(s) Oral at bedtime  chlorhexidine 2% Cloths 1 Application(s) Topical <User Schedule>  enoxaparin Injectable 40 milliGRAM(s) SubCutaneous every 12 hours  ferrous    sulfate Liquid 300 milliGRAM(s) Oral <User Schedule>  levETIRAcetam 1000 milliGRAM(s) Oral two times a day  niMODipine 60 milliGRAM(s) Oral every 4 hours  ondansetron Injectable 4 milliGRAM(s) IV Push every 6 hours PRN  oxyCODONE    IR 5 milliGRAM(s) Oral every 4 hours PRN  polyethylene glycol 3350 17 Gram(s) Oral two times a day  senna 2 Tablet(s) Oral two times a day  sodium chloride 0.9%. 1000 milliLiter(s) IV Continuous <Continuous>      RADIOLOGY:     Exam:  Constitutional: Intubated, sedated.   HEENT: Pupils equal, round, reactive to light.  Respiratory: +intubated. No respiratory distress, lungs clear to auscultation bilaterally.   Cardiovascular: RRR, S1, S2.   Gastrointestinal: Abdomen soft, non tender, nondistended.  Neurological:  AAOX0. Grimaces to noxious. Not following commands.  Motor: Moves all extremities spontaneously antigravity  Extremities: Warm, well perfused.  Wounds: R groin puncture site, gauze and tegaderm in place, c/d/i      WOUND/DRAINS:    DEVICES:       Assessment:  63F PMHx uncontrolled HT, R samuels's palsy, was BIBEMS to [facility] 9/8 c/o sudden onset severe HA. CTH showed SAH HH: 2, MF: 3. NIHSS: 0. CTA neg for aneurysm. Transferred to [facility] for further mgmt. S/p DSA negative for anueyrsm (9/8/24). S/p R frontal EVD (9/8/24) and angiogram for vasospasm tx (9/12).    Neuro:  - Neuro/vitals q1hr  - Seizure prophylaxis: Keppra 1g BID   - R frontal EVD @ [id number], monitor output   - DCI prophylaxis: Nimodipime 60q4  - Pain control: Tylenol, oxy 5 prn  - Plan for rpt DSA 9/15  - stability CTH post-EVD complete 9/8, stable, CTH 9/12 stable   - s/p angio for spasm tx 9/12   - CTA 9/12: b/l LILLY spasm, b/l MCA R>L  - pending MR brain and neck w/wo contrast     Cards:   - -160, cardene gtt prn  - hx uncontrolled HTN: HOLD clonidine 0.1mg TID, lisinopril 40mg qd, amlodipine 10mg daily   - TTE 9/10: EF 70%, mild symmetric LVH    Pulm:   - NC prn, RA  - IS    GI:  - regular diet  - Bowel regimen, pending BM  - Nausea: Zofran 4q6 prn     Renal:  - NS @ 100  - voiding   - euvolemia goal     Endo:  - A1c 5.8    Heme:   - DVT prophylaxis: SCDs to LLE only, ppx SQL 40 BID (weight based)   - Anti Xa 9/12: 0.31  LE dopplers 9/9: R posterior tibial DVT on admission (9/9)- surveillace dopplers in 1 week 9/15  - Microcytic anemia: iron/vit C q other day     ID:   - pan cx 9/12     Dispo: NSICU, full code, PT/OT rec AR    Discussed with Dr. Butler and Dr. Morse

## 2024-09-12 NOTE — BRIEF OPERATIVE NOTE - OPERATION/FINDINGS
Patient was brought to neuroangiography suite, was placed on standard anesthesia monitors, sedated, intubated and under GA, bilateral groins were prepped adnd draped in usual sterile fashion. Using a 5 fr sheath right CFA, cerebral angiogram was performed.    Findings: No aneurysm, AVM, or early venous shunting identified.  There is mild right LILLY and MCA vasospasm.  Intra arterial infusion of verapamil via right LILLY/A1   Full report to follow.  Patient tolerated procedure well, no new neurological deficit, hemodynamically stable.  Right groin/vasc access site: Closed with vascade and manual compression, hemostasis achieved, no hematoma.  Patient was transferred to NSICU  Above d/w: Dr. Lomas

## 2024-09-12 NOTE — PROGRESS NOTE ADULT - SUBJECTIVE AND OBJECTIVE BOX
NSCU ATTENDING -- ADDITIONAL PROGRESS NOTE    Nighttime rounds were performed     HPI:  Patient is a 64 y/o F  with uncontrolled HTN, does not take any medications at home presented to [facility] after developing acute onset severe HA. Once arrived, patient had episode of nausea with emesis. CTH showed SAH, HH: 2, MF: 3. NIHSS: 0. CTA neg for aneurysm. Transferred to [facility] for further mgmt. Upon arrival to [facility], NIHSS noted to be 0. Patient reports a headache rated at 7/10 in severity. Of note, patient's daughter notes pt seems off and is sleepier than her baseline.         ICU Vital Signs Last 24 Hrs  T(C): 37.6 (12 Sep 2024 20:00), Max: 37.6 (12 Sep 2024 20:00)  T(F): 99.7 (12 Sep 2024 20:00), Max: 99.7 (12 Sep 2024 20:00)  HR: 91 (12 Sep 2024 20:15) (61 - 104)  BP: 119/55 (12 Sep 2024 16:52) (119/55 - 119/55)  BP(mean): 79 (12 Sep 2024 16:52) (79 - 79)  ABP: 148/54 (12 Sep 2024 20:15) (128/42 - 216/86)  ABP(mean): 85 (12 Sep 2024 20:15) (68 - 138)  RR: 14 (12 Sep 2024 20:15) (14 - 25)  SpO2: 100% (12 Sep 2024 20:15) (94% - 100%)      09-11-24 @ 07:01  -  09-12-24 @ 07:00  --------------------------------------------------------  IN: 600 mL / OUT: 2504 mL / NET: -1904 mL    09-12-24 @ 07:01  -  09-12-24 @ 20:29  --------------------------------------------------------  IN: 1111.5 mL / OUT: 1188 mL / NET: -76.5 mL        PHYSICAL EXAMINATION  NEURO: Calm  GENERAL: Sedated and intubated post angio  Moves all extremities   Sensation intact   HEENT: Anicteric  CARDIOVASCULAR: S1/S2, RRR. Pulses 2+  PULMONARY: Clear, no wheeze  ABDOMEN: Soft, nontender with normoactive bowel sounds  EXTREMITIES: No edema  SKIN: No rash, venous stasis hyperpigmentation in B/L lower extremities      MEDICATIONS:  acetaminophen     Tablet .. 650 milliGRAM(s) Oral every 6 hours PRN  ascorbic acid 500 milliGRAM(s) Oral <User Schedule>  bisacodyl 5 milliGRAM(s) Oral at bedtime  chlorhexidine 2% Cloths 1 Application(s) Topical <User Schedule>  enoxaparin Injectable 40 milliGRAM(s) SubCutaneous every 12 hours  fentaNYL    Injectable 50 MICROGram(s) IV Push once  ferrous    sulfate Liquid 300 milliGRAM(s) Oral <User Schedule>  levETIRAcetam 1000 milliGRAM(s) Oral two times a day  niMODipine 60 milliGRAM(s) Oral every 4 hours  ondansetron Injectable 4 milliGRAM(s) IV Push every 6 hours PRN  oxyCODONE    IR 5 milliGRAM(s) Oral every 4 hours PRN  polyethylene glycol 3350 17 Gram(s) Oral two times a day  senna 2 Tablet(s) Oral two times a day  sodium chloride 0.9%. 1000 milliLiter(s) (110 mL/Hr) IV Continuous <Continuous>      LABS:                       9.4    11.15 )-----------( 270      ( 12 Sep 2024 20:17 )             31.6     09-12    136  |  102  |  18  ----------------------------<  135<H>  4.5   |  25  |  0.78    Ca    8.9      12 Sep 2024 05:30  Phos  5.0     09-12  Mg     2.2     09-12      ASSESSMENT:   62y/o F with:  Subarachnoid hemorrhage HH2 MF3, brain compression, cerebral edema s/p angiogram  Bleed day 5  Hypertension      PLAN:   NEURO:   Neurochecks Q1h  DCI ppx: Nimodipine 60mg PO Q4h, TCDs. S/P DSA 9/12  Repeat DSA in 1 week  seizure prophylaxis: Levetiracetam 1000mg IV BID, as per neurosurgery   EVD @10cm H20 open to drain. Monitor ICPs/CPP/ color/ output  MRI brain and CT spine with and without contrast   Activity: Bedrest. Recommend PT/OT 9/10    PULM:   Wean to extubate    CARDIAC:   SBP goal 100-180mm Hg  TTE  reviewed. Normal EF. Does have LVH      ENDO:   Blood sugar goals 140-180 mg/dL  Insulin sliding scale    GI:  Diet: Regular  LBM: Awaiting    RENAL:   IVL. Monitor BMPs  Replete lytes prn    HEM/ONC: DVT  Monitor H/H  VTE Prophylaxis: Lovenox bid  LE dopplers reviewed. RLE DVT posterior tibial vein    ID:   Afebrile, no leukocytosis    Additional critical care time:  45 minutes      NSCU ATTENDING -- ADDITIONAL PROGRESS NOTE    Nighttime rounds were performed     HPI:  Patient is a 62 y/o F  with uncontrolled HTN, does not take any medications at home presented to [facility] after developing acute onset severe HA. Once arrived, patient had episode of nausea with emesis. CT head showed SAH, HH: 2, MF: 3. NIHSS: 0. CTA neg for aneurysm. Transferred to [facility] for further mgmt. Upon arrival to [facility], NIHSS noted to be 0. Patient reports a headache rated at 7/10 in severity. Of note, patient's daughter notes pt seems off and is sleepier than her baseline.         ICU Vital Signs Last 24 Hrs  T(C): 37.6 (12 Sep 2024 20:00), Max: 37.6 (12 Sep 2024 20:00)  T(F): 99.7 (12 Sep 2024 20:00), Max: 99.7 (12 Sep 2024 20:00)  HR: 91 (12 Sep 2024 20:15) (61 - 104)  BP: 119/55 (12 Sep 2024 16:52) (119/55 - 119/55)  BP(mean): 79 (12 Sep 2024 16:52) (79 - 79)  ABP: 148/54 (12 Sep 2024 20:15) (128/42 - 216/86)  ABP(mean): 85 (12 Sep 2024 20:15) (68 - 138)  RR: 14 (12 Sep 2024 20:15) (14 - 25)  SpO2: 100% (12 Sep 2024 20:15) (94% - 100%)      09-11-24 @ 07:01  -  09-12-24 @ 07:00  --------------------------------------------------------  IN: 600 mL / OUT: 2504 mL / NET: -1904 mL    09-12-24 @ 07:01  -  09-12-24 @ 20:29  --------------------------------------------------------  IN: 1111.5 mL / OUT: 1188 mL / NET: -76.5 mL    Vent settings: 14/500/50/5      PHYSICAL EXAMINATION  NEURO: Calm  GENERAL: Sedated and intubated post angio  Moves all extremities   Sensation intact   HEENT: Anicteric  CARDIOVASCULAR: S1/S2, RRR. Pulses 2+  PULMONARY: Clear, no wheeze  ABDOMEN: Soft, nontender with normoactive bowel sounds  EXTREMITIES: No edema  SKIN: No rash, venous stasis hyperpigmentation in B/L lower extremities      MEDICATIONS:  acetaminophen     Tablet .. 650 milliGRAM(s) Oral every 6 hours PRN  ascorbic acid 500 milliGRAM(s) Oral <User Schedule>  bisacodyl 5 milliGRAM(s) Oral at bedtime  chlorhexidine 2% Cloths 1 Application(s) Topical <User Schedule>  enoxaparin Injectable 40 milliGRAM(s) SubCutaneous every 12 hours  fentaNYL    Injectable 50 MICROGram(s) IV Push once  ferrous    sulfate Liquid 300 milliGRAM(s) Oral <User Schedule>  levETIRAcetam 1000 milliGRAM(s) Oral two times a day  niMODipine 60 milliGRAM(s) Oral every 4 hours  ondansetron Injectable 4 milliGRAM(s) IV Push every 6 hours PRN  oxyCODONE    IR 5 milliGRAM(s) Oral every 4 hours PRN  polyethylene glycol 3350 17 Gram(s) Oral two times a day  senna 2 Tablet(s) Oral two times a day  sodium chloride 0.9%. 1000 milliLiter(s) (110 mL/Hr) IV Continuous <Continuous>      LABS:                       9.4    11.15 )-----------( 270      ( 12 Sep 2024 20:17 )             31.6     09-12    136  |  102  |  18  ----------------------------<  135<H>  4.5   |  25  |  0.78    Ca    8.9      12 Sep 2024 05:30  Phos  5.0     09-12  Mg     2.2     09-12      ASSESSMENT:   62y/o F with:  Subarachnoid hemorrhage HH2 MF3, brain compression, cerebral edema s/p angiogram  Bleed day 5  Hypertension      PLAN:   NEURO:   Neurochecks Q1h  DCI ppx: Nimodipine 60mg PO Q4h, TCDs. S/P DSA 9/12  Repeat DSA in 1 week  seizure prophylaxis: Levetiracetam 1000mg IV BID, as per neurosurgery   EVD @10cm H20 open to drain. Monitor ICPs/CPP/ color/ output  MRI brain and CT spine with and without contrast   Activity: Bedrest. Recommend PT/OT 9/10    PULM:   Wean to extubate    CARDIAC:   SBP goal 100-180mm Hg  TTE  reviewed. Normal EF. Does have LVH      ENDO:   Blood sugar goals 140-180 mg/dL  Insulin sliding scale    GI:  Diet: Regular  LBM: Awaiting    RENAL:   IVL. Monitor BMPs  Replete lytes prn    HEM/ONC: DVT  Monitor H/H  VTE Prophylaxis: Lovenox bid  LE dopplers reviewed. RLE DVT posterior tibial vein    ID:   Afebrile, no leukocytosis    Additional critical care time:  45 minutes

## 2024-09-12 NOTE — PROGRESS NOTE ADULT - SUBJECTIVE AND OBJECTIVE BOX
S/Overnight events:  : BD 4. POD 4 angio. BILL overnight.     Hospital Course:   : Transfer to [facility] for further mgmt of SAH. Patient taken urgently to angio. ET tube pulled back 2cm. EVD placed, open @ 10. Stability scan stable, EVD in position, NGT placed. Started nimodipine 60q4. Extubated to face mask. POD 0 DSA neg for aneurysm.   : BD1. POD 1 angio. Started iron/vit C q other day for microcytic anermia. Started lisinopril 10mg daily. 1.L liter bolus for euvolemia. Ambriz removed, f/u TOV, started on prophylactic SQL 40mg BID (weight based). Passed TOV. Off cardene gtt. cardene gtt resumed. started hydral 25 q8, inc lisinopril dose from 10mg to 20mg daily.   9/10: BD2. POD 2 angio. BILL overnight. TTE showing EF 70%, mild symmetric LVH. DC cardene.   : BD 3. POD 3 angio. BILL overnight. SBP 160s, given hydralazine 10mg IVP x1. Given ducolax suppository. SBP liberalized 100-160. , given labetolol 5mg IVP. NS bolus 500cc for euvolemia. Lactulose for AM.     Vital Signs Last 24 Hrs  T(C): 37.3 (11 Sep 2024 22:00), Max: 37.3 (11 Sep 2024 22:00)  T(F): 99.2 (11 Sep 2024 22:00), Max: 99.2 (11 Sep 2024 22:00)  HR: 67 (12 Sep 2024 00:00) (57 - 84)  BP: 119/55 (11 Sep 2024 12:04) (119/55 - 148/61)  BP(mean): 79 (11 Sep 2024 12:04) (79 - 89)  RR: 25 (12 Sep 2024 00:00) (14 - 25)  SpO2: 100% (12 Sep 2024 00:00) (96% - 100%)    Parameters below as of 12 Sep 2024 00:00  Patient On (Oxygen Delivery Method): room air      I&O's Detail    10 Sep 2024 07:01  -  11 Sep 2024 07:00  --------------------------------------------------------  IN:    IV PiggyBack: 200 mL    Oral Fluid: 890 mL  Total IN: 1090 mL    OUT:    External Ventricular Device (mL): 154 mL    NiCARdipine: 0 mL    Voided (mL): 850 mL  Total OUT: 1004 mL    Total NET: 86 mL      11 Sep 2024 07:  -  12 Sep 2024 00:13  --------------------------------------------------------  IN:    IV PiggyBack: 100 mL    Sodium Chloride 0.9% Bolus: 500 mL  Total IN: 600 mL    OUT:    External Ventricular Device (mL): 143 mL    Voided (mL): 1350 mL  Total OUT: 1493 mL    Total NET: -893 mL      I&O's Summary    10 Sep 2024 07:  -  11 Sep 2024 07:00  --------------------------------------------------------  IN: 1090 mL / OUT: 1004 mL / NET: 86 mL    11 Sep 2024 07:  -  12 Sep 2024 00:13  --------------------------------------------------------  IN: 600 mL / OUT: 1493 mL / NET: -893 mL      PHYSICAL EXAM:  General: Pt is sitting up comfortably in bed, in NAD, on RA  HEENT: PERRL 3mm, EOMI B/L, +Right facial droop (baseline), +EVD in place   Cardiovascular: RRR, normal S1 and S2   Respiratory: non-labored breathing, symmetric chest rise   GI: abd soft, NTND   Neuro: A&O x 3, no aphasia, speech clear, no dysmetria, no pronator drift  Strength 5/5 throughout all 4 extremities  Sensation intact to light touch throughout   Vascular: Distal pulses 2+ x4, no calf edema or erythema  Wounds: R groin C/D/I, no evidence of hematoma; EVD insertion site and incision C/D/I with chlorhexidine dressing in place     TUBES/LINES:  [] CVC  [X] A-line  [] Lumbar Drain  [X] Ventriculostomy open at 10cm H2O  [] Other    DIET:  [] NPO  [X] Mechanical  [] Tube feeds    LABS:    Urinalysis Basic - ( 11 Sep 2024 05:23 )    Color: x / Appearance: x / SG: x / pH: x  Gluc: 123 mg/dL / Ketone: x  / Bili: x / Urobili: x   Blood: x / Protein: x / Nitrite: x   Leuk Esterase: x / RBC: x / WBC x   Sq Epi: x / Non Sq Epi: x / Bacteria: x    Allergies    No Known Allergies    Intolerances      MEDICATIONS:  Antibiotics:    Neuro:  acetaminophen     Tablet .. 650 milliGRAM(s) Oral every 6 hours PRN  levETIRAcetam 1000 milliGRAM(s) Oral two times a day  ondansetron Injectable 4 milliGRAM(s) IV Push every 6 hours PRN  oxyCODONE    IR 5 milliGRAM(s) Oral every 4 hours PRN    Anticoagulation:  enoxaparin Injectable 40 milliGRAM(s) SubCutaneous every 12 hours    OTHER:  bisacodyl 5 milliGRAM(s) Oral at bedtime  chlorhexidine 2% Cloths 1 Application(s) Topical <User Schedule>  lactulose Syrup 10 Gram(s) Oral once  lisinopril 20 milliGRAM(s) Oral daily  niMODipine 60 milliGRAM(s) Oral every 4 hours  polyethylene glycol 3350 17 Gram(s) Oral two times a day  senna 2 Tablet(s) Oral two times a day    IVF:  ascorbic acid 500 milliGRAM(s) Oral <User Schedule>  ferrous    sulfate Liquid 300 milliGRAM(s) Oral <User Schedule>    ASSESSMENT:  63F PMHx uncontrolled HT, R samuels's palsy, was BIBEMS to [facility]  c/o sudden onset severe HA. CTH showed SAH HH: 2, MF: 3. NIHSS: 0. CTA neg for aneurysm. Transferred to [facility] for further mgmt. S/p DSA negative for anueyrsm (24). S/p R frontal EVD (24).     HTN    No pertinent family history in first degree relatives    Handoff    No pertinent past medical history    Hypertension    SAH (subarachnoid hemorrhage)    SAH (subarachnoid hemorrhage)    SAH (subarachnoid hemorrhage)    Subarachnoid hemorrhage    Angiogram, carotid and cerebral, bilateral    No significant past surgical history    No significant past surgical history    H/O  section    HTN    Hypertension    [email or web address]    PLAN:  Neuro:  - Neuro/vitals q1hr  - Seizure prophylaxis: Keppra 1g BID   - R frontal EVD @ 14htT6L, monitor output   - DCI prophylaxis: Nimodipime 60q4  - Pain control: Tylenol, oxy 5 prn  - Plan for rpt DSA 9/15  - stability CTH post-EVD complete , stable  - pending MR brain and neck w/wo contrast     Cards:   - -160  - hx uncontrolled HTN: lisinopril 20mg qd   - TTE 9/10: EF 70%, mild symmetric LVH    Pulm:   - RA  - IS    GI:  - regular diet  - Bowel regimen, pending BM  - Nausea: Zofran 4q6 prn     Renal:  - IVL  - voiding   - euvolemia goal     Endo:  - A1c 5.8    Heme:   - DVT prophylaxis: SCDs to LLE only, ppx SQL 40 BID (weight based)   - Microcytic anemia: iron/vit C q other day   - LE dopplers : R posterior tibial DVT on admission ()- surveillace dopplers in 1 week 9/15    ID:   - Afebrile     Dispo: NSICU, full code, PT/OT pending     Discussed with Dr. Butler and Dr. Morse

## 2024-09-12 NOTE — PROGRESS NOTE ADULT - ASSESSMENT
Assessment: 63f hx HTN, iron deficiency anemia admit for DSA negative SAH hh2mf3 pending repeat DSA       NEURO:  -neuro check q1  -pain management w/ Tylenol & PRN oxycodone   seizure ppx w/ keppra 1g BID   -hydrocephalus s/p EVD (placed 9/8) @ [id number]  -nimodipine 60mg q4  Activity: bed reset    -DSA tomorrow   -pending mRI brain w/ & w/o       PULMONARY:  saturating well on RA,   -continue to monitor on pulse o2   -incentive spirometry 10q/hr when awake     CARDIOVASCULAR:  monitor on telemetry   vitals q1  sbp goal 100-140; c/w lisinopril 40mg QD, amlodipine 10mg        GASTROENTEROLOGY:  DASH diet   ensure BMs w/ Miralax & senna c/w docusate, s/p lactulose   GI ppx : n/a  Daily stool count, LBM prior to arrival      RENAL/:  -check BMP qd  -strict i/o's ;  -Na goal 135-145  -IVF 1l over 4 hrs; encourage PO intake   dry mm       ENDOCRINE:  pre-diabetes   A1c- 5.8%  TSH- WNL      HEME/ONC:  DVT ppx: left SCDs + SQL  microcytic anemia; c/w iron + vitamin C   R BK DVT repeat doppler in 1 week     INFECTIOUS:   Monitor for fevers

## 2024-09-12 NOTE — PRE-ANESTHESIA EVALUATION ADULT - NSANTHOSAYNRD_GEN_A_CORE
No. FRANNY screening performed.  STOP BANG Legend: 0-2 = LOW Risk; 3-4 = INTERMEDIATE Risk; 5-8 = HIGH Risk

## 2024-09-12 NOTE — PROGRESS NOTE ADULT - SUBJECTIVE AND OBJECTIVE BOX
Surgery: Diagnostic angiogram with possible embolization with possible treatment of vasospasm with intra-arterial verapamil and nitroglycerin   Consent: Signed by patient                    NAME/NUMBER of HCP:     Representative Consent: [  ] Signed by patient vs HCP                                                 [  ] N/A -> only for cerebral angiogram    No Known Allergies      OVERNIGHT EVENTS: BILL overnight, neuro stable.     T(C): 36.6 (09-12-24 @ 05:05), Max: 37.3 (09-11-24 @ 22:00)  HR: 67 (09-12-24 @ 05:00) (61 - 84)  BP: 119/55 (09-11-24 @ 12:04) (119/55 - 148/61)  RR: 21 (09-12-24 @ 05:00) (16 - 25)  SpO2: 99% (09-12-24 @ 05:00) (96% - 100%)  Wt(kg): --    General: Pt is sitting up comfortably in bed, in NAD, on RA  HEENT: PERRL 3mm, EOMI B/L, +Right facial droop (baseline), +EVD in place   Cardiovascular: RRR, normal S1 and S2   Respiratory: non-labored breathing, symmetric chest rise   GI: abd soft, NTND   Neuro: A&O x 3, no aphasia, speech clear, no dysmetria, no pronator drift  Strength 5/5 throughout all 4 extremities  Sensation intact to light touch throughout   Vascular: Distal pulses 2+ x4, no calf edema or erythema  Wounds: R groin C/D/I, no evidence of hematoma; EVD insertion site and incision C/D/I with chlorhexidine dressing in place       09-12    136  |  102  |  18  ----------------------------<  135<H>  4.5   |  25  |  0.78    Ca    8.9      12 Sep 2024 05:30  Phos  5.0     09-12  Mg     2.2     09-12      CBC Full  -  ( 12 Sep 2024 05:30 )  WBC Count : 8.47 K/uL  RBC Count : 4.32 M/uL  Hemoglobin : 8.6 g/dL  Hematocrit : 30.3 %  Platelet Count - Automated : 260 K/uL  Mean Cell Volume : 70.1 fl  Mean Cell Hemoglobin : 19.9 pg  Mean Cell Hemoglobin Concentration : 28.4 gm/dL  Auto Neutrophil # : x  Auto Lymphocyte # : x  Auto Monocyte # : x  Auto Eosinophil # : x  Auto Basophil # : x  Auto Neutrophil % : x  Auto Lymphocyte % : x  Auto Monocyte % : x  Auto Eosinophil % : x  Auto Basophil % : x        Pregnancy test (serum hcg for any female under 56, must be resulted day before OR): [ ] Negative Result  [ ] Positive Result  [ x] N/A : male or female>55 y/o  Type & Screen (in past 72hrs):     2 Type & Screen within 72 hours if anticipate blood need in OR:  x_ Y _ N     Blood ordered and on hold for OR:   [ x] No need     [ ] 1u pRBC on hold      [ ] 2u pRBC on hold    Medically optimized per neurointensivist.     Last dose of antiplatelet/anticoagulation drug: SQL 40mg 9/13 @10AM     Implanted Devices (pacemaker, drug pump...etc):  []YES   [] NO                  If yes --> EPS consulted to interrogate device: [ ] YES  [ ] NO                            If yes -->  EPS called to let them know patient going for surgery: [ ] device needs to be turned off                                                                                                                                                 [ ] magnet needs to be placed for surgery                                                                                                                                                [ ] nothing to do per EP, may proceed with cautery use in OR                                       3M nasal swab ordered?  _Y  x_N    Cranial surgery: Order written for hair to be shampooed night before surgery and morning before surgery  [] yes   [x]no  Chlorhexidine Wipes ordered for Neck Down?  _ Y  x N  (twice a day if 1 day before surgery, daily for 3 days if 3 days prior, daily if in ICU)                 Assessment: 63F PMHx uncontrolled HT, R samuels's palsy, was BIBEMS to [facility] 9/8 c/o sudden onset severe HA. CTH showed SAH HH: 2, MF: 3. NIHSS: 0. CTA neg for aneurysm. Transferred to [facility] for further mgmt. S/p DSA negative for anueyrsm (9/8/24). S/p R frontal EVD (9/8/24).       Plan:  - Consent for procedure obtained by patient and placed in chart  - T+S x2  - Coags  - NPO after midnight/IVF while NPO  - continue DVT chemoprophylaxis     D/w Dr. Butler and Dr. Fuentes

## 2024-09-13 LAB
ANION GAP SERPL CALC-SCNC: 8 MMOL/L — SIGNIFICANT CHANGE UP (ref 5–17)
APPEARANCE UR: CLEAR — SIGNIFICANT CHANGE UP
APTT BLD: 32.8 SEC — SIGNIFICANT CHANGE UP (ref 24.5–35.6)
BACTERIA # UR AUTO: ABNORMAL /HPF
BILIRUB UR-MCNC: NEGATIVE — SIGNIFICANT CHANGE UP
BLD GP AB SCN SERPL QL: NEGATIVE — SIGNIFICANT CHANGE UP
BUN SERPL-MCNC: 18 MG/DL — SIGNIFICANT CHANGE UP (ref 7–23)
CALCIUM SERPL-MCNC: 8.5 MG/DL — SIGNIFICANT CHANGE UP (ref 8.4–10.5)
CHLORIDE SERPL-SCNC: 105 MMOL/L — SIGNIFICANT CHANGE UP (ref 96–108)
CO2 SERPL-SCNC: 24 MMOL/L — SIGNIFICANT CHANGE UP (ref 22–31)
COLOR SPEC: YELLOW — SIGNIFICANT CHANGE UP
CREAT SERPL-MCNC: 0.73 MG/DL — SIGNIFICANT CHANGE UP (ref 0.5–1.3)
DIFF PNL FLD: NEGATIVE — SIGNIFICANT CHANGE UP
EGFR: 91 ML/MIN/1.73M2 — SIGNIFICANT CHANGE UP
GLUCOSE SERPL-MCNC: 126 MG/DL — HIGH (ref 70–99)
GLUCOSE UR QL: NEGATIVE MG/DL — SIGNIFICANT CHANGE UP
HCT VFR BLD CALC: 31.8 % — LOW (ref 34.5–45)
HGB BLD-MCNC: 9.1 G/DL — LOW (ref 11.5–15.5)
INR BLD: 0.94 — SIGNIFICANT CHANGE UP (ref 0.85–1.18)
KETONES UR-MCNC: NEGATIVE MG/DL — SIGNIFICANT CHANGE UP
LEUKOCYTE ESTERASE UR-ACNC: ABNORMAL
MAGNESIUM SERPL-MCNC: 2.4 MG/DL — SIGNIFICANT CHANGE UP (ref 1.6–2.6)
MCHC RBC-ENTMCNC: 20.5 PG — LOW (ref 27–34)
MCHC RBC-ENTMCNC: 28.6 GM/DL — LOW (ref 32–36)
MCV RBC AUTO: 71.6 FL — LOW (ref 80–100)
NITRITE UR-MCNC: NEGATIVE — SIGNIFICANT CHANGE UP
NRBC # BLD: 0 /100 WBCS — SIGNIFICANT CHANGE UP (ref 0–0)
PH UR: 6 — SIGNIFICANT CHANGE UP (ref 5–8)
PHOSPHATE SERPL-MCNC: 4.2 MG/DL — SIGNIFICANT CHANGE UP (ref 2.5–4.5)
PLATELET # BLD AUTO: 256 K/UL — SIGNIFICANT CHANGE UP (ref 150–400)
POTASSIUM SERPL-MCNC: 4.3 MMOL/L — SIGNIFICANT CHANGE UP (ref 3.5–5.3)
POTASSIUM SERPL-SCNC: 4.3 MMOL/L — SIGNIFICANT CHANGE UP (ref 3.5–5.3)
PROT UR-MCNC: NEGATIVE MG/DL — SIGNIFICANT CHANGE UP
PROTHROM AB SERPL-ACNC: 10.7 SEC — SIGNIFICANT CHANGE UP (ref 9.5–13)
RAPID RVP RESULT: SIGNIFICANT CHANGE UP
RBC # BLD: 4.44 M/UL — SIGNIFICANT CHANGE UP (ref 3.8–5.2)
RBC # FLD: 17.7 % — HIGH (ref 10.3–14.5)
RBC CASTS # UR COMP ASSIST: 0 /HPF — SIGNIFICANT CHANGE UP (ref 0–4)
RH IG SCN BLD-IMP: NEGATIVE — SIGNIFICANT CHANGE UP
SARS-COV-2 RNA SPEC QL NAA+PROBE: SIGNIFICANT CHANGE UP
SODIUM SERPL-SCNC: 137 MMOL/L — SIGNIFICANT CHANGE UP (ref 135–145)
SP GR SPEC: 1.01 — SIGNIFICANT CHANGE UP (ref 1–1.03)
SQUAMOUS # UR AUTO: 1 /HPF — SIGNIFICANT CHANGE UP (ref 0–5)
UROBILINOGEN FLD QL: 1 MG/DL — SIGNIFICANT CHANGE UP (ref 0.2–1)
WBC # BLD: 10.57 K/UL — HIGH (ref 3.8–10.5)
WBC # FLD AUTO: 10.57 K/UL — HIGH (ref 3.8–10.5)
WBC UR QL: 1 /HPF — SIGNIFICANT CHANGE UP (ref 0–5)

## 2024-09-13 PROCEDURE — 36000 PLACE NEEDLE IN VEIN: CPT

## 2024-09-13 PROCEDURE — 70553 MRI BRAIN STEM W/O & W/DYE: CPT | Mod: 26

## 2024-09-13 PROCEDURE — 99291 CRITICAL CARE FIRST HOUR: CPT

## 2024-09-13 PROCEDURE — 76937 US GUIDE VASCULAR ACCESS: CPT | Mod: 26

## 2024-09-13 PROCEDURE — 99231 SBSQ HOSP IP/OBS SF/LOW 25: CPT

## 2024-09-13 PROCEDURE — 72156 MRI NECK SPINE W/O & W/DYE: CPT | Mod: 26

## 2024-09-13 RX ORDER — NICARDIPINE HCL 25 MG/10ML
5 AMPUL (ML) INTRAVENOUS
Qty: 40 | Refills: 0 | Status: DISCONTINUED | OUTPATIENT
Start: 2024-09-13 | End: 2024-09-13

## 2024-09-13 RX ORDER — SODIUM CHLORIDE IRRIG SOLUTION 0.9 %
1000 SOLUTION, IRRIGATION IRRIGATION ONCE
Refills: 0 | Status: COMPLETED | OUTPATIENT
Start: 2024-09-13 | End: 2024-09-13

## 2024-09-13 RX ORDER — SODIUM CHLORIDE IRRIG SOLUTION 0.9 %
1000 SOLUTION, IRRIGATION IRRIGATION
Refills: 0 | Status: DISCONTINUED | OUTPATIENT
Start: 2024-09-13 | End: 2024-09-14

## 2024-09-13 RX ADMIN — Medication 650 MILLIGRAM(S): at 09:10

## 2024-09-13 RX ADMIN — Medication 650 MILLIGRAM(S): at 10:09

## 2024-09-13 RX ADMIN — Medication 2 TABLET(S): at 06:04

## 2024-09-13 RX ADMIN — NIMODIPINE 60 MILLIGRAM(S): 30 CAPSULE, LIQUID FILLED ORAL at 18:17

## 2024-09-13 RX ADMIN — Medication 650 MILLIGRAM(S): at 15:35

## 2024-09-13 RX ADMIN — NIMODIPINE 60 MILLIGRAM(S): 30 CAPSULE, LIQUID FILLED ORAL at 06:11

## 2024-09-13 RX ADMIN — CHLORHEXIDINE GLUCONATE ORAL RINSE 1 APPLICATION(S): 1.2 SOLUTION DENTAL at 06:00

## 2024-09-13 RX ADMIN — NIMODIPINE 60 MILLIGRAM(S): 30 CAPSULE, LIQUID FILLED ORAL at 09:10

## 2024-09-13 RX ADMIN — Medication 1000 MILLIGRAM(S): at 06:04

## 2024-09-13 RX ADMIN — Medication 115 MILLILITER(S): at 11:07

## 2024-09-13 RX ADMIN — Medication 1000 MILLILITER(S): at 19:38

## 2024-09-13 RX ADMIN — ENOXAPARIN SODIUM 40 MILLIGRAM(S): 150 INJECTION SUBCUTANEOUS at 21:19

## 2024-09-13 RX ADMIN — Medication 650 MILLIGRAM(S): at 16:35

## 2024-09-13 RX ADMIN — Medication 1000 MILLIGRAM(S): at 00:11

## 2024-09-13 RX ADMIN — Medication 17 GRAM(S): at 06:04

## 2024-09-13 RX ADMIN — NIMODIPINE 60 MILLIGRAM(S): 30 CAPSULE, LIQUID FILLED ORAL at 15:35

## 2024-09-13 RX ADMIN — ENOXAPARIN SODIUM 40 MILLIGRAM(S): 150 INJECTION SUBCUTANEOUS at 09:10

## 2024-09-13 RX ADMIN — Medication 1000 MILLIGRAM(S): at 17:12

## 2024-09-13 RX ADMIN — NIMODIPINE 60 MILLIGRAM(S): 30 CAPSULE, LIQUID FILLED ORAL at 22:53

## 2024-09-13 RX ADMIN — Medication 115 MILLILITER(S): at 21:24

## 2024-09-13 RX ADMIN — Medication 650 MILLIGRAM(S): at 23:39

## 2024-09-13 RX ADMIN — Medication 650 MILLIGRAM(S): at 22:52

## 2024-09-13 RX ADMIN — Medication 25 MG/HR: at 10:24

## 2024-09-13 NOTE — PROGRESS NOTE ADULT - SUBJECTIVE AND OBJECTIVE BOX
NSCU ATTENDING -- ADDITIONAL PROGRESS NOTE    Nighttime rounds were performed     HPI:  Patient is a 64 y/o F  with uncontrolled HTN, does not take any medications at home presented to [facility] after developing acute onset severe HA. Once arrived, patient had episode of nausea with emesis. CT head showed SAH, HH: 2, MF: 3. NIHSS: 0. CTA neg for aneurysm. Transferred to [facility] for further mgmt. Upon arrival to [facility], NIHSS noted to be 0. Patient reports a headache rated at 7/10 in severity. Of note, patient's daughter notes pt seems off and is sleepier than her baseline.       ICU Vital Signs Last 24 Hrs  T(C): 38.3 (13 Sep 2024 18:00), Max: 38.4 (13 Sep 2024 09:00)  T(F): 100.9 (13 Sep 2024 18:00), Max: 101.1 (13 Sep 2024 09:00)  HR: 87 (13 Sep 2024 19:00) (54 - 104)  BP: 168/72 (13 Sep 2024 18:00) (107/51 - 168/72)  BP(mean): 104 (13 Sep 2024 18:00) (74 - 104)  ABP: 138/44 (13 Sep 2024 19:00) (103/40 - 216/86)  ABP(mean): 75 (13 Sep 2024 19:00) (60 - 138)  RR: 16 (13 Sep 2024 19:00) (14 - 52)  SpO2: 98% (13 Sep 2024 19:00) (94% - 100%)      09-12-24 @ 07:01  -  09-13-24 @ 07:00  --------------------------------------------------------  IN: 3128.8 mL / OUT: 3969 mL / NET: -840.2 mL    09-13-24 @ 07:01  -  09-13-24 @ 19:25  --------------------------------------------------------  IN: 465 mL / OUT: 1288 mL / NET: -823 mL      Mode: AC/ CMV (Assist Control/ Continuous Mandatory Ventilation), RR (machine): 14, TV (machine): 500, FiO2: 50, PEEP: 5, ITime: 1, MAP: 12, PIP: 37      PHYSICAL EXAMINATION  NEURO:   Alert, awake, oriented x 3, pupils 3mm and brisk, EOMI, R facial (baseline)  Moves all extremities; power 5/5  Sensation intact   HEENT: Anicteric  CARDIOVASCULAR: S1/S2, RRR. Pulses 2+  PULMONARY: Clear, no wheeze  ABDOMEN: Soft, nontender with normoactive bowel sounds  EXTREMITIES: No edema  SKIN: No rash, venous stasis hyperpigmentation in B/L lower extremities      MEDICATIONS:  acetaminophen     Tablet .. 650 milliGRAM(s) Oral every 6 hours PRN  ascorbic acid 500 milliGRAM(s) Oral <User Schedule>  bisacodyl 5 milliGRAM(s) Oral at bedtime  chlorhexidine 2% Cloths 1 Application(s) Topical <User Schedule>  enoxaparin Injectable 40 milliGRAM(s) SubCutaneous every 12 hours  ferrous    sulfate Liquid 300 milliGRAM(s) Oral <User Schedule>  lactated ringers Bolus 1000 milliLiter(s) IV Bolus once  lactated ringers. 1000 milliLiter(s) (115 mL/Hr) IV Continuous <Continuous>  levETIRAcetam 1000 milliGRAM(s) Oral two times a day  niCARdipine Infusion 5 mG/Hr (25 mL/Hr) IV Continuous <Continuous>  niMODipine 60 milliGRAM(s) Oral every 4 hours  ondansetron Injectable 4 milliGRAM(s) IV Push every 6 hours PRN  oxyCODONE    IR 5 milliGRAM(s) Oral every 4 hours PRN  polyethylene glycol 3350 17 Gram(s) Oral two times a day  senna 2 Tablet(s) Oral two times a day      LABS:                     9.1    10.57 )-----------( 256      ( 13 Sep 2024 05:30 )             31.8     09-13    137  |  105  |  18  ----------------------------<  126<H>  4.3   |  24  |  0.73    Ca    8.5      13 Sep 2024 05:30  Phos  4.2     09-13  Mg     2.4     09-13      ASSESSMENT:   62y/o F with:  Subarachnoid hemorrhage HH2 MF3, brain compression, cerebral edema s/p angiogram  Bleed day 6  Hypertension      PLAN:   NEURO:   Neurochecks Q1h  DCI ppx: Nimodipine 60mg PO Q4h, TCDs. S/P DSA 9/12; mild R LILLY spasm. Tx with verapamil  Repeat DSA in 1 week  seizure prophylaxis: Levetiracetam 1000mg IV BID  per neurosurgery   EVD @10cm H20 open to drain. Monitor ICPs/CPP/ color/ output  MRI brain and CT spine with and without contrast   Activity: Bedrest. Recommend PT/OT 9/10    PULM:   Wean to room air      CARDIAC:   SBP goal 100-160mm Hg  On cardene; wean for BP  TTE  reviewed. Normal EF. Does have LVH    ENDO:   Blood sugar goals 140-180 mg/dL  Insulin sliding scale    GI:  Diet: Regular  LBM: Awaiting    RENAL:   IVL. Monitor BMPs  Replete lytes prn    HEM/ONC: DVT  Monitor H/H  VTE Prophylaxis: Lovenox bid. Antixa level appropriate ppx dosing   LE dopplers reviewed. RLE DVT posterior tibial vein    ID:   Afebrile, no leukocytosis    Additional critical care time:  45 minutes      NSCU ATTENDING -- ADDITIONAL PROGRESS NOTE    Nighttime rounds were performed     HPI:  Patient is a 64 y/o F  with uncontrolled HTN, does not take any medications at home presented to [facility] after developing acute onset severe HA. Once arrived, patient had episode of nausea with emesis. CT head showed SAH, HH: 2, MF: 3. NIHSS: 0. CTA neg for aneurysm. Transferred to [facility] for further mgmt. Upon arrival to [facility], NIHSS noted to be 0. Patient reports a headache rated at 7/10 in severity. Of note, patient's daughter notes pt seems off and is sleepier than her baseline.       ICU Vital Signs Last 24 Hrs  T(C): 38.3 (13 Sep 2024 18:00), Max: 38.4 (13 Sep 2024 09:00)  T(F): 100.9 (13 Sep 2024 18:00), Max: 101.1 (13 Sep 2024 09:00)  HR: 87 (13 Sep 2024 19:00) (54 - 104)  BP: 168/72 (13 Sep 2024 18:00) (107/51 - 168/72)  BP(mean): 104 (13 Sep 2024 18:00) (74 - 104)  ABP: 138/44 (13 Sep 2024 19:00) (103/40 - 216/86)  ABP(mean): 75 (13 Sep 2024 19:00) (60 - 138)  RR: 16 (13 Sep 2024 19:00) (14 - 52)  SpO2: 98% (13 Sep 2024 19:00) (94% - 100%)      09-12-24 @ 07:01  -  09-13-24 @ 07:00  --------------------------------------------------------  IN: 3128.8 mL / OUT: 3969 mL / NET: -840.2 mL    09-13-24 @ 07:01  -  09-13-24 @ 19:25  --------------------------------------------------------  IN: 465 mL / OUT: 1288 mL / NET: -823 mL      Mode: AC/ CMV (Assist Control/ Continuous Mandatory Ventilation), RR (machine): 14, TV (machine): 500, FiO2: 50, PEEP: 5, ITime: 1, MAP: 12, PIP: 37      PHYSICAL EXAMINATION  NEURO:   Alert, awake, oriented x 3, pupils 3mm and brisk, EOMI, R facial (baseline)  Moves all extremities; power 5/5  Sensation intact   HEENT: Anicteric  CARDIOVASCULAR: S1/S2, RRR. Pulses 2+  PULMONARY: Clear, no wheeze  ABDOMEN: Soft, nontender with normoactive bowel sounds  EXTREMITIES: No edema  SKIN: No rash, venous stasis hyperpigmentation in B/L lower extremities      MEDICATIONS:  acetaminophen     Tablet .. 650 milliGRAM(s) Oral every 6 hours PRN  ascorbic acid 500 milliGRAM(s) Oral <User Schedule>  bisacodyl 5 milliGRAM(s) Oral at bedtime  chlorhexidine 2% Cloths 1 Application(s) Topical <User Schedule>  enoxaparin Injectable 40 milliGRAM(s) SubCutaneous every 12 hours  ferrous    sulfate Liquid 300 milliGRAM(s) Oral <User Schedule>  lactated ringers Bolus 1000 milliLiter(s) IV Bolus once  lactated ringers. 1000 milliLiter(s) (115 mL/Hr) IV Continuous <Continuous>  levETIRAcetam 1000 milliGRAM(s) Oral two times a day  niCARdipine Infusion 5 mG/Hr (25 mL/Hr) IV Continuous <Continuous>  niMODipine 60 milliGRAM(s) Oral every 4 hours  ondansetron Injectable 4 milliGRAM(s) IV Push every 6 hours PRN  oxyCODONE    IR 5 milliGRAM(s) Oral every 4 hours PRN  polyethylene glycol 3350 17 Gram(s) Oral two times a day  senna 2 Tablet(s) Oral two times a day      LABS:                     9.1    10.57 )-----------( 256      ( 13 Sep 2024 05:30 )             31.8     09-13    137  |  105  |  18  ----------------------------<  126<H>  4.3   |  24  |  0.73    Ca    8.5      13 Sep 2024 05:30  Phos  4.2     09-13  Mg     2.4     09-13      ASSESSMENT:   62y/o F with:  Subarachnoid hemorrhage HH2 MF3, brain compression, cerebral edema s/p angiogram  Bleed day 6  Hypertension      PLAN:   NEURO:   Neurochecks Q1h  DCI ppx: Nimodipine 60mg PO Q4h, TCDs. S/P DSA 9/12; mild R LILLY spasm. Tx with verapamil  Repeat DSA in 1 week  seizure prophylaxis: Levetiracetam 1000mg IV BID  per neurosurgery   EVD @10cm H20 open to drain. Monitor ICPs/CPP/ color/ output  MRI brain and CT spine with and without contrast reviewed  Activity: Bedrest. Recommend PT/OT 9/10    PULM:   Wean to room air  Encourage incentive spirometry    CARDIAC:   SBP goal 120-160mm Hg  On cardene; wean for BP  TTE  reviewed. Normal EF. Does have LVH    ENDO:   Blood sugar goals 140-180 mg/dL  Insulin sliding scale    GI:  Diet: Regular  LBM: 9/13    RENAL:   IVL. Monitor BMPs  Replete lytes prn    HEM/ONC: DVT  Monitor H/H  VTE Prophylaxis: Lovenox bid. Antixa level appropriate ppx dosing   LE dopplers reviewed. RLE DVT posterior tibial vein    ID:   Febrile,  leukocytosis    Additional critical care time:  45 minutes

## 2024-09-13 NOTE — PROCEDURE NOTE - NSICDXPROCEDURE_GEN_ALL_CORE_FT
PROCEDURES:  Insertion of intravenous catheter with ultrasound guidance 13-Sep-2024 19:53:04  Faby Herrera

## 2024-09-13 NOTE — DIETITIAN INITIAL EVALUATION ADULT - PERTINENT MEDS FT
MEDICATIONS  (STANDING):  ascorbic acid 500 milliGRAM(s) Oral <User Schedule>  bisacodyl 5 milliGRAM(s) Oral at bedtime  chlorhexidine 2% Cloths 1 Application(s) Topical <User Schedule>  enoxaparin Injectable 40 milliGRAM(s) SubCutaneous every 12 hours  ferrous    sulfate Liquid 300 milliGRAM(s) Oral <User Schedule>  lactated ringers. 1000 milliLiter(s) (115 mL/Hr) IV Continuous <Continuous>  levETIRAcetam 1000 milliGRAM(s) Oral two times a day  niCARdipine Infusion 5 mG/Hr (25 mL/Hr) IV Continuous <Continuous>  niMODipine 60 milliGRAM(s) Oral every 4 hours  polyethylene glycol 3350 17 Gram(s) Oral two times a day  senna 2 Tablet(s) Oral two times a day    MEDICATIONS  (PRN):  acetaminophen     Tablet .. 650 milliGRAM(s) Oral every 6 hours PRN Temp greater or equal to 38C (100.4F), Mild Pain (1 - 3), Moderate Pain (4 - 6)  ondansetron Injectable 4 milliGRAM(s) IV Push every 6 hours PRN Nausea and/or Vomiting  oxyCODONE    IR 5 milliGRAM(s) Oral every 4 hours PRN Severe Pain (7 - 10)

## 2024-09-13 NOTE — PROGRESS NOTE ADULT - ASSESSMENT
Assessment: 63f hx HTN, iron deficiency anemia admit for DSA negative SAH hh2mf3 c/b vasospasm s/p IA therapy     NEURO:  -neuro check q1  -pain management w/ Tylenol & PRN oxycodone   seizure ppx w/ keppra 1g BID   -hydrocephalus s/p EVD (placed 9/8) @ [id number]  -nimodipine 60mg q4  Activity: bed reset    9/12 DSA w/ LILLY & MCA spasm s/p treatment   -pending mRI brain w/ & w/o       PULMONARY:  saturating well on RA,   -continue to monitor on pulse o2   -incentive spirometry 10q/hr when awake     CARDIOVASCULAR:  monitor on telemetry   vitals q1  sbp goal 100-160; nicardipine ggt to maintain parameters       GASTROENTEROLOGY:  DASH diet   ensure BMs w/ Miralax & senna c/w docusate, s/p lactulose   GI ppx : n/a  Daily stool count, LBM 9/12    RENAL/:  -check BMP qd  -strict i/o's ;  -Na goal 135-145    ENDOCRINE:  pre-diabetes   A1c- 5.8%  TSH- WNL      HEME/ONC:  DVT ppx: left SCDs + SQL  microcytic anemia; c/w iron + vitamin C   R BK DVT repeat doppler in 1 week     INFECTIOUS:   Monitor for fevers

## 2024-09-13 NOTE — PROGRESS NOTE ADULT - SUBJECTIVE AND OBJECTIVE BOX
HPI: 63F PMHx uncontrolled HT, R samuels's palsy, was BIBEMS to [facility] 9/8 c/o sudden onset severe HA. CTH showed SAH HH: 2, MF: 3. NIHSS: 0. CTA neg for aneurysm. Transferred to [facility] for further mgmt. S/p DSA negative for anueyrsm (9/8/24). S/p R frontal EVD (9/8/24) and angiogram for vasospasm tx (9/12).    S/Overnight events: POD0 angio for spasm tx. Dc'd propofol. Extubated to face mask. No pain at this time.       Hospital Course:   9/8: Transfer to [facility] for further mgmt of SAH. Patient taken urgently to angio. ET tube pulled back 2cm. EVD placed, open @ 10. Stability scan stable, EVD in position, NGT placed. Started nimodipine 60q4. Extubated to face mask. POD 0 DSA neg for aneurysm.   9/9: BD1. POD 1 angio. Started iron/vit C q other day for microcytic anermia. Started lisinopril 10mg daily. 1.L liter bolus for euvolemia. Ambriz removed, f/u TOV, started on prophylactic SQL 40mg BID (weight based). Passed TOV. Off cardene gtt. cardene gtt resumed. started hydral 25 q8, inc lisinopril dose from 10mg to 20mg daily.   9/10: BD2. POD 2 angio. BILL overnight. TTE showing EF 70%, mild symmetric LVH. DC cardene.   9/11: BD 3. POD 3 angio. BILL overnight. SBP 160s, given hydralazine 10mg IVP x1. Given ducolax suppository. SBP liberalized 100-160. , given labetolol 5mg IVP. NS bolus 500cc for euvolemia. Lactulose for AM. PO hydral switched to clonidine.   9/12: BD 4. POD 4 angio. BILL overnight. Anti-Xa within prophylactic range. , given labetolol 10mg IVP. 1L NS for euvolemia. lisinopril increased 40, norvasc 10 added. cardene started. change in exam: JAIME/LL 4/5 w/ drift, CTH/CTA performed showing severe spasm. febrile 101, pan cx sent. POD0 angio for spasm tx. Dc'd propofol. Extubated to face mask.     Vital Signs Last 24 Hrs  T(C): 37.5 (13 Sep 2024 00:40), Max: 37.8 (12 Sep 2024 21:00)  T(F): 99.5 (13 Sep 2024 00:40), Max: 100 (12 Sep 2024 21:00)  HR: 64 (13 Sep 2024 00:00) (54 - 104)  BP: 107/51 (12 Sep 2024 21:00) (107/51 - 119/55)  BP(mean): 74 (12 Sep 2024 21:00) (74 - 79)  RR: 14 (13 Sep 2024 00:00) (14 - 52)  SpO2: 99% (13 Sep 2024 00:00) (94% - 100%)    Parameters below as of 13 Sep 2024 00:00  Patient On (Oxygen Delivery Method): room air        I&O's Detail    11 Sep 2024 07:01  -  12 Sep 2024 07:00  --------------------------------------------------------  IN:    IV PiggyBack: 100 mL    Sodium Chloride 0.9% Bolus: 500 mL  Total IN: 600 mL    OUT:    External Ventricular Device (mL): 204 mL    Voided (mL): 2300 mL  Total OUT: 2504 mL    Total NET: -1904 mL      12 Sep 2024 07:01  -  13 Sep 2024 01:06  --------------------------------------------------------  IN:    IV PiggyBack: 100 mL    NiCARdipine: 137.5 mL    Propofol: 17.3 mL    sodium chloride 0.9%: 300 mL    Sodium Chloride 0.9% Bolus: 1499 mL  Total IN: 2053.8 mL    OUT:    External Ventricular Device (mL): 115 mL    Voided (mL): 1900 mL  Total OUT: 2015 mL    Total NET: 38.8 mL        I&O's Summary    11 Sep 2024 07:01  -  12 Sep 2024 07:00  --------------------------------------------------------  IN: 600 mL / OUT: 2504 mL / NET: -1904 mL    12 Sep 2024 07:01  -  13 Sep 2024 01:06  --------------------------------------------------------  IN: 2053.8 mL / OUT: 2015 mL / NET: 38.8 mL        PHYSICAL EXAM:  Constitutional: NAD, resting comfortably in bed.   HEENT: Pupils equal, round, reactive to light. EOMI. Chronic R facial droop (bells palsy)  Respiratory: +Face mask. No respiratory distress, lungs clear to auscultation bilaterally.   Cardiovascular: RRR, S1, S2.   Gastrointestinal: Abdomen soft, non tender, nondistended.  Neurological:  AAOX3 with choices (missed place). Opens eyes. Follows commands. Speech clear.  Cranial Nerves: II-XII intact  Motor: 5/5 power in b/l UE and LE  Sensation: intact to light touch in all extremities  Pronator Drift: none  Extremities: Warm, well perfused. 2+ DP b/l  Wounds: R groin puncture site, c/d/i    TUBES/LINES:  [] CVC  [x] A-line  [] Lumbar Drain  [x] Ventriculostomy  [] Ambriz  [] Other    DIET:  [x] NPO  [] Mechanical  [] Tube feeds    LABS:                        9.4    11.15 )-----------( 270      ( 12 Sep 2024 20:17 )             31.6     09-12    134<L>  |  100  |  16  ----------------------------<  120<H>  4.5   |  24  |  0.75    Ca    8.5      12 Sep 2024 20:17  Phos  5.0     09-12  Mg     2.2     09-12      PTT - ( 12 Sep 2024 20:17 )  PTT:54.0 sec  Urinalysis Basic - ( 12 Sep 2024 20:17 )    Color: x / Appearance: x / SG: x / pH: x  Gluc: 120 mg/dL / Ketone: x  / Bili: x / Urobili: x   Blood: x / Protein: x / Nitrite: x   Leuk Esterase: x / RBC: x / WBC x   Sq Epi: x / Non Sq Epi: x / Bacteria: x          CAPILLARY BLOOD GLUCOSE      POCT Blood Glucose.: 132 mg/dL (12 Sep 2024 16:56)      Drug Levels: [] N/A    CSF Analysis: [] N/A      Allergies    No Known Allergies    Intolerances      MEDICATIONS:  Antibiotics:    Neuro:  acetaminophen     Tablet .. 650 milliGRAM(s) Oral every 6 hours PRN  levETIRAcetam 1000 milliGRAM(s) Oral two times a day  ondansetron Injectable 4 milliGRAM(s) IV Push every 6 hours PRN  oxyCODONE    IR 5 milliGRAM(s) Oral every 4 hours PRN    Anticoagulation:  enoxaparin Injectable 40 milliGRAM(s) SubCutaneous every 12 hours    OTHER:  bisacodyl 5 milliGRAM(s) Oral at bedtime  chlorhexidine 2% Cloths 1 Application(s) Topical <User Schedule>  niMODipine 60 milliGRAM(s) Oral every 4 hours  polyethylene glycol 3350 17 Gram(s) Oral two times a day  senna 2 Tablet(s) Oral two times a day    IVF:  ascorbic acid 500 milliGRAM(s) Oral <User Schedule>  ferrous    sulfate Liquid 300 milliGRAM(s) Oral <User Schedule>  sodium chloride 0.9%. 1000 milliLiter(s) IV Continuous <Continuous>    CULTURES:    RADIOLOGY & ADDITIONAL TESTS:      ASSESSMENT:  63F PMHx uncontrolled HT, R samuels's palsy, was BIBEMS to [facility] 9/8 c/o sudden onset severe HA. CTH showed SAH HH: 2, MF: 3. NIHSS: 0. CTA neg for aneurysm. Transferred to [facility] for further mgmt. S/p DSA negative for anueyrsm (9/8/24). S/p R frontal EVD (9/8/24) and angiogram for vasospasm tx (9/12).    Neuro:  - Neuro/vitals q1hr  - Seizure prophylaxis: Keppra 1g BID   - R frontal EVD @ 39ifG8P, monitor output   - DCI prophylaxis: Nimodipime 60q4  - Pain control: Tylenol, oxy 5 prn  - Plan for rpt DSA 9/15  - stability CTH post-EVD complete 9/8, stable, CTH 9/12 stable   - CTA 9/12: b/l LILLY spasm, b/l MCA R>L  - pending MR brain and neck w/wo contrast     Cards:   - -160  - hx uncontrolled HTN: clonidine 0.1mg TID, lisinopril 40mg qd, amlodipine 10mg daily   - TTE 9/10: EF 70%, mild symmetric LVH    Pulm:   - RA  - IS    GI:  - regular diet  - Bowel regimen, pending BM  - Nausea: Zofran 4q6 prn     Renal:  - NS @ 100  - voiding   - euvolemia goal     Endo:  - A1c 5.8    Heme:   - DVT prophylaxis: SCDs to LLE only, ppx SQL 40 BID (weight based)   - Anti Xa 9/12: 0.31  LE dopplers 9/9: R posterior tibial DVT on admission (9/9)- surveillace dopplers in 1 week 9/15  - Microcytic anemia: iron/vit C q other day     ID:   - pan cx 9/12     Dispo: NSICU, full code, PT/OT rec AR    Discussed with Dr. Butler and Dr. Morse

## 2024-09-13 NOTE — DIETITIAN INITIAL EVALUATION ADULT - OTHER INFO
This is a 63 year old female with a past medical history of uncontrolled HTN, does not take any medications at home presented to [facility] after developing acute onset severe HA. Once arrived, patient had episode of nausea with emesis. CTH showed SAH, HH: 2, MF: 3. NIHSS: 0. CTA neg for aneurysm. Transferred to [facility] for further mgmt. Upon arrival to [facility], NIHSS noted to be 0. Patient reports a headache rated at 7/10 in severity. Of note, patient's daughter notes pt seems off and is sleepier than her baseline.    Pt seen in room for nutrition assessment. Pt on 8 east, on nicardipine gtt, no other gtts, not sedated or intubated, on room air, SpO2 94-99%, MAPs ranging from , MAP was 104 when RD was in pt's room. Pt's daughter, Javad, was present. Pt reports fair appetite PTA and improving appetite during hospital stay. As per diet recall PTA: pt was eating some "unhealthy" foods as pt's daughter noted, some fried foods, enjoys chicken and rish as well, rice, vegetables, oatmeal, less breakfast foods usually per pt. Currently on low sodium diet, tolerating fairly well, noted with ~50-60% PO intakes overall. No cultural, Rastafari, or ethnic food preferences noted. No known food allergies. Denies major/significant wt changes, reports wt stability at current wt. Dosing wt: ~253.5 pounds, Ideal body weight: ~117.5 pounds, pt is ~216% of ideal body weight. Denies nausea, vomiting, diarrhea, potential constipation, no BM documented, pt is on several bowel regimens, soft/nontender abdomen noted, nondistended abdomen noted. No noted edema. Skin: right groin and head surgical incisions, EVD, no pressure ulcers. Dannie: 16. No issues chewing or swallowing noted. No noted pain. Labs reviewed: elevated serum Glucose (126), POCT glucose (132); RD to continue to monitor trends. Nutritionally pertinent medications/supplements: IV fluids, zofran, senna, MIRALAX. Observed pt with no overt signs of muscle or fat wasting. Based on ASPEN guidelines, pt does not meet criteria for malnutrition at this time. Pt amenable to education; RD provided education in regards to the importance of adequate macro and micronutrients, as well as hydration to support ADLs, maintain energy levels and overall functional/nutritional status. General healthful education provided. Nutrient-dense foods promoted. Pt's diet reviewed. Heart healthy and low sodium food options and nutrition interventions reviewed. Pt appeared receptive and verbalized understanding. Written handouts provided from the nutrition care manual. No additional nutrition-related concerns. Will continue to follow. Additional nutrition recommendations below to follow.

## 2024-09-13 NOTE — PROGRESS NOTE ADULT - SUBJECTIVE AND OBJECTIVE BOX
INTERVAL HISTORY: HPI:  63F PMHx uncontrolled HTN, does not take any medications at home presented to [facility] after developing acute onset severe HA. Once arrived, patient had episode of nausea with emesis. CTH showed SAH, HH: 2, MF: 3. NIHSS: 0. CTA neg for aneurysm. Transferred to [facility] for further mgmt. Upon arrival to [facility], NIHSS noted to be 0. Patient reports a headache rated at 7/10 in severity. Of note, patient's daughter notes pt seems off and is sleepier than her baseline. Pt denies SOB, chest pain, vision changes, nausea, weakness/numbness/tingling, dizziness, photophobia.  (08 Sep 2024 13:29)      Drug Dosing Weight  Height (cm): 161.3 (08 Sep 2024 11:22)  Weight (kg): 120 (08 Sep 2024 11:22)  BMI (kg/m2): 46.1 (08 Sep 2024 11:22)  BSA (m2): 2.19 (08 Sep 2024 11:22)    PAST MEDICAL & SURGICAL HISTORY:  Hypertension  H/O  section    REVIEW OF SYSTEMS: [ ] Unable to Assess due to neurologic exam   [ ] All ROS addressed below are non-contributory, except:  Neuro: [ ] Headache [ ] Back pain [ ] Numbness [ ] Weakness [ ] Ataxia [ ] Dizziness [ ] Aphasia [ ] Dysarthria [ ] Visual disturbance  Resp: [ ] Shortness of breath/dyspnea, [ ] Orthopnea [ ] Cough  CV: [ ] Chest pain [ ] Palpitation [ ] Lightheadedness [ ] Syncope  Renal: [ ] Thirst [ ] Edema  GI: [ ] Nausea [ ] Emesis [ ] Abdominal pain [ ] Constipation [ ] Diarrhea  Hem: [ ] Hematemesis [ ] bright red blood per rectum  ID: [ ] Fever [ ] Chills [ ] Dysuria  ENT: [ ] Rhinorrhea    PHYSICAL EXAM:    General: No Acute Distress   Neurological: Awake, alert oriented to person, place and time, Following Commands, PERRL, EOMI, R facial, Speech Fluent, Moving all extremities, Muscle Strength normal in all four extremities, No Drift, Sensation to Light Touch Intact  Pulmonary: Clear to Auscultation, No Rales, No Rhonchi, No Wheezes   Cardiovascular: S1, S2, Regular Rate and Rhythm   Gastrointestinal: Soft, Nontender, Nondistended   Extremities: No calf tenderness

## 2024-09-13 NOTE — DIETITIAN INITIAL EVALUATION ADULT - OTHER CALCULATIONS
Pt is >100% ideal body weight, not-vented, in the ICU, thus ideal body weight used for all calculations. Needs adjusted for advanced age, clinical course.

## 2024-09-13 NOTE — PROCEDURE NOTE - NSPROCDETAILS_GEN_ALL_CORE
USG/location identified, draped/prepped, sterile technique used/blood seen on insertion/dressing applied/flushes easily/secured in place/sterile technique, catheter placed
location identified, draped/prepped, sterile technique used, needle inserted/introduced/positive blood return obtained via catheter/connected to a pressurized flush line/sutured in place/hemostasis with direct pressure, dressing applied/all materials/supplies accounted for at end of procedure
location identified, draped/prepped, sterile technique used/sterile dressing applied/sterile technique, catheter placed/supine position/ultrasound guidance

## 2024-09-13 NOTE — PROCEDURE NOTE - NSPOSTCAREGUIDE_GEN_A_CORE
Verbal/written post procedure instructions were given to patient/caregiver/Instructed patient/caregiver regarding signs and symptoms of infection
Verbal/written post procedure instructions were given to patient/caregiver
Verbal/written post procedure instructions were given to patient/caregiver/Instructed patient/caregiver to follow-up with primary care physician/Keep the cast/splint/dressing clean and dry/Care for catheter as per unit/ICU protocols

## 2024-09-13 NOTE — DIETITIAN INITIAL EVALUATION ADULT - ADD RECOMMEND
1. Consider DASH/TLC diet order as opposed to low sodium diet   2. Encourage pt to meet nutritional needs as able  3. Monitor PO intakes, trend weights (weekly), monitor skin integrity, monitor labs (electrolytes, CMP), monitor GI function  4. Encourage adherence to diet education (reinforce as able)  5. Pain and bowel regimen per team   6. Will continue to assess/honor preferences as able   7. Align nutrition interventions with goals of care at all times

## 2024-09-13 NOTE — DIETITIAN INITIAL EVALUATION ADULT - PERTINENT LABORATORY DATA
09-13    137  |  105  |  18  ----------------------------<  126<H>  4.3   |  24  |  0.73    Ca    8.5      13 Sep 2024 05:30  Phos  4.2     09-13  Mg     2.4     09-13    POCT Blood Glucose.: 132 mg/dL (09-12-24 @ 16:56)  A1C with Estimated Average Glucose Result: 5.8 % (09-08-24 @ 11:38)

## 2024-09-13 NOTE — DIETITIAN INITIAL EVALUATION ADULT - PERSON TAUGHT/METHOD
Pt amenable to education; RD provided education in regards to the importance of adequate macro and micronutrients, as well as hydration to support ADLs, maintain energy levels and overall functional/nutritional status. General healthful education provided. Nutrient-dense foods promoted. Pt's diet reviewed. Heart healthy and low sodium food options and nutrition interventions reviewed. Pt appeared receptive and verbalized understanding. Written handouts provided from the nutrition care manual./verbal instruction/written material/patient instructed/daughter instructed

## 2024-09-14 LAB
ANION GAP SERPL CALC-SCNC: 5 MMOL/L — SIGNIFICANT CHANGE UP (ref 5–17)
ANION GAP SERPL CALC-SCNC: 8 MMOL/L — SIGNIFICANT CHANGE UP (ref 5–17)
ANION GAP SERPL CALC-SCNC: 8 MMOL/L — SIGNIFICANT CHANGE UP (ref 5–17)
APPEARANCE CSF: SIGNIFICANT CHANGE UP
APPEARANCE SPUN FLD: SIGNIFICANT CHANGE UP
BUN SERPL-MCNC: 15 MG/DL — SIGNIFICANT CHANGE UP (ref 7–23)
BUN SERPL-MCNC: 16 MG/DL — SIGNIFICANT CHANGE UP (ref 7–23)
BUN SERPL-MCNC: 16 MG/DL — SIGNIFICANT CHANGE UP (ref 7–23)
CALCIUM SERPL-MCNC: 8.4 MG/DL — SIGNIFICANT CHANGE UP (ref 8.4–10.5)
CALCIUM SERPL-MCNC: 8.4 MG/DL — SIGNIFICANT CHANGE UP (ref 8.4–10.5)
CALCIUM SERPL-MCNC: 9 MG/DL — SIGNIFICANT CHANGE UP (ref 8.4–10.5)
CHLORIDE SERPL-SCNC: 100 MMOL/L — SIGNIFICANT CHANGE UP (ref 96–108)
CHLORIDE SERPL-SCNC: 101 MMOL/L — SIGNIFICANT CHANGE UP (ref 96–108)
CHLORIDE SERPL-SCNC: 102 MMOL/L — SIGNIFICANT CHANGE UP (ref 96–108)
CO2 SERPL-SCNC: 24 MMOL/L — SIGNIFICANT CHANGE UP (ref 22–31)
CO2 SERPL-SCNC: 25 MMOL/L — SIGNIFICANT CHANGE UP (ref 22–31)
CO2 SERPL-SCNC: 26 MMOL/L — SIGNIFICANT CHANGE UP (ref 22–31)
COLOR CSF: ABNORMAL
CREAT SERPL-MCNC: 0.68 MG/DL — SIGNIFICANT CHANGE UP (ref 0.5–1.3)
CREAT SERPL-MCNC: 0.71 MG/DL — SIGNIFICANT CHANGE UP (ref 0.5–1.3)
CREAT SERPL-MCNC: 0.73 MG/DL — SIGNIFICANT CHANGE UP (ref 0.5–1.3)
CSF COMMENTS: SIGNIFICANT CHANGE UP
EGFR: 91 ML/MIN/1.73M2 — SIGNIFICANT CHANGE UP
EGFR: 94 ML/MIN/1.73M2 — SIGNIFICANT CHANGE UP
EGFR: 97 ML/MIN/1.73M2 — SIGNIFICANT CHANGE UP
GLUCOSE BLDC GLUCOMTR-MCNC: 119 MG/DL — HIGH (ref 70–99)
GLUCOSE CSF-MCNC: 70 MG/DL — SIGNIFICANT CHANGE UP (ref 40–70)
GLUCOSE SERPL-MCNC: 108 MG/DL — HIGH (ref 70–99)
GLUCOSE SERPL-MCNC: 112 MG/DL — HIGH (ref 70–99)
GLUCOSE SERPL-MCNC: 127 MG/DL — HIGH (ref 70–99)
GRAM STN FLD: SIGNIFICANT CHANGE UP
GRAM STN FLD: SIGNIFICANT CHANGE UP
HCT VFR BLD CALC: 30.7 % — LOW (ref 34.5–45)
HGB BLD-MCNC: 8.8 G/DL — LOW (ref 11.5–15.5)
LYMPHOCYTES # CSF: 6 % — LOW (ref 40–80)
MAGNESIUM SERPL-MCNC: 2.4 MG/DL — SIGNIFICANT CHANGE UP (ref 1.6–2.6)
MCHC RBC-ENTMCNC: 20.7 PG — LOW (ref 27–34)
MCHC RBC-ENTMCNC: 28.7 GM/DL — LOW (ref 32–36)
MCV RBC AUTO: 72.1 FL — LOW (ref 80–100)
MONOS+MACROS NFR CSF: 2 % — LOW (ref 15–45)
NEUTROPHILS # CSF: 15 % — HIGH (ref 0–6)
NRBC # BLD: 0 /100 WBCS — SIGNIFICANT CHANGE UP (ref 0–0)
NRBC NFR CSF: 23 /UL — HIGH (ref 0–5)
PHOSPHATE SERPL-MCNC: 3.8 MG/DL — SIGNIFICANT CHANGE UP (ref 2.5–4.5)
PLATELET # BLD AUTO: 254 K/UL — SIGNIFICANT CHANGE UP (ref 150–400)
POTASSIUM SERPL-MCNC: 3.9 MMOL/L — SIGNIFICANT CHANGE UP (ref 3.5–5.3)
POTASSIUM SERPL-MCNC: 4.1 MMOL/L — SIGNIFICANT CHANGE UP (ref 3.5–5.3)
POTASSIUM SERPL-MCNC: 4.1 MMOL/L — SIGNIFICANT CHANGE UP (ref 3.5–5.3)
POTASSIUM SERPL-SCNC: 3.9 MMOL/L — SIGNIFICANT CHANGE UP (ref 3.5–5.3)
POTASSIUM SERPL-SCNC: 4.1 MMOL/L — SIGNIFICANT CHANGE UP (ref 3.5–5.3)
POTASSIUM SERPL-SCNC: 4.1 MMOL/L — SIGNIFICANT CHANGE UP (ref 3.5–5.3)
PROCALCITONIN SERPL-MCNC: 0.05 NG/ML — SIGNIFICANT CHANGE UP (ref 0.02–0.1)
PROT CSF-MCNC: 46 MG/DL — HIGH (ref 15–45)
RBC # BLD: 4.26 M/UL — SIGNIFICANT CHANGE UP (ref 3.8–5.2)
RBC # CSF: HIGH /UL (ref 0–0)
RBC # FLD: 17.9 % — HIGH (ref 10.3–14.5)
SODIUM SERPL-SCNC: 131 MMOL/L — LOW (ref 135–145)
SODIUM SERPL-SCNC: 134 MMOL/L — LOW (ref 135–145)
SODIUM SERPL-SCNC: 134 MMOL/L — LOW (ref 135–145)
SODIUM UR-SCNC: 55 MMOL/L — SIGNIFICANT CHANGE UP
SPECIMEN SOURCE: SIGNIFICANT CHANGE UP
TUBE TYPE: SIGNIFICANT CHANGE UP
WBC # BLD: 10.35 K/UL — SIGNIFICANT CHANGE UP (ref 3.8–10.5)
WBC # FLD AUTO: 10.35 K/UL — SIGNIFICANT CHANGE UP (ref 3.8–10.5)

## 2024-09-14 PROCEDURE — 70498 CT ANGIOGRAPHY NECK: CPT | Mod: 26

## 2024-09-14 PROCEDURE — 99291 CRITICAL CARE FIRST HOUR: CPT

## 2024-09-14 PROCEDURE — 70496 CT ANGIOGRAPHY HEAD: CPT | Mod: 26

## 2024-09-14 PROCEDURE — 71045 X-RAY EXAM CHEST 1 VIEW: CPT | Mod: 26

## 2024-09-14 PROCEDURE — 0042T: CPT

## 2024-09-14 RX ORDER — SODIUM CHLORIDE 5 G/100ML
500 INJECTION, SOLUTION INTRAVENOUS
Refills: 0 | Status: DISCONTINUED | OUTPATIENT
Start: 2024-09-14 | End: 2024-09-15

## 2024-09-14 RX ORDER — NOREPINEPHRINE BITARTRATE/D5W 16MG/250ML
0.05 PLASTIC BAG, INJECTION (ML) INTRAVENOUS
Qty: 8 | Refills: 0 | Status: DISCONTINUED | OUTPATIENT
Start: 2024-09-14 | End: 2024-09-14

## 2024-09-14 RX ORDER — BISACODYL 5 MG/1
5 TABLET, COATED ORAL AT BEDTIME
Refills: 0 | Status: DISCONTINUED | OUTPATIENT
Start: 2024-09-14 | End: 2024-09-18

## 2024-09-14 RX ORDER — LEVETIRACETAM 1000 MG
1000 TABLET ORAL EVERY 12 HOURS
Refills: 0 | Status: DISCONTINUED | OUTPATIENT
Start: 2024-09-14 | End: 2024-09-15

## 2024-09-14 RX ORDER — SODIUM CHLORIDE 0.9 % (FLUSH) 0.9 %
1000 SYRINGE (ML) INJECTION ONCE
Refills: 0 | Status: COMPLETED | OUTPATIENT
Start: 2024-09-14 | End: 2024-09-14

## 2024-09-14 RX ORDER — BROMOCRIPTINE MESYLATE 5 MG/1
5 CAPSULE ORAL EVERY 8 HOURS
Refills: 0 | Status: DISCONTINUED | OUTPATIENT
Start: 2024-09-14 | End: 2024-09-16

## 2024-09-14 RX ORDER — NOREPINEPHRINE BITARTRATE/D5W 16MG/250ML
0.05 PLASTIC BAG, INJECTION (ML) INTRAVENOUS
Qty: 8 | Refills: 0 | Status: DISCONTINUED | OUTPATIENT
Start: 2024-09-14 | End: 2024-09-16

## 2024-09-14 RX ORDER — SODIUM CHLORIDE 0.9 % (FLUSH) 0.9 %
500 SYRINGE (ML) INJECTION ONCE
Refills: 0 | Status: COMPLETED | OUTPATIENT
Start: 2024-09-14 | End: 2024-09-14

## 2024-09-14 RX ADMIN — Medication 1000 MILLILITER(S): at 17:47

## 2024-09-14 RX ADMIN — Medication 500 MILLIGRAM(S): at 06:50

## 2024-09-14 RX ADMIN — NIMODIPINE 60 MILLIGRAM(S): 30 CAPSULE, LIQUID FILLED ORAL at 02:50

## 2024-09-14 RX ADMIN — SODIUM CHLORIDE 30 MILLILITER(S): 5 INJECTION, SOLUTION INTRAVENOUS at 18:04

## 2024-09-14 RX ADMIN — Medication 650 MILLIGRAM(S): at 23:48

## 2024-09-14 RX ADMIN — BROMOCRIPTINE MESYLATE 5 MILLIGRAM(S): 5 CAPSULE ORAL at 13:08

## 2024-09-14 RX ADMIN — NIMODIPINE 60 MILLIGRAM(S): 30 CAPSULE, LIQUID FILLED ORAL at 13:08

## 2024-09-14 RX ADMIN — ENOXAPARIN SODIUM 40 MILLIGRAM(S): 150 INJECTION SUBCUTANEOUS at 21:59

## 2024-09-14 RX ADMIN — Medication 10.8 MICROGRAM(S)/KG/MIN: at 17:58

## 2024-09-14 RX ADMIN — NIMODIPINE 60 MILLIGRAM(S): 30 CAPSULE, LIQUID FILLED ORAL at 21:59

## 2024-09-14 RX ADMIN — Medication 1000 MILLIGRAM(S): at 06:50

## 2024-09-14 RX ADMIN — Medication 300 MILLIGRAM(S): at 06:53

## 2024-09-14 RX ADMIN — Medication 1000 MILLILITER(S): at 22:11

## 2024-09-14 RX ADMIN — Medication 650 MILLIGRAM(S): at 10:25

## 2024-09-14 RX ADMIN — Medication 40 MILLIGRAM(S): at 12:21

## 2024-09-14 RX ADMIN — BROMOCRIPTINE MESYLATE 5 MILLIGRAM(S): 5 CAPSULE ORAL at 22:00

## 2024-09-14 RX ADMIN — Medication 650 MILLIGRAM(S): at 23:18

## 2024-09-14 RX ADMIN — Medication 1000 MILLILITER(S): at 12:21

## 2024-09-14 RX ADMIN — Medication 125 MILLILITER(S): at 22:56

## 2024-09-14 RX ADMIN — CHLORHEXIDINE GLUCONATE ORAL RINSE 1 APPLICATION(S): 1.2 SOLUTION DENTAL at 06:51

## 2024-09-14 RX ADMIN — ENOXAPARIN SODIUM 40 MILLIGRAM(S): 150 INJECTION SUBCUTANEOUS at 09:07

## 2024-09-14 RX ADMIN — Medication 650 MILLIGRAM(S): at 09:07

## 2024-09-14 RX ADMIN — NIMODIPINE 60 MILLIGRAM(S): 30 CAPSULE, LIQUID FILLED ORAL at 09:07

## 2024-09-14 RX ADMIN — Medication 400 MILLIGRAM(S): at 17:58

## 2024-09-14 RX ADMIN — NIMODIPINE 60 MILLIGRAM(S): 30 CAPSULE, LIQUID FILLED ORAL at 06:50

## 2024-09-14 NOTE — PROGRESS NOTE ADULT - SUBJECTIVE AND OBJECTIVE BOX
NSCU ATTENDING -- ADDITIONAL PROGRESS NOTE    Nighttime rounds were performed     HPI:  Patient is a 62 y/o F  with uncontrolled HTN, does not take any medications at home presented to [facility] after developing acute onset severe HA. Once arrived, patient had episode of nausea with emesis. CT head showed SAH, HH: 2, MF: 3. NIHSS: 0. CTA neg for aneurysm. Transferred to [facility] for further mgmt. Upon arrival to [facility], NIHSS noted to be 0. Patient reports a headache rated at 7/10 in severity. Of note, patient's daughter notes pt seems off and is sleepier than her baseline.       ICU Vital Signs Last 24 Hrs  T(C): 37.5 (14 Sep 2024 19:46), Max: 38.4 (13 Sep 2024 22:00)  T(F): 99.5 (14 Sep 2024 19:46), Max: 101.1 (13 Sep 2024 22:00)  HR: 88 (14 Sep 2024 19:00) (69 - 97)  BP: 143/63 (14 Sep 2024 10:35) (143/63 - 148/65)  BP(mean): 90 (14 Sep 2024 10:35) (90 - 93)  ABP: 190/54 (14 Sep 2024 19:00) (130/42 - 191/56)  ABP(mean): 98 (14 Sep 2024 19:00) (70 - 102)  RR: 18 (14 Sep 2024 19:00) (14 - 44)  SpO2: 96% (14 Sep 2024 19:00) (73% - 100%)      09-13-24 @ 07:01  -  09-14-24 @ 07:00  --------------------------------------------------------  IN: 2741.5 mL / OUT: 3785 mL / NET: -1043.5 mL    09-14-24 @ 07:01  -  09-14-24 @ 19:51  --------------------------------------------------------  IN: 118 mL / OUT: 1673 mL / NET: -1555 mL      PHYSICAL EXAMINATION  NEURO:   Alert, awake, oriented x 3, pupils 3mm and brisk, EOMI, R facial (baseline)  Moves all extremities; power 5/5  Sensation intact   HEENT: Anicteric  CARDIOVASCULAR: S1/S2, RRR. Pulses 2+  PULMONARY: Clear, no wheeze  ABDOMEN: Soft, nontender with normoactive bowel sounds  EXTREMITIES: No edema  SKIN: No rash, venous stasis hyperpigmentation in B/L lower extremities      MEDICATIONS:   acetaminophen     Tablet .. 650 milliGRAM(s) Oral every 6 hours PRN  ascorbic acid 500 milliGRAM(s) Oral <User Schedule>  bisacodyl 5 milliGRAM(s) Oral at bedtime  bromocriptine Capsule 5 milliGRAM(s) Oral every 8 hours  chlorhexidine 2% Cloths 1 Application(s) Topical <User Schedule>  enoxaparin Injectable 40 milliGRAM(s) SubCutaneous every 12 hours  ferrous    sulfate Liquid 300 milliGRAM(s) Oral <User Schedule>  levETIRAcetam  IVPB 1000 milliGRAM(s) IV Intermittent every 12 hours  niMODipine 60 milliGRAM(s) Oral every 4 hours  norepinephrine Infusion 0.05 MICROgram(s)/kG/Min (10.8 mL/Hr) IV Continuous <Continuous>  ondansetron Injectable 4 milliGRAM(s) IV Push every 6 hours PRN  oxyCODONE    IR 5 milliGRAM(s) Oral every 4 hours PRN  polyethylene glycol 3350 17 Gram(s) Oral two times a day PRN  sodium chloride 3%. 500 milliLiter(s) (30 mL/Hr) IV Continuous <Continuous>      LABS:                      8.8    10.35 )-----------( 254      ( 14 Sep 2024 06:13 )             30.7     09-14    131<L>  |  101  |  16  ----------------------------<  112<H>  4.1   |  25  |  0.73    Ca    8.4      14 Sep 2024 15:31  Phos  3.8     09-14  Mg     2.4     09-14      Culture - CSF with Gram Stain (collected 09-13-24 @ 23:05)  Source: CSF CSF  Gram Stain (09-14-24 @ 18:03):    No polymorphonuclear cells seen per low power field    No organisms seen per oil power field    by cytocentrifuge      ASSESSMENT:   62 y/o F with:  Subarachnoid hemorrhage HH2 MF3, brain compression, cerebral edema s/p angiogram  Bleed day 7  Delayed cerebral ischemia  Vasospasm  Hypertension      PLAN:   NEURO:   Neurochecks Q1h  DCI: Nimodipine 60mg PO Q4h (hold as augmenting BP), S/P DSA 9/12; mild R LILLY spasm. Tx with verapamil  CT/CTA/CTP 9/14 reviewed  Seizure prophylaxis: Levetiracetam 1000mg IV BID  per neurosurgery   EVD @10cm H20 open to drain. Monitor ICPs/CPP/ color/ output  MRI brain and CT spine with and without contrast reviewed  Activity: Bedrest    PULM:   Room air  Encourage incentive spirometry    CARDIAC:   SBP goal 160-200mmHg; on levophed  Daily EKG, troponin, proBNP while on pressors  TTE  reviewed. Normal EF. Does have LVH    ENDO:   Blood sugar goals 140-180 mg/dL  Insulin sliding scale    GI:  Diet: NPO in case of repeat angio  LBM: 9/13    RENAL: Hyponatremia with hyperdiuresis; suspect CSW  IVL. Monitor BMPs  On 3% at 30cc/hr  Na goal 140-145  Replete lytes prn    HEM/ONC: DVT; RLE DVT posterior tibial vein, microcytic anemia  Monitor H/H. On ferrous sulfate and vit C  VTE Prophylaxis: Lovenox bid. Antixa level appropriate ppx dosing   LE dopplers reviewed. RLE DVT posterior tibial vein    ID: Febrile, mild leukocytosis  Cultures:   CSF:  Blood  UA  Procalcitonin: 0.05  Abx deferred for now. Possibly central fever. On bromocriptine 5mg Q8    Additional critical care time:  45 minutes      NSCU ATTENDING -- ADDITIONAL PROGRESS NOTE    Nighttime rounds were performed     HPI:  Patient is a 62 y/o F  with uncontrolled HTN, does not take any medications at home presented to [facility] after developing acute onset severe HA. Once arrived, patient had episode of nausea with emesis. CT head showed SAH, HH: 2, MF: 3. NIHSS: 0. CTA neg for aneurysm. Transferred to [facility] for further mgmt. Upon arrival to [facility], NIHSS noted to be 0. Patient reports a headache rated at 7/10 in severity. Of note, patient's daughter notes pt seems off and is sleepier than her baseline.       ICU Vital Signs Last 24 Hrs  T(C): 37.5 (14 Sep 2024 19:46), Max: 38.4 (13 Sep 2024 22:00)  T(F): 99.5 (14 Sep 2024 19:46), Max: 101.1 (13 Sep 2024 22:00)  HR: 88 (14 Sep 2024 19:00) (69 - 97)  BP: 143/63 (14 Sep 2024 10:35) (143/63 - 148/65)  BP(mean): 90 (14 Sep 2024 10:35) (90 - 93)  ABP: 190/54 (14 Sep 2024 19:00) (130/42 - 191/56)  ABP(mean): 98 (14 Sep 2024 19:00) (70 - 102)  RR: 18 (14 Sep 2024 19:00) (14 - 44)  SpO2: 96% (14 Sep 2024 19:00) (73% - 100%)      09-13-24 @ 07:01  -  09-14-24 @ 07:00  --------------------------------------------------------  IN: 2741.5 mL / OUT: 3785 mL / NET: -1043.5 mL    09-14-24 @ 07:01  -  09-14-24 @ 19:51  --------------------------------------------------------  IN: 118 mL / OUT: 1673 mL / NET: -1555 mL      PHYSICAL EXAMINATION  NEURO:   Alert, awake, oriented x 3, pupils 3mm and brisk, EOMI, R facial (baseline), dysarthric  RUE and RLE 5/5, RUE mild drift though strength 5/5, RLE 5/5  Sensation intact   HEENT: Anicteric  CARDIOVASCULAR: S1/S2, RRR.   PULMONARY: Clear, no wheeze  ABDOMEN: Soft, nontender with normoactive bowel sounds  EXTREMITIES: No edema  SKIN: No rash, venous stasis hyperpigmentation in B/L lower extremities      MEDICATIONS:   acetaminophen     Tablet .. 650 milliGRAM(s) Oral every 6 hours PRN  ascorbic acid 500 milliGRAM(s) Oral <User Schedule>  bisacodyl 5 milliGRAM(s) Oral at bedtime  bromocriptine Capsule 5 milliGRAM(s) Oral every 8 hours  chlorhexidine 2% Cloths 1 Application(s) Topical <User Schedule>  enoxaparin Injectable 40 milliGRAM(s) SubCutaneous every 12 hours  ferrous    sulfate Liquid 300 milliGRAM(s) Oral <User Schedule>  levETIRAcetam  IVPB 1000 milliGRAM(s) IV Intermittent every 12 hours  niMODipine 60 milliGRAM(s) Oral every 4 hours  norepinephrine Infusion 0.05 MICROgram(s)/kG/Min (10.8 mL/Hr) IV Continuous <Continuous>  ondansetron Injectable 4 milliGRAM(s) IV Push every 6 hours PRN  oxyCODONE    IR 5 milliGRAM(s) Oral every 4 hours PRN  polyethylene glycol 3350 17 Gram(s) Oral two times a day PRN  sodium chloride 3%. 500 milliLiter(s) (30 mL/Hr) IV Continuous <Continuous>      LABS:                      8.8    10.35 )-----------( 254      ( 14 Sep 2024 06:13 )             30.7     09-14    131<L>  |  101  |  16  ----------------------------<  112<H>  4.1   |  25  |  0.73    Ca    8.4      14 Sep 2024 15:31  Phos  3.8     09-14  Mg     2.4     09-14      Culture - CSF with Gram Stain (collected 09-13-24 @ 23:05)  Source: CSF CSF  Gram Stain (09-14-24 @ 18:03):    No polymorphonuclear cells seen per low power field    No organisms seen per oil power field    by cytocentrifuge      ASSESSMENT:   62 y/o F with:  Subarachnoid hemorrhage HH2 MF3, brain compression, cerebral edema s/p angiogram  Bleed day 7  Delayed cerebral ischemia  Vasospasm  Hypertension      PLAN:   NEURO:   Neurochecks Q1h  DCI: Nimodipine 60mg PO Q4h (hold as augmenting BP), S/P DSA 9/12; mild R LILLY spasm. Tx with verapamil  CT/CTA/CTP 9/14 reviewed  Seizure prophylaxis: Levetiracetam 1000mg IV BID  per neurosurgery   EVD @10cm H20 open to drain. Monitor ICPs/CPP/ color/ output  MRI brain and CT spine with and without contrast reviewed  Activity: Bedrest    PULM:   Room air  Encourage incentive spirometry    CARDIAC:   SBP goal 160-200mmHg; on levophed  Daily EKG, troponin, proBNP while on pressors  TTE  reviewed. Normal EF. Does have LVH    ENDO:   Blood sugar goals 140-180 mg/dL  Insulin sliding scale    GI:  Diet: NPO in case of repeat angio  LBM: 9/14; multiple    RENAL: Hyponatremia with hyperdiuresis; suspect CSW  IVL. Monitor BMPs  On 3% at 30cc/hr  Na goal 140-145  Replete lytes prn    HEM/ONC: DVT; RLE DVT posterior tibial vein, microcytic anemia  Monitor H/H. On ferrous sulfate and vit C  VTE Prophylaxis: Lovenox bid. Antixa level appropriate ppx dosing   LE dopplers reviewed. RLE DVT posterior tibial vein    ID: Febrile, mild leukocytosis  Cultures:   CSF: 9/13 NGTD  Blood: 9/12 NGTD  UA: negative  Procalcitonin: 0.05  Abx deferred for now. Possibly central fever. On bromocriptine 5mg Q8    Additional critical care time:  45 minutes      NSCU ATTENDING -- ADDITIONAL PROGRESS NOTE    Nighttime rounds were performed     HPI:  Patient is a 62 y/o F  with uncontrolled HTN, does not take any medications at home presented to [facility] after developing acute onset severe HA. Once arrived, patient had episode of nausea with emesis. CT head showed SAH, HH: 2, MF: 3. NIHSS: 0. CTA neg for aneurysm. Transferred to [facility] for further mgmt. Upon arrival to [facility], NIHSS noted to be 0. Patient reports a headache rated at 7/10 in severity. Of note, patient's daughter notes pt seems off and is sleepier than her baseline.       ICU Vital Signs Last 24 Hrs  T(C): 37.5 (14 Sep 2024 19:46), Max: 38.4 (13 Sep 2024 22:00)  T(F): 99.5 (14 Sep 2024 19:46), Max: 101.1 (13 Sep 2024 22:00)  HR: 88 (14 Sep 2024 19:00) (69 - 97)  BP: 143/63 (14 Sep 2024 10:35) (143/63 - 148/65)  BP(mean): 90 (14 Sep 2024 10:35) (90 - 93)  ABP: 190/54 (14 Sep 2024 19:00) (130/42 - 191/56)  ABP(mean): 98 (14 Sep 2024 19:00) (70 - 102)  RR: 18 (14 Sep 2024 19:00) (14 - 44)  SpO2: 96% (14 Sep 2024 19:00) (73% - 100%)      09-13-24 @ 07:01  -  09-14-24 @ 07:00  --------------------------------------------------------  IN: 2741.5 mL / OUT: 3785 mL / NET: -1043.5 mL    09-14-24 @ 07:01  -  09-14-24 @ 19:51  --------------------------------------------------------  IN: 118 mL / OUT: 1673 mL / NET: -1555 mL      PHYSICAL EXAMINATION  NEURO:   Alert, awake, oriented x 3, pupils 3mm and brisk, EOMI, R facial (baseline), dysarthric  RUE and RLE 5/5, LUE mild drift though strength 5/5, RLE 5/5  Sensation intact   HEENT: Anicteric  CARDIOVASCULAR: S1/S2, RRR.   PULMONARY: Clear, no wheeze  ABDOMEN: Soft, nontender with normoactive bowel sounds  EXTREMITIES: No edema  SKIN: No rash, venous stasis hyperpigmentation in B/L lower extremities      MEDICATIONS:   acetaminophen     Tablet .. 650 milliGRAM(s) Oral every 6 hours PRN  ascorbic acid 500 milliGRAM(s) Oral <User Schedule>  bisacodyl 5 milliGRAM(s) Oral at bedtime  bromocriptine Capsule 5 milliGRAM(s) Oral every 8 hours  chlorhexidine 2% Cloths 1 Application(s) Topical <User Schedule>  enoxaparin Injectable 40 milliGRAM(s) SubCutaneous every 12 hours  ferrous    sulfate Liquid 300 milliGRAM(s) Oral <User Schedule>  levETIRAcetam  IVPB 1000 milliGRAM(s) IV Intermittent every 12 hours  niMODipine 60 milliGRAM(s) Oral every 4 hours  norepinephrine Infusion 0.05 MICROgram(s)/kG/Min (10.8 mL/Hr) IV Continuous <Continuous>  ondansetron Injectable 4 milliGRAM(s) IV Push every 6 hours PRN  oxyCODONE    IR 5 milliGRAM(s) Oral every 4 hours PRN  polyethylene glycol 3350 17 Gram(s) Oral two times a day PRN  sodium chloride 3%. 500 milliLiter(s) (30 mL/Hr) IV Continuous <Continuous>      LABS:                      8.8    10.35 )-----------( 254      ( 14 Sep 2024 06:13 )             30.7     09-14    131<L>  |  101  |  16  ----------------------------<  112<H>  4.1   |  25  |  0.73    Ca    8.4      14 Sep 2024 15:31  Phos  3.8     09-14  Mg     2.4     09-14      Culture - CSF with Gram Stain (collected 09-13-24 @ 23:05)  Source: CSF CSF  Gram Stain (09-14-24 @ 18:03):    No polymorphonuclear cells seen per low power field    No organisms seen per oil power field    by cytocentrifuge      ASSESSMENT:   62 y/o F with:  Subarachnoid hemorrhage HH2 MF3, brain compression, cerebral edema s/p angiogram  Bleed day 7  Delayed cerebral ischemia  Vasospasm  Hypertension      PLAN:   NEURO:   Neurochecks Q1h  DCI: Nimodipine 60mg PO Q4h (hold as augmenting BP), S/P DSA 9/12; mild R LILLY spasm. Tx with verapamil  CT/CTA/CTP 9/14 reviewed  Seizure prophylaxis: Levetiracetam 1000mg IV BID  per neurosurgery   EVD @10cm H20 open to drain. Monitor ICPs/CPP/ color/ output  MRI brain and CT spine with and without contrast reviewed  Activity: Bedrest    PULM:   Room air  Encourage incentive spirometry    CARDIAC:   SBP goal 160-200mmHg; on levophed  Daily EKG, troponin, proBNP while on pressors  TTE  reviewed. Normal EF. Does have LVH    ENDO:   Blood sugar goals 140-180 mg/dL  Insulin sliding scale    GI:  Diet: NPO in case of repeat angio  LBM: 9/14; multiple    RENAL: Hyponatremia with hyperdiuresis; suspect CSW  IVL. Monitor BMPs  On 3% at 30cc/hr  Na goal 140-145  Replete lytes prn    HEM/ONC: DVT; RLE DVT posterior tibial vein, microcytic anemia  Monitor H/H. On ferrous sulfate and vit C  VTE Prophylaxis: Lovenox bid. Antixa level appropriate ppx dosing   LE dopplers reviewed. RLE DVT posterior tibial vein    ID: Febrile, mild leukocytosis  Cultures:   CSF: 9/13 NGTD  Blood: 9/12 NGTD  UA: negative  Procalcitonin: 0.05  Abx deferred for now. Possibly central fever. On bromocriptine 5mg Q8    Additional critical care time:  45 minutes

## 2024-09-14 NOTE — PROGRESS NOTE ADULT - SUBJECTIVE AND OBJECTIVE BOX
HPI:  63F PMHx uncontrolled HTN, does not take any medications at home presented to [facility] after developing acute onset severe HA. Once arrived, patient had episode of nausea with emesis. CTH showed SAH, HH: 2, MF: 3. NIHSS: 0. CTA neg for aneurysm. Transferred to [facility] for further mgmt. Upon arrival to [facility], NIHSS noted to be 0. Patient reports a headache rated at 7/10 in severity. Of note, patient's daughter notes pt seems off and is sleepier than her baseline. Pt denies SOB, chest pain, vision changes, nausea, weakness/numbness/tingling, dizziness, photophobia.  (08 Sep 2024 13:29)    OVERNIGHT EVENTS: bill, neuro stable +EVD     Hospital Course:   : Transfer to [facility] for further mgmt of SAH. Patient taken urgently to angio. ET tube pulled back 2cm. EVD placed, open @ 10. Stability scan stable, EVD in position, NGT placed. Started nimodipine 60q4. Extubated to face mask. POD 0 DSA neg for aneurysm.   : BD1. POD 1 angio. Started iron/vit C q other day for microcytic anermia. Started lisinopril 10mg daily. 1.L liter bolus for euvolemia. Ambriz removed, f/u TOV, started on prophylactic SQL 40mg BID (weight based). Passed TOV. Off cardene gtt. cardene gtt resumed. started hydral 25 q8, inc lisinopril dose from 10mg to 20mg daily.   9/10: BD2. POD 2 angio. BILL overnight. TTE showing EF 70%, mild symmetric LVH. DC cardene.   : BD 3. POD 3 angio. BILL overnight. SBP 160s, given hydralazine 10mg IVP x1. Given ducolax suppository. SBP liberalized 100-160. , given labetolol 5mg IVP. NS bolus 500cc for euvolemia. Lactulose for AM. PO hydral switched to clonidine.   : BD 4. POD 4 angio. BILL overnight. Anti-Xa within prophylactic range. , given labetolol 10mg IVP. 1L NS for euvolemia. lisinopril increased 40, norvasc 10 added. cardene started. change in exam: JAIME/LL 4/5 w/ drift, CTH/CTA performed showing severe spasm. febrile 101, pan cx sent. POD0 angio for spasm tx. Dc'd propofol. Extubated to face mask. SBP 190s, started on cardene gtt  : BD 5, POD 5 diagnostic angio, POD 1 angio for spasm tx. Changed NS to LR.   : BD 6, POD 6, POD 1 angio for spasm. Pt febrile ovn, f/u cultures.       Vital Signs Last 24 Hrs  T(C): 38.1 (14 Sep 2024 00:00), Max: 38.4 (13 Sep 2024 09:00)  T(F): 100.6 (14 Sep 2024 00:00), Max: 101.1 (13 Sep 2024 09:00)  HR: 83 (14 Sep 2024 00:00) (64 - 102)  BP: 168/72 (13 Sep 2024 18:00) (168/72 - 168/72)  BP(mean): 104 (13 Sep 2024 18:00) (104 - 104)  RR: 19 (14 Sep 2024 00:00) (14 - 21)  SpO2: 98% (14 Sep 2024 00:00) (94% - 99%)    Parameters below as of 14 Sep 2024 00:00  Patient On (Oxygen Delivery Method): room air        I&O's Summary    12 Sep 2024 07:01  -  13 Sep 2024 07:00  --------------------------------------------------------  IN: 3128.8 mL / OUT: 3969 mL / NET: -840.2 mL    13 Sep 2024 07:01  -  14 Sep 2024 01:04  --------------------------------------------------------  IN: 2051.5 mL / OUT: 2143 mL / NET: -91.5 mL        PHYSICAL EXAM:  General: patient seen laying supine in bed in NAD  Neuro: AAOx3, FC, OE spontaneously, speech clear and fluent, CNII-XI grossly intact, R facial droop (baseline from Bell's palsy), no pronator drift,   strength 5/5 b/l UE and LE, sensation intact to light touch throughout  HEENT: PERRL, EOMI  Neck: supple  Cardiac: RRR, S1S2  Pulmonary: chest rise symmetric  Abdomen: soft, nontender, nondistended  Ext: perfusing well  Skin: warm, dry  Wound: EVD site c/d/i         LABS:                        9.1    10.57 )-----------( 256      ( 13 Sep 2024 05:30 )             31.8         137  |  105  |  18  ----------------------------<  126<H>  4.3   |  24  |  0.73    Ca    8.5      13 Sep 2024 05:30  Phos  4.2       Mg     2.4           PT/INR - ( 13 Sep 2024 05:30 )   PT: 10.7 sec;   INR: 0.94          PTT - ( 13 Sep 2024 05:30 )  PTT:32.8 sec  Urinalysis Basic - ( 13 Sep 2024 05:30 )    Color: x / Appearance: x / SG: x / pH: x  Gluc: 126 mg/dL / Ketone: x  / Bili: x / Urobili: x   Blood: x / Protein: x / Nitrite: x   Leuk Esterase: x / RBC: x / WBC x   Sq Epi: x / Non Sq Epi: x / Bacteria: x          CAPILLARY BLOOD GLUCOSE          Drug Levels: [] N/A    CSF Analysis: [] N/A      Allergies    No Known Allergies    Intolerances      MEDICATIONS:  Antibiotics:    Neuro:  acetaminophen     Tablet .. 650 milliGRAM(s) Oral every 6 hours PRN  levETIRAcetam 1000 milliGRAM(s) Oral two times a day  ondansetron Injectable 4 milliGRAM(s) IV Push every 6 hours PRN  oxyCODONE    IR 5 milliGRAM(s) Oral every 4 hours PRN    Anticoagulation:  enoxaparin Injectable 40 milliGRAM(s) SubCutaneous every 12 hours    OTHER:  bisacodyl 5 milliGRAM(s) Oral at bedtime  chlorhexidine 2% Cloths 1 Application(s) Topical <User Schedule>  niMODipine 60 milliGRAM(s) Oral every 4 hours  polyethylene glycol 3350 17 Gram(s) Oral two times a day  senna 2 Tablet(s) Oral two times a day    IVF:  ascorbic acid 500 milliGRAM(s) Oral <User Schedule>  ferrous    sulfate Liquid 300 milliGRAM(s) Oral <User Schedule>  lactated ringers. 1000 milliLiter(s) IV Continuous <Continuous>    CULTURES:  Culture Results:   No growth at 24 hours ( @ 16:54)    RADIOLOGY & ADDITIONAL TESTS:      ASSESSMENT:  63F PMHx uncontrolled HT, R samuels's palsy, was [facility]  c/o sudden onset severe HA. CTH showed SAH HH: 2, MF: 3. NIHSS: 0. CTA neg for aneurysm. Transferred to [facility] for further mgmt. S/p DSA negative for anueyrsm (24). S/p R frontal EVD (24). S/p angiogram showing mild R LILLY/R MCA spasm with IA verapamil ().      HTN    No pertinent family history in first degree relatives    Handoff    No pertinent past medical history    Hypertension    SAH (subarachnoid hemorrhage)    Cerebral vasospasm    SAH (subarachnoid hemorrhage)    Cerebral vasospasm    SAH (subarachnoid hemorrhage)    Subarachnoid hemorrhage    Angiogram, carotid and cerebral, bilateral    Insertion of intravenous catheter with ultrasound guidance    No significant past surgical history    No significant past surgical history    H/O  section    HTN    Hypertension    [email or web address]        PLAN:  Neuro:  - Neuro/vitals q1hr  - Seizure prophylaxis: Keppra 1g BID   - R frontal EVD @ [id number], monitor output   - DCI prophylaxis: Nimodipime 60q4  - Pain control: Tylenol, oxy 5 prn  - stability CTH post-EVD complete , stable, CTH  stable   - CTA : b/l LILLY spasm, b/l MCA R>L  - MR brain and neck w/wo contrast complete     Cards:   - -160, cardene gtt   - TTE 9/10: EF 70%, mild symmetric LVH    Pulm:   - RA  - IS    GI:  - regular diet  - Bowel regimen, LBM   - Nausea: Zofran 4q6 prn     Renal:  - IVF post CTA/angio  - voiding   - euvolemia goal     Endo:  - A1c 5.8    Heme:   - DVT prophylaxis: SCDs to LLE only, ppx SQL 40 BID (weight based)   - Anti Xa : 0.31  LE dopplers : R posterior tibial DVT on admission ()- surveillace dopplers in 1 week 9/15  - Microcytic anemia: iron/vit C q other day     ID:   - pan cx , f/u cx    Dispo: NSICU, full code, PT/OT rec AR    Discussed with Dr. Butler and Dr. Morse  Assessment:  Present when checked    []  GCS  E   V  M     Heart Failure: []Acute, [] acute on chronic , []chronic  Heart Failure:  [] Diastolic (HFpEF), [] Systolic (HFrEF), []Combined (HFpEF and HFrEF), [] RHF, [] Pulm HTN, [] Other    [] RODRIGO, [] ATN, [] AIN, [] other  [] CKD1, [] CKD2, [] CKD 3, [] CKD 4, [] CKD 5, []ESRD    Encephalopathy: [] Metabolic, [] Hepatic, [] toxic, [] Neurological, [] Other    Abnormal Nurtitional Status: [] malnurtition (see nutrition note), [ ]underweight: BMI < 19, [] morbid obesity: BMI >40, [] Cachexia    [] Sepsis  [] hypovolemic shock,[] cardiogenic shock, [] hemorrhagic shock, [] neuogenic shock  [] Acute Respiratory Failure  []Cerebral edema, [] Brain compression/ herniation,   [] Functional quadriplegia  [] Acute blood loss anemia

## 2024-09-14 NOTE — PROGRESS NOTE ADULT - ASSESSMENT
Assessment: 63f hx HTN, iron deficiency anemia admit for DSA negative SAH [id number] c/b vasospasm s/p IA therapy     NEURO:  -neuro check q1  -pain management w/ Tylenol & PRN oxycodone   seizure ppx w/ keppra 1g BID   -hydrocephalus s/p EVD (placed 9/8) @ [id number]  -nimodipine 60mg q4  Activity: bed reset    9/12 DSA w/ LILLY & MCA spasm s/p treatment   -MRI brain & neck negative for vascular pathology as source for SAH   central fevers start bromocriptine 5q8       PULMONARY:  saturating well on RA,   -continue to monitor on pulse o2   -incentive spirometry 10q/hr when awake     CARDIOVASCULAR:  monitor on telemetry   vitals q1  sbp goal 100-160; re-start 40mg qd; nicardipine ggt as needed      GASTROENTEROLOGY:  DASH diet   frequent BMs d/c regimen   GI ppx : n/a  Daily stool count, LBM 9/14    RENAL/:  -check BMP BID  -strict i/o's ;  -Na goal 135-145  hyponatremic w/ increased urine output CSW; provide 1L NS and repeat bmp 1hr after, obtain urine Na & osm, & serum osm     ENDOCRINE:  pre-diabetes   A1c- 5.8%  TSH- WNL      HEME/ONC:  DVT ppx: left SCDs + SQL  microcytic anemia; c/w iron + vitamin C   R BK DVT repeat doppler in 1 week     INFECTIOUS:   Monitor for fevers

## 2024-09-14 NOTE — PROGRESS NOTE ADULT - SUBJECTIVE AND OBJECTIVE BOX
INTERVAL HISTORY: HPI:  63F PMHx uncontrolled HTN, does not take any medications at home presented to [facility] after developing acute onset severe HA. Once arrived, patient had episode of nausea with emesis. CTH showed SAH, HH: 2, MF: 3. NIHSS: 0. CTA neg for aneurysm. Transferred to [facility] for further mgmt. Upon arrival to [facility], NIHSS noted to be 0. Patient reports a headache rated at 7/10 in severity. Of note, patient's daughter notes pt seems off and is sleepier than her baseline. Pt denies SOB, chest pain, vision changes, nausea, weakness/numbness/tingling, dizziness, photophobia.  (08 Sep 2024 13:29)      Drug Dosing Weight  Height (cm): 161.3 (08 Sep 2024 11:22)  Weight (kg): 120 (08 Sep 2024 11:22)  BMI (kg/m2): 46.1 (08 Sep 2024 11:22)  BSA (m2): 2.19 (08 Sep 2024 11:22)    PAST MEDICAL & SURGICAL HISTORY:  Hypertension  H/O  section    REVIEW OF SYSTEMS: [ ] Unable to Assess due to neurologic exam   [ ] All ROS addressed below are non-contributory, except:  Neuro: [ ] Headache [ ] Back pain [ ] Numbness [ ] Weakness [ ] Ataxia [ ] Dizziness [ ] Aphasia [ ] Dysarthria [ ] Visual disturbance  Resp: [ ] Shortness of breath/dyspnea, [ ] Orthopnea [ ] Cough  CV: [ ] Chest pain [ ] Palpitation [ ] Lightheadedness [ ] Syncope  Renal: [ ] Thirst [ ] Edema  GI: [ ] Nausea [ ] Emesis [ ] Abdominal pain [ ] Constipation [ ] Diarrhea  Hem: [ ] Hematemesis [ ] bright red blood per rectum  ID: [ ] Fever [ ] Chills [ ] Dysuria  ENT: [ ] Rhinorrhea    PHYSICAL EXAM:    General: No Acute Distress   Neurological: Awake, alert oriented to person, place and time, Following Commands, PERRL, EOMI, R facial, Speech Fluent, Moving all extremities, Muscle Strength normal in all four extremities, No Drift, Sensation to Light Touch Intact  Pulmonary: Clear to Auscultation, No Rales, No Rhonchi, No Wheezes   Cardiovascular: S1, S2, Regular Rate and Rhythm   Gastrointestinal: Soft, Nontender, Nondistended   Extremities: No calf tenderness       ICU Vital Signs Last 24 Hrs  T(C): 38.1 (14 Sep 2024 09:00), Max: 38.4 (13 Sep 2024 22:00)  T(F): 100.6 (14 Sep 2024 09:00), Max: 101.1 (13 Sep 2024 22:00)  HR: 72 (14 Sep 2024 12:00) (72 - 102)  BP: 168/72 (13 Sep 2024 18:00) (168/72 - 168/72)  BP(mean): 104 (13 Sep 2024 18:00) (104 - 104)  ABP: 159/52 (14 Sep 2024 12:00) (137/44 - 172/54)  ABP(mean): 87 (14 Sep 2024 12:00) (73 - 107)  RR: 20 (14 Sep 2024 12:00) (14 - 42)  SpO2: 98% (14 Sep 2024 12:00) (95% - 100%)      24 @ 07:  -  24 @ 07:00  --------------------------------------------------------  IN: 2741.5 mL / OUT: 3785 mL / NET: -1043.5 mL    24 @ 07:01  -  24 @ 12:05  --------------------------------------------------------  IN: 0 mL / OUT: 940 mL / NET: -940 mL      acetaminophen     Tablet .. 650 milliGRAM(s) Oral every 6 hours PRN  ascorbic acid 500 milliGRAM(s) Oral <User Schedule>  bisacodyl 5 milliGRAM(s) Oral at bedtime  chlorhexidine 2% Cloths 1 Application(s) Topical <User Schedule>  enoxaparin Injectable 40 milliGRAM(s) SubCutaneous every 12 hours  ferrous    sulfate Liquid 300 milliGRAM(s) Oral <User Schedule>  levETIRAcetam 1000 milliGRAM(s) Oral two times a day  niMODipine 60 milliGRAM(s) Oral every 4 hours  ondansetron Injectable 4 milliGRAM(s) IV Push every 6 hours PRN  oxyCODONE    IR 5 milliGRAM(s) Oral every 4 hours PRN  polyethylene glycol 3350 17 Gram(s) Oral two times a day  senna 2 Tablet(s) Oral two times a day      LABS:  Na: 134 (:), 137 ( @ 05:30), 134 ( @ 20:17), 136 ( 05:30)  K: 4.1 (:), 4.3 ( 05:30), 4.5 ( 20:17), 4.5 ( 05:30)  Cl: 100 (:), 105 ( 05:30), 100 ( 20:17), 102 ( 05:30)  CO2: 26 (:), 24 ( 05:30), 24 ( 20:17), 25 ( 05:30)  BUN: 16 (:), 18 ( 05:30), 16 ( 20:17), 18 ( 05:30)  Cr: 0.68 (:), 0.73 ( 05:30), 0.75 ( 20:17), 0.78 ( 05:30)  Glu: 108(:), 126( @ 05:30), 120( 20:17), 135( 05:30)    Hgb: 8.8 (:), 9.1 ( @ 05:30), 9.4 ( 20:17), 8.6 ( @ 05:30)  Hct: 30.7 (:), 31.8 (09-13 @ 05:30), 31.6 ( 20:17), 30.3 ( 05:30)  WBC: 10.35 (:), 10.57 ( 05:30), 11.15 (:17), 8.47 (:30)  Plt: 254 (:), 256 ( 05:30), 270 (:17), 260 ( 05:30)    INR: 0.94 24 @ 05:30  PTT: 32.8 24 @ 05:30, 54.0 24 @ 20:17

## 2024-09-15 LAB
ALBUMIN SERPL ELPH-MCNC: 3.7 G/DL — SIGNIFICANT CHANGE UP (ref 3.3–5)
ALP SERPL-CCNC: 98 U/L — SIGNIFICANT CHANGE UP (ref 40–120)
ALT FLD-CCNC: 14 U/L — SIGNIFICANT CHANGE UP (ref 10–45)
ANION GAP SERPL CALC-SCNC: 6 MMOL/L — SIGNIFICANT CHANGE UP (ref 5–17)
ANION GAP SERPL CALC-SCNC: 8 MMOL/L — SIGNIFICANT CHANGE UP (ref 5–17)
ANION GAP SERPL CALC-SCNC: 8 MMOL/L — SIGNIFICANT CHANGE UP (ref 5–17)
AST SERPL-CCNC: 15 U/L — SIGNIFICANT CHANGE UP (ref 10–40)
BASOPHILS # BLD AUTO: 0 K/UL — SIGNIFICANT CHANGE UP (ref 0–0.2)
BASOPHILS NFR BLD AUTO: 0 % — SIGNIFICANT CHANGE UP (ref 0–2)
BILIRUB SERPL-MCNC: 0.3 MG/DL — SIGNIFICANT CHANGE UP (ref 0.2–1.2)
BUN SERPL-MCNC: 11 MG/DL — SIGNIFICANT CHANGE UP (ref 7–23)
BUN SERPL-MCNC: 12 MG/DL — SIGNIFICANT CHANGE UP (ref 7–23)
BUN SERPL-MCNC: 13 MG/DL — SIGNIFICANT CHANGE UP (ref 7–23)
CALCIUM SERPL-MCNC: 8.2 MG/DL — LOW (ref 8.4–10.5)
CALCIUM SERPL-MCNC: 8.3 MG/DL — LOW (ref 8.4–10.5)
CALCIUM SERPL-MCNC: 8.5 MG/DL — SIGNIFICANT CHANGE UP (ref 8.4–10.5)
CHLORIDE SERPL-SCNC: 103 MMOL/L — SIGNIFICANT CHANGE UP (ref 96–108)
CHLORIDE SERPL-SCNC: 104 MMOL/L — SIGNIFICANT CHANGE UP (ref 96–108)
CHLORIDE SERPL-SCNC: 105 MMOL/L — SIGNIFICANT CHANGE UP (ref 96–108)
CO2 SERPL-SCNC: 24 MMOL/L — SIGNIFICANT CHANGE UP (ref 22–31)
CO2 SERPL-SCNC: 24 MMOL/L — SIGNIFICANT CHANGE UP (ref 22–31)
CO2 SERPL-SCNC: 25 MMOL/L — SIGNIFICANT CHANGE UP (ref 22–31)
CREAT SERPL-MCNC: 0.59 MG/DL — SIGNIFICANT CHANGE UP (ref 0.5–1.3)
CREAT SERPL-MCNC: 0.71 MG/DL — SIGNIFICANT CHANGE UP (ref 0.5–1.3)
CREAT SERPL-MCNC: 0.72 MG/DL — SIGNIFICANT CHANGE UP (ref 0.5–1.3)
EGFR: 100 ML/MIN/1.73M2 — SIGNIFICANT CHANGE UP
EGFR: 93 ML/MIN/1.73M2 — SIGNIFICANT CHANGE UP
EGFR: 94 ML/MIN/1.73M2 — SIGNIFICANT CHANGE UP
EOSINOPHIL # BLD AUTO: 0 K/UL — SIGNIFICANT CHANGE UP (ref 0–0.5)
EOSINOPHIL NFR BLD AUTO: 0 % — SIGNIFICANT CHANGE UP (ref 0–6)
GIANT PLATELETS BLD QL SMEAR: PRESENT — SIGNIFICANT CHANGE UP
GLUCOSE SERPL-MCNC: 123 MG/DL — HIGH (ref 70–99)
GLUCOSE SERPL-MCNC: 124 MG/DL — HIGH (ref 70–99)
GLUCOSE SERPL-MCNC: 125 MG/DL — HIGH (ref 70–99)
HCT VFR BLD CALC: 28.7 % — LOW (ref 34.5–45)
HGB BLD-MCNC: 8.3 G/DL — LOW (ref 11.5–15.5)
HYPOCHROMIA BLD QL: SLIGHT — SIGNIFICANT CHANGE UP
LYMPHOCYTES # BLD AUTO: 29.6 % — SIGNIFICANT CHANGE UP (ref 13–44)
LYMPHOCYTES # BLD AUTO: 3.13 K/UL — SIGNIFICANT CHANGE UP (ref 1–3.3)
MAGNESIUM SERPL-MCNC: 2.1 MG/DL — SIGNIFICANT CHANGE UP (ref 1.6–2.6)
MAGNESIUM SERPL-MCNC: 2.1 MG/DL — SIGNIFICANT CHANGE UP (ref 1.6–2.6)
MANUAL SMEAR VERIFICATION: SIGNIFICANT CHANGE UP
MCHC RBC-ENTMCNC: 21.1 PG — LOW (ref 27–34)
MCHC RBC-ENTMCNC: 28.9 GM/DL — LOW (ref 32–36)
MCV RBC AUTO: 73 FL — LOW (ref 80–100)
MONOCYTES # BLD AUTO: 0.92 K/UL — HIGH (ref 0–0.9)
MONOCYTES NFR BLD AUTO: 8.7 % — SIGNIFICANT CHANGE UP (ref 2–14)
NEUTROPHILS # BLD AUTO: 6.53 K/UL — SIGNIFICANT CHANGE UP (ref 1.8–7.4)
NEUTROPHILS NFR BLD AUTO: 61.7 % — SIGNIFICANT CHANGE UP (ref 43–77)
NT-PROBNP SERPL-SCNC: 215 PG/ML — SIGNIFICANT CHANGE UP (ref 0–300)
OSMOLALITY SERPL: 282 MOSMOL/KG — SIGNIFICANT CHANGE UP (ref 280–301)
OSMOLALITY UR: 362 MOSM/KG — SIGNIFICANT CHANGE UP (ref 50–1200)
OVALOCYTES BLD QL SMEAR: SLIGHT — SIGNIFICANT CHANGE UP
PHOSPHATE SERPL-MCNC: 3.1 MG/DL — SIGNIFICANT CHANGE UP (ref 2.5–4.5)
PHOSPHATE SERPL-MCNC: 3.4 MG/DL — SIGNIFICANT CHANGE UP (ref 2.5–4.5)
PLAT MORPH BLD: ABNORMAL
PLATELET # BLD AUTO: 246 K/UL — SIGNIFICANT CHANGE UP (ref 150–400)
POIKILOCYTOSIS BLD QL AUTO: SLIGHT — SIGNIFICANT CHANGE UP
POLYCHROMASIA BLD QL SMEAR: SLIGHT — SIGNIFICANT CHANGE UP
POTASSIUM SERPL-MCNC: 3.7 MMOL/L — SIGNIFICANT CHANGE UP (ref 3.5–5.3)
POTASSIUM SERPL-MCNC: 4.1 MMOL/L — SIGNIFICANT CHANGE UP (ref 3.5–5.3)
POTASSIUM SERPL-MCNC: 4.1 MMOL/L — SIGNIFICANT CHANGE UP (ref 3.5–5.3)
POTASSIUM SERPL-SCNC: 3.7 MMOL/L — SIGNIFICANT CHANGE UP (ref 3.5–5.3)
POTASSIUM SERPL-SCNC: 4.1 MMOL/L — SIGNIFICANT CHANGE UP (ref 3.5–5.3)
POTASSIUM SERPL-SCNC: 4.1 MMOL/L — SIGNIFICANT CHANGE UP (ref 3.5–5.3)
PROT SERPL-MCNC: 6.8 G/DL — SIGNIFICANT CHANGE UP (ref 6–8.3)
RBC # BLD: 3.93 M/UL — SIGNIFICANT CHANGE UP (ref 3.8–5.2)
RBC # FLD: 17.8 % — HIGH (ref 10.3–14.5)
RBC BLD AUTO: ABNORMAL
SODIUM SERPL-SCNC: 135 MMOL/L — SIGNIFICANT CHANGE UP (ref 135–145)
SODIUM SERPL-SCNC: 135 MMOL/L — SIGNIFICANT CHANGE UP (ref 135–145)
SODIUM SERPL-SCNC: 137 MMOL/L — SIGNIFICANT CHANGE UP (ref 135–145)
TROPONIN T, HIGH SENSITIVITY RESULT: 22 NG/L — SIGNIFICANT CHANGE UP (ref 0–51)
WBC # BLD: 10.59 K/UL — HIGH (ref 3.8–10.5)
WBC # FLD AUTO: 10.59 K/UL — HIGH (ref 3.8–10.5)

## 2024-09-15 PROCEDURE — 99291 CRITICAL CARE FIRST HOUR: CPT

## 2024-09-15 PROCEDURE — 99231 SBSQ HOSP IP/OBS SF/LOW 25: CPT

## 2024-09-15 RX ORDER — SODIUM CHLORIDE 0.9 % (FLUSH) 0.9 %
1000 SYRINGE (ML) INJECTION ONCE
Refills: 0 | Status: COMPLETED | OUTPATIENT
Start: 2024-09-15 | End: 2024-09-15

## 2024-09-15 RX ORDER — LEVETIRACETAM 1000 MG
1000 TABLET ORAL
Refills: 0 | Status: DISCONTINUED | OUTPATIENT
Start: 2024-09-15 | End: 2024-09-27

## 2024-09-15 RX ORDER — SODIUM CHLORIDE 0.9 % (FLUSH) 0.9 %
500 SYRINGE (ML) INJECTION ONCE
Refills: 0 | Status: COMPLETED | OUTPATIENT
Start: 2024-09-15 | End: 2024-09-15

## 2024-09-15 RX ORDER — SODIUM CHLORIDE 5 G/100ML
500 INJECTION, SOLUTION INTRAVENOUS
Refills: 0 | Status: DISCONTINUED | OUTPATIENT
Start: 2024-09-15 | End: 2024-09-16

## 2024-09-15 RX ADMIN — BROMOCRIPTINE MESYLATE 5 MILLIGRAM(S): 5 CAPSULE ORAL at 05:56

## 2024-09-15 RX ADMIN — ENOXAPARIN SODIUM 40 MILLIGRAM(S): 150 INJECTION SUBCUTANEOUS at 10:02

## 2024-09-15 RX ADMIN — Medication 650 MILLIGRAM(S): at 21:29

## 2024-09-15 RX ADMIN — CHLORHEXIDINE GLUCONATE ORAL RINSE 1 APPLICATION(S): 1.2 SOLUTION DENTAL at 07:52

## 2024-09-15 RX ADMIN — BROMOCRIPTINE MESYLATE 5 MILLIGRAM(S): 5 CAPSULE ORAL at 22:11

## 2024-09-15 RX ADMIN — Medication 650 MILLIGRAM(S): at 06:20

## 2024-09-15 RX ADMIN — Medication 1000 MILLILITER(S): at 01:05

## 2024-09-15 RX ADMIN — SODIUM CHLORIDE 50 MILLILITER(S): 5 INJECTION, SOLUTION INTRAVENOUS at 10:02

## 2024-09-15 RX ADMIN — Medication 10.8 MICROGRAM(S)/KG/MIN: at 20:58

## 2024-09-15 RX ADMIN — Medication 650 MILLIGRAM(S): at 20:29

## 2024-09-15 RX ADMIN — Medication 1000 MILLILITER(S): at 20:58

## 2024-09-15 RX ADMIN — Medication 40 MILLIEQUIVALENT(S): at 07:52

## 2024-09-15 RX ADMIN — Medication 125 MILLILITER(S): at 21:50

## 2024-09-15 RX ADMIN — ENOXAPARIN SODIUM 40 MILLIGRAM(S): 150 INJECTION SUBCUTANEOUS at 22:11

## 2024-09-15 RX ADMIN — Medication 10.8 MICROGRAM(S)/KG/MIN: at 06:44

## 2024-09-15 RX ADMIN — BROMOCRIPTINE MESYLATE 5 MILLIGRAM(S): 5 CAPSULE ORAL at 13:14

## 2024-09-15 RX ADMIN — Medication 650 MILLIGRAM(S): at 05:55

## 2024-09-15 RX ADMIN — Medication 500 MILLILITER(S): at 13:14

## 2024-09-15 RX ADMIN — Medication 400 MILLIGRAM(S): at 05:56

## 2024-09-15 RX ADMIN — Medication 1000 MILLIGRAM(S): at 17:05

## 2024-09-15 NOTE — PROGRESS NOTE ADULT - ASSESSMENT
Assessment: 63f hx HTN, iron deficiency anemia admit for DSA negative SAH hh2mf3 c/b vasospasm s/p IA therapy     NEURO:  -neuro check q1  -pain management w/ Tylenol & PRN oxycodone   seizure ppx w/ keppra 1g BID   -hydrocephalus s/p EVD (placed 9/8) @ 65wmg78  -nimodipine 60mg q4  Activity: bed reset    9/12 DSA w/ LILLY & MCA spasm s/p treatment   -MRI brain & neck negative for vascular pathology as source for SAH   central fevers start bromocriptine 5q8       PULMONARY:  saturating well on RA,   -continue to monitor on pulse o2   -incentive spirometry 10q/hr when awake     CARDIOVASCULAR:  monitor on telemetry   vitals q1  sbp goal 100-160; nicardipine ggt as needed      GASTROENTEROLOGY:  DASH diet   GI ppx : n/a  Daily stool count, LBM 9/14    RENAL/:  -check BMP BID  -strict i/o's ;  -Na goal 135-145 ; c/w HTS 3% @ 50cc/hr    ENDOCRINE:  pre-diabetes   A1c- 5.8%  TSH- WNL    HEME/ONC:  DVT ppx: left SCDs + SQL  microcytic anemia; c/w iron + vitamin C   R BK DVT repeat doppler this week    INFECTIOUS:   Monitor for fevers

## 2024-09-15 NOTE — PROGRESS NOTE ADULT - SUBJECTIVE AND OBJECTIVE BOX
NSCU ATTENDING -- ADDITIONAL PROGRESS NOTE    Nighttime rounds were performed     HPI:  Patient is a 64 y/o F  with uncontrolled HTN, does not take any medications at home presented to [facility] after developing acute onset severe HA. Once arrived, patient had episode of nausea with emesis. CT head showed SAH, HH: 2, MF: 3. NIHSS: 0. CTA neg for aneurysm. Transferred to [facility] for further mgmt. Upon arrival to [facility], NIHSS noted to be 0. Patient reports a headache rated at 7/10 in severity. Of note, patient's daughter notes pt seems off and is sleepier than her baseline.       ICU Vital Signs Last 24 Hrs  T(C): 38.5 (15 Sep 2024 20:13), Max: 38.5 (15 Sep 2024 20:13)  T(F): 101.3 (15 Sep 2024 20:13), Max: 101.3 (15 Sep 2024 20:13)  HR: 87 (15 Sep 2024 20:13) (75 - 91)  ABP: 194/64 (15 Sep 2024 20:13) (163/44 - 194/64)  ABP(mean): 112 (15 Sep 2024 20:13) (84 - 112)  RR: 18 (15 Sep 2024 20:00) (16 - 20)  SpO2: 97% (15 Sep 2024 20:13) (96% - 100%)      09-14-24 @ 07:01  -  09-15-24 @ 07:00  --------------------------------------------------------  IN: 2450.5 mL / OUT: 3500 mL / NET: -1049.5 mL    09-15-24 @ 07:01  -  09-15-24 @ 20:19  --------------------------------------------------------  IN: 1307.4 mL / OUT: 2100 mL / NET: -792.6 mL      PHYSICAL EXAMINATION  NEURO:   Alert, awake, oriented x 3, pupils 3mm and brisk, EOMI, R facial (baseline), dysarthric  RUE and RLE 5/5, LUE mild drift though strength 5/5, RLE 5/5  Sensation intact   HEENT: Anicteric  CARDIOVASCULAR: S1/S2, RRR.   PULMONARY: Clear, no wheeze  ABDOMEN: Soft, nontender with normoactive bowel sounds  EXTREMITIES: No edema  SKIN: No rash, venous stasis hyperpigmentation in B/L lower extremities      MEDICATIONS:  acetaminophen     Tablet .. 650 milliGRAM(s) Oral every 6 hours PRN  albumin human  5% IVPB 250 milliLiter(s) IV Intermittent once  ascorbic acid 500 milliGRAM(s) Oral <User Schedule>  bisacodyl 5 milliGRAM(s) Oral at bedtime PRN  bromocriptine Capsule 5 milliGRAM(s) Oral every 8 hours  chlorhexidine 2% Cloths 1 Application(s) Topical <User Schedule>  enoxaparin Injectable 40 milliGRAM(s) SubCutaneous every 12 hours  ferrous    sulfate Liquid 300 milliGRAM(s) Oral <User Schedule>  levETIRAcetam 1000 milliGRAM(s) Oral two times a day  norepinephrine Infusion 0.05 MICROgram(s)/kG/Min (10.8 mL/Hr) IV Continuous <Continuous>  ondansetron Injectable 4 milliGRAM(s) IV Push every 6 hours PRN  oxyCODONE    IR 5 milliGRAM(s) Oral every 4 hours PRN  polyethylene glycol 3350 17 Gram(s) Oral two times a day PRN  sodium chloride 0.9% Bolus 1000 milliLiter(s) IV Bolus once  sodium chloride 3%. 500 milliLiter(s) (50 mL/Hr) IV Continuous <Continuous>      LABS:                         8.3    10.59 )-----------( 246      ( 15 Sep 2024 05:30 )             28.7     09-15    137  |  105  |  12  ----------------------------<  125<H>  4.1   |  24  |  0.59    Ca    8.5      15 Sep 2024 13:13  Phos  3.1     09-15  Mg     2.1     09-15    TPro  6.8  /  Alb  3.7  /  TBili  0.3  /  DBili  x   /  AST  15  /  ALT  14  /  AlkPhos  98  09-15    LIVER FUNCTIONS - ( 15 Sep 2024 05:30 )  Alb: 3.7 g/dL / Pro: 6.8 g/dL / ALK PHOS: 98 U/L / ALT: 14 U/L / AST: 15 U/L / GGT: x              Culture - CSF with Gram Stain (collected 09-13-24 @ 23:05)  Source: CSF CSF  Gram Stain (09-14-24 @ 18:03):    No polymorphonuclear cells seen per low power field    No organisms seen per oil power field    by cytocentrifuge      ASSESSMENT:   64 y/o F with:  Subarachnoid hemorrhage HH2 MF3, brain compression, cerebral edema s/p angiogram  Bleed day 8  Delayed cerebral ischemia  Vasospasm  Hypertension      PLAN:   NEURO:   Neurochecks Q1h  DCI: Nimodipine 60mg PO Q4h (hold as augmenting BP), S/P DSA 9/12; mild R LILLY spasm. Tx with verapamil  Repeat DSA 9/16 as LUE drift while augmenting BP  CT/CTA/CTP 9/14 reviewed.   Seizure prophylaxis: Levetiracetam 1000mg IV bid per neurosurgery   EVD @10cm H20 open to drain. Monitor ICPs/CPP/ color/ output  MRI brain and CT spine with and without contrast reviewed  Activity: Bedrest    PULM:   Room air  Encourage incentive spirometry    CARDIAC:   SBP goal 160-200mmHg; on levophed  Daily EKG, troponin, proBNP while on pressors  TTE  reviewed. Normal EF. Does have LVH    ENDO:   Blood sugar goals 140-180 mg/dL  Insulin sliding scale    GI:  Diet: NPO at MN 9/15  LBM: 9/14; multiple    RENAL: Hyponatremia with hyperdiuresis; suspect CSW  On 3% at 50cc/hr. Monitor BMPs  Na goal 140-145  Replete lytes prn  Monitor Is/Os. Goal euvolemia    HEM/ONC: DVT; RLE DVT posterior tibial vein, microcytic anemia  Monitor H/H. On ferrous sulfate and vit C  VTE Prophylaxis: Lovenox bid. Antixa level appropriate ppx dosing   LE dopplers reviewed. RLE DVT posterior tibial vein    ID: Febrile, mild leukocytosis  Cultures:   CSF: 9/13 NGTD  Blood: 9/12 NGTD  UA: negative  Procalcitonin: 0.05  Abx deferred for now. Possibly central fever. On bromocriptine 5mg Q8    Additional critical care time:  45 minutes      NSCU ATTENDING -- ADDITIONAL PROGRESS NOTE    Nighttime rounds were performed     HPI:  Patient is a 62 y/o F  with uncontrolled HTN, does not take any medications at home presented to [facility] after developing acute onset severe HA. Once arrived, patient had episode of nausea with emesis. CT head showed SAH, HH: 2, MF: 3. NIHSS: 0. CTA neg for aneurysm. Transferred to [facility] for further mgmt. Upon arrival to [facility], NIHSS noted to be 0. Patient reports a headache rated at 7/10 in severity. Of note, patient's daughter notes pt seems off and is sleepier than her baseline.       ICU Vital Signs Last 24 Hrs  T(C): 38.5 (15 Sep 2024 20:13), Max: 38.5 (15 Sep 2024 20:13)  T(F): 101.3 (15 Sep 2024 20:13), Max: 101.3 (15 Sep 2024 20:13)  HR: 87 (15 Sep 2024 20:13) (75 - 91)  ABP: 194/64 (15 Sep 2024 20:13) (163/44 - 194/64)  ABP(mean): 112 (15 Sep 2024 20:13) (84 - 112)  RR: 18 (15 Sep 2024 20:00) (16 - 20)  SpO2: 97% (15 Sep 2024 20:13) (96% - 100%)      09-14-24 @ 07:01  -  09-15-24 @ 07:00  --------------------------------------------------------  IN: 2450.5 mL / OUT: 3500 mL / NET: -1049.5 mL    09-15-24 @ 07:01  -  09-15-24 @ 20:19  --------------------------------------------------------  IN: 1307.4 mL / OUT: 2100 mL / NET: -792.6 mL      PHYSICAL EXAMINATION  NEURO:   Alert, awake, oriented x 3, pupils 3mm and brisk, EOMI, R facial (baseline), dysarthric  RUE and RLE 5/5, LUE mild drift though strength 5/5, RLE 5/5  Sensation intact   HEENT: Anicteric  CARDIOVASCULAR: S1/S2, RRR.   PULMONARY: Clear, no wheeze  ABDOMEN: Soft, nontender with normoactive bowel sounds  EXTREMITIES: No edema  SKIN: No rash, venous stasis hyperpigmentation in B/L lower extremities      MEDICATIONS:  acetaminophen     Tablet .. 650 milliGRAM(s) Oral every 6 hours PRN  albumin human  5% IVPB 250 milliLiter(s) IV Intermittent once  ascorbic acid 500 milliGRAM(s) Oral <User Schedule>  bisacodyl 5 milliGRAM(s) Oral at bedtime PRN  bromocriptine Capsule 5 milliGRAM(s) Oral every 8 hours  chlorhexidine 2% Cloths 1 Application(s) Topical <User Schedule>  enoxaparin Injectable 40 milliGRAM(s) SubCutaneous every 12 hours  ferrous    sulfate Liquid 300 milliGRAM(s) Oral <User Schedule>  levETIRAcetam 1000 milliGRAM(s) Oral two times a day  norepinephrine Infusion 0.05 MICROgram(s)/kG/Min (10.8 mL/Hr) IV Continuous <Continuous>  ondansetron Injectable 4 milliGRAM(s) IV Push every 6 hours PRN  oxyCODONE    IR 5 milliGRAM(s) Oral every 4 hours PRN  polyethylene glycol 3350 17 Gram(s) Oral two times a day PRN  sodium chloride 0.9% Bolus 1000 milliLiter(s) IV Bolus once  sodium chloride 3%. 500 milliLiter(s) (50 mL/Hr) IV Continuous <Continuous>      LABS:                         8.3    10.59 )-----------( 246      ( 15 Sep 2024 05:30 )             28.7     09-15    137  |  105  |  12  ----------------------------<  125<H>  4.1   |  24  |  0.59    Ca    8.5      15 Sep 2024 13:13  Phos  3.1     09-15  Mg     2.1     09-15    TPro  6.8  /  Alb  3.7  /  TBili  0.3  /  DBili  x   /  AST  15  /  ALT  14  /  AlkPhos  98  09-15    LIVER FUNCTIONS - ( 15 Sep 2024 05:30 )  Alb: 3.7 g/dL / Pro: 6.8 g/dL / ALK PHOS: 98 U/L / ALT: 14 U/L / AST: 15 U/L / GGT: x              Culture - CSF with Gram Stain (collected 09-13-24 @ 23:05)  Source: CSF CSF  Gram Stain (09-14-24 @ 18:03):    No polymorphonuclear cells seen per low power field    No organisms seen per oil power field    by cytocentrifuge      ASSESSMENT:   62 y/o F with:  Subarachnoid hemorrhage HH2 MF3, brain compression, cerebral edema s/p angiogram  Bleed day 8  Delayed cerebral ischemia  Vasospasm  Hypertension      PLAN:   NEURO:   Neurochecks Q1h  DCI: Nimodipine 60mg PO Q4h (hold as augmenting BP), S/P DSA 9/12; mild R LILLY spasm. Tx with verapamil  Repeat DSA 9/16 as LUE drift while augmenting BP  CT/CTA/CTP 9/14 reviewed.   Seizure prophylaxis: Levetiracetam 1000mg IV bid per neurosurgery   EVD @10cm H20 open to drain. Monitor ICPs/CPP/ color/ output  MRI brain and CT spine with and without contrast reviewed  Activity: Bedrest    PULM:   Room air  Encourage incentive spirometry    CARDIAC:   SBP goal 180-200mmHg; on levophed  Daily EKG, troponin, proBNP while on pressors  TTE  reviewed. Normal EF. Does have LVH    ENDO:   Blood sugar goals 140-180 mg/dL  Insulin sliding scale    GI:  Diet: NPO at MN 9/15  LBM: 9/15    RENAL: Hyponatremia with hyperdiuresis; suspect CSW  On 3% at 50cc/hr. Monitor BMPs  Na goal 140-145  Replete lytes prn  Monitor Is/Os. Goal euvolemia    HEM/ONC: DVT; RLE DVT posterior tibial vein, microcytic anemia  Monitor H/H. On ferrous sulfate and vit C  VTE Prophylaxis: Lovenox bid. Antixa level appropriate ppx dosing   LE dopplers reviewed. RLE DVT posterior tibial vein    ID: Febrile, mild leukocytosis  Cultures:   CSF: 9/13 NGTD  Blood: 9/12 NGTD  UA: negative  Procalcitonin: 0.05  Abx deferred for now. Possibly central fever. On bromocriptine 5mg Q8  Culture if febrile 9/16    Additional critical care time:  45 minutes

## 2024-09-15 NOTE — PROGRESS NOTE ADULT - SUBJECTIVE AND OBJECTIVE BOX
INTERVAL HISTORY: HPI:  63F PMHx uncontrolled HTN, does not take any medications at home presented to [facility] after developing acute onset severe HA. Once arrived, patient had episode of nausea with emesis. CTH showed SAH, HH: 2, MF: 3. NIHSS: 0. CTA neg for aneurysm. Transferred to [facility] for further mgmt. Upon arrival to [facility], NIHSS noted to be 0. Patient reports a headache rated at 7/10 in severity. Of note, patient's daughter notes pt seems off and is sleepier than her baseline. Pt denies SOB, chest pain, vision changes, nausea, weakness/numbness/tingling, dizziness, photophobia.  (08 Sep 2024 13:29)      Drug Dosing Weight  Height (cm): 161.3 (08 Sep 2024 11:22)  Weight (kg): 120 (08 Sep 2024 11:22)  BMI (kg/m2): 46.1 (08 Sep 2024 11:22)  BSA (m2): 2.19 (08 Sep 2024 11:22)    PAST MEDICAL & SURGICAL HISTORY:  Hypertension  H/O  section    REVIEW OF SYSTEMS: [ ] Unable to Assess due to neurologic exam   [ ] All ROS addressed below are non-contributory, except:  Neuro: [ ] Headache [ ] Back pain [ ] Numbness [ ] Weakness [ ] Ataxia [ ] Dizziness [ ] Aphasia [ ] Dysarthria [ ] Visual disturbance  Resp: [ ] Shortness of breath/dyspnea, [ ] Orthopnea [ ] Cough  CV: [ ] Chest pain [ ] Palpitation [ ] Lightheadedness [ ] Syncope  Renal: [ ] Thirst [ ] Edema  GI: [ ] Nausea [ ] Emesis [ ] Abdominal pain [ ] Constipation [ ] Diarrhea  Hem: [ ] Hematemesis [ ] bright red blood per rectum  ID: [ ] Fever [ ] Chills [ ] Dysuria  ENT: [ ] Rhinorrhea    PHYSICAL EXAM:    General: No Acute Distress: sbp 180  Neurological: AOx2, Following Commands, PERRL, EOMI, R facial, Speech Fluent, Moving all extremities, Muscle Strength normal in all four extremities, No Drift, Sensation to Light Touch Intact  Pulmonary: Clear to Auscultation, No Rales, No Rhonchi, No Wheezes   Cardiovascular: S1, S2, Regular Rate and Rhythm   Gastrointestinal: Soft, Nontender, Nondistended   Extremities: No calf tenderness             ICU Vital Signs Last 24 Hrs  T(C): 37.3 (15 Sep 2024 05:16), Max: 38.1 (14 Sep 2024 09:00)  T(F): 99.1 (15 Sep 2024 05:16), Max: 100.6 (14 Sep 2024 09:00)  HR: 80 (15 Sep 2024 08:00) (69 - 95)  BP: 143/63 (14 Sep 2024 10:35) (143/63 - 148/65)  BP(mean): 90 (14 Sep 2024 10:35) (90 - 93)  ABP: 175/52 (15 Sep 2024 08:00) (130/42 - 191/56)  ABP(mean): 96 (15 Sep 2024 08:00) (70 - 104)  RR: 20 (15 Sep 2024 08:00) (16 - 44)  SpO2: 100% (15 Sep 2024 08:00) (73% - 100%)      24 @ 07:01  -  09-15-24 @ 07:00  --------------------------------------------------------  IN: 2450.5 mL / OUT: 3500 mL / NET: -1049.5 mL    09-15-24 @ 07:01  -  09-15-24 @ 08:47  --------------------------------------------------------  IN: 43.5 mL / OUT: 4 mL / NET: 39.5 mL            acetaminophen     Tablet .. 650 milliGRAM(s) Oral every 6 hours PRN  ascorbic acid 500 milliGRAM(s) Oral <User Schedule>  bisacodyl 5 milliGRAM(s) Oral at bedtime PRN  bromocriptine Capsule 5 milliGRAM(s) Oral every 8 hours  chlorhexidine 2% Cloths 1 Application(s) Topical <User Schedule>  enoxaparin Injectable 40 milliGRAM(s) SubCutaneous every 12 hours  ferrous    sulfate Liquid 300 milliGRAM(s) Oral <User Schedule>  levETIRAcetam  IVPB 1000 milliGRAM(s) IV Intermittent every 12 hours  norepinephrine Infusion 0.05 MICROgram(s)/kG/Min (10.8 mL/Hr) IV Continuous <Continuous>  ondansetron Injectable 4 milliGRAM(s) IV Push every 6 hours PRN  oxyCODONE    IR 5 milliGRAM(s) Oral every 4 hours PRN  polyethylene glycol 3350 17 Gram(s) Oral two times a day PRN  sodium chloride 3%. 500 milliLiter(s) (30 mL/Hr) IV Continuous <Continuous>      LABS:  Na: 135 (09-15 @ 05:30), 134 ( 22:19), 131 (09-14 @ 15:31), 134 (:), 137 ( 05:30), 134 ( 20:17)  K: 3.7 (09-15 @ 05:30), 3.9 (:19), 4.1 (09-14 @ 15:), 4.1 (:), 4.3 ( 05:30), 4.5 ( 20:17)  Cl: 104 (09-15 @ 05:30), 102 (:19), 101 (09-14 @ 15:), 100 (:), 105 ( 05:30), 100 ( 20:17)  CO2: 25 (09-15 @ 05:30), 24 (:19), 25 (09-14 @ 15:31), 26 (:), 24 ( 05:30), 24 ( 20:17)  BUN: 13 (09-15 @ 05:30), 15 (:19), 16 (09-14 @ 15:31), 16 (:13), 18 ( 05:30), 16 ( 20:17)  Cr: 0.71 (09-15 @ 05:30), 0.71 (09-14 @ 22:19), 0.73 ( 15:31), 0.68 ( 06:13), 0.73 ( 05:30), 0.75 ( 20:17)  Glu: 123(09-15 @ 05:30), 127( 22:19), 112( 15:31), 108( 06:13), 126( 05:30), 120( 20:17)    Hgb: 8.3 (09-15 @ 05:30), 8.8 (:13), 9.1 ( 05:30), 9.4 (:17)  Hct: 28.7 (09-15 @ 05:30), 30.7 (:13), 31.8 ( 05:30), 31.6 (:17)  WBC: 10.59 (09-15 @ 05:30), 10.35 (:13), 10.57 ( 05:30), 11.15 ( 20:17)  Plt: 246 (09-15 @ 05:30), 254 (:13), 256 ( 05:30), 270 ( 20:17)    INR: 0.94 24 @ 05:30  PTT: 32.8 24 @ 05:30, 54.0 24 @ 20:17    LIVER FUNCTIONS - ( 15 Sep 2024 05:30 )  Alb: 3.7 g/dL / Pro: 6.8 g/dL / ALK PHOS: 98 U/L / ALT: 14 U/L / AST: 15 U/L / GGT: x

## 2024-09-15 NOTE — PROGRESS NOTE ADULT - SUBJECTIVE AND OBJECTIVE BOX
HPI:  63F PMHx uncontrolled HTN, does not take any medications at home presented to [facility] after developing acute onset severe HA. Once arrived, patient had episode of nausea with emesis. CTH showed SAH, HH: 2, MF: 3. NIHSS: 0. CTA neg for aneurysm. Transferred to [facility] for further mgmt. Upon arrival to [facility], NIHSS noted to be 0. Patient reports a headache rated at 7/10 in severity. Of note, patient's daughter notes pt seems off and is sleepier than her baseline. Pt denies SOB, chest pain, vision changes, nausea, weakness/numbness/tingling, dizziness, photophobia.  (08 Sep 2024 13:29)    OVERNIGHT EVENTS: bill, neuro stable.     Hospital Course:   : Transfer to [facility] for further mgmt of SAH. Patient taken urgently to angio. ET tube pulled back 2cm. EVD placed, open @ 10. Stability scan stable, EVD in position, NGT placed. Started nimodipine 60q4. Extubated to face mask. POD 0 DSA neg for aneurysm.   : BD2. POD 1 angio. Started iron/vit C q other day for microcytic anermia. Started lisinopril 10mg daily. 1.L liter bolus for euvolemia. Ambriz removed, f/u TOV, started on prophylactic SQL 40mg BID (weight based). Passed TOV. Off cardene gtt. cardene gtt resumed. started hydral 25 q8, inc lisinopril dose from 10mg to 20mg daily.   9/10: BD3. POD 2 angio. BILL overnight. TTE showing EF 70%, mild symmetric LVH. DC cardene.   : BD 4. POD 3 angio. BILL overnight. SBP 160s, given hydralazine 10mg IVP x1. Given ducolax suppository. SBP liberalized 100-160. , given labetolol 5mg IVP. NS bolus 500cc for euvolemia. Lactulose for AM. PO hydral switched to clonidine.   : BD 5. POD 4 angio. BILL overnight. Anti-Xa within prophylactic range. , given labetolol 10mg IVP. 1L NS for euvolemia. lisinopril increased 40, norvasc 10 added. cardene started. change in exam: JAIME/LL 4/5 w/ drift, CTH/CTA performed showing severe spasm. febrile 101, pan cx sent. POD0 angio for spasm tx. Dc'd propofol. Extubated to face mask. SBP 190s, started on cardene gtt  : BD 6, POD 5 diagnostic angio, POD 1 angio for spasm tx. Changed NS to LR.   : BD 7, POD 6, POD 2 angio for spasm. Pt febrile ovn, f/u cultures. Start bromocriptine. Resume lisinopril. 1L bolus, f/u Na studies. Notified by primary RN of exam change: new dysarthria, L facial droop, LUE/LLLE withdrawing to noxious stim. Stroke code called, with high suspicion for vasospasm. Levo gtt started for SBP goal 180. CTA/CTP/CT performed - R sided spasm, no perfusion defect. Exam improved with higher BP, dysarthria and facial droop improving and L side 4/5. Discussed with Dr. Chao, Dr. Fuentes, and Dr. Butler - will keep SBP goal 160-200 and consider angiogram if not improving further.  9/15: BD 8, POD 7, POD 3 angio. Given 250 cc albumin bolus followed by 500 cc NS. Repeat BMP Na 134.     Vital Signs Last 24 Hrs  T(C): 38.1 (14 Sep 2024 22:31), Max: 38.1 (14 Sep 2024 09:00)  T(F): 100.6 (14 Sep 2024 22:31), Max: 100.6 (14 Sep 2024 09:00)  HR: 90 (15 Sep 2024 00:00) (69 - 95)  BP: 143/63 (14 Sep 2024 10:35) (143/63 - 148/65)  BP(mean): 90 (14 Sep 2024 10:35) (90 - 93)  RR: 17 (15 Sep 2024 00:00) (14 - 44)  SpO2: 97% (15 Sep 2024 00:00) (73% - 100%)    Parameters below as of 15 Sep 2024 00:00  Patient On (Oxygen Delivery Method): room air        I&O's Summary    13 Sep 2024 07:01  -  14 Sep 2024 07:00  --------------------------------------------------------  IN: 2741.5 mL / OUT: 3785 mL / NET: -1043.5 mL    14 Sep 2024 07:01  -  15 Sep 2024 00:21  --------------------------------------------------------  IN: 170 mL / OUT: 2456 mL / NET: -2286 mL        PHYSICAL EXAM:  General: patient seen laying supine in bed in NAD on RA  Neuro: AAOx3, FC, OE spontaneously, speech clear and fluent, CNII-XI grossly intact, +R facial (chronic), LUE pronator drift, strength 5/5 b/l UE and RLE, 4/5 LLE, sensation grossly intact to light touch throughout  HEENT: PERRL, EOMI  Neck: supple  Cardiac: RRR, S1S2  Pulmonary: chest rise symmetric  Abdomen: soft, nontender, nondistended  Ext: perfusing well  Skin: warm, dry  Wound: EVD site c/d/i       LABS:                        8.8    10.35 )-----------( 254      ( 14 Sep 2024 06:13 )             30.7     09-14    134<L>  |  102  |  15  ----------------------------<  127<H>  3.9   |  24  |  0.71    Ca    8.4      14 Sep 2024 22:19  Phos  3.8     09-14  Mg     2.4     09-14      PT/INR - ( 13 Sep 2024 05:30 )   PT: 10.7 sec;   INR: 0.94          PTT - ( 13 Sep 2024 05:30 )  PTT:32.8 sec  Urinalysis Basic - ( 14 Sep 2024 22:19 )    Color: x / Appearance: x / SG: x / pH: x  Gluc: 127 mg/dL / Ketone: x  / Bili: x / Urobili: x   Blood: x / Protein: x / Nitrite: x   Leuk Esterase: x / RBC: x / WBC x   Sq Epi: x / Non Sq Epi: x / Bacteria: x          CAPILLARY BLOOD GLUCOSE      POCT Blood Glucose.: 119 mg/dL (14 Sep 2024 16:49)      Drug Levels: [] N/A    CSF Analysis: [] N/A      Allergies    No Known Allergies    Intolerances      MEDICATIONS:  Antibiotics:    Neuro:  acetaminophen     Tablet .. 650 milliGRAM(s) Oral every 6 hours PRN  bromocriptine Capsule 5 milliGRAM(s) Oral every 8 hours  levETIRAcetam  IVPB 1000 milliGRAM(s) IV Intermittent every 12 hours  ondansetron Injectable 4 milliGRAM(s) IV Push every 6 hours PRN  oxyCODONE    IR 5 milliGRAM(s) Oral every 4 hours PRN    Anticoagulation:  enoxaparin Injectable 40 milliGRAM(s) SubCutaneous every 12 hours    OTHER:  bisacodyl 5 milliGRAM(s) Oral at bedtime PRN  chlorhexidine 2% Cloths 1 Application(s) Topical <User Schedule>  norepinephrine Infusion 0.05 MICROgram(s)/kG/Min IV Continuous <Continuous>  polyethylene glycol 3350 17 Gram(s) Oral two times a day PRN    IVF:  ascorbic acid 500 milliGRAM(s) Oral <User Schedule>  ferrous    sulfate Liquid 300 milliGRAM(s) Oral <User Schedule>  sodium chloride 3%. 500 milliLiter(s) IV Continuous <Continuous>    CULTURES:  Culture Results:   No growth at 24 hours ( @ 16:54)    RADIOLOGY & ADDITIONAL TESTS:      ASSESSMENT:  63F PMHx uncontrolled HT, R samuels's palsy, was BIBEMS to [facility]  c/o sudden onset severe HA. CTH showed SAH HH: 2, MF: 3. NIHSS: 0. CTA neg for aneurysm. Transferred to [facility] for further mgmt. S/p DSA negative for anueyrsm (24). S/p R frontal EVD (24). S/p angiogram showing mild R LILLY/R MCA spasm with IA verapamil ().      HTN    No pertinent family history in first degree relatives    Handoff    No pertinent past medical history    Hypertension    SAH (subarachnoid hemorrhage)    Cerebral vasospasm    SAH (subarachnoid hemorrhage)    Cerebral vasospasm    SAH (subarachnoid hemorrhage)    Subarachnoid hemorrhage    Angiogram, carotid and cerebral, bilateral    Insertion of intravenous catheter with ultrasound guidance    No significant past surgical history    No significant past surgical history    H/O  section    HTN    Hypertension    [email or web address]        PLAN:  Neuro:  - Neuro/vitals q1hr  - Seizure prophylaxis: Keppra 1g BID   - R frontal EVD @ [id number], monitor output   - DCI prophylaxis: Nimodipime 60q4 (holding while on pressors)   - Pain control: Tylenol, oxy 5 prn  - stability CTH post-EVD complete , stable, CTH  stable   - CTA : b/l LILLY spasm, b/l MCA R>L  - MR brain and C spine w/wo contrast complete   - Bromocriptine 5mg q8h for central fever    Cards:   - -200, levo gtt  - TTE 9/10: EF 70%, mild symmetric LVH    Pulm:   - RA  - IS    GI:  - NPO since exam change, o/w reg diet  - Bowel regimen, LBM   - Nausea: Zofran 4q6 prn     Renal:  - Na goal 140- 145   - NaCl 3% @ 30cc/hr  - voiding   - euvolemia goal     Endo:  - A1c 5.8    Heme:   - DVT prophylaxis: SCDs to LLE only, ppx SQL 40 BID (weight based)   - Anti Xa : 0.31  LE dopplers : R posterior tibial DVT on admission ()- surveillace dopplers in 1 week 9/15  - Microcytic anemia: iron/vit C q other day     ID:   - pan cx , f/u cx    Dispo: NSICU, full code, PT/OT rec AR    Discussed with Dr. Butler and Dr. Morse    Assessment:  Present when checked    []  GCS  E   V  M     Heart Failure: []Acute, [] acute on chronic , []chronic  Heart Failure:  [] Diastolic (HFpEF), [] Systolic (HFrEF), []Combined (HFpEF and HFrEF), [] RHF, [] Pulm HTN, [] Other    [] RODRIGO, [] ATN, [] AIN, [] other  [] CKD1, [] CKD2, [] CKD 3, [] CKD 4, [] CKD 5, []ESRD    Encephalopathy: [] Metabolic, [] Hepatic, [] toxic, [] Neurological, [] Other    Abnormal Nurtitional Status: [] malnurtition (see nutrition note), [ ]underweight: BMI < 19, [] morbid obesity: BMI >40, [] Cachexia    [] Sepsis  [] hypovolemic shock,[] cardiogenic shock, [] hemorrhagic shock, [] neuogenic shock  [] Acute Respiratory Failure  []Cerebral edema, [] Brain compression/ herniation,   [] Functional quadriplegia  [] Acute blood loss anemia

## 2024-09-16 LAB
ALBUMIN SERPL ELPH-MCNC: 3.6 G/DL — SIGNIFICANT CHANGE UP (ref 3.3–5)
ALP SERPL-CCNC: 90 U/L — SIGNIFICANT CHANGE UP (ref 40–120)
ALT FLD-CCNC: 14 U/L — SIGNIFICANT CHANGE UP (ref 10–45)
ANION GAP SERPL CALC-SCNC: 6 MMOL/L — SIGNIFICANT CHANGE UP (ref 5–17)
ANION GAP SERPL CALC-SCNC: 7 MMOL/L — SIGNIFICANT CHANGE UP (ref 5–17)
ANION GAP SERPL CALC-SCNC: 8 MMOL/L — SIGNIFICANT CHANGE UP (ref 5–17)
ANION GAP SERPL CALC-SCNC: 8 MMOL/L — SIGNIFICANT CHANGE UP (ref 5–17)
APTT BLD: 30.8 SEC — SIGNIFICANT CHANGE UP (ref 24.5–35.6)
AST SERPL-CCNC: 14 U/L — SIGNIFICANT CHANGE UP (ref 10–40)
BASOPHILS # BLD AUTO: 0.05 K/UL — SIGNIFICANT CHANGE UP (ref 0–0.2)
BASOPHILS # BLD AUTO: 0.06 K/UL — SIGNIFICANT CHANGE UP (ref 0–0.2)
BASOPHILS NFR BLD AUTO: 0.7 % — SIGNIFICANT CHANGE UP (ref 0–2)
BASOPHILS NFR BLD AUTO: 0.7 % — SIGNIFICANT CHANGE UP (ref 0–2)
BILIRUB SERPL-MCNC: 0.3 MG/DL — SIGNIFICANT CHANGE UP (ref 0.2–1.2)
BLD GP AB SCN SERPL QL: NEGATIVE — SIGNIFICANT CHANGE UP
BUN SERPL-MCNC: 10 MG/DL — SIGNIFICANT CHANGE UP (ref 7–23)
BUN SERPL-MCNC: 8 MG/DL — SIGNIFICANT CHANGE UP (ref 7–23)
BUN SERPL-MCNC: 9 MG/DL — SIGNIFICANT CHANGE UP (ref 7–23)
BUN SERPL-MCNC: 9 MG/DL — SIGNIFICANT CHANGE UP (ref 7–23)
CALCIUM SERPL-MCNC: 8.1 MG/DL — LOW (ref 8.4–10.5)
CALCIUM SERPL-MCNC: 8.4 MG/DL — SIGNIFICANT CHANGE UP (ref 8.4–10.5)
CALCIUM SERPL-MCNC: 8.4 MG/DL — SIGNIFICANT CHANGE UP (ref 8.4–10.5)
CALCIUM SERPL-MCNC: 8.5 MG/DL — SIGNIFICANT CHANGE UP (ref 8.4–10.5)
CHLORIDE SERPL-SCNC: 102 MMOL/L — SIGNIFICANT CHANGE UP (ref 96–108)
CHLORIDE SERPL-SCNC: 104 MMOL/L — SIGNIFICANT CHANGE UP (ref 96–108)
CHLORIDE SERPL-SCNC: 104 MMOL/L — SIGNIFICANT CHANGE UP (ref 96–108)
CHLORIDE SERPL-SCNC: 105 MMOL/L — SIGNIFICANT CHANGE UP (ref 96–108)
CO2 SERPL-SCNC: 24 MMOL/L — SIGNIFICANT CHANGE UP (ref 22–31)
CO2 SERPL-SCNC: 24 MMOL/L — SIGNIFICANT CHANGE UP (ref 22–31)
CO2 SERPL-SCNC: 25 MMOL/L — SIGNIFICANT CHANGE UP (ref 22–31)
CO2 SERPL-SCNC: 26 MMOL/L — SIGNIFICANT CHANGE UP (ref 22–31)
CREAT SERPL-MCNC: 0.59 MG/DL — SIGNIFICANT CHANGE UP (ref 0.5–1.3)
CREAT SERPL-MCNC: 0.61 MG/DL — SIGNIFICANT CHANGE UP (ref 0.5–1.3)
CREAT SERPL-MCNC: 0.64 MG/DL — SIGNIFICANT CHANGE UP (ref 0.5–1.3)
CREAT SERPL-MCNC: 0.68 MG/DL — SIGNIFICANT CHANGE UP (ref 0.5–1.3)
EGFR: 100 ML/MIN/1.73M2 — SIGNIFICANT CHANGE UP
EGFR: 97 ML/MIN/1.73M2 — SIGNIFICANT CHANGE UP
EGFR: 98 ML/MIN/1.73M2 — SIGNIFICANT CHANGE UP
EGFR: 99 ML/MIN/1.73M2 — SIGNIFICANT CHANGE UP
EOSINOPHIL # BLD AUTO: 0.05 K/UL — SIGNIFICANT CHANGE UP (ref 0–0.5)
EOSINOPHIL # BLD AUTO: 0.07 K/UL — SIGNIFICANT CHANGE UP (ref 0–0.5)
EOSINOPHIL NFR BLD AUTO: 0.7 % — SIGNIFICANT CHANGE UP (ref 0–6)
EOSINOPHIL NFR BLD AUTO: 0.8 % — SIGNIFICANT CHANGE UP (ref 0–6)
GLUCOSE SERPL-MCNC: 114 MG/DL — HIGH (ref 70–99)
GLUCOSE SERPL-MCNC: 114 MG/DL — HIGH (ref 70–99)
GLUCOSE SERPL-MCNC: 115 MG/DL — HIGH (ref 70–99)
GLUCOSE SERPL-MCNC: 131 MG/DL — HIGH (ref 70–99)
HCT VFR BLD CALC: 26.6 % — LOW (ref 34.5–45)
HCT VFR BLD CALC: 28.8 % — LOW (ref 34.5–45)
HCT VFR BLD CALC: 29 % — LOW (ref 34.5–45)
HGB BLD-MCNC: 7.5 G/DL — LOW (ref 11.5–15.5)
HGB BLD-MCNC: 8.1 G/DL — LOW (ref 11.5–15.5)
HGB BLD-MCNC: 8.3 G/DL — LOW (ref 11.5–15.5)
IMM GRANULOCYTES NFR BLD AUTO: 0.2 % — SIGNIFICANT CHANGE UP (ref 0–0.9)
IMM GRANULOCYTES NFR BLD AUTO: 0.3 % — SIGNIFICANT CHANGE UP (ref 0–0.9)
LYMPHOCYTES # BLD AUTO: 1.72 K/UL — SIGNIFICANT CHANGE UP (ref 1–3.3)
LYMPHOCYTES # BLD AUTO: 2.12 K/UL — SIGNIFICANT CHANGE UP (ref 1–3.3)
LYMPHOCYTES # BLD AUTO: 24.3 % — SIGNIFICANT CHANGE UP (ref 13–44)
LYMPHOCYTES # BLD AUTO: 25.1 % — SIGNIFICANT CHANGE UP (ref 13–44)
MAGNESIUM SERPL-MCNC: 2 MG/DL — SIGNIFICANT CHANGE UP (ref 1.6–2.6)
MCHC RBC-ENTMCNC: 20.8 PG — LOW (ref 27–34)
MCHC RBC-ENTMCNC: 20.9 PG — LOW (ref 27–34)
MCHC RBC-ENTMCNC: 21.3 PG — LOW (ref 27–34)
MCHC RBC-ENTMCNC: 27.9 GM/DL — LOW (ref 32–36)
MCHC RBC-ENTMCNC: 28.2 GM/DL — LOW (ref 32–36)
MCHC RBC-ENTMCNC: 28.8 GM/DL — LOW (ref 32–36)
MCV RBC AUTO: 73.7 FL — LOW (ref 80–100)
MCV RBC AUTO: 74 FL — LOW (ref 80–100)
MCV RBC AUTO: 74.7 FL — LOW (ref 80–100)
MONOCYTES # BLD AUTO: 0.91 K/UL — HIGH (ref 0–0.9)
MONOCYTES # BLD AUTO: 1.2 K/UL — HIGH (ref 0–0.9)
MONOCYTES NFR BLD AUTO: 13.3 % — SIGNIFICANT CHANGE UP (ref 2–14)
MONOCYTES NFR BLD AUTO: 13.8 % — SIGNIFICANT CHANGE UP (ref 2–14)
NEUTROPHILS # BLD AUTO: 4.1 K/UL — SIGNIFICANT CHANGE UP (ref 1.8–7.4)
NEUTROPHILS # BLD AUTO: 5.24 K/UL — SIGNIFICANT CHANGE UP (ref 1.8–7.4)
NEUTROPHILS NFR BLD AUTO: 59.9 % — SIGNIFICANT CHANGE UP (ref 43–77)
NEUTROPHILS NFR BLD AUTO: 60.2 % — SIGNIFICANT CHANGE UP (ref 43–77)
NRBC # BLD: 0 /100 WBCS — SIGNIFICANT CHANGE UP (ref 0–0)
OSMOLALITY SERPL: 287 MOSMOL/KG — SIGNIFICANT CHANGE UP (ref 280–301)
PHOSPHATE SERPL-MCNC: 3.5 MG/DL — SIGNIFICANT CHANGE UP (ref 2.5–4.5)
PLATELET # BLD AUTO: 222 K/UL — SIGNIFICANT CHANGE UP (ref 150–400)
PLATELET # BLD AUTO: 228 K/UL — SIGNIFICANT CHANGE UP (ref 150–400)
PLATELET # BLD AUTO: 237 K/UL — SIGNIFICANT CHANGE UP (ref 150–400)
POTASSIUM SERPL-MCNC: 3.8 MMOL/L — SIGNIFICANT CHANGE UP (ref 3.5–5.3)
POTASSIUM SERPL-MCNC: 3.9 MMOL/L — SIGNIFICANT CHANGE UP (ref 3.5–5.3)
POTASSIUM SERPL-MCNC: 4 MMOL/L — SIGNIFICANT CHANGE UP (ref 3.5–5.3)
POTASSIUM SERPL-MCNC: 4.3 MMOL/L — SIGNIFICANT CHANGE UP (ref 3.5–5.3)
POTASSIUM SERPL-SCNC: 3.8 MMOL/L — SIGNIFICANT CHANGE UP (ref 3.5–5.3)
POTASSIUM SERPL-SCNC: 3.9 MMOL/L — SIGNIFICANT CHANGE UP (ref 3.5–5.3)
POTASSIUM SERPL-SCNC: 4 MMOL/L — SIGNIFICANT CHANGE UP (ref 3.5–5.3)
POTASSIUM SERPL-SCNC: 4.3 MMOL/L — SIGNIFICANT CHANGE UP (ref 3.5–5.3)
PROCALCITONIN SERPL-MCNC: 0.05 NG/ML — SIGNIFICANT CHANGE UP (ref 0.02–0.1)
PROT SERPL-MCNC: 6.4 G/DL — SIGNIFICANT CHANGE UP (ref 6–8.3)
RBC # BLD: 3.61 M/UL — LOW (ref 3.8–5.2)
RBC # BLD: 3.88 M/UL — SIGNIFICANT CHANGE UP (ref 3.8–5.2)
RBC # BLD: 3.89 M/UL — SIGNIFICANT CHANGE UP (ref 3.8–5.2)
RBC # FLD: 17.7 % — HIGH (ref 10.3–14.5)
RBC # FLD: 18.1 % — HIGH (ref 10.3–14.5)
RBC # FLD: 18.3 % — HIGH (ref 10.3–14.5)
RH IG SCN BLD-IMP: NEGATIVE — SIGNIFICANT CHANGE UP
SODIUM SERPL-SCNC: 135 MMOL/L — SIGNIFICANT CHANGE UP (ref 135–145)
SODIUM SERPL-SCNC: 135 MMOL/L — SIGNIFICANT CHANGE UP (ref 135–145)
SODIUM SERPL-SCNC: 136 MMOL/L — SIGNIFICANT CHANGE UP (ref 135–145)
SODIUM SERPL-SCNC: 137 MMOL/L — SIGNIFICANT CHANGE UP (ref 135–145)
WBC # BLD: 6.85 K/UL — SIGNIFICANT CHANGE UP (ref 3.8–10.5)
WBC # BLD: 8.02 K/UL — SIGNIFICANT CHANGE UP (ref 3.8–10.5)
WBC # BLD: 8.71 K/UL — SIGNIFICANT CHANGE UP (ref 3.8–10.5)
WBC # FLD AUTO: 6.85 K/UL — SIGNIFICANT CHANGE UP (ref 3.8–10.5)
WBC # FLD AUTO: 8.02 K/UL — SIGNIFICANT CHANGE UP (ref 3.8–10.5)
WBC # FLD AUTO: 8.71 K/UL — SIGNIFICANT CHANGE UP (ref 3.8–10.5)

## 2024-09-16 PROCEDURE — 76937 US GUIDE VASCULAR ACCESS: CPT | Mod: 26

## 2024-09-16 PROCEDURE — 99291 CRITICAL CARE FIRST HOUR: CPT

## 2024-09-16 PROCEDURE — 36226 PLACE CATH VERTEBRAL ART: CPT | Mod: 59,LT

## 2024-09-16 PROCEDURE — 61650 EVASC PRLNG ADMN RX AGNT 1ST: CPT

## 2024-09-16 PROCEDURE — 36224 PLACE CATH CAROTD ART: CPT | Mod: 50,LT

## 2024-09-16 RX ORDER — SODIUM CHLORIDE 0.9 % (FLUSH) 0.9 %
500 SYRINGE (ML) INJECTION ONCE
Refills: 0 | Status: COMPLETED | OUTPATIENT
Start: 2024-09-16 | End: 2024-09-16

## 2024-09-16 RX ORDER — FLUDROCORTISONE ACETATE 0.1 MG/1
0.1 TABLET ORAL
Refills: 0 | Status: DISCONTINUED | OUTPATIENT
Start: 2024-09-16 | End: 2024-09-19

## 2024-09-16 RX ORDER — SODIUM CHLORIDE 5 G/100ML
500 INJECTION, SOLUTION INTRAVENOUS
Refills: 0 | Status: DISCONTINUED | OUTPATIENT
Start: 2024-09-16 | End: 2024-09-17

## 2024-09-16 RX ORDER — SODIUM CHLORIDE 0.9 % (FLUSH) 0.9 %
2 SYRINGE (ML) INJECTION EVERY 8 HOURS
Refills: 0 | Status: DISCONTINUED | OUTPATIENT
Start: 2024-09-16 | End: 2024-09-16

## 2024-09-16 RX ORDER — SODIUM CHLORIDE 0.9 % (FLUSH) 0.9 %
1000 SYRINGE (ML) INJECTION ONCE
Refills: 0 | Status: COMPLETED | OUTPATIENT
Start: 2024-09-16 | End: 2024-09-16

## 2024-09-16 RX ORDER — BROMOCRIPTINE MESYLATE 5 MG/1
10 CAPSULE ORAL EVERY 8 HOURS
Refills: 0 | Status: DISCONTINUED | OUTPATIENT
Start: 2024-09-16 | End: 2024-09-18

## 2024-09-16 RX ORDER — SODIUM CHLORIDE 0.9 % (FLUSH) 0.9 %
3 SYRINGE (ML) INJECTION EVERY 6 HOURS
Refills: 0 | Status: DISCONTINUED | OUTPATIENT
Start: 2024-09-16 | End: 2024-09-16

## 2024-09-16 RX ORDER — FLUDROCORTISONE ACETATE 0.1 MG/1
0.1 TABLET ORAL ONCE
Refills: 0 | Status: COMPLETED | OUTPATIENT
Start: 2024-09-16 | End: 2024-09-16

## 2024-09-16 RX ORDER — NOREPINEPHRINE BITARTRATE/D5W 16MG/250ML
0.05 PLASTIC BAG, INJECTION (ML) INTRAVENOUS
Qty: 8 | Refills: 0 | Status: DISCONTINUED | OUTPATIENT
Start: 2024-09-16 | End: 2024-09-17

## 2024-09-16 RX ORDER — HYDROMORPHONE HYDROCHLORIDE 1 MG/ML
0.25 INJECTION, SOLUTION INTRAMUSCULAR; INTRAVENOUS; SUBCUTANEOUS ONCE
Refills: 0 | Status: DISCONTINUED | OUTPATIENT
Start: 2024-09-16 | End: 2024-09-16

## 2024-09-16 RX ADMIN — Medication 500 MILLIGRAM(S): at 06:06

## 2024-09-16 RX ADMIN — ENOXAPARIN SODIUM 40 MILLIGRAM(S): 150 INJECTION SUBCUTANEOUS at 21:12

## 2024-09-16 RX ADMIN — Medication 1000 MILLILITER(S): at 07:10

## 2024-09-16 RX ADMIN — SODIUM CHLORIDE 70 MILLILITER(S): 5 INJECTION, SOLUTION INTRAVENOUS at 04:14

## 2024-09-16 RX ADMIN — CHLORHEXIDINE GLUCONATE ORAL RINSE 1 APPLICATION(S): 1.2 SOLUTION DENTAL at 06:10

## 2024-09-16 RX ADMIN — Medication 500 MILLILITER(S): at 04:13

## 2024-09-16 RX ADMIN — Medication 1000 MILLIGRAM(S): at 06:09

## 2024-09-16 RX ADMIN — BROMOCRIPTINE MESYLATE 5 MILLIGRAM(S): 5 CAPSULE ORAL at 06:10

## 2024-09-16 RX ADMIN — Medication 10.8 MICROGRAM(S)/KG/MIN: at 21:59

## 2024-09-16 RX ADMIN — Medication 650 MILLIGRAM(S): at 22:00

## 2024-09-16 RX ADMIN — SODIUM CHLORIDE 70 MILLILITER(S): 5 INJECTION, SOLUTION INTRAVENOUS at 18:54

## 2024-09-16 RX ADMIN — Medication 2 GRAM(S): at 06:07

## 2024-09-16 RX ADMIN — BROMOCRIPTINE MESYLATE 10 MILLIGRAM(S): 5 CAPSULE ORAL at 21:08

## 2024-09-16 RX ADMIN — Medication 650 MILLIGRAM(S): at 21:12

## 2024-09-16 RX ADMIN — FLUDROCORTISONE ACETATE 0.1 MILLIGRAM(S): 0.1 TABLET ORAL at 12:42

## 2024-09-16 RX ADMIN — Medication 1000 MILLIGRAM(S): at 17:33

## 2024-09-16 RX ADMIN — Medication 4 MILLIGRAM(S): at 12:18

## 2024-09-16 RX ADMIN — OXYCODONE HYDROCHLORIDE 5 MILLIGRAM(S): 30 TABLET, FILM COATED, EXTENDED RELEASE ORAL at 07:21

## 2024-09-16 RX ADMIN — FLUDROCORTISONE ACETATE 0.1 MILLIGRAM(S): 0.1 TABLET ORAL at 23:23

## 2024-09-16 RX ADMIN — Medication 1000 MILLILITER(S): at 18:50

## 2024-09-16 RX ADMIN — OXYCODONE HYDROCHLORIDE 5 MILLIGRAM(S): 30 TABLET, FILM COATED, EXTENDED RELEASE ORAL at 06:07

## 2024-09-16 RX ADMIN — BROMOCRIPTINE MESYLATE 10 MILLIGRAM(S): 5 CAPSULE ORAL at 14:17

## 2024-09-16 RX ADMIN — HYDROMORPHONE HYDROCHLORIDE 0.25 MILLIGRAM(S): 1 INJECTION, SOLUTION INTRAMUSCULAR; INTRAVENOUS; SUBCUTANEOUS at 11:58

## 2024-09-16 RX ADMIN — Medication 300 MILLIGRAM(S): at 06:09

## 2024-09-16 RX ADMIN — HYDROMORPHONE HYDROCHLORIDE 0.25 MILLIGRAM(S): 1 INJECTION, SOLUTION INTRAMUSCULAR; INTRAVENOUS; SUBCUTANEOUS at 11:20

## 2024-09-16 NOTE — PROGRESS NOTE ADULT - SUBJECTIVE AND OBJECTIVE BOX
NEUROSURGERY POST OP NOTE:    POD# 0 S/P Bilateral carotid and cerebral angiogram. Showed mild JANELLE spasm - 15 mg Verapamil injected.    S: Patient is lying supine in ICU bed, alert and oriented x 3. Patient denies pain, nausea, difficulty breathing, chest pain or discomfort.     ALLERGIES:  ·	No known allergies.    PAST MEDICAL HISTORY   ·	Hypertension  ·	SAH (subarachnoid hemorrhage)  ·	Cerebral vasospasm  ·	Angiogram, carotid and cerebral, bilateral  ·	Insertion of intravenous catheter with ultrasound guidance    SURGICAL HISTORY:  ·	H/O  section    MEDICATIONS  ·	acetaminophen     Tablet .. 650 milliGRAM(s) Oral every 6 hours PRN  ·	ascorbic acid 500 milliGRAM(s) Oral <User Schedule>  ·	bisacodyl 5 milliGRAM(s) Oral at bedtime PRN  ·	bromocriptine Capsule 10 milliGRAM(s) Oral every 8 hours  ·	chlorhexidine 2% Cloths 1 Application(s) Topical <User Schedule>  ·	enoxaparin Injectable 40 milliGRAM(s) SubCutaneous every 12 hours  ·	ferrous    sulfate Liquid 300 milliGRAM(s) Oral <User Schedule>  ·	fludroCORTISONE 0.1 milliGRAM(s) Oral two times a day  ·	levETIRAcetam 1000 milliGRAM(s) Oral two times a day  ·	norepinephrine Infusion 0.05 MICROgram(s)/kG/Min IV Continuous <Continuous>  ·	ondansetron Injectable 4 milliGRAM(s) IV Push every 6 hours PRN  ·	oxyCODONE    IR 5 milliGRAM(s) Oral every 4 hours PRN  ·	polyethylene glycol 3350 17 Gram(s) Oral two times a day PRN  ·	sodium chloride 3 Gram(s) Oral every 6 hours  ·	sodium chloride 3%. 500 milliLiter(s) IV Continuous <Continuous>    VITAL SIGNS  ·	T(C): 37.2 (24 @ 09:09), Max: 38.5 (09-15-24 @ 20:13)  ·	HR: 68 (24 @ 09:01) (68 - 88)  ·	BP: 143/63 (24 @ 09:01) (143/63 - 143/63)  ·	RR: 16 (24 @ 09:01) (14 - 18)  ·	SpO2: 99% (24 @ 09:01) (96% - 100%)      INPUT/OUTPUT  09-15-24 @ 07:01  -  24 @ 07:00  --------------------------------------------------------  IN: 4687 mL / OUT: 6103 mL / NET: -1416 mL    24 @ 07:01  -  24 @ 11:11  --------------------------------------------------------  IN: 654.3 mL / OUT: 268 mL / NET: 386.3 mL    RADIOLOGY:   CT ANGIO NECK STROKE PROTCL IC     ORDERED BY: PHOENIX GALEAS   PROCEDURE DATE:  2024      IMPRESSION:  Stable perimesencephalic subarachnoid hemorrhage, without interval rebleeding.  Suspected multifocal worsened vasospasm.  Cannot categorically exclude artifact from suboptimal bolus timing.    Dr. Gonzalez called report to PHOENIX GALEAS  24 05:21 PM.    --- End of Report ---    Exam:  ·	NEURO: PERRLA b/l; EOMI b/l; CN II-XII intact; R sided baseline facial droop. No L sided facial droop.   ·	Motor:  good muscle bulk/tone throughout all extremities.                                                HF          KF          KE          PF        DF    Right   Left   ·	Sensation: intact to light touch in upper and lower extremities b/l to light touch    ·	CONSTITUTIONAL: Well groomed, no apparent distress.  ·	EYES: PERRLA and symmetric, EOMI, No conjunctival or scleral injection, non-icteric.  ·	ENMT: Oral mucosa with moist membranes. No pharyngeal injection or exudates.  ·	NECK: Supple, symmetric and without tracheal deviation.   ·	RESP: Equal bilateral chest rise anteriorly, posteriorly, no abnormal work of breathing, no respiratory distress, no use of accessory muscles, no adventitious lung sounds.  ·	CV: Regular heart rate and rhythm, +S1S2,  no JVD; + peripheral edema.  ·	GI: Soft, non-tender, non-distended, no guarding or palpable masses.  ·	LYMPH: No cervical LAD or tenderness; no axillary LAD or tenderness; no inguinal LAD or tenderness  ·	MSK: Gait deferred. No digital clubbing or cyanosis; no misalignment or deformities noted in extremities.  ·	Normal ROM without pain, no spinal tenderness, normal muscle strength/tone  ·	SKIN: No rashes or ulcers noted; no subcutaneous nodules or induration palpable  ·	PSYCH: Appropriate insight/judgment; A+O x 3, mood and affect appropriate, recent/remote memory intact.    WOUND:  ·	    DRAINS:   ·	External ventricular drain open @     LINES:   ·	    Assessment:       Plan:   ·	    Dispo:     d/w NEUROSURGERY POST OP NOTE:    HPI: 63F PMHx uncontrolled HTN, does not take any medications at home presented to [facility] after developing acute onset severe HA. Once arrived, patient had episode of nausea with emesis. CTH showed SAH, HH: 2, MF: 3. NIHSS: 0. CTA neg for aneurysm. Transferred to [facility] for further mgmt. Upon arrival to [facility], NIHSS noted to be 0. Patient reports a headache rated at 7/10 in severity. Of note, patient's daughter notes pt seems off and is sleepier than her baseline. Pt denies SOB, chest pain, vision changes, nausea, weakness/numbness/tingling, dizziness, photophobia.  (08 Sep 2024 13:29)    POD# 0 S/P Bilateral carotid and cerebral angiogram.   ·	Showed mild R ICA spasm - 15 mg Verapamil injected.    S: Patient is lying supine in ICU bed, AAO x 3. Patient denies pain, nausea, difficulty breathing, chest pain or discomfort.     ALLERGIES:  ·	No known allergies.    PAST MEDICAL HISTORY   ·	Hypertension  ·	SAH (subarachnoid hemorrhage)  ·	Cerebral vasospasm  ·	Angiogram, carotid and cerebral, bilateral  ·	Insertion of intravenous catheter with ultrasound guidance    SURGICAL HISTORY:  ·	H/O  section    MEDICATIONS  ·	acetaminophen     Tablet .. 650 milliGRAM(s) Oral every 6 hours PRN  ·	ascorbic acid 500 milliGRAM(s) Oral <User Schedule>  ·	bisacodyl 5 milliGRAM(s) Oral at bedtime PRN  ·	bromocriptine Capsule 10 milliGRAM(s) Oral every 8 hours  ·	chlorhexidine 2% Cloths 1 Application(s) Topical <User Schedule>  ·	enoxaparin Injectable 40 milliGRAM(s) SubCutaneous every 12 hours  ·	ferrous    sulfate Liquid 300 milliGRAM(s) Oral <User Schedule>  ·	fludroCORTISONE 0.1 milliGRAM(s) Oral two times a day  ·	levETIRAcetam 1000 milliGRAM(s) Oral two times a day  ·	norepinephrine Infusion 0.05 MICROgram(s)/kG/Min IV Continuous <Continuous>  ·	ondansetron Injectable 4 milliGRAM(s) IV Push every 6 hours PRN  ·	oxyCODONE    IR 5 milliGRAM(s) Oral every 4 hours PRN  ·	polyethylene glycol 3350 17 Gram(s) Oral two times a day PRN  ·	sodium chloride 3 Gram(s) Oral every 6 hours  ·	sodium chloride 3%. 500 milliLiter(s) IV Continuous <Continuous>    REVIEW OF SYSTEMS:   ·	    VITAL SIGNS  ·	T(C): 37.2 (24 @ 09:09), Max: 38.5 (09-15-24 @ 20:13)  ·	HR: 68 (24 @ 09:01) (68 - 88)  ·	BP: 143/63 (24 @ 09:01) (143/63 - 143/63)  ·	RR: 16 (24 @ 09:01) (14 - 18)  ·	SpO2: 99% (24 @ 09:01) (96% - 100%)    INPUT/OUTPUT  09-15-24 @ 07:01  -  24 @ 07:00  --------------------------------------------------------  IN: 4687 mL / OUT: 6103 mL / NET: -1416 mL    24 @ 07:01  -  24 @ 11:11  --------------------------------------------------------  IN: 654.3 mL / OUT: 268 mL / NET: 386.3 mL    RADIOLOGY:   CT ANGIO NECK STROKE PROTCL IC     ORDERED BY: PHOENIX GALEAS     PROCEDURE DATE:  2024    IMPRESSION:  Stable perimesencephalic subarachnoid hemorrhage, without interval rebleeding.  Suspected multifocal worsened vasospasm.  Cannot categorically exclude artifact from suboptimal bolus timing.    Dr. Gonzalez called report to PHOENIX GALEAS  24 05:21 PM.  --- End of Report ---    Exam:  ·	NEURO: AAOx3, no dysmetria, no slurred speech.   ·	CN II: PERRLA b/l.  ·	CN III, IV, VI: EOMI b/l; CN II-XII intact.   ·	CN V: sensation intact to light touch in V1-V3.   ·	CN VII: R sided baseline facial droop. No L sided facial droop. Symmetric eyebrow raise.   ·	CN XI: head turning and shrugs shoulders without difficulty.  ·	CX XII: tongue protruding midline.   ·	Motor: Gait deferred. Unable to assess right lower extremity due to right groin vascular access site. Good muscle bulk/tone. RUE: 5/5. LUE: 5/5 LLE: 5/5.                  ·	Sensation: intact to light touch in upper and lower extremities b/l to light touch.  ·	CONSTITUTIONAL: Lying supine in ICU bed on room air, no apparent distress.  ·	EYES: No conjunctival or scleral injection, non-icteric.  ·	ENMT: Oral mucosa with moist membranes. No pharyngeal injection or exudates.  ·	NECK: Supple, symmetric and without tracheal deviation.   ·	RESP: Equal bilateral chest rise anteriorly, posteriorly, no abnormal work of breathing, no respiratory distress, no use of accessory muscles, no adventitious lung sounds.  ·	CV: Regular heart rate and rhythm, +S1S2,  no JVD; + peripheral edema. + hyperpigmentation to b/l lower legs.  ·	GI: Soft, non-tender, non-distended, no guarding or palpable masses.  ·	MSK: Gait deferred; no misalignment or deformities noted in extremities.  ·	SKIN: No new rashes or ulcers noted; no subcutaneous nodules or induration palpable.   ·	PSYCH: Appropriate insight/judgment; A+O x 3, mood and affect appropriate, recent/remote memory intact.    WOUND:  ·	Right groin vascular access site; covered by sterile strips gauze and Tegaderm - clean dry intact.   ·	No palpable hematoma.     DRAINS:   ·	External ventricular drain open @ 5cm H20, monitoring output.    LINES:   ·	Left brachial arterial line.   ·	No barnes.    Assessment:   ·	Patient is a 62 y/o female, with PMHx of uncontrolled HTN, who was transferred from OSH following an acute onset of severe headaches and subsequent CT head that showed SAH. POD 8 R frontal EVD placement, POD 4 angiogram with mild R LILLY/MCA spasm with IA verapamil, and now POD0 bilateral carotid and cerebral angiogram with IA verapamil to R ICA, resting comfortably in ICU bed, with no new neurologic deficits.    Plan:   ·	Neuro:  ·	Neuro/vitals q1hr  ·	Seizure prophylaxis: Keppra 1g BID   ·	R frontal EVD @ 5cmH2O, monitor output   ·	DCI prophylaxis: Nimodipime 60q4 (holding while on pressors)   ·	Pain control: Tylenol, oxy 5 prn  ·	stability CTH post-EVD complete , stable, CTH  stable   ·	CTA : b/l LILLY spasm, b/l MCA R>L  ·	MR brain and C spine w/wo contrast complete   ·	Increase Bromocriptine to 10g q8h for central fever  ·	Cards:   ·	-200 s/p levo gtt   ·	TTE 9/10: EF 70%, mild symmetric LVH  ·	Pulm:   ·	RA  ·	IS  ·	GI:  ·	NPO for   ·	Bowel regimen, LBM 9/15  ·	Nausea: Zofran 4q6 prn   ·	Renal:  ·	Na goal 140- 145.  Euvolemia goal.  ·	NaCl 3% @ 70cc/hr; Salt tabs 3q6hrs.   ·	BMP in 4-6 hours.  ·	Voiding    ·	Fludrocortisone 0.1 mg BID   ·	Endo:  ·	A1c 5.8  ·	Heme:   ·	DVT prophylaxis: SCDs to LLE only, ppx SQL 40 BID (weight based)   ·	Anti Xa : 0.31  ·	LE dopplers : R posterior tibial DVT on admission ()-   ·	Obtain surveillance dopplers ordered for 9/15.  ·	Microcytic anemia: iron/vit C q other day   ·	ID:   ·	sung cx , f/u cx      Dispo: NSICU, full code, PT/OT rec AR  Discussed with Dr. Butler and Dr. Morse   NEUROSURGERY POST OP NOTE:    HPI: 63F PMHx uncontrolled HTN, does not take any medications at home presented to [facility] after developing acute onset severe HA. Once arrived, patient had episode of nausea with emesis. CTH showed SAH, HH: 2, MF: 3. NIHSS: 0. CTA neg for aneurysm. Transferred to [facility] for further mgmt. Upon arrival to [facility], NIHSS noted to be 0. Patient reports a headache rated at 7/10 in severity. Of note, patient's daughter notes pt seems off and is sleepier than her baseline. Pt denies SOB, chest pain, vision changes, nausea, weakness/numbness/tingling, dizziness, photophobia.  (08 Sep 2024 13:29)    COURSE IN THE HOSPITAL:   s/p angiogram, carotid & cerebral, b/l (2024, Alfredo Lomas), no aneurysm seen; kept intubated post procedure, EVD inserted   Tmax 38.5 L UE drift overnight; increased SBP goals to 180-200    POD# 0 S/P Bilateral carotid and cerebral angiogram.   ·	Showed mild R ICA spasm - 15 mg Verapamil injected.    S: Patient is lying supine in ICU bed, AAO x 3. Patient denies pain, nausea, difficulty breathing, chest pain or discomfort.     ALLERGIES:  ·	No known allergies.    PAST MEDICAL HISTORY   ·	Hypertension  ·	SAH (subarachnoid hemorrhage)  ·	Cerebral vasospasm  ·	Angiogram, carotid and cerebral, bilateral  ·	Insertion of intravenous catheter with ultrasound guidance    SURGICAL HISTORY:  ·	H/O  section    MEDICATIONS  ·	acetaminophen     Tablet .. 650 milliGRAM(s) Oral every 6 hours PRN  ·	ascorbic acid 500 milliGRAM(s) Oral <User Schedule>  ·	bisacodyl 5 milliGRAM(s) Oral at bedtime PRN  ·	bromocriptine Capsule 10 milliGRAM(s) Oral every 8 hours  ·	chlorhexidine 2% Cloths 1 Application(s) Topical <User Schedule>  ·	enoxaparin Injectable 40 milliGRAM(s) SubCutaneous every 12 hours  ·	ferrous    sulfate Liquid 300 milliGRAM(s) Oral <User Schedule>  ·	fludroCORTISONE 0.1 milliGRAM(s) Oral two times a day  ·	levETIRAcetam 1000 milliGRAM(s) Oral two times a day  ·	norepinephrine Infusion 0.05 MICROgram(s)/kG/Min IV Continuous <Continuous>  ·	ondansetron Injectable 4 milliGRAM(s) IV Push every 6 hours PRN  ·	oxyCODONE    IR 5 milliGRAM(s) Oral every 4 hours PRN  ·	polyethylene glycol 3350 17 Gram(s) Oral two times a day PRN  ·	sodium chloride 3 Gram(s) Oral every 6 hours  ·	sodium chloride 3%. 500 milliLiter(s) IV Continuous <Continuous>    REVIEW OF SYSTEMS:   ·	Neuro: Denies Headache, Back pain, Numbness, Weakness, Ataxia, Dizziness, Aphasia, Dysarthria, Visual disturbance  ·	Resp: Denies Shortness of breath/dyspnea, Cough, difficulty breathing  ·	CV: Denies Chest pain, Palpitation, Lightheadedness, Syncope  ·	Renal: Denies polydipsia dysuria hematuria, edema   ·	GI: Denies Nausea, Emesis, Abdominal pain, Constipation, Diarrhea  ·	Hem: Denies Hematemesis, bright red blood per rectum  ·	ID: Denies Fever, Chills, Dysuria  ·	ENT: Denies Rhinorrhea    VITAL SIGNS  ·	T(C): 37.2 (24 @ 09:09), Max: 38.5 (09-15-24 @ 20:13)  ·	HR: 68 (24 @ 09:01) (68 - 88)  ·	BP: 143/63 (24 @ 09:01) (143/63 - 143/63)  ·	RR: 16 (24 @ 09:01) (14 - 18)  ·	SpO2: 99% (24 @ 09:01) (96% - 100%)    INPUT/OUTPUT  09-15-24 @ 07:01  -  24 @ 07:00  --------------------------------------------------------  IN: 4687 mL / OUT: 6103 mL / NET: -1416 mL    24 @ 07:01  -  24 @ 11:11  --------------------------------------------------------  IN: 654.3 mL / OUT: 268 mL / NET: 386.3 mL    LABS:                        8.3    6.85  )-----------( 222      ( 16 Sep 2024 10:20 )             28.8     135  |  102  |  8   ----------------------------<  114<H>  4.0   |  25  |  0.59    Ca    8.4      16 Sep 2024 10:20  Phos  3.5       Mg     2.0         TPro  6.4  /  Alb  3.6  /  TBili  0.3  /  DBili  x   /  AST  14  /  ALT  14  /  AlkPhos  90      PTT - ( 16 Sep 2024 10:20 )  PTT:30.8 sec  Urinalysis Basic - ( 16 Sep 2024 10:20 )    Color: x / Appearance: x / SG: x / pH: x  Gluc: 114 mg/dL / Ketone: x  / Bili: x / Urobili: x   Blood: x / Protein: x / Nitrite: x   Leuk Esterase: x / RBC: x / WBC x   Sq Epi: x / Non Sq Epi: x / Bacteria: x    RADIOLOGY:   CT ANGIO NECK STROKE PROTOCOL IC     ORDERED BY: PHOENIX GALEAS     PROCEDURE DATE:  2024    IMPRESSION:  Stable perimesencephalic subarachnoid hemorrhage, without interval rebleeding.  Suspected multifocal worsened vasospasm.  Cannot categorically exclude artifact from suboptimal bolus timing.    Dr. Gonzalez called report to PHOENIX GALEAS  24 05:21 PM.  --- End of Report ---    Exam:  ·	NEURO: AAOx3, no dysmetria, no slurred speech.   ·	CN II: PERRLA b/l.  ·	CN III, IV, VI: EOMI b/l; CN II-XII intact.   ·	CN V: sensation intact to light touch in V1-V3.   ·	CN VII: R sided baseline facial droop. No L sided facial droop. Symmetric eyebrow raise.   ·	CN XI: head turning and shrugs shoulders without difficulty.  ·	CX XII: tongue protruding midline.   ·	Motor: Gait deferred. Unable to assess right lower extremity due to right groin vascular access site. Good muscle bulk/tone. RUE: /5. LUE: 5/5 LLE: 5/.                  ·	Sensation: intact to light touch in upper and lower extremities b/l to light touch.  ·	CONSTITUTIONAL: Lying supine in ICU bed on room air, no apparent distress.  ·	EYES: No conjunctival or scleral injection, non-icteric.  ·	ENMT: Oral mucosa with moist membranes. No pharyngeal injection or exudates.  ·	NECK: Supple, symmetric and without tracheal deviation.   ·	RESP: Equal bilateral chest rise anteriorly, posteriorly, no abnormal work of breathing, no respiratory distress, no use of accessory muscles, no adventitious lung sounds.  ·	CV: Regular heart rate and rhythm, +S1S2,  no JVD; + peripheral edema. + hyperpigmentation to b/l lower legs.  ·	GI: Soft, non-tender, non-distended, no guarding or palpable masses.  ·	MSK: Gait deferred; no misalignment or deformities noted in extremities.  ·	SKIN: No new rashes or ulcers noted; no subcutaneous nodules or induration palpable.   ·	PSYCH: Appropriate insight/judgment; A+O x 3, mood and affect appropriate, recent/remote memory intact.    WOUND:  ·	Right groin vascular access site; covered by sterile strips, gauze and Tegaderm - clean dry intact.   ·	No palpable hematoma.     DRAINS:   ·	External ventricular drain open @ 5cm H20, monitoring output.    LINES:   ·	Left brachial arterial line.   ·	No barnes.    Assessment:   ·	Patient is a 64 y/o female, with PMHx of uncontrolled HTN, who was transferred from OSH following an acute onset of severe headaches and subsequent CT head that showed SAH. POD 8 R frontal EVD placement, POD 4 angiogram with mild R LILLY/MCA spasm with IA verapamil, and now POD0 bilateral carotid and cerebral angiogram with IA verapamil to R ICA, resting comfortably in ICU bed, with no new neurologic deficits.    Plan:   ·	Neuro:  ·	Neuro/vitals q1hr  ·	Seizure prophylaxis: Keppra 1g BID   ·	R frontal EVD @ 5cmH2O, monitor output   ·	DCI prophylaxis: Nimodipime 60q4 (holding while on pressors)   ·	Pain control: Tylenol, oxy 5 prn  ·	stability CTH post-EVD complete , stable, CTH  stable   ·	CTA : b/l LILLY spasm, b/l MCA R>L  ·	MR brain and C spine w/wo contrast complete   ·	Increase Bromocriptine to 10g q8h for central fever  ·	Cards:   ·	-200 s/p levo gtt   ·	TTE 9/10: EF 70%, mild symmetric LVH  ·	Pulm:   ·	RA  ·	IS  ·	GI:  ·	NPO for   ·	Bowel regimen, LBM 9/15  ·	Nausea: Zofran 4q6 prn   ·	Renal:  ·	Na goal 140- 145.  Euvolemia goal.  ·	NaCl 3% @ 70cc/hr; Salt tabs 3q6hrs.   ·	BMP in 4-6 hours.  ·	Voiding    ·	Fludrocortisone 0.1 mg BID   ·	Endo:  ·	A1c 5.8  ·	Heme:   ·	DVT prophylaxis: SCDs to LLE only, ppx SQL 40 BID (weight based)   ·	Anti Xa : 0.31  ·	LE dopplers : R posterior tibial DVT on admission ()-   ·	Obtain surveillance dopplers ordered for 9/15.  ·	Microcytic anemia: iron/vit C q other day   ·	ID:   ·	sung cx , f/u cx      Dispo: NSICU, full code, PT/OT rec AR  Discussed with Dr. Butler and Dr. Morse   NEUROSURGERY POST OP NOTE:    HPI: 63F PMHx uncontrolled HTN, does not take any medications at home presented to [facility] after developing acute onset severe HA. Once arrived, patient had episode of nausea with emesis. CTH showed SAH, HH: 2, MF: 3. NIHSS: 0. CTA neg for aneurysm. Transferred to [facility] for further mgmt. Upon arrival to [facility], NIHSS noted to be 0. Patient reports a headache rated at 7/10 in severity. Of note, patient's daughter notes pt seems off and is sleepier than her baseline. Pt denies SOB, chest pain, vision changes, nausea, weakness/numbness/tingling, dizziness, photophobia.  (08 Sep 2024 13:29)    POD# 0 S/P Bilateral carotid and cerebral angiogram with IA verapamil, w findings of R ICA spasm ()     S: Patient denies pain, nausea, difficulty breathing, chest pain or discomfort.     ALLERGIES:  ·	No known allergies.    PAST MEDICAL HISTORY   ·	Hypertension  ·	SAH (subarachnoid hemorrhage)  ·	Cerebral vasospasm  ·	Angiogram, carotid and cerebral, bilateral  ·	Insertion of intravenous catheter with ultrasound guidance    SURGICAL HISTORY:  ·	H/O  section    MEDICATIONS  ·	acetaminophen     Tablet .. 650 milliGRAM(s) Oral every 6 hours PRN  ·	ascorbic acid 500 milliGRAM(s) Oral <User Schedule>  ·	bisacodyl 5 milliGRAM(s) Oral at bedtime PRN  ·	bromocriptine Capsule 10 milliGRAM(s) Oral every 8 hours  ·	chlorhexidine 2% Cloths 1 Application(s) Topical <User Schedule>  ·	enoxaparin Injectable 40 milliGRAM(s) SubCutaneous every 12 hours  ·	ferrous    sulfate Liquid 300 milliGRAM(s) Oral <User Schedule>  ·	fludroCORTISONE 0.1 milliGRAM(s) Oral two times a day  ·	levETIRAcetam 1000 milliGRAM(s) Oral two times a day  ·	norepinephrine Infusion 0.05 MICROgram(s)/kG/Min IV Continuous <Continuous>  ·	ondansetron Injectable 4 milliGRAM(s) IV Push every 6 hours PRN  ·	oxyCODONE    IR 5 milliGRAM(s) Oral every 4 hours PRN  ·	polyethylene glycol 3350 17 Gram(s) Oral two times a day PRN  ·	sodium chloride 3 Gram(s) Oral every 6 hours  ·	sodium chloride 3%. 500 milliLiter(s) IV Continuous <Continuous>    REVIEW OF SYSTEMS:   ·	Neuro: Denies Headache, Back pain, Numbness, Weakness, Ataxia, Dizziness, Aphasia, Dysarthria, Visual disturbance  ·	Resp: Denies Shortness of breath/dyspnea, Cough, difficulty breathing  ·	CV: Denies Chest pain, Palpitation, Lightheadedness, Syncope  ·	Renal: Denies polydipsia dysuria hematuria, edema   ·	GI: Denies Nausea, Emesis, Abdominal pain, Constipation, Diarrhea  ·	Hem: Denies Hematemesis, bright red blood per rectum  ·	ID: Denies Fever, Chills, Dysuria  ·	ENT: Denies Rhinorrhea    VITAL SIGNS  ·	T(C): 37.2 (24 @ 09:09), Max: 38.5 (09-15-24 @ 20:13)  ·	HR: 68 (24 @ 09:01) (68 - 88)  ·	BP: 143/63 (24 @ 09:01) (143/63 - 143/63)  ·	RR: 16 (24 @ 09:01) (14 - 18)  ·	SpO2: 99% (24 @ 09:01) (96% - 100%)    INPUT/OUTPUT  09-15-24 @ 07:01  -  24 @ 07:00  --------------------------------------------------------  IN: 4687 mL / OUT: 6103 mL / NET: -1416 mL    24 @ 07:01  -  24 @ 11:11  --------------------------------------------------------  IN: 654.3 mL / OUT: 268 mL / NET: 386.3 mL    LABS:                        8.3    6.85  )-----------( 222      ( 16 Sep 2024 10:20 )             28.8     135  |  102  |  8   ----------------------------<  114<H>  4.0   |  25  |  0.59    Ca    8.4      16 Sep 2024 10:20  Phos  3.5       Mg     2.0         TPro  6.4  /  Alb  3.6  /  TBili  0.3  /  DBili  x   /  AST  14  /  ALT  14  /  AlkPhos  90      PTT - ( 16 Sep 2024 10:20 )  PTT:30.8 sec  Urinalysis Basic - ( 16 Sep 2024 10:20 )    Color: x / Appearance: x / SG: x / pH: x  Gluc: 114 mg/dL / Ketone: x  / Bili: x / Urobili: x   Blood: x / Protein: x / Nitrite: x   Leuk Esterase: x / RBC: x / WBC x   Sq Epi: x / Non Sq Epi: x / Bacteria: x    RADIOLOGY:   CT ANGIO NECK STROKE PROTOCOL IC     ORDERED BY: PHOENIX GALEAS     PROCEDURE DATE:  2024    IMPRESSION:  Stable perimesencephalic subarachnoid hemorrhage, without interval rebleeding.  Suspected multifocal worsened vasospasm.  Cannot categorically exclude artifact from suboptimal bolus timing.    Dr. Gonzalez called report to PHOENIX GALEAS  24 05:21 PM.  --- End of Report ---    Exam:  ·	NEURO: AAOx3, no dysmetria, no slurred speech.   ·	CN II: PERRLA b/l, 3mm briskly reactive b/l   ·	CN III, IV, VI: EOMI b/l  ·	CN V: sensation intact to light touch in V1-V3.   ·	CN VII: R sided baseline facial droop. No L sided facial droop. Symmetric eyebrow raise.   ·	CN XI: head turning and shrugs shoulders without difficulty.  ·	CX XII: tongue midline  ·	Motor: Gait deferred. Unable to assess right lower extremity due to right groin vascular access site. Good muscle bulk/tone. RUE: . LUE:  LLE: .                  ·	Sensation: intact to light touch in upper and lower extremities b/l  ·	CONSTITUTIONAL: Lying supine in ICU bed on room air, no apparent distress.  ·	EYES: No conjunctival or scleral injection, non-icteric.  ·	ENMT: Oral mucosa with moist membranes. No pharyngeal injection or exudates.  ·	NECK: Supple, symmetric and without tracheal deviation.   ·	RESP: Equal bilateral chest rise anteriorly, posteriorly, no abnormal work of breathing, no respiratory distress, no use of accessory muscles, no adventitious lung sounds.  ·	CV: Regular heart rate and rhythm, +S1S2,  no JVD; 1+ pitting edema. + hyperpigmentation to b/l lower legs.  ·	GI: Soft, non-tender, non-distended, no guarding or palpable masses.  ·	MSK: Gait deferred; no misalignment or deformities noted in extremities.  ·	SKIN: No new rashes or ulcers noted; no subcutaneous nodules or induration palpable.   ·	PSYCH: Appropriate insight/judgment; mood and affect appropriate, recent/remote memory intact.    WOUND:  ·	Right groin vascular access site; covered by sterile strips, gauze and Tegaderm - clean dry intact.   ·	No palpable hematoma.     DRAINS:   ·	External ventricular drain open @ 5cm H20    LINES:   ·	Left brachial arterial line.   ·	No barnes.    Assessment:   ·	Patient is a 62 y/o female, with PMHx of uncontrolled HTN, who was transferred from [facility] following an acute onset of severe headaches and subsequent CT head that showed SAH. POD 8 R frontal EVD placement, POD 4 angiogram with mild R LILLY/MCA spasm with IA verapamil, and now POD0 bilateral carotid and cerebral angiogram with IA verapamil to R ICA, resting comfortably in ICU bed, with no new neurologic deficits.    Plan:   ·	Neuro:  ·	Neuro/vitals q1hr  ·	Seizure prophylaxis: Keppra 1g BID   ·	R frontal EVD @ 5cmH2O, monitor output   ·	DCI prophylaxis: Nimodipime 60q4 (holding while on pressors)   ·	Pain control: Tylenol, oxy 5 prn  ·	stability CTH post-EVD complete , stable, CTH  stable   ·	CTA : b/l LILLY spasm, b/l MCA R>L  ·	MR brain and C spine w/wo contrast complete   ·	Increase Bromocriptine to 10g q8h for central fever  ·	Cards:   ·	-200 s/p levo gtt   ·	TTE 9/10: EF 70%, mild symmetric LVH  ·	Pulm:   ·	RA  ·	IS  ·	GI:  ·	ADAT   ·	Bowel regimen, LBM 9/15  ·	Nausea: Zofran 4q6 prn   ·	Renal:  ·	Na goal 140- 145.  Euvolemia goal.  ·	NaCl 3% @ 70cc/hr  ·	BMP in 4-6 hours.  ·	Voiding    ·	Fludrocortisone 0.1 mg BID   ·	Endo:  ·	A1c 5.8  ·	Heme:   ·	DVT prophylaxis: SCDs to LLE only, ppx SQL 40 BID (weight based)   ·	Anti Xa : 0.31  ·	LE dopplers : R posterior tibial DVT on admission ()-   ·	Obtain surveillance dopplers ordered for 9/15.  ·	Microcytic anemia: iron/vit C q other day   ·	ID:   ·	pan cx , f/u cx  ·	afebrile       Dispo: NSICU, full code, PT/OT rec AR    Discussed with Dr. Butler and Dr. Sanchez

## 2024-09-16 NOTE — BRIEF OPERATIVE NOTE - FIRST ASSIST NAME
Jennifer Lemus (Physician Assistant)
Edward Matamoros (Physician Assistant)
Matthew Brown (Physician Assistant)

## 2024-09-16 NOTE — BRIEF OPERATIVE NOTE - NSICDXBRIEFPREOP_GEN_ALL_CORE_FT
PRE-OP DIAGNOSIS:  SAH (subarachnoid hemorrhage) 08-Sep-2024 15:50:20  Matthew Brown  
PRE-OP DIAGNOSIS:  SAH (subarachnoid hemorrhage) 08-Sep-2024 15:50:20  Matthew Brown  Cerebral vasospasm 12-Sep-2024 19:31:38 Mild Right LILLY and MCA vasospasm Edward Matamoros  
PRE-OP DIAGNOSIS:  SAH (subarachnoid hemorrhage) 08-Sep-2024 15:50:20  Matthew Brown  Cerebral vasospasm 12-Sep-2024 19:31:38 Mild Right LILLY and MCA vasospasm Edward Matamoros

## 2024-09-16 NOTE — PRE-ANESTHESIA EVALUATION ADULT - MALLAMPATI CLASS
Class III - visualization of the soft palate and the base of the uvula
Class III - visualization of the soft palate and the base of the uvula
up no tooth/Class II - visualization of the soft palate, fauces, and uvula

## 2024-09-16 NOTE — PROGRESS NOTE ADULT - ASSESSMENT
63f hx HTN, iron deficiency anemia admit for DSA negative SAH [id number] c/b vasospasm s/p IA therapy     -Ncq1, VSq1   -EVD management @[id number]; monitor output and ICP  -c/w AEDs  -SBP goal 160-200; Levophed as needed to maintain parameters   -c/w 3% @70cc/hr; BMP q8 goal 135-145

## 2024-09-16 NOTE — PRE-ANESTHESIA EVALUATION ADULT - NSANTHPEFT_GEN_ALL_CORE
AAO x3  CTA, RRR
General: Appearance is consistent with chronological age. No abnormal facies.  Airway:  See Mallampati score  EENT: Anicteric sclera; oropharynx clear, moist mucus membranes  Cardiovascular:  Regular rate and rhythm  Respiratory: Unlabored breathing  Neurological: Awake and alert, moves all extremities  Constitutional: MET<4

## 2024-09-16 NOTE — PROGRESS NOTE ADULT - SUBJECTIVE AND OBJECTIVE BOX
=================================  NEUROCRITICAL CARE ATTENDING NOTE  =================================    KARRIE MORALES   MRN-[id number]  Summary:  63y/F  with uncontrolled HTN, does not take any medications at home presented to [facility] after developing acute onset severe HA. Once arrived, patient had episode of nausea with emesis. CTH showed SAH, HH: 2, MF: 3. NIHSS: 0. CTA neg for aneurysm. Transferred to [facility] for further mgmt. Upon arrival to [facility], NIHSS noted to be 0. Patient reports a headache rated at 7/10 in severity. Of note, patient's daughter notes pt seems off and is sleepier than her baseline. Pt denies SOB, chest pain, vision changes, nausea, weakness/numbness/tingling, dizziness, photophobia.  (08 Sep 2024 13:29)    COURSE IN THE HOSPITAL:   s/p angiogram, carotid & cerebral, b/l (2024, Alfredo Lomas), no aneurysm seen; kept intubated post procedure, EVD inserted     Tmax 38.5    Past Medical History: No pertinent past medical history Hypertension  Allergies:  No Known Allergies  Home meds:     PHYSICAL EXAMINATION  T(C): 37.3 (24 @ 05:26), Max: 38.5 (09-15-24 @ 20:13) HR: 68 (24 @ 07:00) (68 - 88) ABP: 180/56 (24 @ 07:00) (170/47 - 205/65) RR: 16 (24 @ 08:00) (14 - 20) SpO2: 99% (24 @ 07:00) (96% - 100%)  NEUROLOGIC EXAMINATION:  Patient  AOx1 CAMERON (+) cough/gag/corneals B UE withdraws briskly, BLE withdraws  GENERAL:  full vent support  EENT:  anicteric  CARDIOVASCULAR: (+) S1 S2, normal rate and regular rhythm  PULMONARY: clear to auscultation bilaterally  ABDOMEN: soft, nontender with normoactive bowel sounds  EXTREMITIES: no edema  SKIN: no rash    LABS:     (10.59)  7.5  (8.3)  8.71  )-----------( 228      ( 16 Sep 2024 02:12 )             26.6   (135)   135  |  104  |  10  ----------------------------<  115<H>  3.9   |  24  |  0.68    Ca    8.4      16 Sep 2024 02:12  Phos  3.5       Mg     2.0         TPro  6.4  /  Alb  3.6  /  TBili  0.3  /  DBili  x   /  AST  14  /  ALT  14  /  AlkPhos  90  09-16    09-15 @ 07:01   @ 07:00  --------------------------------------------------------  IN: 4687 mL / OUT: 5303 mL / NET: -616 mL     @ 07:01   @ 07:20  --------------------------------------------------------  IN: 570 mL / OUT: 0 mL / NET: 570 mL    Bacteriology:  CSF studies:  EEG:  Neuroimagin2024 09:46AM	CT BRAIN           	Diffuse SAH, mild midline shift, ventr dilation  2024 10:12AM	CT ANGIO NECK	No high-grade stenosis or occlusion  2024 10:12AM	CT ANGIO BRAIN	Mild right ICA stenosis, no large occlusion  2024 10:12AM	CT PERFUSION     	4ml core infarct, 4ml penumbra    Other imaging:    MEDICATIONS:     ·	enoxaparin Injectable 40 SubCutaneous every 12 hours  ·	bromocriptine Capsule 5 Oral every 8 hours  ·	levETIRAcetam 1000 Oral two times a day  ·	ascorbic acid 500 Oral <User Schedule>  ·	ferrous    sulfate Liquid 300 Oral <User Schedule>  ·	sodium chloride 2 Oral every 8 hours  ·	acetaminophen     Tablet .. 650 Oral every 6 hours PRN  ·	bisacodyl 5 Oral at bedtime PRN  ·	ondansetron Injectable 4 IV Push every 6 hours PRN  ·	oxyCODONE    IR 5 Oral every 4 hours PRN  ·	polyethylene glycol 3350 17 Oral two times a day PRN    IV FLUIDS: NS@60cc/hr  DRIPS:  ·	norepinephrine Infusion 0.05 MICROgram(s)/kG/Min IV Continuous <Continuous>  DIET: NPO  Lines:  Drains:      Wounds:    CODE STATUS:  Full Code                       GOALS OF CARE:  aggressive                      DISPOSITION:  ICU =================================  NEUROCRITICAL CARE ATTENDING NOTE  =================================    KARRIE MORALES   MRN-[id number]  Summary:  63y/F  with uncontrolled HTN, does not take any medications at home presented to [facility] after developing acute onset severe HA. Once arrived, patient had episode of nausea with emesis. CTH showed SAH, HH: 2, MF: 3. NIHSS: 0. CTA neg for aneurysm. Transferred to [facility] for further mgmt. Upon arrival to [facility], NIHSS noted to be 0. Patient reports a headache rated at 7/10 in severity. Of note, patient's daughter notes pt seems off and is sleepier than her baseline. Pt denies SOB, chest pain, vision changes, nausea, weakness/numbness/tingling, dizziness, photophobia.  (08 Sep 2024 13:29)    COURSE IN THE HOSPITAL:   s/p angiogram, carotid & cerebral, b/l (2024, Alfredo Lomas), no aneurysm seen; kept intubated post procedure, EVD inserted   Tmax 38.5 L UE drift overnight; increased SBP goals to 180-200    Past Medical History: No pertinent past medical history Hypertension  Allergies:  No Known Allergies  Home meds:     PHYSICAL EXAMINATION  T(C): 37.3 (24 @ 05:26), Max: 38.5 (09-15-24 @ 20:13) HR: 68 (24 @ 07:00) (68 - 88) ABP: 180/56 (24 @ 07:00) (170/47 - 205/65) RR: 16 (24 @ 08:00) (14 - 20) SpO2: 99% (24 @ 07:00) (96% - 100%)  NEUROLOGIC EXAMINATION:  Patient AOx3 FC BUE / LE 5/5 no drift, R facial (h/o Arreaga's)  GENERAL:  room air   EENT:  anicteric  CARDIOVASCULAR: (+) S1 S2, normal rate and regular rhythm  PULMONARY: clear to auscultation bilaterally  ABDOMEN: soft, nontender with normoactive bowel sounds  EXTREMITIES: no edema  SKIN: no rash    LABS:     (10.59)  7.5  (8.3)  8.71  )-----------( 228      ( 16 Sep 2024 02:12 )             26.6   (135)   135  |  104  |  10  ----------------------------<  115<H>  3.9   |  24  |  0.68    Ca    8.4      16 Sep 2024 02:12  Phos  3.5       Mg     2.0         TPro  6.4  /  Alb  3.6  /  TBili  0.3  /  DBili  x   /  AST  14  /  ALT  14  /  AlkPhos  90  09-16    09-15 @ 07:01  -   @ 07:00  --------------------------------------------------------  IN: 4687 mL / OUT: 5303 mL / NET: -616 mL     @ 07:01  -   @ 07:20  --------------------------------------------------------  IN: 570 mL / OUT: 0 mL / NET: 570 mL    Bacteriology:  CSF studies:  EEG:  Neuroimagin2024 09:46AM	CT BRAIN           	Diffuse SAH, mild midline shift, ventr dilation  2024 10:12AM	CT ANGIO NECK	No high-grade stenosis or occlusion  2024 10:12AM	CT ANGIO BRAIN	Mild right ICA stenosis, no large occlusion  2024 10:12AM	CT PERFUSION     	4ml core infarct, 4ml penumbra    Other imaging:    MEDICATIONS:     ·	enoxaparin Injectable 40 SubCutaneous every 12 hours  ·	bromocriptine Capsule 5 Oral every 8 hours  ·	levETIRAcetam 1000 Oral two times a day  ·	ascorbic acid 500 Oral <User Schedule>  ·	ferrous    sulfate Liquid 300 Oral <User Schedule>  ·	sodium chloride 2 Oral every 8 hours  ·	acetaminophen     Tablet .. 650 Oral every 6 hours PRN  ·	bisacodyl 5 Oral at bedtime PRN  ·	ondansetron Injectable 4 IV Push every 6 hours PRN  ·	oxyCODONE    IR 5 Oral every 4 hours PRN  ·	polyethylene glycol 3350 17 Oral two times a day PRN    IV FLUIDS: 3%@70  DRIPS:  ·	norepinephrine Infusion 0.05 MICROgram(s)/kG/Min IV Continuous <Continuous> 0.035 --> 0.02  DIET: NPO  Lines: L papito [place]  Drains:     EVD @ 5cm H20 ICPs <11 output 190/24h  Wounds:    CODE STATUS:  Full Code                       GOALS OF CARE:  aggressive                      DISPOSITION:  ICU

## 2024-09-16 NOTE — BRIEF OPERATIVE NOTE - OPERATION/FINDINGS
Patient was brought to neuroangiography suite, was placed on standard anesthesia monitors, sedated, and under MAC, using a 5 fr sheath right CFA, cerebral angiogram was performed.    Findings: No aneurysm, AVM, or early venous shunting. Mild right A 1 vasospasm  Full report to follow.  Patient tolerated procedure well, no new neurological deficit, hemodynamically stable.  Right groin/vasc access site: Closed with vascade and manual compression, hemostasis achieved, no hematoma.  Patient was transferred back to NSICU  Above d/w: Dr. Fuentes   Patient was brought to neuroangiography suite, was placed on standard anesthesia monitors, sedated, and under MAC, using a 5 fr sheath right CFA, cerebral angiogram was performed.    Findings: No aneurysm, AVM, or early venous shunting. Mild right A 1 vasospasm.  15 mg of verapamil given intra arterially via right ICA   Full report to follow.  Patient tolerated procedure well, no new neurological deficit, hemodynamically stable.  Right groin/vasc access site: Closed with vascade and manual compression, hemostasis achieved, no hematoma.  Patient was transferred back to NSICU  Above d/w: Dr. Fuentes

## 2024-09-16 NOTE — BRIEF OPERATIVE NOTE - NSICDXBRIEFPROCEDURE_GEN_ALL_CORE_FT
PROCEDURES:  Angiogram, carotid and cerebral, bilateral 08-Sep-2024 15:50:12  Matthew Brown  
PROCEDURES:  Angiogram, carotid and cerebral, bilateral 08-Sep-2024 15:50:12 Matthew Tatum  
PROCEDURES:  Angiogram, carotid and cerebral, bilateral 08-Sep-2024 15:50:12 Mtathew Tatum

## 2024-09-16 NOTE — PROGRESS NOTE ADULT - SUBJECTIVE AND OBJECTIVE BOX
HPI:  63F PMHx uncontrolled HTN, does not take any medications at home presented to [facility] after developing acute onset severe HA. Once arrived, patient had episode of nausea with emesis. CTH showed SAH, HH: 2, MF: 3. NIHSS: 0. CTA neg for aneurysm. Transferred to [facility] for further mgmt. Upon arrival to [facility], NIHSS noted to be 0. Patient reports a headache rated at 7/10 in severity. Of note, patient's daughter notes pt seems off and is sleepier than her baseline. Pt denies SOB, chest pain, vision changes, nausea, weakness/numbness/tingling, dizziness, photophobia.  (08 Sep 2024 13:29)    OVERNIGHT EVENTS: (+) LUE pronator drift, kept npo after midnight for possible angio     Hospital Course:   : Transfer to [facility] for further mgmt of SAH. Patient taken urgently to angio. ET tube pulled back 2cm. EVD placed, open @ 10. Stability scan stable, EVD in position, NGT placed. Started nimodipine 60q4. Extubated to face mask. POD 0 DSA neg for aneurysm.   : BD2. POD 1 angio. Started iron/vit C q other day for microcytic anermia. Started lisinopril 10mg daily. 1.L liter bolus for euvolemia. Ambriz removed, f/u TOV, started on prophylactic SQL 40mg BID (weight based). Passed TOV. Off cardene gtt. cardene gtt resumed. started hydral 25 q8, inc lisinopril dose from 10mg to 20mg daily.   9/10: BD3. POD 2 angio. BILL overnight. TTE showing EF 70%, mild symmetric LVH. DC cardene.   : BD 4. POD 3 angio. BILL overnight. SBP 160s, given hydralazine 10mg IVP x1. Given ducolax suppository. SBP liberalized 100-160. , given labetolol 5mg IVP. NS bolus 500cc for euvolemia. Lactulose for AM. PO hydral switched to clonidine.   : BD 5. POD 4 angio. BILL overnight. Anti-Xa within prophylactic range. , given labetolol 10mg IVP. 1L NS for euvolemia. lisinopril increased 40, norvasc 10 added. cardene started. change in exam: JAIME/LL 4/5 w/ drift, CTH/CTA performed showing severe spasm. febrile 101, pan cx sent. POD0 angio for spasm tx. Dc'd propofol. Extubated to face mask. SBP 190s, started on cardene gtt  : BD 6, POD 5 diagnostic angio, POD 1 angio for spasm tx. Changed NS to LR.   : BD 7, POD 6, POD 2 angio for spasm. Pt febrile ovn, f/u cultures. Start bromocriptine. Resume lisinopril. 1L bolus, f/u Na studies. Notified by primary RN of exam change: new dysarthria, L facial droop, LUE/LLLE withdrawing to noxious stim. Stroke code called, with high suspicion for vasospasm. Levo gtt started for SBP goal 180. CTA/CTP/CT performed - R sided spasm, no perfusion defect. Exam improved with higher BP, dysarthria and facial droop improving and L side 4/5. Discussed with Dr. Chao, Dr. Fuentes, and Dr. Butler - will keep SBP goal 160-200 and consider angiogram if not improving further.  9/15: BD 8, POD 7, POD 3 angio. Given 250 cc albumin bolus followed by 500 cc NS. Repeat BMP Na 134. S/p 1 L bolus for euvolemia. Full stregnth b/l, no drift; no repeat angio, regular diet. Given 250 albumin and 1 L NS for net negative volume.   : BD 9, POD 8, POD 4 angio. Remains npo after midnight.     Vital Signs Last 24 Hrs  T(C): 38.1 (15 Sep 2024 22:33), Max: 38.5 (15 Sep 2024 20:13)  T(F): 100.6 (15 Sep 2024 22:33), Max: 101.3 (15 Sep 2024 20:13)  HR: 84 (15 Sep 2024 22:00) (75 - 91)  BP: --  BP(mean): --  RR: 15 (15 Sep 2024 22:00) (15 - 20)  SpO2: 96% (15 Sep 2024 22:00) (96% - 100%)    Parameters below as of 15 Sep 2024 22:00  Patient On (Oxygen Delivery Method): room air        I&O's Summary    14 Sep 2024 07:01  -  15 Sep 2024 07:00  --------------------------------------------------------  IN: 2450.5 mL / OUT: 3500 mL / NET: -1049.5 mL    15 Sep 2024 07:01  -  15 Sep 2024 22:58  --------------------------------------------------------  IN: 2924.7 mL / OUT: 2723 mL / NET: 201.7 mL        PHYSICAL EXAM:  General: patient seen laying supine in bed in NAD on RA  Neuro: AAOx3, FC, OE spontaneously, speech clear and fluent, CNII-XI grossly intact, +R facial (chronic), LUE pronator drift, strength 5/5 b/l UE and RLE, 4/5 LLE, sensation grossly intact to light touch throughout  HEENT: PERRL, EOMI  Neck: supple  Cardiac: RRR, S1S2  Pulmonary: chest rise symmetric  Abdomen: soft, nontender, nondistended  Ext: perfusing well  Skin: warm, dry  Wound: EVD site c/d/i         LABS:                        8.3    10.59 )-----------( 246      ( 15 Sep 2024 05:30 )             28.7     09-15    135  |  103  |  11  ----------------------------<  124<H>  4.1   |  24  |  0.72    Ca    8.2<L>      15 Sep 2024 20:52  Phos  3.1     09-15  Mg     2.1     09-15    TPro  6.8  /  Alb  3.7  /  TBili  0.3  /  DBili  x   /  AST  15  /  ALT  14  /  AlkPhos  98  09-15      Urinalysis Basic - ( 15 Sep 2024 20:52 )    Color: x / Appearance: x / SG: x / pH: x  Gluc: 124 mg/dL / Ketone: x  / Bili: x / Urobili: x   Blood: x / Protein: x / Nitrite: x   Leuk Esterase: x / RBC: x / WBC x   Sq Epi: x / Non Sq Epi: x / Bacteria: x          CAPILLARY BLOOD GLUCOSE          Drug Levels: [] N/A    CSF Analysis: [] N/A      Allergies    No Known Allergies    Intolerances      MEDICATIONS:  Antibiotics:    Neuro:  acetaminophen     Tablet .. 650 milliGRAM(s) Oral every 6 hours PRN  bromocriptine Capsule 5 milliGRAM(s) Oral every 8 hours  levETIRAcetam 1000 milliGRAM(s) Oral two times a day  ondansetron Injectable 4 milliGRAM(s) IV Push every 6 hours PRN  oxyCODONE    IR 5 milliGRAM(s) Oral every 4 hours PRN    Anticoagulation:  enoxaparin Injectable 40 milliGRAM(s) SubCutaneous every 12 hours    OTHER:  bisacodyl 5 milliGRAM(s) Oral at bedtime PRN  chlorhexidine 2% Cloths 1 Application(s) Topical <User Schedule>  norepinephrine Infusion 0.05 MICROgram(s)/kG/Min IV Continuous <Continuous>  polyethylene glycol 3350 17 Gram(s) Oral two times a day PRN    IVF:  ascorbic acid 500 milliGRAM(s) Oral <User Schedule>  ferrous    sulfate Liquid 300 milliGRAM(s) Oral <User Schedule>  sodium chloride 3%. 500 milliLiter(s) IV Continuous <Continuous>    CULTURES:  Culture Results:   No growth ( @ 23:05)  Culture Results:   No growth at 48 Hours ( @ 16:54)    RADIOLOGY & ADDITIONAL TESTS:      ASSESSMENT:  63F PMHx uncontrolled HT, R samuels's palsy, was BIBEMS to [facility]  c/o sudden onset severe HA. CTH showed SAH HH: 2, MF: 3. NIHSS: 0. CTA neg for aneurysm. Transferred to [facility] for further mgmt. S/p DSA negative for anueyrsm (24). S/p R frontal EVD (24). S/p angiogram showing mild R LILLY/R MCA spasm with IA verapamil ().    HTN    No pertinent family history in first degree relatives    Handoff    No pertinent past medical history    Hypertension    SAH (subarachnoid hemorrhage)    Cerebral vasospasm    SAH (subarachnoid hemorrhage)    Cerebral vasospasm    SAH (subarachnoid hemorrhage)    Subarachnoid hemorrhage    Angiogram, carotid and cerebral, bilateral    Insertion of intravenous catheter with ultrasound guidance    No significant past surgical history    No significant past surgical history    H/O  section    HTN    Hypertension    [email or web address]        PLAN:  Neuro:  - Neuro/vitals q1hr  - Seizure prophylaxis: Keppra 1g BID   - R frontal EVD @ 58qmS5S, monitor output   - DCI prophylaxis: Nimodipime 60q4 (holding while on pressors)   - Pain control: Tylenol, oxy 5 prn  - stability CTH post-EVD complete , stable, CTH  stable   - CTA : b/l LILLY spasm, b/l MCA R>L  - MR brain and C spine w/wo contrast complete   - Bromocriptine 5mg q8h for central fever    Cards:   - -200, levo gtt  - TTE 9/10: EF 70%, mild symmetric LVH    Pulm:   - RA  - IS    GI:  - NPO since exam change, o/w reg diet  - Bowel regimen, LBM 9/15  - Nausea: Zofran 4q6 prn     Renal:  - Na goal 140- 145   - NaCl 3% @ 50cc/hr  - voiding   - euvolemia goal     Endo:  - A1c 5.8    Heme:   - DVT prophylaxis: SCDs to LLE only, ppx SQL 40 BID (weight based)   - Anti Xa : 0.31  LE dopplers : R posterior tibial DVT on admission ()- surveillace dopplers in 1 week 9/15  - Microcytic anemia: iron/vit C q other day     ID:   - pan cx , f/u cx    Dispo: NSICU, full code, PT/OT rec AR    Discussed with Dr. Butler and Dr. Morse      Assessment:  Present when checked

## 2024-09-16 NOTE — PROGRESS NOTE ADULT - SUBJECTIVE AND OBJECTIVE BOX
INTERVAL HISTORY: HPI:  63F PMHx uncontrolled HTN, does not take any medications at home presented to [facility] after developing acute onset severe HA. Once arrived, patient had episode of nausea with emesis. CTH showed SAH, HH: 2, MF: 3. NIHSS: 0. CTA neg for aneurysm. Transferred to [facility] for further mgmt. Upon arrival to [facility], NIHSS noted to be 0. Patient reports a headache rated at 7/10 in severity. Of note, patient's daughter notes pt seems off and is sleepier than her baseline. Pt denies SOB, chest pain, vision changes, nausea, weakness/numbness/tingling, dizziness, photophobia.  (08 Sep 2024 13:29)      MEDICATIONS  (STANDING):  ascorbic acid 500 milliGRAM(s) Oral <User Schedule>  bromocriptine Capsule 10 milliGRAM(s) Oral every 8 hours  chlorhexidine 2% Cloths 1 Application(s) Topical <User Schedule>  enoxaparin Injectable 40 milliGRAM(s) SubCutaneous every 12 hours  ferrous    sulfate Liquid 300 milliGRAM(s) Oral <User Schedule>  fludroCORTISONE 0.1 milliGRAM(s) Oral two times a day  levETIRAcetam 1000 milliGRAM(s) Oral two times a day  norepinephrine Infusion 0.05 MICROgram(s)/kG/Min (10.8 mL/Hr) IV Continuous <Continuous>  sodium chloride 3%. 500 milliLiter(s) (70 mL/Hr) IV Continuous <Continuous>    MEDICATIONS  (PRN):  acetaminophen     Tablet .. 650 milliGRAM(s) Oral every 6 hours PRN Temp greater or equal to 38C (100.4F), Mild Pain (1 - 3), Moderate Pain (4 - 6)  bisacodyl 5 milliGRAM(s) Oral at bedtime PRN Constipation  ondansetron Injectable 4 milliGRAM(s) IV Push every 6 hours PRN Nausea and/or Vomiting  oxyCODONE    IR 5 milliGRAM(s) Oral every 4 hours PRN Severe Pain (7 - 10)  polyethylene glycol 3350 17 Gram(s) Oral two times a day PRN Constipation      Drug Dosing Weight  Height (cm): 161.3 (16 Sep 2024 09:01)  Weight (kg): 115 (16 Sep 2024 09:01)  BMI (kg/m2): 44.2 (16 Sep 2024 09:01)  BSA (m2): 2.15 (16 Sep 2024 09:01)    PAST MEDICAL & SURGICAL HISTORY:  Hypertension  H/O  section    REVIEW OF SYSTEMS: [ ] Unable to Assess due to neurologic exam   [ ] All ROS addressed below are non-contributory, except:  Neuro: [ ] Headache [ ] Back pain [ ] Numbness [ ] Weakness [ ] Ataxia [ ] Dizziness [ ] Aphasia [ ] Dysarthria [ ] Visual disturbance  Resp: [ ] Shortness of breath/dyspnea, [ ] Orthopnea [ ] Cough  CV: [ ] Chest pain [ ] Palpitation [ ] Lightheadedness [ ] Syncope  Renal: [ ] Thirst [ ] Edema  GI: [ ] Nausea [ ] Emesis [ ] Abdominal pain [ ] Constipation [ ] Diarrhea  Hem: [ ] Hematemesis [ ] bright red blood per rectum  ID: [ ] Fever [ ] Chills [ ] Dysuria  ENT: [ ] Rhinorrhea    PHYSICAL EXAM:    General: No Acute Distress     Neurological: Awake, alert oriented to person, place and time, Following Commands, PERRL, EOMI, Face Symmetrical, Speech Fluent, Moving all extremities, Muscle Strength normal in all four extremities, No Drift, Sensation to Light Touch Intact    Pulmonary: Clear to Auscultation, No Rales, No Rhonchi, No Wheezes     Cardiovascular: S1, S2, Regular Rate and Rhythm     Gastrointestinal: Soft, Nontender, Nondistended     Extremities: No calf tenderness     Incision: EVD in place @ 10cm    ICU Vital Signs Last 24 Hrs  T(C): 37.6 (16 Sep 2024 17:53), Max: 38.1 (15 Sep 2024 22:33)  T(F): 99.7 (16 Sep 2024 17:53), Max: 100.6 (15 Sep 2024 22:33)  HR: 61 (16 Sep 2024 22:00) (58 - 88)  BP: 143/63 (16 Sep 2024 09:01) (143/63 - 143/63)  BP(mean): 90 (16 Sep 2024 09:01) (90 - 90)  ABP: 170/53 (16 Sep 2024 22:00) (154/51 - 212/68)  ABP(mean): 92 (16 Sep 2024 22:00) (86 - 125)  RR: 14 (16 Sep 2024 22:00) (14 - 18)  SpO2: 98% (16 Sep 2024 22:00) (97% - 100%)    O2 Parameters below as of 16 Sep 2024 22:00  Patient On (Oxygen Delivery Method): room air    I&O's Detail    15 Sep 2024 07:01  -  16 Sep 2024 07:00  --------------------------------------------------------  IN:    IV PiggyBack: 250 mL    Norepinephrine: 257 mL    Oral Fluid: 400 mL    Sodium Chloride 0.9% Bolus: 2500 mL    sodium chloride 3%: 30 mL    sodium chloride 3%: 900 mL    sodium chloride 3%: 350 mL  Total IN: 4687 mL    OUT:    External Ventricular Device (mL): 203 mL    Voided (mL): 5900 mL  Total OUT: 6103 mL    Total NET: -1416 mL      16 Sep 2024 07:01  -  16 Sep 2024 22:29  --------------------------------------------------------  IN:    Norepinephrine: 14.3 mL    Oral Fluid: 740 mL    Sodium Chloride 0.9% Bolus: 1500 mL    sodium chloride 3%: 1050 mL  Total IN: 3304.3 mL    OUT:    External Ventricular Device (mL): 144 mL    Voided (mL): 1900 mL  Total OUT: 2044 mL    Total NET: 1260.3 mL      LABS:  CBC Full  -  ( 16 Sep 2024 10:20 )  WBC Count : 6.85 K/uL  RBC Count : 3.89 M/uL  Hemoglobin : 8.3 g/dL  Hematocrit : 28.8 %  Platelet Count - Automated : 222 K/uL  Mean Cell Volume : 74.0 fl  Mean Cell Hemoglobin : 21.3 pg  Mean Cell Hemoglobin Concentration : 28.8 gm/dL  Auto Neutrophil # : 4.10 K/uL  Auto Lymphocyte # : 1.72 K/uL  Auto Monocyte # : 0.91 K/uL  Auto Eosinophil # : 0.05 K/uL  Auto Basophil # : 0.05 K/uL  Auto Neutrophil % : 59.9 %  Auto Lymphocyte % : 25.1 %  Auto Monocyte % : 13.3 %  Auto Eosinophil % : 0.7 %  Auto Basophil % : 0.7 %        137  |  105  |  9   ----------------------------<  131<H>  4.3   |  26  |  0.64    Ca    8.5      16 Sep 2024 17:31  Phos  3.5       Mg     2.0         TPro  6.4  /  Alb  3.6  /  TBili  0.3  /  DBili  x   /  AST  14  /  ALT  14  /  AlkPhos  90      PTT - ( 16 Sep 2024 10:20 )  PTT:30.8 sec  Urinalysis Basic - ( 16 Sep 2024 17:31 )    Color: x / Appearance: x / SG: x / pH: x  Gluc: 131 mg/dL / Ketone: x  / Bili: x / Urobili: x   Blood: x / Protein: x / Nitrite: x   Leuk Esterase: x / RBC: x / WBC x   Sq Epi: x / Non Sq Epi: x / Bacteria: x        RADIOLOGY & ADDITIONAL STUDIES:

## 2024-09-16 NOTE — PROGRESS NOTE ADULT - ASSESSMENT
63y/F with  subarachnoid hemorrhage HH2 MF3, brain compression, cerebral edema s/p angiogram, carotid & cerebral, b/l (09/08/2024, Alfredo Lomas)  Hypertension    PLAN:   NEURO: neurochecks q1h, PRN pain meds with acetaminophen; fent PRN; hold propofol  nimodipine 60mg PO q6h, TCDs on Day 4  repeat DSA on D15  seizure prophylaxis:  levetiracetam 1000mg IV BID, as per neurosurgery   EVD @10cm H20 open to drain  MRI brain WWO   REHAB:  physical therapy evaluation and management    EARLY MOB:      PULM:  wean to extubate  CARDIO:  SBP goal 100-140mm Hg  ENDO:  Blood sugar goals 140-180 mg/dL, continue insulin sliding scale  GI:  PPI for GI prophylaxis  DIET: NPO for now  RENAL:  IVF  HEM/ONC: Hb stable  VTE Prophylaxis: SCDs, no DVT chemoprophylaxis for now as patient is high risk for bleed (fresh bleed)  ID: afebrile, no leukocytosis  Social:  at bedside - updated    Active issues:  What's keeping patient in the ICU? intubated, EVD  What is this patient's dispo plan?    ATTENDING ATTESTATION:  I was physically present for the key portions of the evaluation and management (E/M) service provided.  I agree with the above history, physical and plan, which I have reviewed and edited where appropriate.    Patient at high risk for neurological deterioration or death due to:  ICU delirium, aspiration PNA, DVT / PE.  Critical care time:  I have personally provided 45 minutes of critical care time, excluding time spent on separate procedures.      Plan discussed with RN, house staff. 63y/F with  subarachnoid hemorrhage HH2 MF3, brain compression, cerebral edema s/p angiogram, carotid & cerebral, b/l (09/08/2024, Alfredo Lomas)  Hypertension    PLAN:   NEURO: neurochecks q1h, PRN pain meds with acetaminophen  no nimodipine   EVD@5cm H20   continue bromocriptine - increase to 10mg q8h   seizure prophylaxis:  levetiracetam 1000mg POBID, as per neurosurgery   angio today   REHAB:  physical therapy evaluation and management    EARLY MOB:      PULM:  room air  CARDIO:  SBP goal 180-200 mm Hg; EF   ENDO:  Blood sugar goals 140-180 mg/dL  GI:  bowel regimen   DIET: NPO for now  RENAL:  increase salt to 3 q6h, 3%@70cc/hr, start fludrocortisone  HEM/ONC: Hb stable  VTE Prophylaxis: SCDs, SQL 40 BID   ID: afebrile, no leukocytosis  Social:  at bedside - updated    Active issues:  What's keeping patient in the ICU? intubated, EVD  What is this patient's dispo plan?    ATTENDING ATTESTATION:  I was physically present for the key portions of the evaluation and management (E/M) service provided.  I agree with the above history, physical and plan, which I have reviewed and edited where appropriate.    Patient at high risk for neurological deterioration or death due to:  ICU delirium, aspiration PNA, DVT / PE.  Critical care time:  I have personally provided 45 minutes of critical care time, excluding time spent on separate procedures.      Plan discussed with RN, house staff.

## 2024-09-16 NOTE — BRIEF OPERATIVE NOTE - FIRST ASSIST CATEGORY
No Resident Program within this Specialty at this Location
No Resident Program within this Specialty at this Location
No qualified resident/fellow available to assist

## 2024-09-16 NOTE — BRIEF OPERATIVE NOTE - FIRST ASSIST PARTICIPATION DETAILS - (COMPONENTS OF THE PROCEDURE THAT ASSISTANT PARTICIPATED IN)
I acted within this role throughout the entirety of the procedure performed by the primary surgeon

## 2024-09-16 NOTE — BRIEF OPERATIVE NOTE - NSICDXBRIEFPOSTOP_GEN_ALL_CORE_FT
POST-OP DIAGNOSIS:  SAH (subarachnoid hemorrhage) 08-Sep-2024 15:50:26  Matthew Brown  Cerebral vasospasm 12-Sep-2024 19:32:26 Mild Right LILLY and MCA vasospasm Edward Matamoros  
POST-OP DIAGNOSIS:  SAH (subarachnoid hemorrhage) 08-Sep-2024 15:50:26  Matthew Brown  
POST-OP DIAGNOSIS:  SAH (subarachnoid hemorrhage) 08-Sep-2024 15:50:26  Matthew Brown  Cerebral vasospasm 12-Sep-2024 19:32:26 Mild Right LILLY and MCA vasospasm Edward Matamoros

## 2024-09-17 LAB
ANION GAP SERPL CALC-SCNC: 5 MMOL/L — SIGNIFICANT CHANGE UP (ref 5–17)
ANION GAP SERPL CALC-SCNC: 5 MMOL/L — SIGNIFICANT CHANGE UP (ref 5–17)
ANION GAP SERPL CALC-SCNC: 8 MMOL/L — SIGNIFICANT CHANGE UP (ref 5–17)
BASOPHILS # BLD AUTO: 0.03 K/UL — SIGNIFICANT CHANGE UP (ref 0–0.2)
BASOPHILS NFR BLD AUTO: 0.4 % — SIGNIFICANT CHANGE UP (ref 0–2)
BUN SERPL-MCNC: 10 MG/DL — SIGNIFICANT CHANGE UP (ref 7–23)
BUN SERPL-MCNC: 11 MG/DL — SIGNIFICANT CHANGE UP (ref 7–23)
BUN SERPL-MCNC: 11 MG/DL — SIGNIFICANT CHANGE UP (ref 7–23)
CALCIUM SERPL-MCNC: 8.1 MG/DL — LOW (ref 8.4–10.5)
CALCIUM SERPL-MCNC: 8.3 MG/DL — LOW (ref 8.4–10.5)
CALCIUM SERPL-MCNC: 8.7 MG/DL — SIGNIFICANT CHANGE UP (ref 8.4–10.5)
CHLORIDE SERPL-SCNC: 106 MMOL/L — SIGNIFICANT CHANGE UP (ref 96–108)
CHLORIDE SERPL-SCNC: 110 MMOL/L — HIGH (ref 96–108)
CHLORIDE SERPL-SCNC: 112 MMOL/L — HIGH (ref 96–108)
CO2 SERPL-SCNC: 24 MMOL/L — SIGNIFICANT CHANGE UP (ref 22–31)
CO2 SERPL-SCNC: 25 MMOL/L — SIGNIFICANT CHANGE UP (ref 22–31)
CO2 SERPL-SCNC: 25 MMOL/L — SIGNIFICANT CHANGE UP (ref 22–31)
CREAT SERPL-MCNC: 0.63 MG/DL — SIGNIFICANT CHANGE UP (ref 0.5–1.3)
CREAT SERPL-MCNC: 0.66 MG/DL — SIGNIFICANT CHANGE UP (ref 0.5–1.3)
CREAT SERPL-MCNC: 0.67 MG/DL — SIGNIFICANT CHANGE UP (ref 0.5–1.3)
EGFR: 97 ML/MIN/1.73M2 — SIGNIFICANT CHANGE UP
EGFR: 97 ML/MIN/1.73M2 — SIGNIFICANT CHANGE UP
EGFR: 98 ML/MIN/1.73M2 — SIGNIFICANT CHANGE UP
EOSINOPHIL # BLD AUTO: 0.02 K/UL — SIGNIFICANT CHANGE UP (ref 0–0.5)
EOSINOPHIL NFR BLD AUTO: 0.3 % — SIGNIFICANT CHANGE UP (ref 0–6)
GLUCOSE SERPL-MCNC: 107 MG/DL — HIGH (ref 70–99)
GLUCOSE SERPL-MCNC: 117 MG/DL — HIGH (ref 70–99)
GLUCOSE SERPL-MCNC: 124 MG/DL — HIGH (ref 70–99)
HCT VFR BLD CALC: 26.5 % — LOW (ref 34.5–45)
HCT VFR BLD CALC: 27.8 % — LOW (ref 34.5–45)
HGB BLD-MCNC: 7.5 G/DL — LOW (ref 11.5–15.5)
HGB BLD-MCNC: 8 G/DL — LOW (ref 11.5–15.5)
IMM GRANULOCYTES NFR BLD AUTO: 0.3 % — SIGNIFICANT CHANGE UP (ref 0–0.9)
LYMPHOCYTES # BLD AUTO: 1.24 K/UL — SIGNIFICANT CHANGE UP (ref 1–3.3)
LYMPHOCYTES # BLD AUTO: 16.8 % — SIGNIFICANT CHANGE UP (ref 13–44)
MAGNESIUM SERPL-MCNC: 2 MG/DL — SIGNIFICANT CHANGE UP (ref 1.6–2.6)
MCHC RBC-ENTMCNC: 20.7 PG — LOW (ref 27–34)
MCHC RBC-ENTMCNC: 21.4 PG — LOW (ref 27–34)
MCHC RBC-ENTMCNC: 28.3 GM/DL — LOW (ref 32–36)
MCHC RBC-ENTMCNC: 28.8 GM/DL — LOW (ref 32–36)
MCV RBC AUTO: 73.2 FL — LOW (ref 80–100)
MCV RBC AUTO: 74.3 FL — LOW (ref 80–100)
MONOCYTES # BLD AUTO: 1.02 K/UL — HIGH (ref 0–0.9)
MONOCYTES NFR BLD AUTO: 13.9 % — SIGNIFICANT CHANGE UP (ref 2–14)
NEUTROPHILS # BLD AUTO: 5.03 K/UL — SIGNIFICANT CHANGE UP (ref 1.8–7.4)
NEUTROPHILS NFR BLD AUTO: 68.3 % — SIGNIFICANT CHANGE UP (ref 43–77)
NRBC # BLD: 0 /100 WBCS — SIGNIFICANT CHANGE UP (ref 0–0)
NRBC # BLD: 0 /100 WBCS — SIGNIFICANT CHANGE UP (ref 0–0)
PHOSPHATE SERPL-MCNC: 3.5 MG/DL — SIGNIFICANT CHANGE UP (ref 2.5–4.5)
PLATELET # BLD AUTO: 223 K/UL — SIGNIFICANT CHANGE UP (ref 150–400)
PLATELET # BLD AUTO: 223 K/UL — SIGNIFICANT CHANGE UP (ref 150–400)
POTASSIUM SERPL-MCNC: 4 MMOL/L — SIGNIFICANT CHANGE UP (ref 3.5–5.3)
POTASSIUM SERPL-MCNC: 4.1 MMOL/L — SIGNIFICANT CHANGE UP (ref 3.5–5.3)
POTASSIUM SERPL-MCNC: 4.2 MMOL/L — SIGNIFICANT CHANGE UP (ref 3.5–5.3)
POTASSIUM SERPL-SCNC: 4 MMOL/L — SIGNIFICANT CHANGE UP (ref 3.5–5.3)
POTASSIUM SERPL-SCNC: 4.1 MMOL/L — SIGNIFICANT CHANGE UP (ref 3.5–5.3)
POTASSIUM SERPL-SCNC: 4.2 MMOL/L — SIGNIFICANT CHANGE UP (ref 3.5–5.3)
PROCALCITONIN SERPL-MCNC: 0.04 NG/ML — SIGNIFICANT CHANGE UP (ref 0.02–0.1)
RBC # BLD: 3.62 M/UL — LOW (ref 3.8–5.2)
RBC # BLD: 3.74 M/UL — LOW (ref 3.8–5.2)
RBC # FLD: 18.3 % — HIGH (ref 10.3–14.5)
RBC # FLD: 18.8 % — HIGH (ref 10.3–14.5)
SODIUM SERPL-SCNC: 139 MMOL/L — SIGNIFICANT CHANGE UP (ref 135–145)
SODIUM SERPL-SCNC: 140 MMOL/L — SIGNIFICANT CHANGE UP (ref 135–145)
SODIUM SERPL-SCNC: 141 MMOL/L — SIGNIFICANT CHANGE UP (ref 135–145)
WBC # BLD: 7.36 K/UL — SIGNIFICANT CHANGE UP (ref 3.8–10.5)
WBC # BLD: 8.02 K/UL — SIGNIFICANT CHANGE UP (ref 3.8–10.5)
WBC # FLD AUTO: 7.36 K/UL — SIGNIFICANT CHANGE UP (ref 3.8–10.5)
WBC # FLD AUTO: 8.02 K/UL — SIGNIFICANT CHANGE UP (ref 3.8–10.5)

## 2024-09-17 PROCEDURE — 99024 POSTOP FOLLOW-UP VISIT: CPT

## 2024-09-17 PROCEDURE — 93970 EXTREMITY STUDY: CPT | Mod: 26

## 2024-09-17 PROCEDURE — 99291 CRITICAL CARE FIRST HOUR: CPT

## 2024-09-17 RX ORDER — INFLUENZA VIRUS VACCINE 15; 15; 15; 15 UG/.5ML; UG/.5ML; UG/.5ML; UG/.5ML
0.5 SUSPENSION INTRAMUSCULAR ONCE
Refills: 0 | Status: DISCONTINUED | OUTPATIENT
Start: 2024-09-17 | End: 2024-09-27

## 2024-09-17 RX ORDER — SODIUM CHLORIDE 0.9 % (FLUSH) 0.9 %
500 SYRINGE (ML) INJECTION ONCE
Refills: 0 | Status: COMPLETED | OUTPATIENT
Start: 2024-09-17 | End: 2024-09-17

## 2024-09-17 RX ORDER — SODIUM CHLORIDE 5 G/100ML
500 INJECTION, SOLUTION INTRAVENOUS
Refills: 0 | Status: DISCONTINUED | OUTPATIENT
Start: 2024-09-17 | End: 2024-09-19

## 2024-09-17 RX ADMIN — BROMOCRIPTINE MESYLATE 10 MILLIGRAM(S): 5 CAPSULE ORAL at 14:04

## 2024-09-17 RX ADMIN — ENOXAPARIN SODIUM 40 MILLIGRAM(S): 150 INJECTION SUBCUTANEOUS at 09:53

## 2024-09-17 RX ADMIN — BROMOCRIPTINE MESYLATE 10 MILLIGRAM(S): 5 CAPSULE ORAL at 21:27

## 2024-09-17 RX ADMIN — SODIUM CHLORIDE 50 MILLILITER(S): 5 INJECTION, SOLUTION INTRAVENOUS at 21:28

## 2024-09-17 RX ADMIN — CHLORHEXIDINE GLUCONATE ORAL RINSE 1 APPLICATION(S): 1.2 SOLUTION DENTAL at 05:02

## 2024-09-17 RX ADMIN — Medication 650 MILLIGRAM(S): at 15:24

## 2024-09-17 RX ADMIN — Medication 650 MILLIGRAM(S): at 22:33

## 2024-09-17 RX ADMIN — Medication 650 MILLIGRAM(S): at 21:27

## 2024-09-17 RX ADMIN — Medication 650 MILLIGRAM(S): at 14:24

## 2024-09-17 RX ADMIN — ENOXAPARIN SODIUM 40 MILLIGRAM(S): 150 INJECTION SUBCUTANEOUS at 21:27

## 2024-09-17 RX ADMIN — Medication 500 MILLILITER(S): at 17:07

## 2024-09-17 RX ADMIN — FLUDROCORTISONE ACETATE 0.1 MILLIGRAM(S): 0.1 TABLET ORAL at 17:39

## 2024-09-17 RX ADMIN — Medication 1000 MILLIGRAM(S): at 17:39

## 2024-09-17 RX ADMIN — FLUDROCORTISONE ACETATE 0.1 MILLIGRAM(S): 0.1 TABLET ORAL at 05:02

## 2024-09-17 RX ADMIN — BROMOCRIPTINE MESYLATE 10 MILLIGRAM(S): 5 CAPSULE ORAL at 05:02

## 2024-09-17 RX ADMIN — Medication 1000 MILLIGRAM(S): at 05:02

## 2024-09-17 NOTE — PROGRESS NOTE ADULT - SUBJECTIVE AND OBJECTIVE BOX
=================================  NEUROCRITICAL CARE ATTENDING NOTE  =================================    KARRIE MORALES   MRN-[id number]  Summary:  63y/F  with uncontrolled HTN, does not take any medications at home presented to [facility] after developing acute onset severe HA. Once arrived, patient had episode of nausea with emesis. CTH showed SAH, HH: 2, MF: 3. NIHSS: 0. CTA neg for aneurysm. Transferred to [facility] for further mgmt. Upon arrival to [facility], NIHSS noted to be 0. Patient reports a headache rated at 7/10 in severity. Of note, patient's daughter notes pt seems off and is sleepier than her baseline. Pt denies SOB, chest pain, vision changes, nausea, weakness/numbness/tingling, dizziness, photophobia.  (08 Sep 2024 13:29)    COURSE IN THE HOSPITAL:   s/p angiogram, carotid & cerebral, b/l (2024, Alfredo Lomas), no aneurysm seen; kept intubated post procedure, EVD inserted   Tmax 38.5 L UE drift overnight; increased SBP goals to 180-200       Past Medical History: No pertinent past medical history Hypertension  Allergies:  No Known Allergies  Home meds:     PHYSICAL EXAMINATION  T(C): 37.6 (24 @ 04:33), Max: 37.7 (24 @ 22:34) HR: 71 (24 @ 07:00) (58 - 88) BP: 143/63 (24 @ 09:01) (143/63 - 143/63) ABP: 167/58 (24 @ 07:00) (154/51 - 212/68) RR: 14 (24 @ 07:00) (14 - 19)SpO2: 98% (24 @ 07:00) (96% - 100%)  NEUROLOGIC EXAMINATION:  Patient AOx3 FC BUE / LE 5/5 no drift, R facial (h/o Arreaga's)  GENERAL:  room air   EENT:  anicteric  CARDIOVASCULAR: (+) S1 S2, normal rate and regular rhythm  PULMONARY: clear to auscultation bilaterally  ABDOMEN: soft, nontender with normoactive bowel sounds  EXTREMITIES: no edema  SKIN: no rash    LABS:                         8.3  (8.1)  6.85  )-----------( 222      ( 16 Sep 2024 10:20 )             28.8     141  |  112<H>  |  10  ----------------------------<  107<H>  4.2   |  24  |  0.66    Ca    8.1<L>      16 Sep 2024 23:49  Phos  3.5       Mg     2.0         TPro  6.4  /  Alb  3.6  /  TBili  0.3  /  DBili  x   /  AST  14  /  ALT  14  /  AlkPhos  90   @ 07:01   @ 07:00  --------------------------------------------------------  IN: 4106.6 mL / OUT: 3028 mL / NET: 1078.6 mL     @ 07:01   @ 07:27  --------------------------------------------------------  IN: 50 mL / OUT: 0 mL / NET: 50 mL    Bacteriology:  CSF studies:  EEG:  [email or web address] 09:46AM	CT BRAIN           	Diffuse SAH, mild midline shift, ventr dilation  2024 10:12AM	CT ANGIO NECK	No high-grade stenosis or occlusion  2024 10:12AM	CT ANGIO BRAIN	Mild right ICA stenosis, no large occlusion  2024 10:12AM	CT PERFUSION     	4ml core infarct, 4ml penumbra    Other imaging:    MEDICATIONS:     ·	enoxaparin Injectable 40 SubCutaneous every 12 hours  ·	bromocriptine Capsule 10 Oral every 8 hours  ·	levETIRAcetam 1000 Oral two times a day  ·	fludroCORTISONE 0.1 Oral two times a day  ·	ascorbic acid 500 Oral <User Schedule>  ·	ferrous    sulfate Liquid 300 Oral <User Schedule>  ·	sodium chloride 3%. 500 IV Continuous <Continuous>  ·	acetaminophen     Tablet .. 650 Oral every 6 hours PRN  ·	bisacodyl 5 Oral at bedtime PRN  ·	ondansetron Injectable 4 IV Push every 6 hours PRN  ·	oxyCODONE    IR 5 Oral every 4 hours PRN  ·	polyethylene glycol 3350 17 Oral two times a day PRN    IV FLUIDS: 3%@70  DRIPS:  ·	norepinephrine Infusion 0.05 MICROgram(s)/kG/Min IV Continuous <Continuous> 0.035 --> 0.02  DIET: NPO  Lines: L brachial [place]  Drains:     EVD @ 5cm H20 ICPs <11 output 190/24h  Wounds:    CODE STATUS:  Full Code                       GOALS OF CARE:  aggressive                      DISPOSITION:  ICU =================================  NEUROCRITICAL CARE ATTENDING NOTE  =================================    KARRIE MORALES   MRN-[id number]  Summary:  63y/F  with uncontrolled HTN, does not take any medications at home presented to [facility] after developing acute onset severe HA. Once arrived, patient had episode of nausea with emesis. CTH showed SAH, HH: 2, MF: 3. NIHSS: 0. CTA neg for aneurysm. Transferred to [facility] for further mgmt. Upon arrival to [facility], NIHSS noted to be 0. Patient reports a headache rated at 7/10 in severity. Of note, patient's daughter notes pt seems off and is sleepier than her baseline. Pt denies SOB, chest pain, vision changes, nausea, weakness/numbness/tingling, dizziness, photophobia.  (08 Sep 2024 13:29)    COURSE IN THE HOSPITAL:   s/p angiogram, carotid & cerebral, b/l (2024, Alfredo Lomas), no aneurysm seen; kept intubated post procedure, EVD inserted   Tmax 38.5 L UE drift overnight; increased SBP goals to 180-200; angio mild VSP   No significant events overnight.     Past Medical History: No pertinent past medical history Hypertension  Allergies:  No Known Allergies  Home meds:     PHYSICAL EXAMINATION  T(C): 37.6 (24 @ 04:33), Max: 37.7 (24 @ 22:34) HR: 71 (24 @ 07:00) (58 - 88) BP: 143/63 (24 @ 09:01) (143/63 - 143/63) ABP: 167/58 (24 @ 07:00) (154/51 - 212/68) RR: 14 (24 @ 07:00) (14 - 19)SpO2: 98% (24 @ 07:00) (96% - 100%)  NEUROLOGIC EXAMINATION:  Patient AOx3 FC BUE / LE 5/5 no drift, R facial (h/o Arreaga's)  GENERAL:  room air   EENT:  anicteric  CARDIOVASCULAR: (+) S1 S2, normal rate and regular rhythm  PULMONARY: clear to auscultation bilaterally  ABDOMEN: soft, nontender with normoactive bowel sounds  EXTREMITIES: no edema  SKIN: no rash    LABS:                         8.3  (8.1)  6.85  )-----------( 222      ( 16 Sep 2024 10:20 )             28.8     141  |  112<H>  |  10  ----------------------------<  107<H>  4.2   |  24  |  0.66    Ca    8.1<L>      16 Sep 2024 23:49  Phos  3.5       Mg     2.0         TPro  6.4  /  Alb  3.6  /  TBili  0.3  /  DBili  x   /  AST  14  /  ALT  14  /  AlkPhos  90   @ 07: @ 07:00  --------------------------------------------------------  IN: 4106.6 mL / OUT: 3028 mL / NET: 1078.6 mL     @ 07:01   @ 07:27  --------------------------------------------------------  IN: 50 mL / OUT: 0 mL / NET: 50 mL    Bacteriology:  CSF studies:  EEG:  [email or web address] 09:46AM	CT BRAIN           	Diffuse SAH, mild midline shift, ventr dilation  2024 10:12AM	CT ANGIO NECK	No high-grade stenosis or occlusion  2024 10:12AM	CT ANGIO BRAIN	Mild right ICA stenosis, no large occlusion  2024 10:12AM	CT PERFUSION     	4ml core infarct, 4ml penumbra    Other imaging:    MEDICATIONS:     ·	enoxaparin Injectable 40 SubCutaneous every 12 hours  ·	bromocriptine Capsule 10 Oral every 8 hours  ·	levETIRAcetam 1000 Oral two times a day  ·	fludroCORTISONE 0.1 Oral two times a day  ·	ascorbic acid 500 Oral <User Schedule>  ·	ferrous    sulfate Liquid 300 Oral <User Schedule>  ·	acetaminophen     Tablet .. 650 Oral every 6 hours PRN  ·	bisacodyl 5 Oral at bedtime PRN  ·	ondansetron Injectable 4 IV Push every 6 hours PRN  ·	oxyCODONE    IR 5 Oral every 4 hours PRN  ·	polyethylene glycol 3350 17 Oral two times a day PRN    IV FLUIDS: 3%@50  DRIPS:  ·	norepinephrine Infusion 0.05 MICROgram(s)/kG/Min IV Continuous <Continuous> OFF  DIET: regular diet   Lines: KORINA Ramos  Drains:     EVD @5cm H20 ICPs <11 output 190/24h   EVD @5cm H20 ICPs <11 output 216 /24h  Wounds:    CODE STATUS:  Full Code                       GOALS OF CARE:  aggressive                      DISPOSITION:  ICU

## 2024-09-17 NOTE — PROGRESS NOTE ADULT - ASSESSMENT
63y/F with  subarachnoid hemorrhage HH2 MF3, brain compression, cerebral edema s/p angiogram, carotid & cerebral, b/l (09/08/2024, Alfredo Lomas)  Hypertension    PLAN:   NEURO: neurochecks q1h, PRN pain meds with acetaminophen  no nimodipine   EVD@5cm H20   continue bromocriptine - increase to 10mg q8h   seizure prophylaxis:  levetiracetam 1000mg POBID, as per neurosurgery   angio today   REHAB:  physical therapy evaluation and management    EARLY MOB:      PULM:  room air  CARDIO:  SBP goal 180-200 mm Hg; EF   ENDO:  Blood sugar goals 140-180 mg/dL  GI:  bowel regimen   DIET: NPO for now  RENAL:  increase salt to 3 q6h, 3%@70cc/hr, start fludrocortisone  HEM/ONC: Hb stable  VTE Prophylaxis: SCDs, SQL 40 BID   ID: afebrile, no leukocytosis  Social:  at bedside - updated    Active issues:  What's keeping patient in the ICU? intubated, EVD  What is this patient's dispo plan?    ATTENDING ATTESTATION:  I was physically present for the key portions of the evaluation and management (E/M) service provided.  I agree with the above history, physical and plan, which I have reviewed and edited where appropriate.    Patient at high risk for neurological deterioration or death due to:  ICU delirium, aspiration PNA, DVT / PE.  Critical care time:  I have personally provided 45 minutes of critical care time, excluding time spent on separate procedures.      Plan discussed with RN, house staff. 63y/F with  subarachnoid hemorrhage HH2 MF3, brain compression, cerebral edema s/p angiogram, carotid & cerebral, b/l (09/08/2024, Alfredo Lomas)  Hypertension    PLAN:   NEURO: neurochecks q1h, PRN pain meds with acetaminophen  no nimodipine   EVD@5cm H20 - once -200  continue bromocriptine - cont to 10mg q8h   seizure prophylaxis:  levetiracetam 1000mg PO BID, as per neurosurgery   angio today   REHAB:  physical therapy evaluation and management    EARLY MOB:      PULM:  room air  CARDIO:  SBP goal 150-200 mm Hg; EF   ENDO:  Blood sugar goals 140-180 mg/dL  GI:  bowel regimen   DIET: regular diet  RENAL:  cont salt tabs 3%@50cc/hr, cont fludrocortisone  HEM/ONC: Hb stable, continue iron / Vit C  VTE Prophylaxis: SCDs L, SQL 40 BID; R LE DVT - below knee - surveillance doppler today   ID: afebrile, no leukocytosis  Social: will update family     Active issues:  What's keeping patient in the ICU? EVD, vasospasm   What is this patient's dispo plan?    ATTENDING ATTESTATION:  I was physically present for the key portions of the evaluation and management (E/M) service provided.  I agree with the above history, physical and plan, which I have reviewed and edited where appropriate.    Patient at high risk for neurological deterioration or death due to:  ICU delirium, aspiration PNA, DVT / PE.  Critical care time:  I have personally provided 45 minutes of critical care time, excluding time spent on separate procedures.      Plan discussed with RN, house staff.

## 2024-09-17 NOTE — PROGRESS NOTE ADULT - SUBJECTIVE AND OBJECTIVE BOX
INTERVAL HISTORY: HPI:  63F PMHx uncontrolled HTN, does not take any medications at home presented to [facility] after developing acute onset severe HA. Once arrived, patient had episode of nausea with emesis. CTH showed SAH, HH: 2, MF: 3. NIHSS: 0. CTA neg for aneurysm. Transferred to [facility] for further mgmt. Upon arrival to [facility], NIHSS noted to be 0. Patient reports a headache rated at 7/10 in severity. Of note, patient's daughter notes pt seems off and is sleepier than her baseline. Pt denies SOB, chest pain, vision changes, nausea, weakness/numbness/tingling, dizziness, photophobia.  (08 Sep 2024 13:29)    Drug Dosing Weight  Height (cm): 161.3 (16 Sep 2024 09:01)  Weight (kg): 115 (16 Sep 2024 09:01)  BMI (kg/m2): 44.2 (16 Sep 2024 09:01)  BSA (m2): 2.15 (16 Sep 2024 09:01)    PAST MEDICAL & SURGICAL HISTORY:  Hypertension  H/O  section    REVIEW OF SYSTEMS: [ ] Unable to Assess due to neurologic exam   [ ] All ROS addressed below are non-contributory, except:  Neuro: [ ] Headache [ ] Back pain [ ] Numbness [ ] Weakness [ ] Ataxia [ ] Dizziness [ ] Aphasia [ ] Dysarthria [ ] Visual disturbance  Resp: [ ] Shortness of breath/dyspnea, [ ] Orthopnea [ ] Cough  CV: [ ] Chest pain [ ] Palpitation [ ] Lightheadedness [ ] Syncope  Renal: [ ] Thirst [ ] Edema  GI: [ ] Nausea [ ] Emesis [ ] Abdominal pain [ ] Constipation [ ] Diarrhea  Hem: [ ] Hematemesis [ ] bright red blood per rectum  ID: [ ] Fever [ ] Chills [ ] Dysuria  ENT: [ ] Rhinorrhea    PHYSICAL EXAM:    General: No Acute Distress   Neurological: Awake, alert oriented to person, place and time, Following Commands, PERRL, EOMI, Face Symmetrical, Speech Fluent, Moving all extremities, Muscle Strength normal in all four extremities, No Drift, Sensation to Light Touch Intact  Pulmonary: Clear to Auscultation, No Rales, No Rhonchi, No Wheezes   Cardiovascular: S1, S2, Regular Rte and Rhythm   Gastrointestinal: Soft, Nontender, Nondistended   Extremities: No calf tenderness   Incision: EVD in place @ 10cm    ICU Vital Signs Last 24 Hrs  T(C): 37.2 (17 Sep 2024 22:05), Max: 37.6 (17 Sep 2024 04:33)  T(F): 98.9 (17 Sep 2024 22:05), Max: 99.7 (17 Sep 2024 04:33)  HR: 58 (17 Sep 2024 22:05) (57 - 75)  BP: 192/77 (17 Sep 2024 12:00) (151/73 - 192/77)  BP(mean): 110 (17 Sep 2024 12:00) (103 - 113)  ABP: 150/40 (17 Sep 2024 22:05) (150/40 - 197/65)  ABP(mean): 79 (17 Sep 2024 22:05) (79 - 118)  RR: 14 (17 Sep 2024 22:) (14 - 19)  SpO2: 97% (17 Sep 2024 22:) (96% - 100%)      24 @ 07:01  -  24 @ 07:00  --------------------------------------------------------  IN: 4106.6 mL / OUT: 3028 mL / NET: 1078.6 mL    24 @ 07:01  -  24 @ 22:54  --------------------------------------------------------  IN: 1450 mL / OUT: 850 mL / NET: 600 mL      acetaminophen     Tablet .. 650 milliGRAM(s) Oral every 6 hours PRN  ascorbic acid 500 milliGRAM(s) Oral <User Schedule>  bisacodyl 5 milliGRAM(s) Oral at bedtime PRN  bromocriptine Capsule 10 milliGRAM(s) Oral every 8 hours  chlorhexidine 2% Cloths 1 Application(s) Topical <User Schedule>  enoxaparin Injectable 40 milliGRAM(s) SubCutaneous every 12 hours  ferrous    sulfate Liquid 300 milliGRAM(s) Oral <User Schedule>  fludroCORTISONE 0.1 milliGRAM(s) Oral two times a day  influenza  Vaccine (HIGH DOSE) 0.5 milliLiter(s) IntraMuscular once  levETIRAcetam 1000 milliGRAM(s) Oral two times a day  ondansetron Injectable 4 milliGRAM(s) IV Push every 6 hours PRN  oxyCODONE    IR 5 milliGRAM(s) Oral every 4 hours PRN  polyethylene glycol 3350 17 Gram(s) Oral two times a day PRN  sodium chloride 3%. 500 milliLiter(s) (50 mL/Hr) IV Continuous <Continuous>      LABS:  Na: 139 ( @ 14:47), 140 ( @ 08:46), 141 ( @ 23:49), 137 ( 17:31), 135 ( @ 10:20), 136 ( @ 07:46), 135 ( @ 02:12), 135 (09-15 @ 20:52), 137 (09-15 @ 13:13), 135 (09-15 @ 05:30)  K: 4.1 ( @ 14:47), 4.0 ( @ 08:46), 4.2 ( @ 23:49), 4.3 (:31), 4.0 ( @ 10:20), 3.8 ( @ 07:46), 3.9 ( @ 02:12), 4.1 (09-15 @ 20:52), 4.1 (09-15 @ 13:13), 3.7 (09-15 @ 05:30)  Cl: 106 ( @ 14:47), 110 ( @ 08:46), 112 ( @ 23:49), 105 ( 17:31), 102 ( @ 10:20), 104 ( @ 07:46), 104 ( @ 02:12), 103 (09-15 @ 20:52), 105 (09-15 @ 13:13), 104 (09-15 @ 05:30)  CO2: 25 (17 @ 14:47), 25 ( @ 08:46), 24 (16 @ 23:49), 26 ( @ 17:31), 25 (16 @ 10:20), 24 ( @ 07:46), 24 ( @ 02:12), 24 (09-15 @ 20:52), 24 (09-15 @ 13:13), 25 (09-15 @ 05:30)  BUN: 11 ( @ 14:47), 11 ( @ 08:46), 10 (16 @ 23:49), 9 ( @ 17:31), 8 ( @ 10:20), 9 ( @ 07:46), 10 ( @ 02:12), 11 (09-15 @ 20:52), 12 (09-15 @ 13:13), 13 (09-15 @ 05:30)  Cr: 0.63 ( @ 14:47), 0.67 ( @ 08:46), 0.66 ( @ 23:49), 0.64 ( @ 17:31), 0.59 ( @ 10:20), 0.61 ( @ 07:46), 0.68 ( @ 02:12), 0.72 (09-15 @ 20:52), 0.59 (09-15 @ 13:13), 0.71 (09-15 @ 05:30)  Glu: 124(17 @ 14:47), 117( @ 08:46), 107( @ 23:49), 131( @ 17:31), 114(16 @ 10:20), 114( @ 07:46), 115( @ 02:12), 124(15 @ 20:52), 125(09-15 @ 13:13), 123(09-15 @ 05:30)    Hgb: 8.0 ( @ 14:47), 7.5 ( @ 08:46), 8.3 ( @ 10:20), 8.1 ( @ 07:46), 7.5 ( 02:12), 8.3 (09-15 @ 05:30)  Hct: 27.8 ( 14:47), 26.5 ( 08:46), 28.8 ( @ 10:20), 29.0 ( 07:46), 26.6 ( @ 02:12), 28.7 (09-15 @ 05:30)  WBC: 8.02 ( 14:47), 7.36 ( 08:46), 6.85 ( 10:20), 8.02 ( 07:46), 8.71 ( @ 02:12), 10.59 (09-15 @ 05:30)  Plt: 223 ( 14:47), 223 ( 08:46), 222 ( 10:20), 237 ( 07:46), 228 ( 02:12), 246 (09-15 @ 05:30)    INR:   PTT: 30.8 24 @ 10:20    LIVER FUNCTIONS - ( 16 Sep 2024 02:12 )  Alb: 3.6 g/dL / Pro: 6.4 g/dL / ALK PHOS: 90 U/L / ALT: 14 U/L / AST: 14 U/L / GGT: x

## 2024-09-17 NOTE — PROGRESS NOTE ADULT - ASSESSMENT
63f hx HTN, iron deficiency anemia admit for DSA negative SAH hh2mf3 c/b vasospasm s/p IA therapy     -Ncq1, VSq1   -EVD management @95mly75; monitor output and ICP  -c/w AEDs  -new SBP goal 120-200; - pt SBP down trending naturally & neurological examination intact w/o drift   -fludrocortisone 0.1mg BID started today, monitor for hypokalemia   -dvt ppx w/ SQL

## 2024-09-17 NOTE — PROGRESS NOTE ADULT - SUBJECTIVE AND OBJECTIVE BOX
S/Overnight events: BILL overnight, neuro stable      Hospital Course:   : Transfer to [facility] for further mgmt of SAH. Patient taken urgently to angio. ET tube pulled back 2cm. EVD placed, open @ 10. Stability scan stable, EVD in position, NGT placed. Started nimodipine 60q4. Extubated to face mask. POD 0 DSA neg for aneurysm.   : BD2. POD 1 angio. Started iron/vit C q other day for microcytic anermia. Started lisinopril 10mg daily. 1.L liter bolus for euvolemia. Ambriz removed, f/u TOV, started on prophylactic SQL 40mg BID (weight based). Passed TOV. Off cardene gtt. cardene gtt resumed. started hydral 25 q8, inc lisinopril dose from 10mg to 20mg daily.   9/10: BD3. POD 2 angio. BILL overnight. TTE showing EF 70%, mild symmetric LVH. DC cardene.   : BD 4. POD 3 angio. BILL overnight. SBP 160s, given hydralazine 10mg IVP x1. Given ducolax suppository. SBP liberalized 100-160. , given labetolol 5mg IVP. NS bolus 500cc for euvolemia. Lactulose for AM. PO hydral switched to clonidine.   : BD 5. POD 4 angio. BILL overnight. Anti-Xa within prophylactic range. , given labetolol 10mg IVP. 1L NS for euvolemia. lisinopril increased 40, norvasc 10 added. cardene started. change in exam: JAIME/LL 4/5 w/ drift, CTH/CTA performed showing severe spasm. febrile 101, pan cx sent. POD0 angio for spasm tx. Dc'd propofol. Extubated to face mask. SBP 190s, started on cardene gtt  : BD 6, POD 5 diagnostic angio, POD 1 angio for spasm tx. Changed NS to LR.   : BD 7, POD 6, POD 2 angio for spasm. Pt febrile ovn, f/u cultures. Start bromocriptine. Resume lisinopril. 1L bolus, f/u Na studies. Notified by primary RN of exam change: new dysarthria, L facial droop, LUE/LLLE withdrawing to noxious stim. Stroke code called, with high suspicion for vasospasm. Levo gtt started for SBP goal 180. CTA/CTP/CT performed - R sided spasm, no perfusion defect. Exam improved with higher BP, dysarthria and facial droop improving and L side 4/5. Discussed with Dr. Chao, Dr. Fuentes, and Dr. Butler - will keep SBP goal 160-200 and consider angiogram if not improving further.  9/15: BD 8, POD 7, POD 3 angio. Given 250 cc albumin bolus followed by 500 cc NS. Repeat BMP Na 134. S/p 1 L bolus for euvolemia. Full stregnth b/l, no drift; no repeat angio, regular diet. Given 250 albumin and 1 L NS for net negative volume.   : BD 9, POD 8, POD 4 angio. Remains npo after midnight. Given 500 cc bolus NS for euvolemia. 1L bolus for euvolemia. EVD lowered to 5 i/s/o spasm. added fludrocortisone 0.1 BID, salt tabs increased to 3q6hr. increased bromocriptine to 10 q8. POD0 repeat angio 15 mg verapamil injected to R ICA. SBP parameters liberalized 160-200, off levo. 5 cc EVD output during angio, drained 22 cc's post-op, ICPs single digits. emesis s/p salt tabs, florinef rpt dose, salt tabs dc'd. 1L NS for BP 150s. POD 0 s/p angiogram showing R ICA spasm with IA verapamil.   : BD 10. POD 9. POD 5 angio with spasm. POD 1 angio with spasm. BILL o/n. Started on levo gtt.         Vital Signs Last 24 Hrs  T(C): 37.5 (17 Sep 2024 00:56), Max: 37.7 (16 Sep 2024 22:34)  T(F): 99.5 (17 Sep 2024 00:56), Max: 99.9 (16 Sep 2024 22:34)  HR: 64 (17 Sep 2024 02:00) (58 - 88)  BP: 143/63 (16 Sep 2024 09:01) (143/63 - 143/63)  BP(mean): 90 (16 Sep 2024 09:01) (90 - 90)  RR: 19 (17 Sep 2024 02:00) (14 - 19)  SpO2: 96% (17 Sep 2024 02:00) (96% - 100%)    Parameters below as of 17 Sep 2024 02:00  Patient On (Oxygen Delivery Method): room air        I&O's Detail    15 Sep 2024 07:01  -  16 Sep 2024 07:00  --------------------------------------------------------  IN:    IV PiggyBack: 250 mL    Norepinephrine: 257 mL    Oral Fluid: 400 mL    Sodium Chloride 0.9% Bolus: 2500 mL    sodium chloride 3%: 30 mL    sodium chloride 3%: 900 mL    sodium chloride 3%: 350 mL  Total IN: 4687 mL    OUT:    External Ventricular Device (mL): 203 mL    Voided (mL): 5900 mL  Total OUT: 6103 mL    Total NET: -1416 mL      16 Sep 2024 07:01  -  17 Sep 2024 02:29  --------------------------------------------------------  IN:    Norepinephrine: 14.3 mL    Norepinephrine: 15.1 mL    Oral Fluid: 740 mL    Sodium Chloride 0.9% Bolus: 1500 mL    sodium chloride 3%: 1260 mL  Total IN: 3529.4 mL    OUT:    External Ventricular Device (mL): 183 mL    Voided (mL): 2300 mL  Total OUT: 2483 mL    Total NET: 1046.4 mL        I&O's Summary    15 Sep 2024 07:01  -  16 Sep 2024 07:00  --------------------------------------------------------  IN: 4687 mL / OUT: 6103 mL / NET: -1416 mL    16 Sep 2024 07:01  -  17 Sep 2024 02:29  --------------------------------------------------------  IN: 3529.4 mL / OUT: 2483 mL / NET: 1046.4 mL          PHYSICAL EXAM:  Constitutional: awake, alert, sitting up in bed. NAD.   Respiratory: non-labored breathing. Normal chest rise.   Cardiovascular: Regular rate and rhythm  Gastrointestinal:  Soft, nontender, nondistended.  .  Vascular: Extremities warm, no ulcers, no discoloration of skin.   Neurological: Gen: AA&O x 3, conversant, appropriate.      CN II-XII grossly intact.    Motor: RANDALL x 4, 5/5 throughout UE/LE.    Sens: Sensation intact to light touch throughout.    Extremities: distal pulses 2+ x 4 DPPT symmetric throughout.     No pronator drift  Wound/incision: R groin c/d/i, no hematoma. Groin soft. +R frontal EVD.     DEVICE/DRAIN DRESSING:    TUBES/LINES:  [] CVC  [x] A-line  [] Lumbar Drain  [] Ventriculostomy  [] Other    DIET:  [] NPO  [x] Mechanical  [] Tube feeds    LABS:                        8.3    6.85  )-----------( 222      ( 16 Sep 2024 10:20 )             28.8         141  |  112<H>  |  10  ----------------------------<  107<H>  4.2   |  24  |  0.66    Ca    8.1<L>      16 Sep 2024 23:49  Phos  3.5       Mg     2.0         TPro  6.4  /  Alb  3.6  /  TBili  0.3  /  DBili  x   /  AST  14  /  ALT  14  /  AlkPhos  90      PTT - ( 16 Sep 2024 10:20 )  PTT:30.8 sec  Urinalysis Basic - ( 16 Sep 2024 23:49 )    Color: x / Appearance: x / SG: x / pH: x  Gluc: 107 mg/dL / Ketone: x  / Bili: x / Urobili: x   Blood: x / Protein: x / Nitrite: x   Leuk Esterase: x / RBC: x / WBC x   Sq Epi: x / Non Sq Epi: x / Bacteria: x          CAPILLARY BLOOD GLUCOSE          Drug Levels: [] N/A    CSF Analysis: [] N/A      Allergies    No Known Allergies    Intolerances      MEDICATIONS:  Antibiotics:    Neuro:  acetaminophen     Tablet .. 650 milliGRAM(s) Oral every 6 hours PRN  bromocriptine Capsule 10 milliGRAM(s) Oral every 8 hours  levETIRAcetam 1000 milliGRAM(s) Oral two times a day  ondansetron Injectable 4 milliGRAM(s) IV Push every 6 hours PRN  oxyCODONE    IR 5 milliGRAM(s) Oral every 4 hours PRN    Anticoagulation:  enoxaparin Injectable 40 milliGRAM(s) SubCutaneous every 12 hours    OTHER:  bisacodyl 5 milliGRAM(s) Oral at bedtime PRN  chlorhexidine 2% Cloths 1 Application(s) Topical <User Schedule>  fludroCORTISONE 0.1 milliGRAM(s) Oral two times a day  norepinephrine Infusion 0.05 MICROgram(s)/kG/Min IV Continuous <Continuous>  polyethylene glycol 3350 17 Gram(s) Oral two times a day PRN    IVF:  ascorbic acid 500 milliGRAM(s) Oral <User Schedule>  ferrous    sulfate Liquid 300 milliGRAM(s) Oral <User Schedule>  sodium chloride 3%. 500 milliLiter(s) IV Continuous <Continuous>    CULTURES:  Culture Results:   No growth ( @ 23:05)  Culture Results:   No growth at 4 days ( @ 16:54)    RADIOLOGY & ADDITIONAL TESTS:      ASSESSMENT:  63F PMHx uncontrolled HT, R samuels's palsy, was BIBEMS to [facility]  c/o sudden onset severe HA. CTH showed SAH HH: 2, MF: 3. NIHSS: 0. CTA neg for aneurysm. Transferred to [facility] for further mgmt. S/p DSA negative for anueyrsm (24). S/p R frontal EVD (24). S/p angiogram showing mild R LILLY/R MCA spasm with IA verapamil (), s/p  angiogram with 15 mg IA verapamil for R ICA spasm ().     HTN    No pertinent family history in first degree relatives    Handoff    No pertinent past medical history    Hypertension    SAH (subarachnoid hemorrhage)    Cerebral vasospasm    SAH (subarachnoid hemorrhage)    Cerebral vasospasm    SAH (subarachnoid hemorrhage)    Subarachnoid hemorrhage    Angiogram, carotid and cerebral, bilateral    Insertion of intravenous catheter with ultrasound guidance    No significant past surgical history    No significant past surgical history    H/O  section    HTN    Hypertension    [email or web address]        PLAN:    Neuro:  - Neuro/vitals q1hr  - Seizure prophylaxis: Keppra 1g BID   - R frontal EVD @ 5cmH2O, monitor output   - DCI prophylaxis: Nimodipime 60q4 (holding while on pressors)   - Pain control: Tylenol, oxy 5 prn  - stability CTH post-EVD complete , stable, CTH  stable   - CTA : b/l LILLY spasm, b/l MCA R>L  - MR brain and C spine w/wo contrast complete   - Bromocriptine 10mg q8h for central fever    Cards:   - -200, levo gtt   - TTE 9/10: EF 70%, mild symmetric LVH    Pulm:   - RA  - IS    GI:  - NPO since exam change, o/w reg diet  - Bowel regimen, LBM   - Nausea: Zofran 4q6 prn     Renal:  - NaCl 3% @ 70cc/hr; fludrocortisone 0.1 mg BID   - Na goal 140- 145   - voiding   - euvolemic goal     Endo:  - A1c 5.8    Heme:   - DVT prophylaxis: SCDs to LLE only, ppx SQL 40 BID (weight based)   - Anti Xa : 0.31  LE dopplers : R posterior tibial DVT on admission ()- surveillace dopplers in 1 week 9/15  - Microcytic anemia: iron/vit C q other day     ID:   - pan cx , f/u cx    Dispo: NSICU, full code, PT/OT rec AR    Discussed with Dr. Butler and Dr. Chao

## 2024-09-18 LAB
ANION GAP SERPL CALC-SCNC: 7 MMOL/L — SIGNIFICANT CHANGE UP (ref 5–17)
ANION GAP SERPL CALC-SCNC: 8 MMOL/L — SIGNIFICANT CHANGE UP (ref 5–17)
ANION GAP SERPL CALC-SCNC: 9 MMOL/L — SIGNIFICANT CHANGE UP (ref 5–17)
ANION GAP SERPL CALC-SCNC: 9 MMOL/L — SIGNIFICANT CHANGE UP (ref 5–17)
BUN SERPL-MCNC: 10 MG/DL — SIGNIFICANT CHANGE UP (ref 7–23)
BUN SERPL-MCNC: 11 MG/DL — SIGNIFICANT CHANGE UP (ref 7–23)
BUN SERPL-MCNC: 8 MG/DL — SIGNIFICANT CHANGE UP (ref 7–23)
BUN SERPL-MCNC: 9 MG/DL — SIGNIFICANT CHANGE UP (ref 7–23)
CALCIUM SERPL-MCNC: 7.9 MG/DL — LOW (ref 8.4–10.5)
CALCIUM SERPL-MCNC: 8.2 MG/DL — LOW (ref 8.4–10.5)
CALCIUM SERPL-MCNC: 8.4 MG/DL — SIGNIFICANT CHANGE UP (ref 8.4–10.5)
CALCIUM SERPL-MCNC: 8.4 MG/DL — SIGNIFICANT CHANGE UP (ref 8.4–10.5)
CHLORIDE SERPL-SCNC: 102 MMOL/L — SIGNIFICANT CHANGE UP (ref 96–108)
CHLORIDE SERPL-SCNC: 104 MMOL/L — SIGNIFICANT CHANGE UP (ref 96–108)
CHLORIDE SERPL-SCNC: 109 MMOL/L — HIGH (ref 96–108)
CHLORIDE SERPL-SCNC: 110 MMOL/L — HIGH (ref 96–108)
CO2 SERPL-SCNC: 24 MMOL/L — SIGNIFICANT CHANGE UP (ref 22–31)
CO2 SERPL-SCNC: 25 MMOL/L — SIGNIFICANT CHANGE UP (ref 22–31)
CREAT SERPL-MCNC: 0.65 MG/DL — SIGNIFICANT CHANGE UP (ref 0.5–1.3)
CREAT SERPL-MCNC: 0.7 MG/DL — SIGNIFICANT CHANGE UP (ref 0.5–1.3)
CREAT SERPL-MCNC: 0.71 MG/DL — SIGNIFICANT CHANGE UP (ref 0.5–1.3)
CREAT SERPL-MCNC: 0.74 MG/DL — SIGNIFICANT CHANGE UP (ref 0.5–1.3)
CULTURE RESULTS: SIGNIFICANT CHANGE UP
EGFR: 90 ML/MIN/1.73M2 — SIGNIFICANT CHANGE UP
EGFR: 94 ML/MIN/1.73M2 — SIGNIFICANT CHANGE UP
EGFR: 96 ML/MIN/1.73M2 — SIGNIFICANT CHANGE UP
EGFR: 98 ML/MIN/1.73M2 — SIGNIFICANT CHANGE UP
GLUCOSE BLDC GLUCOMTR-MCNC: 277 MG/DL — HIGH (ref 70–99)
GLUCOSE SERPL-MCNC: 109 MG/DL — HIGH (ref 70–99)
GLUCOSE SERPL-MCNC: 110 MG/DL — HIGH (ref 70–99)
GLUCOSE SERPL-MCNC: 112 MG/DL — HIGH (ref 70–99)
GLUCOSE SERPL-MCNC: 123 MG/DL — HIGH (ref 70–99)
HCT VFR BLD CALC: 26 % — LOW (ref 34.5–45)
HCT VFR BLD CALC: 26.6 % — LOW (ref 34.5–45)
HGB BLD-MCNC: 7.5 G/DL — LOW (ref 11.5–15.5)
HGB BLD-MCNC: 7.7 G/DL — LOW (ref 11.5–15.5)
MAGNESIUM SERPL-MCNC: 2 MG/DL — SIGNIFICANT CHANGE UP (ref 1.6–2.6)
MCHC RBC-ENTMCNC: 21.4 PG — LOW (ref 27–34)
MCHC RBC-ENTMCNC: 21.5 PG — LOW (ref 27–34)
MCHC RBC-ENTMCNC: 28.8 GM/DL — LOW (ref 32–36)
MCHC RBC-ENTMCNC: 28.9 GM/DL — LOW (ref 32–36)
MCV RBC AUTO: 74.1 FL — LOW (ref 80–100)
MCV RBC AUTO: 74.5 FL — LOW (ref 80–100)
NRBC # BLD: 0 /100 WBCS — SIGNIFICANT CHANGE UP (ref 0–0)
NRBC # BLD: 0 /100 WBCS — SIGNIFICANT CHANGE UP (ref 0–0)
PHOSPHATE SERPL-MCNC: 3.7 MG/DL — SIGNIFICANT CHANGE UP (ref 2.5–4.5)
PLATELET # BLD AUTO: 207 K/UL — SIGNIFICANT CHANGE UP (ref 150–400)
PLATELET # BLD AUTO: 236 K/UL — SIGNIFICANT CHANGE UP (ref 150–400)
POTASSIUM SERPL-MCNC: 3.6 MMOL/L — SIGNIFICANT CHANGE UP (ref 3.5–5.3)
POTASSIUM SERPL-MCNC: 3.7 MMOL/L — SIGNIFICANT CHANGE UP (ref 3.5–5.3)
POTASSIUM SERPL-MCNC: 3.8 MMOL/L — SIGNIFICANT CHANGE UP (ref 3.5–5.3)
POTASSIUM SERPL-MCNC: 3.9 MMOL/L — SIGNIFICANT CHANGE UP (ref 3.5–5.3)
POTASSIUM SERPL-SCNC: 3.6 MMOL/L — SIGNIFICANT CHANGE UP (ref 3.5–5.3)
POTASSIUM SERPL-SCNC: 3.7 MMOL/L — SIGNIFICANT CHANGE UP (ref 3.5–5.3)
POTASSIUM SERPL-SCNC: 3.8 MMOL/L — SIGNIFICANT CHANGE UP (ref 3.5–5.3)
POTASSIUM SERPL-SCNC: 3.9 MMOL/L — SIGNIFICANT CHANGE UP (ref 3.5–5.3)
RBC # BLD: 3.49 M/UL — LOW (ref 3.8–5.2)
RBC # BLD: 3.59 M/UL — LOW (ref 3.8–5.2)
RBC # FLD: 18.7 % — HIGH (ref 10.3–14.5)
RBC # FLD: 18.9 % — HIGH (ref 10.3–14.5)
SODIUM SERPL-SCNC: 136 MMOL/L — SIGNIFICANT CHANGE UP (ref 135–145)
SODIUM SERPL-SCNC: 138 MMOL/L — SIGNIFICANT CHANGE UP (ref 135–145)
SODIUM SERPL-SCNC: 140 MMOL/L — SIGNIFICANT CHANGE UP (ref 135–145)
SODIUM SERPL-SCNC: 143 MMOL/L — SIGNIFICANT CHANGE UP (ref 135–145)
SPECIMEN SOURCE: SIGNIFICANT CHANGE UP
WBC # BLD: 5.83 K/UL — SIGNIFICANT CHANGE UP (ref 3.8–10.5)
WBC # BLD: 9.34 K/UL — SIGNIFICANT CHANGE UP (ref 3.8–10.5)
WBC # FLD AUTO: 5.83 K/UL — SIGNIFICANT CHANGE UP (ref 3.8–10.5)
WBC # FLD AUTO: 9.34 K/UL — SIGNIFICANT CHANGE UP (ref 3.8–10.5)

## 2024-09-18 PROCEDURE — 99024 POSTOP FOLLOW-UP VISIT: CPT

## 2024-09-18 PROCEDURE — 99291 CRITICAL CARE FIRST HOUR: CPT

## 2024-09-18 RX ORDER — SENNOSIDES 8.6 MG
2 TABLET ORAL AT BEDTIME
Refills: 0 | Status: DISCONTINUED | OUTPATIENT
Start: 2024-09-18 | End: 2024-09-21

## 2024-09-18 RX ORDER — BISACODYL 5 MG/1
5 TABLET, COATED ORAL AT BEDTIME
Refills: 0 | Status: DISCONTINUED | OUTPATIENT
Start: 2024-09-18 | End: 2024-09-21

## 2024-09-18 RX ORDER — BROMOCRIPTINE MESYLATE 5 MG/1
5 CAPSULE ORAL EVERY 8 HOURS
Refills: 0 | Status: DISCONTINUED | OUTPATIENT
Start: 2024-09-18 | End: 2024-09-19

## 2024-09-18 RX ORDER — HYDRALAZINE HYDROCHLORIDE 100 MG/1
5 TABLET ORAL ONCE
Refills: 0 | Status: COMPLETED | OUTPATIENT
Start: 2024-09-18 | End: 2024-09-18

## 2024-09-18 RX ORDER — SODIUM CHLORIDE 0.9 % (FLUSH) 0.9 %
1000 SYRINGE (ML) INJECTION ONCE
Refills: 0 | Status: COMPLETED | OUTPATIENT
Start: 2024-09-18 | End: 2024-09-18

## 2024-09-18 RX ADMIN — Medication 40 MILLIEQUIVALENT(S): at 06:00

## 2024-09-18 RX ADMIN — FLUDROCORTISONE ACETATE 0.1 MILLIGRAM(S): 0.1 TABLET ORAL at 06:00

## 2024-09-18 RX ADMIN — CHLORHEXIDINE GLUCONATE ORAL RINSE 1 APPLICATION(S): 1.2 SOLUTION DENTAL at 06:00

## 2024-09-18 RX ADMIN — Medication 40 MILLIEQUIVALENT(S): at 23:40

## 2024-09-18 RX ADMIN — SODIUM CHLORIDE 30 MILLILITER(S): 5 INJECTION, SOLUTION INTRAVENOUS at 22:17

## 2024-09-18 RX ADMIN — BROMOCRIPTINE MESYLATE 5 MILLIGRAM(S): 5 CAPSULE ORAL at 14:44

## 2024-09-18 RX ADMIN — ENOXAPARIN SODIUM 40 MILLIGRAM(S): 150 INJECTION SUBCUTANEOUS at 09:44

## 2024-09-18 RX ADMIN — BROMOCRIPTINE MESYLATE 10 MILLIGRAM(S): 5 CAPSULE ORAL at 06:00

## 2024-09-18 RX ADMIN — Medication 20 MILLIEQUIVALENT(S): at 09:44

## 2024-09-18 RX ADMIN — FLUDROCORTISONE ACETATE 0.1 MILLIGRAM(S): 0.1 TABLET ORAL at 17:46

## 2024-09-18 RX ADMIN — BROMOCRIPTINE MESYLATE 5 MILLIGRAM(S): 5 CAPSULE ORAL at 22:17

## 2024-09-18 RX ADMIN — Medication 300 MILLIGRAM(S): at 06:00

## 2024-09-18 RX ADMIN — SODIUM CHLORIDE 50 MILLILITER(S): 5 INJECTION, SOLUTION INTRAVENOUS at 07:07

## 2024-09-18 RX ADMIN — HYDRALAZINE HYDROCHLORIDE 5 MILLIGRAM(S): 100 TABLET ORAL at 09:44

## 2024-09-18 RX ADMIN — ENOXAPARIN SODIUM 40 MILLIGRAM(S): 150 INJECTION SUBCUTANEOUS at 22:17

## 2024-09-18 RX ADMIN — Medication 250 MILLILITER(S): at 21:39

## 2024-09-18 RX ADMIN — Medication 4 MILLIGRAM(S): at 06:57

## 2024-09-18 RX ADMIN — HYDRALAZINE HYDROCHLORIDE 5 MILLIGRAM(S): 100 TABLET ORAL at 17:54

## 2024-09-18 RX ADMIN — Medication 1000 MILLIGRAM(S): at 06:00

## 2024-09-18 RX ADMIN — Medication 1000 MILLIGRAM(S): at 17:46

## 2024-09-18 RX ADMIN — Medication 650 MILLIGRAM(S): at 20:40

## 2024-09-18 RX ADMIN — Medication 500 MILLIGRAM(S): at 06:00

## 2024-09-18 RX ADMIN — Medication 650 MILLIGRAM(S): at 21:31

## 2024-09-18 RX ADMIN — SODIUM CHLORIDE 30 MILLILITER(S): 5 INJECTION, SOLUTION INTRAVENOUS at 10:24

## 2024-09-18 NOTE — PROGRESS NOTE ADULT - SUBJECTIVE AND OBJECTIVE BOX
INTERVAL HISTORY: HPI:  63F PMHx uncontrolled HTN, does not take any medications at home presented to [facility] after developing acute onset severe HA. Once arrived, patient had episode of nausea with emesis. CTH showed SAH, HH: 2, MF: 3. NIHSS: 0. CTA neg for aneurysm. Transferred to [facility] for further mgmt. Upon arrival to [facility], NIHSS noted to be 0. Patient reports a headache rated at 7/10 in severity. Of note, patient's daughter notes pt seems off and is sleepier than her baseline. Pt denies SOB, chest pain, vision changes, nausea, weakness/numbness/tingling, dizziness, photophobia.  (08 Sep 2024 13:29)    Drug Dosing Weight  Height (cm): 161.3 (16 Sep 2024 09:01)  Weight (kg): 115 (16 Sep 2024 09:01)  BMI (kg/m2): 44.2 (16 Sep 2024 09:01)  BSA (m2): 2.15 (16 Sep 2024 09:01)    PAST MEDICAL & SURGICAL HISTORY:  Hypertension  H/O  section    REVIEW OF SYSTEMS: [ ] Unable to Assess due to neurologic exam   [ ] All ROS addressed below are non-contributory, except:  Neuro: [ ] Headache [ ] Back pain [ ] Numbness [ ] Weakness [ ] Ataxia [ ] Dizziness [ ] Aphasia [ ] Dysarthria [ ] Visual disturbance  Resp: [ ] Shortness of breath/dyspnea, [ ] Orthopnea [ ] Cough  CV: [ ] Chest pain [ ] Palpitation [ ] Lightheadedness [ ] Syncope  Renal: [ ] Thirst [ ] Edema  GI: [ ] Nausea [ ] Emesis [ ] Abdominal pain [ ] Constipation [ ] Diarrhea  Hem: [ ] Hematemesis [ ] bright red blood per rectum  ID: [ ] Fever [ ] Chills [ ] Dysuria  ENT: [ ] Rhinorrhea    PHYSICAL EXAM:    General: No Acute Distress   Neurological: Awake, alert oriented to person, place and time, Following Commands, PERRL, EOMI, Face Symmetrical, Speech Fluent, Moving all extremities, Muscle Strength normal in all four extremities, No Drift, Sensation to Light Touch Intact  Pulmonary: Clear to Auscultation, No Rales, No Rhonchi, No Wheezes   Cardiovascular: S1, S2, Regular Rte and Rhythm   Gastrointestinal: Soft, Nontender, Nondistended   Extremities: No calf tenderness   Incision: EVD in place @ 10cm      ICU Vital Signs Last 24 Hrs  T(C): 37 (18 Sep 2024 18:02), Max: 38.1 (18 Sep 2024 14:03)  T(F): 98.6 (18 Sep 2024 18:02), Max: 100.6 (18 Sep 2024 14:03)  HR: 79 (18 Sep 2024 20:00) (56 - 88)  BP: 167/73 (18 Sep 2024 14:17) (163/101 - 167/73)  BP(mean): 105 (18 Sep 2024 14:17) (105 - 127)  ABP: 199/74 (18 Sep 2024 20:00) (143/44 - 213/75)  ABP(mean): 122 (18 Sep 2024 20:00) (79 - 132)  RR: 17 (18 Sep 2024 20:00) (14 - 18)  SpO2: 99% (18 Sep 2024 20:00) (93% - 100%)      24 @ 07:01  -  24 @ 07:00  --------------------------------------------------------  IN: 2090 mL / OUT: 1743 mL / NET: 347 mL    24 @ 07:01  -  24 @ 21:02  --------------------------------------------------------  IN: 430 mL / OUT: 2047 mL / NET: -1617 mL      acetaminophen     Tablet .. 650 milliGRAM(s) Oral every 6 hours PRN  ascorbic acid 500 milliGRAM(s) Oral <User Schedule>  bisacodyl 5 milliGRAM(s) Oral at bedtime  bromocriptine Capsule 5 milliGRAM(s) Oral every 8 hours  chlorhexidine 2% Cloths 1 Application(s) Topical <User Schedule>  enoxaparin Injectable 40 milliGRAM(s) SubCutaneous every 12 hours  ferrous    sulfate Liquid 300 milliGRAM(s) Oral <User Schedule>  fludroCORTISONE 0.1 milliGRAM(s) Oral two times a day  influenza  Vaccine (HIGH DOSE) 0.5 milliLiter(s) IntraMuscular once  levETIRAcetam 1000 milliGRAM(s) Oral two times a day  ondansetron Injectable 4 milliGRAM(s) IV Push every 6 hours PRN  oxyCODONE    IR 5 milliGRAM(s) Oral every 4 hours PRN  polyethylene glycol 3350 17 Gram(s) Oral two times a day  senna 2 Tablet(s) Oral at bedtime  sodium chloride 3%. 500 milliLiter(s) (30 mL/Hr) IV Continuous <Continuous>      LABS:  Na: 138 ( @ 15:48), 143 ( @ 05:30), 140 ( @ 23:40), 139 ( @ 14:47), 140 ( @ 08:46), 141 ( @ 23:49), 137 (:31), 135 ( @ 10:20), 136 ( @ 07:46), 135 ( @ 02:12)  K: 3.9 (09-18 @ 15:48), 3.8 ( @ 05:30), 3.6 ( 23:40), 4.1 ( @ 14:47), 4.0 ( 08:46), 4.2 ( @ 23:49), 4.3 (:31), 4.0 ( @ 10:20), 3.8 ( @ 07:46), 3.9 ( @ 02:12)  Cl: 104 ( @ 15:48), 110 ( @ 05:30), 109 ( @ 23:40), 106 ( @ 14:47), 110 ( @ 08:46), 112 ( @ 23:49), 105 ( @ 17:31), 102 ( @ 10:20), 104 ( @ 07:46), 104 ( @ 02:12)  CO2: 25 (18 @ 15:48), 25 (18 @ 05:30), 24 (17 @ 23:40), 25 (17 @ 14:47), 25 ( @ 08:46), 24 ( @ 23:49), 26 ( @ 17:31), 25 (16 @ 10:20), 24 ( @ 07:46), 24 ( @ 02:12)  BUN: 8 (18 @ 15:48), 10 ( @ 05:30), 11 (17 @ 23:40), 11 ( @ 14:47), 11 ( @ 08:46), 10 ( @ 23:49), 9 ( @ 17:31), 8 ( @ 10:20), 9 ( @ 07:46), 10 ( @ 02:12)  Cr: 0.65 (18 @ 15:48), 0.70 ( @ 05:30), 0.74 (17 @ 23:40), 0.63 ( @ 14:47), 0.67 ( @ 08:46), 0.66 ( @ 23:49), 0.64 ( @ 17:31), 0.59 ( @ 10:20), 0.61 ( @ 07:46), 0.68 ( @ 02:12)  Glu: 112(18 @ 15:48), 110( @ 05:30), 109(17 @ 23:40), 124(17 @ 14:47), 117( @ 08:46), 107(16 @ 23:49), 131( @ 17:31), 114(16 @ 10:20), 114( @ 07:46), 115( @ 02:12)    Hgb: 7.5 ( @ 05:30), 8.0 ( @ 14:47), 7.5 ( @ 08:46), 8.3 ( @ 10:20), 8.1 ( @ 07:46), 7.5 ( @ 02:12)  Hct: 26.0 ( @ 05:30), 27.8 ( @ 14:47), 26.5 ( @ 08:46), 28.8 ( @ 10:20), 29.0 ( 07:46), 26.6 ( @ 02:12)  WBC: 5.83 ( @ 05:30), 8.02 ( @ 14:47), 7.36 ( 08:46), 6.85 ( @ 10:20), 8.02 ( 07:46), 8.71 ( @ 02:12)  Plt: 207 ( @ 05:30), 223 ( @ 14:47), 223 ( 08:46), 222 ( @ 10:20), 237 ( 07:46), 228 ( @ 02:12)    INR:   PTT: 30.8 24 @ 10:20                 INTERVAL HISTORY: HPI:  63F PMHx uncontrolled HTN, does not take any medications at home presented to [facility] after developing acute onset severe HA. Once arrived, patient had episode of nausea with emesis. CTH showed SAH, HH: 2, MF: 3. NIHSS: 0. CTA neg for aneurysm. Transferred to [facility] for further mgmt. Upon arrival to [facility], NIHSS noted to be 0. Patient reports a headache rated at 7/10 in severity. Of note, patient's daughter notes pt seems off and is sleepier than her baseline. Pt denies SOB, chest pain, vision changes, nausea, weakness/numbness/tingling, dizziness, photophobia.  (08 Sep 2024 13:29)    Drug Dosing Weight  Height (cm): 161.3 (16 Sep 2024 09:01)  Weight (kg): 115 (16 Sep 2024 09:01)  BMI (kg/m2): 44.2 (16 Sep 2024 09:01)  BSA (m2): 2.15 (16 Sep 2024 09:01)    PAST MEDICAL & SURGICAL HISTORY:  Hypertension  H/O  section    REVIEW OF SYSTEMS: [ ] Unable to Assess due to neurologic exam   [ ] All ROS addressed below are non-contributory, except:  Neuro: [ ] Headache [ ] Back pain [ ] Numbness [ ] Weakness [ ] Ataxia [ ] Dizziness [ ] Aphasia [ ] Dysarthria [ ] Visual disturbance  Resp: [ ] Shortness of breath/dyspnea, [ ] Orthopnea [ ] Cough  CV: [ ] Chest pain [ ] Palpitation [ ] Lightheadedness [ ] Syncope  Renal: [ ] Thirst [ ] Edema  GI: [ ] Nausea [ ] Emesis [ ] Abdominal pain [ ] Constipation [ ] Diarrhea  Hem: [ ] Hematemesis [ ] bright red blood per rectum  ID: [ ] Fever [ ] Chills [ ] Dysuria  ENT: [ ] Rhinorrhea    PHYSICAL EXAM:    General: No Acute Distress   Neurological: Awake, alert oriented to person, place and time, Following Commands, PERRL, EOMI, Face Symmetrical, Speech Fluent, Moving all extremities, Muscle Strength normal in all four extremities, No Drift, Sensation to Light Touch Intact  Pulmonary: Clear to Auscultation, No Rales, No Rhonchi, No Wheezes   Cardiovascular: S1, S2, Regular Rte and Rhythm   Gastrointestinal: Soft, Nontender, Nondistended   Extremities: No calf tenderness   Incision: EVD in place @ 10cm    ICU Vital Signs Last 24 Hrs  T(C): 36.9 (18 Sep 2024 22:52), Max: 38.1 (18 Sep 2024 14:03)  T(F): 98.4 (18 Sep 2024 22:52), Max: 100.6 (18 Sep 2024 14:03)  HR: 59 (18 Sep 2024 23:00) (56 - 88)  BP: 167/73 (18 Sep 2024 14:17) (163/101 - 167/73)  BP(mean): 105 (18 Sep 2024 14:17) (105 - 127)  ABP: 149/52 (18 Sep 2024 23:00) (149/52 - 213/75)  ABP(mean): 85 (18 Sep 2024 23:00) (85 - 132)  RR: 17 (18 Sep 2024 23:00) (14 - 18)  SpO2: 100% (18 Sep 2024 23:00) (93% - 100%)      24 @ 07:01  -  24 @ 07:00  --------------------------------------------------------  IN: 2090 mL / OUT: 1743 mL / NET: 347 mL    24 @ 07:01  -  24 @ 23:19  --------------------------------------------------------  IN: 990 mL / OUT: 2054 mL / NET: -1064 mL      acetaminophen     Tablet .. 650 milliGRAM(s) Oral every 6 hours PRN  ascorbic acid 500 milliGRAM(s) Oral <User Schedule>  bisacodyl 5 milliGRAM(s) Oral at bedtime  bromocriptine Capsule 5 milliGRAM(s) Oral every 8 hours  chlorhexidine 2% Cloths 1 Application(s) Topical <User Schedule>  enoxaparin Injectable 40 milliGRAM(s) SubCutaneous every 12 hours  ferrous    sulfate Liquid 300 milliGRAM(s) Oral <User Schedule>  fludroCORTISONE 0.1 milliGRAM(s) Oral two times a day  influenza  Vaccine (HIGH DOSE) 0.5 milliLiter(s) IntraMuscular once  levETIRAcetam 1000 milliGRAM(s) Oral two times a day  ondansetron Injectable 4 milliGRAM(s) IV Push every 6 hours PRN  oxyCODONE    IR 5 milliGRAM(s) Oral every 4 hours PRN  polyethylene glycol 3350 17 Gram(s) Oral two times a day  potassium chloride    Tablet ER 40 milliEquivalent(s) Oral once  senna 2 Tablet(s) Oral at bedtime  sodium chloride 3%. 500 milliLiter(s) (30 mL/Hr) IV Continuous <Continuous>      LABS:  Na: 136 ( @ 21:34), 138 ( @ 15:48), 143 ( @ 05:30), 140 ( @ 23:40), 139 ( @ 14:47), 140 ( @ 08:46), 141 ( @ 23:49), 137 ( @ 17:31), 135 ( @ 10:20), 136 ( @ 07:46), 135 ( @ 02:12)  K: 3.7 ( @ 21:34), 3.9 ( @ 15:48), 3.8 ( @ 05:30), 3.6 ( @ 23:40), 4.1 ( @ 14:47), 4.0 ( @ 08:46), 4.2 ( @ 23:49), 4.3 ( @ 17:31), 4.0 ( @ 10:20), 3.8 ( @ 07:46), 3.9 ( @ 02:12)  Cl: 102 ( @ 21:34), 104 ( @ 15:48), 110 ( @ 05:30), 109 ( @ 23:40), 106 ( @ 14:47), 110 ( @ 08:46), 112 ( @ 23:49), 105 ( @ 17:31), 102 ( @ 10:20), 104 ( @ 07:46), 104 ( @ 02:12)  CO2: 25 ( @ 21:34), 25 ( @ 15:48), 25 ( @ 05:30), 24 ( @ 23:40), 25 ( @ 14:47), 25 ( @ 08:46), 24 ( @ 23:49), 26 ( @ 17:31), 25 ( @ 10:20), 24 ( @ 07:46), 24 ( @ 02:12)  BUN: 9 ( @ 21:34), 8 ( @ 15:48), 10 ( @ 05:30), 11 ( @ 23:40), 11 ( @ 14:47), 11 ( @ 08:46), 10 ( @ 23:49), 9 ( @ 17:31), 8 ( @ 10:20), 9 ( @ 07:46), 10 ( @ 02:12)  Cr: 0.71 (18 @ 21:34), 0.65 ( @ 15:48), 0.70 ( @ 05:30), 0.74 ( @ 23:40), 0.63 ( @ 14:47), 0.67 ( @ 08:46), 0.66 ( @ 23:49), 0.64 ( @ 17:31), 0.59 ( @ 10:20), 0.61 ( @ 07:46), 0.68 ( @ 02:12)  Glu: 123( @ 21:34), 112( @ 15:48), 110( @ 05:30), 109( @ 23:40), 124( @ 14:47), 117( @ 08:46), 107( @ 23:49), 131( @ 17:31), 114( @ 10:20), 114( @ 07:46), 115( @ 02:12)    Hgb: 7.7 ( @ 21:34), 7.5 ( @ 05:30), 8.0 ( @ 14:47), 7.5 ( @ 08:46), 8.3 ( @ 10:20), 8.1 ( @ :46), 7.5 ( @ 02:12)  Hct: 26.6 ( @ 21:34), 26.0 ( @ 05:30), 27.8 ( @ 14:47), 26.5 ( @ :46), 28.8 ( @ 10:20), 29.0 (:46), 26.6 ( @ 02:12)  WBC: 9.34 ( @ 21:34), 5.83 ( @ 05:30), 8.02 ( @ 14:47), 7.36 (:46), 6.85 ( @ 10:20), 8.02 ( @ 07:46), 8.71 ( @ 02:12)  Plt: 236 ( @ 21:34), 207 ( @ 05:30), 223 ( 14:47), 223 ( 08:46), 222 ( @ 10:20), 237 ( @ :46), 228 ( @ 02:12)    INR:   PTT: 30.8 24 @ 10:20

## 2024-09-18 NOTE — PROGRESS NOTE ADULT - SUBJECTIVE AND OBJECTIVE BOX
=================================  NEUROCRITICAL CARE ATTENDING NOTE  =================================    KARRIE MORALES   MRN-[id number]  Summary:  63y/F  with uncontrolled HTN, does not take any medications at home presented to [facility] after developing acute onset severe HA. Once arrived, patient had episode of nausea with emesis. CTH showed SAH, HH: 2, MF: 3. NIHSS: 0. CTA neg for aneurysm. Transferred to [facility] for further mgmt. Upon arrival to [facility], NIHSS noted to be 0. Patient reports a headache rated at 7/10 in severity. Of note, patient's daughter notes pt seems off and is sleepier than her baseline. Pt denies SOB, chest pain, vision changes, nausea, weakness/numbness/tingling, dizziness, photophobia.  (08 Sep 2024 13:29)    COURSE IN THE HOSPITAL:   s/p angiogram, carotid & cerebral, b/l (2024, Alfredo Lomas), no aneurysm seen; kept intubated post procedure, EVD inserted   Tmax 38.5 L UE drift overnight; increased SBP goals to 180-200; angio mild VSP   No significant events overnight.    No significant events overnight.     Past Medical History: No pertinent past medical history Hypertension  Allergies:  No Known Allergies  Home meds:     PHYSICAL EXAMINATION  T(C): 36.8 (24 @ 06:03), Max: 37.6 (24 @ 14:21) HR: 62 (24 @ 07:00) (56 - 72) BP: 192/77 (24 @ 12:00) (151/73 - 192/77) ABP: 195/61 (24 @ 07:00) (143/44 - 202/69) RR: 15 (24 @ 07:00) (14 - 18) SpO2: 97% (24 @ 07:00) (96% - 100%)  NEUROLOGIC EXAMINATION:  Patient AOx3 FC BUE / LE 5/5 no drift, R facial (h/o Arreaga's)  GENERAL:  room air   EENT:  anicteric  CARDIOVASCULAR: (+) S1 S2, normal rate and regular rhythm  PULMONARY: clear to auscultation bilaterally  ABDOMEN: soft, nontender with normoactive bowel sounds  EXTREMITIES: no edema  SKIN: no rash    LABS:     (8.02) 7.5  (8.0)  5.83  )-----------( 207      ( 18 Sep 2024 05:30 )             26.0        143  |  110[H]  |  10  ----------------------------<  110[H]  3.8   |  25  |  0.70    Ca    8.4      18 Sep 2024 05:30  Phos  3.7       Mg     2.0      @ 07:01  -   @ 07:00  --------------------------------------------------------  IN: 2090 mL / OUT: 1432 mL / NET: 658 mL     Bacteriology:  CSF studies:  EEG:  [email or web address] 09:46AM	CT BRAIN           	Diffuse SAH, mild midline shift, ventr dilation  2024 10:12AM	CT ANGIO NECK	No high-grade stenosis or occlusion  2024 10:12AM	CT ANGIO BRAIN	Mild right ICA stenosis, no large occlusion  2024 10:12AM	CT PERFUSION     	4ml core infarct, 4ml penumbra    Other imaging:    MEDICATIONS:     ·	enoxaparin Injectable 40 SubCutaneous every 12 hours  ·	bromocriptine Capsule 10 Oral every 8 hours  ·	levETIRAcetam 1000 Oral two times a day  ·	fludroCORTISONE 0.1 Oral two times a day  ·	ascorbic acid 500 Oral <User Schedule>  ·	ferrous    sulfate Liquid 300 Oral <User Schedule>  ·	sodium chloride 3%. 500 IV Continuous <Continuous>  ·	acetaminophen     Tablet .. 650 Oral every 6 hours PRN  ·	bisacodyl 5 Oral at bedtime PRN  ·	ondansetron Injectable 4 IV Push every 6 hours PRN  ·	oxyCODONE    IR 5 Oral every 4 hours PRN  ·	polyethylene glycol 3350 17 Oral two times a day PRN    IV FLUIDS: 3%@50  DRIPS:  ·	norepinephrine Infusion 0.05 MICROgram(s)/kG/Min IV Continuous <Continuous> OFF  DIET: regular diet   Lines: KORINA Ramos  Drains:     EVD @5cm H20 ICPs <11 output 190/24h   EVD @5cm H20 ICPs <11 output 216 /24h  Wounds:    CODE STATUS:  Full Code                       GOALS OF CARE:  aggressive                      DISPOSITION:  ICU =================================  NEUROCRITICAL CARE ATTENDING NOTE  =================================    KARRIE MORALES   MRN-[id number]  Summary:  63y/F  with uncontrolled HTN, does not take any medications at home presented to [facility] after developing acute onset severe HA. Once arrived, patient had episode of nausea with emesis. CTH showed SAH, HH: 2, MF: 3. NIHSS: 0. CTA neg for aneurysm. Transferred to [facility] for further mgmt. Upon arrival to [facility], NIHSS noted to be 0. Patient reports a headache rated at 7/10 in severity. Of note, patient's daughter notes pt seems off and is sleepier than her baseline. Pt denies SOB, chest pain, vision changes, nausea, weakness/numbness/tingling, dizziness, photophobia.  (08 Sep 2024 13:29)    COURSE IN THE HOSPITAL:   s/p angiogram, carotid & cerebral, b/l (2024, Alfredo Lomas), no aneurysm seen; kept intubated post procedure, EVD inserted   Tmax 38.5 L UE drift overnight; increased SBP goals to 180-200; angio mild VSP   No significant events overnight.    No significant events overnight. vomit x1 this AM    Past Medical History: No pertinent past medical history Hypertension  Allergies:  No Known Allergies  Home meds:     PHYSICAL EXAMINATION  T(C): 36.8 (24 @ 06:03), Max: 37.6 (24 @ 14:21) HR: 62 (24 @ 07:00) (56 - 72) BP: 192/77 (24 @ 12:00) (151/73 - 192/77) ABP: 195/61 (24 @ 07:00) (143/44 - 202/69) RR: 15 (24 @ 07:00) (14 - 18) SpO2: 97% (24 @ 07:00) (96% - 100%)  NEUROLOGIC EXAMINATION:  Patient AOx3 FC BUE / LE 5/ no drift, R facial (h/o Arreaga's)  GENERAL:  room air   EENT:  anicteric  CARDIOVASCULAR: (+) S1 S2, normal rate and regular rhythm  PULMONARY: clear to auscultation bilaterally  ABDOMEN: soft, nontender with normoactive bowel sounds  EXTREMITIES: no edema  SKIN: no rash    LABS:     (8.02) 7.5  (8.0)  5.83  )-----------( 207      ( 18 Sep 2024 05:30 )             26.0     143  |  110[H]  |  10  ----------------------------<  110[H]  3.8   |  25  |  0.70    Ca    8.4      18 Sep 2024 05:30  Phos  3.7       Mg     2.0      @ 07:01  -   @ 07:00  --------------------------------------------------------  IN: 2090 mL / OUT: 1432 mL / NET: 658 mL     Bacteriology:  CSF studies:  EEG:  [email or web address] 09:46AM	CT BRAIN           	Diffuse SAH, mild midline shift, ventr dilation  2024 10:12AM	CT ANGIO NECK	No high-grade stenosis or occlusion  2024 10:12AM	CT ANGIO BRAIN	Mild right ICA stenosis, no large occlusion  2024 10:12AM	CT PERFUSION     	4ml core infarct, 4ml penumbra    Other imaging:    MEDICATIONS:     ·	enoxaparin Injectable 40 SubCutaneous every 12 hours  ·	bromocriptine Capsule 10 Oral every 8 hours  ·	levETIRAcetam 1000 Oral two times a day  ·	fludroCORTISONE 0.1 Oral two times a day  ·	ascorbic acid 500 Oral <User Schedule>  ·	ferrous    sulfate Liquid 300 Oral <User Schedule>  ·	acetaminophen     Tablet .. 650 Oral every 6 hours PRN  ·	bisacodyl 5 Oral at bedtime PRN  ·	ondansetron Injectable 4 IV Push every 6 hours PRN  ·	oxyCODONE    IR 5 Oral every 4 hours PRN  ·	polyethylene glycol 3350 17 Oral two times a day PRN    IV FLUIDS: 3%@50  DRIPS:  DIET: regular diet   Lines: KORINA Ramos  Drains:     EVD @5cm H20 ICPs <11 output 190/24h   EVD @5cm H20 ICPs <11 output 216 /24h   EVD @5cm H20 ICPs <10 output 219/24h  Wounds:    CODE STATUS:  Full Code                       GOALS OF CARE:  aggressive                      DISPOSITION:  ICU

## 2024-09-18 NOTE — PROGRESS NOTE ADULT - SUBJECTIVE AND OBJECTIVE BOX
S/Overnight events: BILL overnight, neuro stable.       Hospital Course:   : Transfer to [facility] for further mgmt of SAH. Patient taken urgently to angio. ET tube pulled back 2cm. EVD placed, open @ 10. Stability scan stable, EVD in position, NGT placed. Started nimodipine 60q4. Extubated to face mask. POD 0 DSA neg for aneurysm.   : BD2. POD 1 angio. Started iron/vit C q other day for microcytic anermia. Started lisinopril 10mg daily. 1.L liter bolus for euvolemia. Ambriz removed, f/u TOV, started on prophylactic SQL 40mg BID (weight based). Passed TOV. Off cardene gtt. cardene gtt resumed. started hydral 25 q8, inc lisinopril dose from 10mg to 20mg daily.   9/10: BD3. POD 2 angio. BILL overnight. TTE showing EF 70%, mild symmetric LVH. DC cardene.   : BD 4. POD 3 angio. BILL overnight. SBP 160s, given hydralazine 10mg IVP x1. Given ducolax suppository. SBP liberalized 100-160. , given labetolol 5mg IVP. NS bolus 500cc for euvolemia. Lactulose for AM. PO hydral switched to clonidine.   : BD 5. POD 4 angio. BILL overnight. Anti-Xa within prophylactic range. , given labetolol 10mg IVP. 1L NS for euvolemia. lisinopril increased 40, norvasc 10 added. cardene started. change in exam: JAIME/LL 4/5 w/ drift, CTH/CTA performed showing severe spasm. febrile 101, pan cx sent. POD0 angio for spasm tx. Dc'd propofol. Extubated to face mask. SBP 190s, started on cardene gtt  : BD 6, POD 5 diagnostic angio, POD 1 angio for spasm tx. Changed NS to LR.   : BD 7, POD 6, POD 2 angio for spasm. Pt febrile ovn, f/u cultures. Start bromocriptine. Resume lisinopril. 1L bolus, f/u Na studies. Notified by primary RN of exam change: new dysarthria, L facial droop, LUE/LLLE withdrawing to noxious stim. Stroke code called, with high suspicion for vasospasm. Levo gtt started for SBP goal 180. CTA/CTP/CT performed - R sided spasm, no perfusion defect. Exam improved with higher BP, dysarthria and facial droop improving and L side 4/5. Discussed with Dr. Chao, Dr. Fuentes, and Dr. Butler - will keep SBP goal 160-200 and consider angiogram if not improving further.  9/15: BD 8, POD 7, POD 3 angio. Given 250 cc albumin bolus followed by 500 cc NS. Repeat BMP Na 134. S/p 1 L bolus for euvolemia. Full stregnth b/l, no drift; no repeat angio, regular diet. Given 250 albumin and 1 L NS for net negative volume.   : BD 9, POD 8, POD 4 angio. Remains npo after midnight. Given 500 cc bolus NS for euvolemia. 1L bolus for euvolemia. EVD lowered to 5 i/s/o spasm. added fludrocortisone 0.1 BID, salt tabs increased to 3q6hr. increased bromocriptine to 10 q8. POD0 repeat angio 15 mg verapamil injected to R ICA. SBP parameters liberalized 160-200, off levo. 5 cc EVD output during angio, drained 22 cc's post-op, ICPs single digits. emesis s/p salt tabs, florinef rpt dose, salt tabs dc'd. 1L NS for BP 150s. POD 0 s/p angiogram showing R ICA spasm with IA verapamil.   : BD 10. POD 9. POD 5 angio with spasm. POD 1 angio with spasm. BILL o/n. Started on levo gtt. Na 141, weaned 3% to 50cc/hr. Surveillance dopplers neg for. DVT. 500cc bolus for euvolemia.   : BD 11. POD 10. POD 6 angio with spasm. POD 2 angio with spasm. BILL o/n. SBP liberalized to 120-200, exam stable.         Vital Signs Last 24 Hrs  T(C): 37.2 (17 Sep 2024 22:05), Max: 37.6 (17 Sep 2024 04:33)  T(F): 98.9 (17 Sep 2024 22:05), Max: 99.7 (17 Sep 2024 04:33)  HR: 58 (18 Sep 2024 00:00) (57 - 75)  BP: 192/77 (17 Sep 2024 12:00) (151/73 - 192/77)  BP(mean): 110 (17 Sep 2024 12:00) (103 - 113)  RR: 14 (18 Sep 2024 00:00) (14 - 19)  SpO2: 98% (18 Sep 2024 00:00) (96% - 100%)    Parameters below as of 18 Sep 2024 00:00  Patient On (Oxygen Delivery Method): room air        I&O's Detail    16 Sep 2024 07:01  -  17 Sep 2024 07:00  --------------------------------------------------------  IN:    Norepinephrine: 14.3 mL    Norepinephrine: 32.3 mL    Oral Fluid: 980 mL    Sodium Chloride 0.9% Bolus: 1500 mL    sodium chloride 3%: 1330 mL    sodium chloride 3%: 250 mL  Total IN: 4106.6 mL    OUT:    External Ventricular Device (mL): 228 mL    Voided (mL): 2800 mL  Total OUT: 3028 mL    Total NET: 1078.6 mL      17 Sep 2024 07:01  -  18 Sep 2024 00:17  --------------------------------------------------------  IN:    Oral Fluid: 200 mL    Sodium Chloride 0.9% Bolus: 500 mL    sodium chloride 3%: 800 mL  Total IN: 1500 mL    OUT:    External Ventricular Device (mL): 169 mL    Voided (mL): 700 mL  Total OUT: 869 mL    Total NET: 631 mL        I&O's Summary    16 Sep 2024 07:01  -  17 Sep 2024 07:00  --------------------------------------------------------  IN: 4106.6 mL / OUT: 3028 mL / NET: 1078.6 mL    17 Sep 2024 07:01  -  18 Sep 2024 00:17  --------------------------------------------------------  IN: 1500 mL / OUT: 869 mL / NET: 631 mL          PHYSICAL EXAM:  Constitutional: awake, alert, sitting up in bed. NAD.   Respiratory: non-labored breathing. Normal chest rise.   Cardiovascular: Regular rate and rhythm  Gastrointestinal:  Soft, nontender, nondistended.  .  Vascular: Extremities warm, no ulcers, no discoloration of skin.   Neurological: Gen: AA&O x 3, conversant, appropriate.      CN II-XII grossly intact.    Motor: RANDALL x 4, 5/5 throughout UE/LE.    Sens: Sensation intact to light touch throughout.    Extremities: distal pulses 2+ x 4 DPPT symmetric throughout.     No pronator drift  Wound/incision: R groin c/d/i, no hematoma. Groin soft. +R frontal EVD.     DEVICE/DRAIN DRESSING:    TUBES/LINES:  [] CVC  [x] A-line  [] Lumbar Drain  [] Ventriculostomy  [] Other    DIET:  [] NPO  [x] Mechanical  [] Tube feeds      LABS:                        8.0    8.02  )-----------( 223      ( 17 Sep 2024 14:47 )             27.8     -    140  |  x   |  11  ----------------------------<  109[H]  3.6   |  24  |  0.74    Ca    8.7      17 Sep 2024 14:47  Phos  3.5       Mg     2.0         TPro  6.4  /  Alb  3.6  /  TBili  0.3  /  DBili  x   /  AST  14  /  ALT  14  /  AlkPhos  90  09-16    PTT - ( 16 Sep 2024 10:20 )  PTT:30.8 sec  Urinalysis Basic - ( 17 Sep 2024 23:40 )    Color: x / Appearance: x / SG: x / pH: x  Gluc: 109 mg/dL / Ketone: x  / Bili: x / Urobili: x   Blood: x / Protein: x / Nitrite: x   Leuk Esterase: x / RBC: x / WBC x   Sq Epi: x / Non Sq Epi: x / Bacteria: x          CAPILLARY BLOOD GLUCOSE          Drug Levels: [] N/A    CSF Analysis: [] N/A      Allergies    No Known Allergies    Intolerances      MEDICATIONS:  Antibiotics:    Neuro:  acetaminophen     Tablet .. 650 milliGRAM(s) Oral every 6 hours PRN  bromocriptine Capsule 10 milliGRAM(s) Oral every 8 hours  levETIRAcetam 1000 milliGRAM(s) Oral two times a day  ondansetron Injectable 4 milliGRAM(s) IV Push every 6 hours PRN  oxyCODONE    IR 5 milliGRAM(s) Oral every 4 hours PRN    Anticoagulation:  enoxaparin Injectable 40 milliGRAM(s) SubCutaneous every 12 hours    OTHER:  bisacodyl 5 milliGRAM(s) Oral at bedtime PRN  chlorhexidine 2% Cloths 1 Application(s) Topical <User Schedule>  fludroCORTISONE 0.1 milliGRAM(s) Oral two times a day  influenza  Vaccine (HIGH DOSE) 0.5 milliLiter(s) IntraMuscular once  polyethylene glycol 3350 17 Gram(s) Oral two times a day PRN    IVF:  ascorbic acid 500 milliGRAM(s) Oral <User Schedule>  ferrous    sulfate Liquid 300 milliGRAM(s) Oral <User Schedule>  sodium chloride 3%. 500 milliLiter(s) IV Continuous <Continuous>    CULTURES:  Culture Results:   No growth ( @ 23:05)  Culture Results:   No growth at 4 days ( @ 16:54)    RADIOLOGY & ADDITIONAL TESTS:      ASSESSMENT:  63F PMHx uncontrolled HT, R samuels's palsy, was BIBEMS to [facility]  c/o sudden onset severe HA. CTH showed SAH HH: 2, MF: 3. NIHSS: 0. CTA neg for aneurysm. Transferred to [facility] for further mgmt. S/p DSA negative for anueyrsm (24). S/p R frontal EVD (24). S/p angiogram showing mild R LILLY/R MCA spasm with IA verapamil (), s/p  angiogram with 15 mg IA verapamil for R ICA spasm (9/16).       HTN    No pertinent family history in first degree relatives    Handoff    No pertinent past medical history    Hypertension    SAH (subarachnoid hemorrhage)    Cerebral vasospasm    SAH (subarachnoid hemorrhage)    Cerebral vasospasm    SAH (subarachnoid hemorrhage)    Subarachnoid hemorrhage    Angiogram, carotid and cerebral, bilateral    Insertion of intravenous catheter with ultrasound guidance    No significant past surgical history    No significant past surgical history    H/O  section    HTN    Hypertension    [email or web address]        PLAN:    Neuro:  - Neuro/vitals q1hr  - Seizure prophylaxis: Keppra 1g BID   - R frontal EVD @ 5cmH2O, monitor output   - DCI prophylaxis: Nimodipime 60q4 (holding while on pressors)   - Pain control: Tylenol, oxy 5 prn  - stability CTH post-EVD complete , stable, CTH  stable   - CTA : b/l LILLY spasm, b/l MCA R>L  - MR brain and C spine w/wo contrast complete   - Bromocriptine 10mg q8h for central fever    Cards:   - -200  - TTE 9/10: EF 70%, mild symmetric LVH    Pulm:   - RA  - IS    GI:  - Regular diet   - Bowel regimen, LBM   - Nausea: Zofran 4q6 prn     Renal:  - NaCl 3% @ 50cc/hr; fludrocortisone 0.1 mg BID   - Na goal 140-145   - voiding   - euvolemic goal     Endo:  - A1c 5.8    Heme:   - DVT prophylaxis: SCDs to BL LE, ppx SQL 40 BID (weight based)   - Anti Xa : 0.31  LE dopplers : R posterior tibial DVT on admission ()- surveillace dopplers  neg for DVT  - Microcytic anemia: iron/vit C q other day     ID:   - pan cx , f/u cx    Dispo: NSICU, full code, PT/OT rec AR    Discussed with Dr. Butler and Dr. Chao

## 2024-09-18 NOTE — PROGRESS NOTE ADULT - ASSESSMENT
63f hx HTN, iron deficiency anemia admit for DSA negative SAH hh2mf3 c/b vasospasm s/p IA therapy     -Ncq1, VSq1   -EVD management @5cmh20; monitor output and ICP  -c/w AEDs  -new SBP goal 120-200; - pt SBP down trending naturally & neurological examination intact w/o drift   -fludrocortisone 0.1mg BID, monitor for hypokalemia; 3% HTS @ 30cc/hr cbc q8 ; NS 1L over next 4 hrs  given net negative documented status   -dvt ppx w/ SQL 63f hx HTN, iron deficiency anemia admit for DSA negative SAH hh2mf3 c/b vasospasm s/p IA therapy     -Ncq1, VSq1   -EVD management @5cmh20; monitor output and ICP  -c/w AEDs  -SBP goal 120-200;   -fludrocortisone 0.1mg BID, monitor for hypokalemia; 3% HTS @ 30cc/hr cbc q8 ; NS 1L over next 4 hrs  given net negative documented status   -dvt ppx w/ SQL

## 2024-09-18 NOTE — PROGRESS NOTE ADULT - ASSESSMENT
63y/F with  subarachnoid hemorrhage HH2 MF3, brain compression, cerebral edema s/p angiogram, carotid & cerebral, b/l (09/08/2024, Alfredo Lomas)  Hypertension    PLAN:   NEURO: neurochecks q1h, PRN pain meds with acetaminophen  no nimodipine   EVD@5cm H20 - once -200  continue bromocriptine - cont to 10mg q8h   seizure prophylaxis:  levetiracetam 1000mg PO BID, as per neurosurgery   angio today   REHAB:  physical therapy evaluation and management    EARLY MOB:      PULM:  room air  CARDIO:  SBP goal 150-200 mm Hg; EF   ENDO:  Blood sugar goals 140-180 mg/dL  GI:  bowel regimen   DIET: regular diet  RENAL:  cont salt tabs 3%@50cc/hr, cont fludrocortisone  HEM/ONC: Hb stable, continue iron / Vit C  VTE Prophylaxis: SCDs L, SQL 40 BID; R LE DVT - below knee - surveillance doppler today   ID: afebrile, no leukocytosis  Social: will update family     Active issues:  What's keeping patient in the ICU? EVD, vasospasm   What is this patient's dispo plan?    ATTENDING ATTESTATION:  I was physically present for the key portions of the evaluation and management (E/M) service provided.  I agree with the above history, physical and plan, which I have reviewed and edited where appropriate.    Patient at high risk for neurological deterioration or death due to:  ICU delirium, aspiration PNA, DVT / PE.  Critical care time:  I have personally provided 45 minutes of critical care time, excluding time spent on separate procedures.      Plan discussed with RN, house staff. 63y/F with  subarachnoid hemorrhage HH2 MF3, brain compression, cerebral edema s/p angiogram, carotid & cerebral, b/l (09/08/2024, Alfredo Lomas)  Hypertension    PLAN:   NEURO: neurochecks q1h, PRN pain meds with acetaminophen  no nimodipine   EVD@5cm H20 - once -200  continue bromocriptine - decrease to 5mg q8h   seizure prophylaxis:  levetiracetam 1000mg PO BID, as per neurosurgery   REHAB:  physical therapy evaluation and management    EARLY MOB:  HOBup OOB to c hair    PULM:  room air, incentive spirometry   CARDIO:  SBP goal 120-200 mm Hg; EF   ENDO:  Blood sugar goals 140-180 mg/dL  GI:  start bowel regimen   DIET: regular diet  RENAL:  off salt tabs 3%@30cc/hr, cont fludrocortisone; Check BMP this afternoon;   HEM/ONC: Hb stable, continue iron / Vit C  VTE Prophylaxis: SCDs, SQL 40 BID; R LE DVT - repeat doppler NEG  ID: afebrile, no leukocytosis  Social: will update family     Active issues:  What's keeping patient in the ICU? EVD, vasospasm   What is this patient's dispo plan?    ATTENDING ATTESTATION:  I was physically present for the key portions of the evaluation and management (E/M) service provided.  I agree with the above history, physical and plan, which I have reviewed and edited where appropriate.    Patient at high risk for neurological deterioration or death due to:  ICU delirium, aspiration PNA, DVT / PE.  Critical care time:  I have personally provided 45 minutes of critical care time, excluding time spent on separate procedures.      Plan discussed with RN, house staff.

## 2024-09-19 LAB
ANION GAP SERPL CALC-SCNC: 5 MMOL/L — SIGNIFICANT CHANGE UP (ref 5–17)
ANION GAP SERPL CALC-SCNC: 8 MMOL/L — SIGNIFICANT CHANGE UP (ref 5–17)
ANION GAP SERPL CALC-SCNC: 8 MMOL/L — SIGNIFICANT CHANGE UP (ref 5–17)
BUN SERPL-MCNC: 7 MG/DL — SIGNIFICANT CHANGE UP (ref 7–23)
BUN SERPL-MCNC: 8 MG/DL — SIGNIFICANT CHANGE UP (ref 7–23)
BUN SERPL-MCNC: 9 MG/DL — SIGNIFICANT CHANGE UP (ref 7–23)
CALCIUM SERPL-MCNC: 8.2 MG/DL — LOW (ref 8.4–10.5)
CALCIUM SERPL-MCNC: 8.4 MG/DL — SIGNIFICANT CHANGE UP (ref 8.4–10.5)
CALCIUM SERPL-MCNC: 8.6 MG/DL — SIGNIFICANT CHANGE UP (ref 8.4–10.5)
CHLORIDE SERPL-SCNC: 105 MMOL/L — SIGNIFICANT CHANGE UP (ref 96–108)
CO2 SERPL-SCNC: 24 MMOL/L — SIGNIFICANT CHANGE UP (ref 22–31)
CO2 SERPL-SCNC: 25 MMOL/L — SIGNIFICANT CHANGE UP (ref 22–31)
CO2 SERPL-SCNC: 25 MMOL/L — SIGNIFICANT CHANGE UP (ref 22–31)
CREAT SERPL-MCNC: 0.69 MG/DL — SIGNIFICANT CHANGE UP (ref 0.5–1.3)
CREAT SERPL-MCNC: 0.7 MG/DL — SIGNIFICANT CHANGE UP (ref 0.5–1.3)
CREAT SERPL-MCNC: 0.72 MG/DL — SIGNIFICANT CHANGE UP (ref 0.5–1.3)
EGFR: 93 ML/MIN/1.73M2 — SIGNIFICANT CHANGE UP
EGFR: 96 ML/MIN/1.73M2 — SIGNIFICANT CHANGE UP
EGFR: 96 ML/MIN/1.73M2 — SIGNIFICANT CHANGE UP
GLUCOSE SERPL-MCNC: 108 MG/DL — HIGH (ref 70–99)
GLUCOSE SERPL-MCNC: 126 MG/DL — HIGH (ref 70–99)
GLUCOSE SERPL-MCNC: 127 MG/DL — HIGH (ref 70–99)
HCT VFR BLD CALC: 26.3 % — LOW (ref 34.5–45)
HGB BLD-MCNC: 7.7 G/DL — LOW (ref 11.5–15.5)
MAGNESIUM SERPL-MCNC: 1.9 MG/DL — SIGNIFICANT CHANGE UP (ref 1.6–2.6)
MCHC RBC-ENTMCNC: 21.6 PG — LOW (ref 27–34)
MCHC RBC-ENTMCNC: 29.3 GM/DL — LOW (ref 32–36)
MCV RBC AUTO: 73.7 FL — LOW (ref 80–100)
NRBC # BLD: 0 /100 WBCS — SIGNIFICANT CHANGE UP (ref 0–0)
PHOSPHATE SERPL-MCNC: 3.6 MG/DL — SIGNIFICANT CHANGE UP (ref 2.5–4.5)
PLATELET # BLD AUTO: 235 K/UL — SIGNIFICANT CHANGE UP (ref 150–400)
POTASSIUM SERPL-MCNC: 3.8 MMOL/L — SIGNIFICANT CHANGE UP (ref 3.5–5.3)
POTASSIUM SERPL-MCNC: 4 MMOL/L — SIGNIFICANT CHANGE UP (ref 3.5–5.3)
POTASSIUM SERPL-MCNC: 4.2 MMOL/L — SIGNIFICANT CHANGE UP (ref 3.5–5.3)
POTASSIUM SERPL-SCNC: 3.8 MMOL/L — SIGNIFICANT CHANGE UP (ref 3.5–5.3)
POTASSIUM SERPL-SCNC: 4 MMOL/L — SIGNIFICANT CHANGE UP (ref 3.5–5.3)
POTASSIUM SERPL-SCNC: 4.2 MMOL/L — SIGNIFICANT CHANGE UP (ref 3.5–5.3)
RBC # BLD: 3.57 M/UL — LOW (ref 3.8–5.2)
RBC # FLD: 18.9 % — HIGH (ref 10.3–14.5)
SODIUM SERPL-SCNC: 135 MMOL/L — SIGNIFICANT CHANGE UP (ref 135–145)
SODIUM SERPL-SCNC: 137 MMOL/L — SIGNIFICANT CHANGE UP (ref 135–145)
SODIUM SERPL-SCNC: 138 MMOL/L — SIGNIFICANT CHANGE UP (ref 135–145)
WBC # BLD: 6.91 K/UL — SIGNIFICANT CHANGE UP (ref 3.8–10.5)
WBC # FLD AUTO: 6.91 K/UL — SIGNIFICANT CHANGE UP (ref 3.8–10.5)

## 2024-09-19 PROCEDURE — 71045 X-RAY EXAM CHEST 1 VIEW: CPT | Mod: 26

## 2024-09-19 PROCEDURE — 99291 CRITICAL CARE FIRST HOUR: CPT

## 2024-09-19 PROCEDURE — 99024 POSTOP FOLLOW-UP VISIT: CPT

## 2024-09-19 RX ORDER — SODIUM CHLORIDE 5 G/100ML
500 INJECTION, SOLUTION INTRAVENOUS
Refills: 0 | Status: DISCONTINUED | OUTPATIENT
Start: 2024-09-19 | End: 2024-09-22

## 2024-09-19 RX ORDER — SODIUM CHLORIDE 0.9 % (FLUSH) 0.9 %
500 SYRINGE (ML) INJECTION ONCE
Refills: 0 | Status: COMPLETED | OUTPATIENT
Start: 2024-09-19 | End: 2024-09-19

## 2024-09-19 RX ORDER — FLUDROCORTISONE ACETATE 0.1 MG/1
0.1 TABLET ORAL ONCE
Refills: 0 | Status: COMPLETED | OUTPATIENT
Start: 2024-09-19 | End: 2024-09-19

## 2024-09-19 RX ORDER — MAGNESIUM SULFATE 500 MG/ML
2 VIAL (ML) INJECTION ONCE
Refills: 0 | Status: COMPLETED | OUTPATIENT
Start: 2024-09-19 | End: 2024-09-19

## 2024-09-19 RX ORDER — FLUDROCORTISONE ACETATE 0.1 MG/1
0.2 TABLET ORAL
Refills: 0 | Status: DISCONTINUED | OUTPATIENT
Start: 2024-09-20 | End: 2024-09-23

## 2024-09-19 RX ADMIN — FLUDROCORTISONE ACETATE 0.1 MILLIGRAM(S): 0.1 TABLET ORAL at 22:39

## 2024-09-19 RX ADMIN — Medication 500 MILLILITER(S): at 16:18

## 2024-09-19 RX ADMIN — BROMOCRIPTINE MESYLATE 5 MILLIGRAM(S): 5 CAPSULE ORAL at 05:49

## 2024-09-19 RX ADMIN — Medication 1000 MILLIGRAM(S): at 05:48

## 2024-09-19 RX ADMIN — CHLORHEXIDINE GLUCONATE ORAL RINSE 1 APPLICATION(S): 1.2 SOLUTION DENTAL at 05:48

## 2024-09-19 RX ADMIN — BROMOCRIPTINE MESYLATE 5 MILLIGRAM(S): 5 CAPSULE ORAL at 13:18

## 2024-09-19 RX ADMIN — Medication 25 GRAM(S): at 06:32

## 2024-09-19 RX ADMIN — ENOXAPARIN SODIUM 40 MILLIGRAM(S): 150 INJECTION SUBCUTANEOUS at 22:39

## 2024-09-19 RX ADMIN — FLUDROCORTISONE ACETATE 0.1 MILLIGRAM(S): 0.1 TABLET ORAL at 18:47

## 2024-09-19 RX ADMIN — ENOXAPARIN SODIUM 40 MILLIGRAM(S): 150 INJECTION SUBCUTANEOUS at 09:15

## 2024-09-19 RX ADMIN — Medication 1000 MILLIGRAM(S): at 18:46

## 2024-09-19 RX ADMIN — FLUDROCORTISONE ACETATE 0.1 MILLIGRAM(S): 0.1 TABLET ORAL at 05:48

## 2024-09-19 RX ADMIN — Medication 40 MILLIEQUIVALENT(S): at 06:32

## 2024-09-19 RX ADMIN — SODIUM CHLORIDE 50 MILLILITER(S): 5 INJECTION, SOLUTION INTRAVENOUS at 12:22

## 2024-09-19 NOTE — PROGRESS NOTE ADULT - SUBJECTIVE AND OBJECTIVE BOX
S/Overnight events:  BILL overnight, neuro stable.       Hospital Course:   : Transfer to [facility] for further mgmt of SAH. Patient taken urgently to angio. ET tube pulled back 2cm. EVD placed, open @ 10. Stability scan stable, EVD in position, NGT placed. Started nimodipine 60q4. Extubated to face mask. POD 0 DSA neg for aneurysm.   : BD2. POD 1 angio. Started iron/vit C q other day for microcytic anermia. Started lisinopril 10mg daily. 1.L liter bolus for euvolemia. Ambriz removed, f/u TOV, started on prophylactic SQL 40mg BID (weight based). Passed TOV. Off cardene gtt. cardene gtt resumed. started hydral 25 q8, inc lisinopril dose from 10mg to 20mg daily.   9/10: BD3. POD 2 angio. BILL overnight. TTE showing EF 70%, mild symmetric LVH. DC cardene.   : BD 4. POD 3 angio. BILL overnight. SBP 160s, given hydralazine 10mg IVP x1. Given ducolax suppository. SBP liberalized 100-160. , given labetolol 5mg IVP. NS bolus 500cc for euvolemia. Lactulose for AM. PO hydral switched to clonidine.   : BD 5. POD 4 angio. BILL overnight. Anti-Xa within prophylactic range. , given labetolol 10mg IVP. 1L NS for euvolemia. lisinopril increased 40, norvasc 10 added. cardene started. change in exam: JAIME/LL 4/5 w/ drift, CTH/CTA performed showing severe spasm. febrile 101, pan cx sent. POD0 angio for spasm tx. Dc'd propofol. Extubated to face mask. SBP 190s, started on cardene gtt  : BD 6, POD 5 diagnostic angio, POD 1 angio for spasm tx. Changed NS to LR.   : BD 7, POD 6, POD 2 angio for spasm. Pt febrile ovn, f/u cultures. Start bromocriptine. Resume lisinopril. 1L bolus, f/u Na studies. Notified by primary RN of exam change: new dysarthria, L facial droop, LUE/LLLE withdrawing to noxious stim. Stroke code called, with high suspicion for vasospasm. Levo gtt started for SBP goal 180. CTA/CTP/CT performed - R sided spasm, no perfusion defect. Exam improved with higher BP, dysarthria and facial droop improving and L side 4/5. Discussed with Dr. Chao, Dr. Fuentes, and Dr. Butler - will keep SBP goal 160-200 and consider angiogram if not improving further.  9/15: BD 8, POD 7, POD 3 angio. Given 250 cc albumin bolus followed by 500 cc NS. Repeat BMP Na 134. S/p 1 L bolus for euvolemia. Full stregnth b/l, no drift; no repeat angio, regular diet. Given 250 albumin and 1 L NS for net negative volume.   : BD 9, POD 8, POD 4 angio. Remains npo after midnight. Given 500 cc bolus NS for euvolemia. 1L bolus for euvolemia. EVD lowered to 5 i/s/o spasm. added fludrocortisone 0.1 BID, salt tabs increased to 3q6hr. increased bromocriptine to 10 q8. POD0 repeat angio 15 mg verapamil injected to R ICA. SBP parameters liberalized 160-200, off levo. 5 cc EVD output during angio, drained 22 cc's post-op, ICPs single digits. emesis s/p salt tabs, florinef rpt dose, salt tabs dc'd. 1L NS for BP 150s. POD 0 s/p angiogram showing R ICA spasm with IA verapamil.   : BD 10. POD 9. POD 5 angio with spasm. POD 1 angio with spasm. BILL o/n. Started on levo gtt. Na 141, weaned 3% to 50cc/hr. Surveillance dopplers neg for. DVT. 500cc bolus for euvolemia.   : BD 11. POD 10. POD 6 angio with spasm. POD 2 angio with spasm. BILL o/n. SBP liberalized to 120-200, exam stable. 3% decreased to 30cc/hr. BMP pend 4pm. Given 5mg hydralazine x 1 for . decreased bromo to 5mgq8. BM today. given 5mg hydral for .   : BD 12. POD 11. POD 7 angio with spasm. POD 3 angio with spasm. BILL o/n. Given 1L for euvolemia.       Vital Signs Last 24 Hrs  T(C): 36.6 (19 Sep 2024 00:30), Max: 38.1 (18 Sep 2024 14:03)  T(F): 97.8 (19 Sep 2024 00:30), Max: 100.6 (18 Sep 2024 14:03)  HR: 57 (19 Sep 2024 00:00) (56 - 88)  BP: 167/73 (18 Sep 2024 14:17) (163/101 - 167/73)  BP(mean): 105 (18 Sep 2024 14:17) (105 - 127)  RR: 19 (19 Sep 2024 00:00) (14 - 19)  SpO2: 99% (19 Sep 2024 00:00) (93% - 100%)    Parameters below as of 19 Sep 2024 00:00  Patient On (Oxygen Delivery Method): room air        I&O's Detail    17 Sep 2024 07:01  -  18 Sep 2024 07:00  --------------------------------------------------------  IN:    Oral Fluid: 440 mL    Sodium Chloride 0.9% Bolus: 500 mL    sodium chloride 3%: 1150 mL  Total IN: 2090 mL    OUT:    Emesis (mL): 200 mL    External Ventricular Device (mL): 243 mL    Voided (mL): 1300 mL  Total OUT: 1743 mL    Total NET: 347 mL      18 Sep 2024 07:01  -  19 Sep 2024 00:51  --------------------------------------------------------  IN:    Oral Fluid: 500 mL    Sodium Chloride 0.9% Bolus: 1000 mL    sodium chloride 3%: 580 mL  Total IN: 2080 mL    OUT:    Emesis (mL): 0 mL    External Ventricular Device (mL): 189 mL    Voided (mL): 1900 mL  Total OUT: 2089 mL    Total NET: -9 mL        I&O's Summary    17 Sep 2024 07:01  -  18 Sep 2024 07:00  --------------------------------------------------------  IN: 2090 mL / OUT: 1743 mL / NET: 347 mL    18 Sep 2024 07:01  -  19 Sep 2024 00:51  --------------------------------------------------------  IN:  mL / OUT:  mL / NET: -9 mL        PHYSICAL EXAM:  Constitutional: awake, alert, sitting up in bed. NAD.   Respiratory: non-labored breathing. Normal chest rise.   Cardiovascular: Regular rate and rhythm  Gastrointestinal:  Soft, nontender, nondistended.  .  Vascular: Extremities warm, no ulcers, no discoloration of skin.   Neurological: Gen: AA&O x 3, conversant, appropriate.      CN II-XII grossly intact.    Motor: RANDALL x 4, 5/5 throughout UE/LE.    Sens: Sensation intact to light touch throughout.    Extremities: distal pulses 2+ x 4 DPPT symmetric throughout.     No pronator drift  Wound/incision: R groin c/d/i, no hematoma. Groin soft. +R frontal EVD.     DEVICE/DRAIN DRESSING:    TUBES/LINES:  [] CVC  [x] A-line  [] Lumbar Drain  [] Ventriculostomy  [] Other    DIET:  [] NPO  [x] Mechanical  [] Tube feeds      LABS:                        7.7    9.34  )-----------( 236      ( 18 Sep 2024 21:34 )             26.6     -18    136  |  102  |  9   ----------------------------<  123[H]  3.7   |  25  |  0.71    Ca    8.2[L]      18 Sep 2024 21:34  Phos  3.7       Mg     2.0     18        Urinalysis Basic - ( 18 Sep 2024 21:34 )    Color: x / Appearance: x / SG: x / pH: x  Gluc: 123 mg/dL / Ketone: x  / Bili: x / Urobili: x   Blood: x / Protein: x / Nitrite: x   Leuk Esterase: x / RBC: x / WBC x   Sq Epi: x / Non Sq Epi: x / Bacteria: x          CAPILLARY BLOOD GLUCOSE      POCT Blood Glucose.: 277 mg/dL (18 Sep 2024 21:20)      Drug Levels: [] N/A    CSF Analysis: [] N/A      Allergies    No Known Allergies    Intolerances      MEDICATIONS:  Antibiotics:    Neuro:  acetaminophen     Tablet .. 650 milliGRAM(s) Oral every 6 hours PRN  bromocriptine Capsule 5 milliGRAM(s) Oral every 8 hours  levETIRAcetam 1000 milliGRAM(s) Oral two times a day  ondansetron Injectable 4 milliGRAM(s) IV Push every 6 hours PRN  oxyCODONE    IR 5 milliGRAM(s) Oral every 4 hours PRN    Anticoagulation:  enoxaparin Injectable 40 milliGRAM(s) SubCutaneous every 12 hours    OTHER:  bisacodyl 5 milliGRAM(s) Oral at bedtime  chlorhexidine 2% Cloths 1 Application(s) Topical <User Schedule>  fludroCORTISONE 0.1 milliGRAM(s) Oral two times a day  influenza  Vaccine (HIGH DOSE) 0.5 milliLiter(s) IntraMuscular once  polyethylene glycol 3350 17 Gram(s) Oral two times a day  senna 2 Tablet(s) Oral at bedtime    IVF:  ascorbic acid 500 milliGRAM(s) Oral <User Schedule>  ferrous    sulfate Liquid 300 milliGRAM(s) Oral <User Schedule>  sodium chloride 3%. 500 milliLiter(s) IV Continuous <Continuous>    CULTURES:  Culture Results:   No growth ( @ 23:05)  Culture Results:   No growth at 5 days ( @ 16:54)    RADIOLOGY & ADDITIONAL TESTS:      ASSESSMENT:  63F PMHx uncontrolled HT, R samuels's palsy, was BIBEMS to [facility]  c/o sudden onset severe HA. CTH showed SAH HH: 2, MF: 3. NIHSS: 0. CTA neg for aneurysm. Transferred to [facility] for further mgmt. S/p DSA negative for anueyrsm (24). S/p R frontal EVD (24). S/p angiogram showing mild R LILLY/R MCA spasm with IA verapamil (), s/p  angiogram with 15 mg IA verapamil for R ICA spasm ().     HTN    No pertinent family history in first degree relatives    Handoff    No pertinent past medical history    Hypertension    SAH (subarachnoid hemorrhage)    Cerebral vasospasm    SAH (subarachnoid hemorrhage)    Cerebral vasospasm    SAH (subarachnoid hemorrhage)    Subarachnoid hemorrhage    Angiogram, carotid and cerebral, bilateral    Insertion of intravenous catheter with ultrasound guidance    No significant past surgical history    No significant past surgical history    H/O  section    HTN    Hypertension    [email or web address]        PLAN:    Neuro:  - Neuro/vitals q1hr  - Seizure prophylaxis: Keppra 1g BID   - R frontal EVD @ 5cmH2O, monitor output   - DCI prophylaxis: Nimodipime 60q4 (holding while on pressors)   - Pain control: Tylenol, oxy 5 prn  - stability CTH post-EVD complete , stable, CTH  stable   - CTA : b/l LILLY spasm, b/l MCA R>L  - MR brain and C spine w/wo contrast complete   - Bromocriptine 5mg q8h for central fever    Cards:   - -200  - TTE 9/10: EF 70%, mild symmetric LVH    Pulm:   - RA  - IS    GI:  - Regular diet   - Bowel regimen, LBM   - Nausea: Zofran 4q6 prn     Renal:  - NaCl 3% @ 30cc/hr; fludrocortisone 0.1 mg BID   - Na goal 140-145   - voiding   - euvolemic goal     Endo:  - A1c 5.8    Heme:   - DVT prophylaxis: SCDs to BL LE, ppx SQL 40 BID (weight based)   - Anti Xa : 0.31  LE dopplers : R posterior tibial DVT on admission ()- surveillace dopplers  neg for DVT  - Microcytic anemia: iron/vit C q other day     ID:   - pan cx , f/u cx    Dispo: NSICU, full code, PT/OT rec AR    Discussed with Dr. Butler and Dr. Chao

## 2024-09-19 NOTE — PROGRESS NOTE ADULT - SUBJECTIVE AND OBJECTIVE BOX
INTERVAL HISTORY: HPI:  63F PMHx uncontrolled HTN, does not take any medications at home presented to [facility] after developing acute onset severe HA. Once arrived, patient had episode of nausea with emesis. CTH showed SAH, HH: 2, MF: 3. NIHSS: 0. CTA neg for aneurysm. Transferred to [facility] for further mgmt. Upon arrival to [facility], NIHSS noted to be 0. Patient reports a headache rated at 7/10 in severity. Of note, patient's daughter notes pt seems off and is sleepier than her baseline. Pt denies SOB, chest pain, vision changes, nausea, weakness/numbness/tingling, dizziness, photophobia.  (08 Sep 2024 13:29)    Drug Dosing Weight  Height (cm): 161.3 (16 Sep 2024 09:01)  Weight (kg): 115 (16 Sep 2024 09:01)  BMI (kg/m2): 44.2 (16 Sep 2024 09:01)  BSA (m2): 2.15 (16 Sep 2024 09:01)    PAST MEDICAL & SURGICAL HISTORY:  Hypertension  H/O  section    REVIEW OF SYSTEMS: [ ] Unable to Assess due to neurologic exam   [ ] All ROS addressed below are non-contributory, except:  Neuro: [ ] Headache [ ] Back pain [ ] Numbness [ ] Weakness [ ] Ataxia [ ] Dizziness [ ] Aphasia [ ] Dysarthria [ ] Visual disturbance  Resp: [ ] Shortness of breath/dyspnea, [ ] Orthopnea [ ] Cough  CV: [ ] Chest pain [ ] Palpitation [ ] Lightheadedness [ ] Syncope  Renal: [ ] Thirst [ ] Edema  GI: [ ] Nausea [ ] Emesis [ ] Abdominal pain [ ] Constipation [ ] Diarrhea  Hem: [ ] Hematemesis [ ] bright red blood per rectum  ID: [ ] Fever [ ] Chills [ ] Dysuria  ENT: [ ] Rhinorrhea    PHYSICAL EXAM:    General: No Acute Distress   Neurological: Awake, alert oriented to person, place and time, Following Commands, PERRL, EOMI, Face Symmetrical, Speech Fluent, Moving all extremities, Muscle Strength normal in all four extremities, No Drift, Sensation to Light Touch Intact  Pulmonary: Clear to Auscultation, No Rales, No Rhonchi, No Wheezes   Cardiovascular: S1, S2, Regular Rte and Rhythm   Gastrointestinal: Soft, Nontender, Nondistended   Extremities: No calf tenderness   Incision: EVD in place @ 10cm      ICU Vital Signs Last 24 Hrs  T(C): 37.2 (19 Sep 2024 21:50), Max: 37.2 (19 Sep 2024 21:50)  T(F): 99 (19 Sep 2024 21:50), Max: 99 (19 Sep 2024 21:50)  HR: 72 (19 Sep 2024 21:00) (57 - 89)  BP: 176/72 (19 Sep 2024 12:15) (176/72 - 204/98)  BP(mean): 103 (19 Sep 2024 12:15) (103 - 141)  ABP: 193/66 (19 Sep 2024 21:00) (149/52 - 249/110)  ABP(mean): 116 (19 Sep 2024 21:00) (85 - 170)  RR: 17 (19 Sep 2024 21:00) (15 - 20)  SpO2: 98% (19 Sep 2024 21:00) (98% - 100%)      24 @ 07:01  -  24 @ 07:00  --------------------------------------------------------  IN: 2710 mL / OUT: 2758 mL / NET: -48 mL    24 @ 07:01  -  24 @ 22:15  --------------------------------------------------------  IN: 1530 mL / OUT: 1850 mL / NET: -320 mL            acetaminophen     Tablet .. 650 milliGRAM(s) Oral every 6 hours PRN  ascorbic acid 500 milliGRAM(s) Oral <User Schedule>  bisacodyl 5 milliGRAM(s) Oral at bedtime  bromocriptine Capsule 5 milliGRAM(s) Oral every 8 hours  chlorhexidine 2% Cloths 1 Application(s) Topical <User Schedule>  enoxaparin Injectable 40 milliGRAM(s) SubCutaneous every 12 hours  ferrous    sulfate Liquid 300 milliGRAM(s) Oral <User Schedule>  fludroCORTISONE 0.1 milliGRAM(s) Oral two times a day  influenza  Vaccine (HIGH DOSE) 0.5 milliLiter(s) IntraMuscular once  levETIRAcetam 1000 milliGRAM(s) Oral two times a day  ondansetron Injectable 4 milliGRAM(s) IV Push every 6 hours PRN  oxyCODONE    IR 5 milliGRAM(s) Oral every 4 hours PRN  polyethylene glycol 3350 17 Gram(s) Oral two times a day  senna 2 Tablet(s) Oral at bedtime  sodium chloride 3%. 500 milliLiter(s) (50 mL/Hr) IV Continuous <Continuous>      LABS:  Na: 137 ( @ 19:56), 138 ( 13:20), 135 ( 05:14), 136 ( 21:34), 138 ( @ 15:48), 143 ( 05:30), 140 ( 23:40), 139 ( 14:47), 140 ( 08:46), 141 ( @ 23:49)  K: 4.0 (:56), 4.2 ( 13:20), 3.8 ( 05:14), 3.7 ( 21:34), 3.9 ( 15:48), 3.8 ( 05:30), 3.6 ( @ 23:40), 4.1 ( 14:47), 4.0 ( 08:46), 4.2 ( @ 23:49)  Cl: 105 ( 19:56), 105 ( 13:20), 105 ( 05:14), 102 ( 21:34), 104 ( @ 15:48), 110 ( 05:30), 109 ( @ 23:40), 106 ( @ 14:47), 110 ( @ 08:46), 112 (09-16 @ 23:49)  CO2: 24 ( @ 19:56), 25 ( @ 13:20), 25 ( @ 05:14), 25 (18 @ 21:34), 25 ( @ 15:48), 25 ( @ 05:30), 24 (17 @ 23:40), 25 ( @ 14:47), 25 ( @ 08:46), 24 ( @ 23:49)  BUN: 8 ( @ 19:56), 7 ( @ 13:20), 9 ( @ 05:14), 9 ( @ 21:34), 8 ( @ 15:48), 10 ( @ 05:30), 11 ( @ 23:40), 11 ( @ 14:47), 11 ( @ 08:46), 10 (16 @ 23:49)  Cr: 0.69 ( @ 19:56), 0.70 ( @ 13:20), 0.72 ( @ 05:14), 0.71 ( @ 21:34), 0.65 ( @ 15:48), 0.70 ( @ 05:30), 0.74 (17 @ 23:40), 0.63 ( @ 14:47), 0.67 ( @ 08:46), 0.66 ( @ 23:49)  Glu: 126( @ 19:56), 127( @ 13:20), 108( @ 05:14), 123(18 @ 21:34), 112( @ 15:48), 110( @ 05:30), 109( @ 23:40), 124( @ 14:47), 117( @ 08:46), 107(16 @ 23:49)    Hgb: 7.7 ( @ 05:14), 7.7 ( @ 21:34), 7.5 ( @ 05:30), 8.0 ( @ 14:47), 7.5 ( @ 08:46)  Hct: 26.3 ( @ 05:14), 26.6 ( @ 21:34), 26.0 ( @ 05:30), 27.8 ( @ 14:47), 26.5 ( @ 08:46)  WBC: 6.91 ( @ 05:14), 9.34 ( @ 21:34), 5.83 ( @ 05:30), 8.02 ( @ 14:47), 7.36 ( @ 08:46)  Plt: 235 ( @ 05:14), 236 ( @ 21:34), 207 ( @ 05:30), 223 ( @ 14:47), 223 ( @ 08:46)

## 2024-09-19 NOTE — PROGRESS NOTE ADULT - ASSESSMENT
63y/F with  subarachnoid hemorrhage HH2 MF3, brain compression, cerebral edema s/p angiogram, carotid & cerebral, b/l (09/08/2024, Alfredo Lomas)  Hypertension    PLAN:   NEURO: neurochecks q1h, PRN pain meds with acetaminophen  no nimodipine   EVD@5cm H20 - once -200  continue bromocriptine - decrease to 5mg q8h   seizure prophylaxis:  levetiracetam 1000mg PO BID, as per neurosurgery   REHAB:  physical therapy evaluation and management    EARLY MOB:  HOBup OOB to c hair    PULM:  room air, incentive spirometry   CARDIO:  SBP goal 120-200 mm Hg; EF   ENDO:  Blood sugar goals 140-180 mg/dL  GI:  start bowel regimen   DIET: regular diet  RENAL:  off salt tabs 3%@30cc/hr, cont fludrocortisone; Check BMP this afternoon;   HEM/ONC: Hb stable, continue iron / Vit C  VTE Prophylaxis: SCDs, SQL 40 BID; R LE DVT - repeat doppler NEG  ID: afebrile, no leukocytosis  Social: will update family     Active issues:  What's keeping patient in the ICU? EVD, vasospasm   What is this patient's dispo plan?    ATTENDING ATTESTATION:  I was physically present for the key portions of the evaluation and management (E/M) service provided.  I agree with the above history, physical and plan, which I have reviewed and edited where appropriate.    Patient at high risk for neurological deterioration or death due to:  ICU delirium, aspiration PNA, DVT / PE.  Critical care time:  I have personally provided 45 minutes of critical care time, excluding time spent on separate procedures.      Plan discussed with RN, house staff. 63y/F with  subarachnoid hemorrhage HH2 MF3, brain compression, cerebral edema s/p angiogram, carotid & cerebral, b/l (09/08/2024, Alfredo Lomas)  Hypertension    PLAN:   NEURO: neurochecks q1h, PRN pain meds with acetaminophen  no nimodipine   EVD@5cm H20   continue bromocriptine - 5mg q8h   seizure prophylaxis:  levetiracetam 1000mg PO BID, as per neurosurgery   REHAB:  physical therapy evaluation and management    EARLY MOB:  HOB up OOB to c hair    PULM:  room air, incentive spirometry   CARDIO:  SBP goal 120-200 mm Hg; EF   ENDO:  Blood sugar goals 140-180 mg/dL  GI:  bowel regimen   DIET: regular diet  RENAL:  3%@50cc/hr, cont fludrocortisone; Check BMP this afternoon;   HEM/ONC: Hb stable, continue iron / Vit C  VTE Prophylaxis: SCDs, SQL 40 BID; R LE DVT - repeat doppler NEG  ID: low grade fevder no leukocytosis  Social: will update family     Active issues:  What's keeping patient in the ICU? EVD, vasospasm   What is this patient's dispo plan?    ATTENDING ATTESTATION:  I was physically present for the key portions of the evaluation and management (E/M) service provided.  I agree with the above history, physical and plan, which I have reviewed and edited where appropriate.    Patient at high risk for neurological deterioration or death due to:  ICU delirium, aspiration PNA, DVT / PE.  Critical care time:  I have personally provided 45 minutes of critical care time, excluding time spent on separate procedures.      Plan discussed with RN, house staff.

## 2024-09-19 NOTE — PROGRESS NOTE ADULT - SUBJECTIVE AND OBJECTIVE BOX
=================================  NEUROCRITICAL CARE ATTENDING NOTE  =================================    KARRIE MORALES   MRN-[id number]  Summary:  63y/F  with uncontrolled HTN, does not take any medications at home presented to [facility] after developing acute onset severe HA. Once arrived, patient had episode of nausea with emesis. CTH showed SAH, HH: 2, MF: 3. NIHSS: 0. CTA neg for aneurysm. Transferred to [facility] for further mgmt. Upon arrival to [facility], NIHSS noted to be 0. Patient reports a headache rated at 7/10 in severity. Of note, patient's daughter notes pt seems off and is sleepier than her baseline. Pt denies SOB, chest pain, vision changes, nausea, weakness/numbness/tingling, dizziness, photophobia.  (08 Sep 2024 13:29)    COURSE IN THE HOSPITAL:   s/p angiogram, carotid & cerebral, b/l (2024, Alfredo Lomas), no aneurysm seen; kept intubated post procedure, EVD inserted   Tmax 38.5 L UE drift overnight; increased SBP goals to 180-200; angio mild VSP   No significant events overnight.    No significant events overnight. vomit x1 this AM   No significant events overnight.     Past Medical History: No pertinent past medical history Hypertension  Allergies:  No Known Allergies  Home meds:     PHYSICAL EXAMINATION  T(C): 36.6 (24 @ 05:19), Max: 38.1 (24 @ 14:03) HR: 70 (24 @ 07:00) (57 - 88) BP: 191/79 (24 @ 06:00) (163/101 - 191/79) ABP: 178/60 (24 @ 07:00) (149/52 - 213/75) RR: 20 (24 @ 07:00) (14 - 20) SpO2: 99% (24 @ 07:00) (93% - 100%)  NEUROLOGIC EXAMINATION:  Patient AOx3 FC BUE / LE 5/5 no drift, R facial (h/o Arreaga's)  GENERAL:  room air   EENT:  anicteric  CARDIOVASCULAR: (+) S1 S2, normal rate and regular rhythm  PULMONARY: clear to auscultation bilaterally  ABDOMEN: soft, nontender with normoactive bowel sounds  EXTREMITIES: no edema  SKIN: no rash    LABS:   CAPILLARY BLOOD GLUCOSE 277               7.7  (7.7)  6.91  )-----------( 235      ( 19 Sep 2024 05:14 )             26.3     135  |  105  |  9   ----------------------------<  108[H]  3.8   |  25  |  0.72    Ca    8.2[L]      19 Sep 2024 05:14  Phos  3.6       Mg     1.9     18 @ 07:01  -   @ 07:00  --------------------------------------------------------  IN: 2710 mL / OUT: 2758 mL / NET: -48 mL     @ 07:01  -   @ 07:44  --------------------------------------------------------  IN: 30 mL / OUT: 0 mL / NET: 30 mL     Bacteriology:  CSF studies:  EEG:  [email or web address] 09:46AM	CT BRAIN           	Diffuse SAH, mild midline shift, ventr dilation  2024 10:12AM	CT ANGIO NECK	No high-grade stenosis or occlusion  2024 10:12AM	CT ANGIO BRAIN	Mild right ICA stenosis, no large occlusion  2024 10:12AM	CT PERFUSION     	4ml core infarct, 4ml penumbra    Other imaging:    MEDICATIONS:     ·	enoxaparin Injectable 40 SubCutaneous every 12 hours  ·	bromocriptine Capsule 5 Oral every 8 hours  ·	levETIRAcetam 1000 Oral two times a day  ·	bisacodyl 5 Oral at bedtime  ·	polyethylene glycol 3350 17 Oral two times a day  ·	senna 2 Oral at bedtime  ·	fludroCORTISONE 0.1 Oral two times a day  ·	ascorbic acid 500 Oral <User Schedule>  ·	ferrous    sulfate Liquid 300 Oral <User Schedule>  ·	acetaminophen     Tablet .. 650 Oral every 6 hours PRN  ·	ondansetron Injectable 4 IV Push every 6 hours PRN  ·	oxyCODONE    IR 5 Oral every 4 hours PRN    IV FLUIDS: 3%@50  DRIPS:  DIET: regular diet   Lines: KORINA cobos Rachel  Drains:     EVD @5cm H20 ICPs <11 output 190/24h   EVD @5cm H20 ICPs <11 output 216 /24h   EVD @5cm H20 ICPs <10 output 219/24h  Wounds:    CODE STATUS:  Full Code                       GOALS OF CARE:  aggressive                      DISPOSITION:  ICU =================================  NEUROCRITICAL CARE ATTENDING NOTE  =================================    KARRIE MORALES   MRN-[id number]  Summary:  63y/F  with uncontrolled HTN, does not take any medications at home presented to [facility] after developing acute onset severe HA. Once arrived, patient had episode of nausea with emesis. CTH showed SAH, HH: 2, MF: 3. NIHSS: 0. CTA neg for aneurysm. Transferred to [facility] for further mgmt. Upon arrival to [facility], NIHSS noted to be 0. Patient reports a headache rated at 7/10 in severity. Of note, patient's daughter notes pt seems off and is sleepier than her baseline. Pt denies SOB, chest pain, vision changes, nausea, weakness/numbness/tingling, dizziness, photophobia.  (08 Sep 2024 13:29)    COURSE IN THE HOSPITAL:   s/p angiogram, carotid & cerebral, b/l (2024, Alfredo Lomas), no aneurysm seen; kept intubated post procedure, EVD inserted   Tmax 38.5 L UE drift overnight; increased SBP goals to 180-200; angio mild VSP   No significant events overnight.    No significant events overnight. vomit x1 this AM   Tmax 38.1  No significant events overnight. -200    Past Medical History: No pertinent past medical history Hypertension  Allergies:  No Known Allergies  Home meds:     PHYSICAL EXAMINATION  T(C): 36.6 (24 @ 05:19), Max: 38.1 (24 @ 14:03) HR: 70 (24 @ 07:00) (57 - 88) BP: 191/79 (24 @ 06:00) (163/101 - 191/79) ABP: 178/60 (24 @ 07:00) (149/52 - 213/75) RR: 20 (24 @ 07:00) (14 - 20) SpO2: 99% (24 @ 07:00) (93% - 100%)  NEUROLOGIC EXAMINATION:  Patient AOx3 FC BUE / LE 5/ no drift, R facial (h/o Arreaga's)  GENERAL:  room air   EENT:  anicteric  CARDIOVASCULAR: (+) S1 S2, normal rate and regular rhythm  PULMONARY: clear to auscultation bilaterally  ABDOMEN: soft, nontender with normoactive bowel sounds  EXTREMITIES: no edema  SKIN: no rash    LABS:   CAPILLARY BLOOD GLUCOSE 277               7.7  (7.7)  6.91  )-----------( 235      ( 19 Sep 2024 05:14 )             26.3     135  |  105  |  9   ----------------------------<  108[H]  3.8   |  25  |  0.72    Ca    8.2[L]      19 Sep 2024 05:14  Phos  3.6       Mg     1.9      @ 07:01  -   @ 07:00  --------------------------------------------------------  IN: 2710 mL / OUT: 2758 mL / NET: -48 mL     @ 07:01  -   @ 07:44  --------------------------------------------------------  IN: 30 mL / OUT: 0 mL / NET: 30 mL     Bacteriology:  CSF studies:  EEG:  [email or web address] 09:46AM	CT BRAIN           	Diffuse SAH, mild midline shift, ventr dilation  2024 10:12AM	CT ANGIO NECK	No high-grade stenosis or occlusion  2024 10:12AM	CT ANGIO BRAIN	Mild right ICA stenosis, no large occlusion  2024 10:12AM	CT PERFUSION     	4ml core infarct, 4ml penumbra    Other imaging:    MEDICATIONS:     ·	enoxaparin Injectable 40 SubCutaneous every 12 hours  ·	bromocriptine Capsule 5 Oral every 8 hours  ·	levETIRAcetam 1000 Oral two times a day  ·	bisacodyl 5 Oral at bedtime  ·	polyethylene glycol 3350 17 Oral two times a day  ·	senna 2 Oral at bedtime  ·	fludroCORTISONE 0.1 Oral two times a day  ·	ascorbic acid 500 Oral <User Schedule>  ·	ferrous    sulfate Liquid 300 Oral <User Schedule>  ·	acetaminophen     Tablet .. 650 Oral every 6 hours PRN  ·	ondansetron Injectable 4 IV Push every 6 hours PRN  ·	oxyCODONE    IR 5 Oral every 4 hours PRN    IV FLUIDS: 3%@30  DRIPS:  DIET: regular diet   Lines: L papito Rachel  Drains:     EVD @5cm H20 ICPs <11 output 190/24h   EVD @5cm H20 ICPs <11 output 216 /24h   EVD @5cm H20 ICPs <10 output 219/24h   EVD@5cm H20 ICPs 1-8 244/24h  Wounds:    CODE STATUS:  Full Code                       GOALS OF CARE:  aggressive                      DISPOSITION:  ICU numerical 0-10

## 2024-09-19 NOTE — PROGRESS NOTE ADULT - ASSESSMENT
63f hx HTN, iron deficiency anemia admit for DSA negative SAH hh2mf3 c/b vasospasm s/p IA therapy     -Ncq1, VSq1   -EVD management @5cmh20; monitor output and ICP  -c/w AEDs  -SBP goal 120-200;   -increase fludrocortisone to 0.2mg BID, monitor for hypokalemia; 3% HTS @ 50cc/hr cbc q8 Na goal 140-145  -dvt ppx w/ SQL  -d/c bromocriptine 5mg

## 2024-09-20 LAB
ALBUMIN SERPL ELPH-MCNC: 3.6 G/DL — SIGNIFICANT CHANGE UP (ref 3.3–5)
ALP SERPL-CCNC: 100 U/L — SIGNIFICANT CHANGE UP (ref 40–120)
ALT FLD-CCNC: 28 U/L — SIGNIFICANT CHANGE UP (ref 10–45)
ANION GAP SERPL CALC-SCNC: 6 MMOL/L — SIGNIFICANT CHANGE UP (ref 5–17)
ANION GAP SERPL CALC-SCNC: 7 MMOL/L — SIGNIFICANT CHANGE UP (ref 5–17)
ANION GAP SERPL CALC-SCNC: 8 MMOL/L — SIGNIFICANT CHANGE UP (ref 5–17)
AST SERPL-CCNC: 21 U/L — SIGNIFICANT CHANGE UP (ref 10–40)
BILIRUB SERPL-MCNC: 0.2 MG/DL — SIGNIFICANT CHANGE UP (ref 0.2–1.2)
BUN SERPL-MCNC: 7 MG/DL — SIGNIFICANT CHANGE UP (ref 7–23)
BUN SERPL-MCNC: 8 MG/DL — SIGNIFICANT CHANGE UP (ref 7–23)
BUN SERPL-MCNC: 8 MG/DL — SIGNIFICANT CHANGE UP (ref 7–23)
CALCIUM SERPL-MCNC: 8.2 MG/DL — LOW (ref 8.4–10.5)
CALCIUM SERPL-MCNC: 8.3 MG/DL — LOW (ref 8.4–10.5)
CALCIUM SERPL-MCNC: 8.4 MG/DL — SIGNIFICANT CHANGE UP (ref 8.4–10.5)
CHLORIDE SERPL-SCNC: 104 MMOL/L — SIGNIFICANT CHANGE UP (ref 96–108)
CHLORIDE SERPL-SCNC: 104 MMOL/L — SIGNIFICANT CHANGE UP (ref 96–108)
CHLORIDE SERPL-SCNC: 106 MMOL/L — SIGNIFICANT CHANGE UP (ref 96–108)
CO2 SERPL-SCNC: 24 MMOL/L — SIGNIFICANT CHANGE UP (ref 22–31)
CO2 SERPL-SCNC: 24 MMOL/L — SIGNIFICANT CHANGE UP (ref 22–31)
CO2 SERPL-SCNC: 26 MMOL/L — SIGNIFICANT CHANGE UP (ref 22–31)
CREAT SERPL-MCNC: 0.66 MG/DL — SIGNIFICANT CHANGE UP (ref 0.5–1.3)
CREAT SERPL-MCNC: 0.7 MG/DL — SIGNIFICANT CHANGE UP (ref 0.5–1.3)
CREAT SERPL-MCNC: 0.72 MG/DL — SIGNIFICANT CHANGE UP (ref 0.5–1.3)
EGFR: 93 ML/MIN/1.73M2 — SIGNIFICANT CHANGE UP
EGFR: 96 ML/MIN/1.73M2 — SIGNIFICANT CHANGE UP
EGFR: 97 ML/MIN/1.73M2 — SIGNIFICANT CHANGE UP
GLUCOSE SERPL-MCNC: 108 MG/DL — HIGH (ref 70–99)
GLUCOSE SERPL-MCNC: 113 MG/DL — HIGH (ref 70–99)
GLUCOSE SERPL-MCNC: 116 MG/DL — HIGH (ref 70–99)
HCT VFR BLD CALC: 25.8 % — LOW (ref 34.5–45)
HGB BLD-MCNC: 7.4 G/DL — LOW (ref 11.5–15.5)
MAGNESIUM SERPL-MCNC: 2 MG/DL — SIGNIFICANT CHANGE UP (ref 1.6–2.6)
MAGNESIUM SERPL-MCNC: 2 MG/DL — SIGNIFICANT CHANGE UP (ref 1.6–2.6)
MCHC RBC-ENTMCNC: 20.9 PG — LOW (ref 27–34)
MCHC RBC-ENTMCNC: 28.7 GM/DL — LOW (ref 32–36)
MCV RBC AUTO: 72.9 FL — LOW (ref 80–100)
NRBC # BLD: 0 /100 WBCS — SIGNIFICANT CHANGE UP (ref 0–0)
PHOSPHATE SERPL-MCNC: 2.9 MG/DL — SIGNIFICANT CHANGE UP (ref 2.5–4.5)
PHOSPHATE SERPL-MCNC: 3.7 MG/DL — SIGNIFICANT CHANGE UP (ref 2.5–4.5)
PLATELET # BLD AUTO: 249 K/UL — SIGNIFICANT CHANGE UP (ref 150–400)
POTASSIUM SERPL-MCNC: 3.8 MMOL/L — SIGNIFICANT CHANGE UP (ref 3.5–5.3)
POTASSIUM SERPL-MCNC: 3.9 MMOL/L — SIGNIFICANT CHANGE UP (ref 3.5–5.3)
POTASSIUM SERPL-MCNC: 4 MMOL/L — SIGNIFICANT CHANGE UP (ref 3.5–5.3)
POTASSIUM SERPL-SCNC: 3.8 MMOL/L — SIGNIFICANT CHANGE UP (ref 3.5–5.3)
POTASSIUM SERPL-SCNC: 3.9 MMOL/L — SIGNIFICANT CHANGE UP (ref 3.5–5.3)
POTASSIUM SERPL-SCNC: 4 MMOL/L — SIGNIFICANT CHANGE UP (ref 3.5–5.3)
PROT SERPL-MCNC: 6.5 G/DL — SIGNIFICANT CHANGE UP (ref 6–8.3)
RBC # BLD: 3.54 M/UL — LOW (ref 3.8–5.2)
RBC # FLD: 18.9 % — HIGH (ref 10.3–14.5)
SODIUM SERPL-SCNC: 136 MMOL/L — SIGNIFICANT CHANGE UP (ref 135–145)
SODIUM SERPL-SCNC: 136 MMOL/L — SIGNIFICANT CHANGE UP (ref 135–145)
SODIUM SERPL-SCNC: 137 MMOL/L — SIGNIFICANT CHANGE UP (ref 135–145)
WBC # BLD: 8.27 K/UL — SIGNIFICANT CHANGE UP (ref 3.8–10.5)
WBC # FLD AUTO: 8.27 K/UL — SIGNIFICANT CHANGE UP (ref 3.8–10.5)

## 2024-09-20 PROCEDURE — 99291 CRITICAL CARE FIRST HOUR: CPT

## 2024-09-20 PROCEDURE — 99231 SBSQ HOSP IP/OBS SF/LOW 25: CPT

## 2024-09-20 RX ORDER — SODIUM CHLORIDE 0.9 % (FLUSH) 0.9 %
500 SYRINGE (ML) INJECTION ONCE
Refills: 0 | Status: COMPLETED | OUTPATIENT
Start: 2024-09-20 | End: 2024-09-20

## 2024-09-20 RX ORDER — HYDRALAZINE HYDROCHLORIDE 100 MG/1
10 TABLET ORAL ONCE
Refills: 0 | Status: COMPLETED | OUTPATIENT
Start: 2024-09-20 | End: 2024-09-20

## 2024-09-20 RX ADMIN — Medication 1000 MILLILITER(S): at 16:52

## 2024-09-20 RX ADMIN — Medication 1000 MILLILITER(S): at 19:00

## 2024-09-20 RX ADMIN — FLUDROCORTISONE ACETATE 0.2 MILLIGRAM(S): 0.1 TABLET ORAL at 06:03

## 2024-09-20 RX ADMIN — Medication 500 MILLIGRAM(S): at 06:03

## 2024-09-20 RX ADMIN — ENOXAPARIN SODIUM 40 MILLIGRAM(S): 150 INJECTION SUBCUTANEOUS at 09:43

## 2024-09-20 RX ADMIN — ENOXAPARIN SODIUM 40 MILLIGRAM(S): 150 INJECTION SUBCUTANEOUS at 22:06

## 2024-09-20 RX ADMIN — FLUDROCORTISONE ACETATE 0.2 MILLIGRAM(S): 0.1 TABLET ORAL at 17:16

## 2024-09-20 RX ADMIN — Medication 1000 MILLIGRAM(S): at 17:15

## 2024-09-20 RX ADMIN — Medication 300 MILLIGRAM(S): at 06:03

## 2024-09-20 RX ADMIN — Medication 1000 MILLIGRAM(S): at 06:03

## 2024-09-20 RX ADMIN — HYDRALAZINE HYDROCHLORIDE 10 MILLIGRAM(S): 100 TABLET ORAL at 09:43

## 2024-09-20 RX ADMIN — CHLORHEXIDINE GLUCONATE ORAL RINSE 1 APPLICATION(S): 1.2 SOLUTION DENTAL at 06:03

## 2024-09-20 RX ADMIN — HYDRALAZINE HYDROCHLORIDE 10 MILLIGRAM(S): 100 TABLET ORAL at 18:00

## 2024-09-20 RX ADMIN — Medication 40 MILLIEQUIVALENT(S): at 11:37

## 2024-09-20 NOTE — PROGRESS NOTE ADULT - ASSESSMENT
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

## 2024-09-20 NOTE — PROGRESS NOTE ADULT - SUBJECTIVE AND OBJECTIVE BOX
INTERVAL HISTORY: HPI:  63F PMHx uncontrolled HTN, does not take any medications at home presented to [facility] after developing acute onset severe HA. Once arrived, patient had episode of nausea with emesis. CTH showed SAH, HH: 2, MF: 3. NIHSS: 0. CTA neg for aneurysm. Transferred to [facility] for further mgmt. Upon arrival to [facility], NIHSS noted to be 0. Patient reports a headache rated at 7/10 in severity. Of note, patient's daughter notes pt seems off and is sleepier than her baseline. Pt denies SOB, chest pain, vision changes, nausea, weakness/numbness/tingling, dizziness, photophobia.  (08 Sep 2024 13:29)    Drug Dosing Weight  Height (cm): 161.3 (16 Sep 2024 09:01)  Weight (kg): 115 (16 Sep 2024 09:01)  BMI (kg/m2): 44.2 (16 Sep 2024 09:01)  BSA (m2): 2.15 (16 Sep 2024 09:01)    PAST MEDICAL & SURGICAL HISTORY:  Hypertension  H/O  section    REVIEW OF SYSTEMS: [ ] Unable to Assess due to neurologic exam   [ ] All ROS addressed below are non-contributory, except:  Neuro: [ ] Headache [ ] Back pain [ ] Numbness [ ] Weakness [ ] Ataxia [ ] Dizziness [ ] Aphasia [ ] Dysarthria [ ] Visual disturbance  Resp: [ ] Shortness of breath/dyspnea, [ ] Orthopnea [ ] Cough  CV: [ ] Chest pain [ ] Palpitation [ ] Lightheadedness [ ] Syncope  Renal: [ ] Thirst [ ] Edema  GI: [ ] Nausea [ ] Emesis [ ] Abdominal pain [ ] Constipation [ ] Diarrhea  Hem: [ ] Hematemesis [ ] bright red blood per rectum  ID: [ ] Fever [ ] Chills [ ] Dysuria  ENT: [ ] Rhinorrhea    PHYSICAL EXAM:    General: No Acute Distress   Neurological: Awake, alert oriented to person, place and time, Following Commands, PERRL, EOMI, baseline R facial, Speech Fluent, Moving all extremities, Muscle Strength normal in all four extremities, No Drift, Sensation to Light Touch Intact  Pulmonary: Clear to Auscultation, No Rales, No Rhonchi, No Wheezes   Cardiovascular: S1, S2, Regular Rte and Rhythm   Gastrointestinal: Soft, Nontender, Nondistended   Extremities: No calf tenderness   Incision: EVD in place @ 10cm      ICU Vital Signs Last 24 Hrs  T(C): 36.7 (20 Sep 2024 14:53), Max: 37.2 (19 Sep 2024 21:50)  T(F): 98 (20 Sep 2024 14:53), Max: 99 (19 Sep 2024 21:50)  HR: 88 (20 Sep 2024 19:00) (59 - 88)  BP: 176/74 (20 Sep 2024 14:40) (176/74 - 176/74)  BP(mean): 106 (20 Sep 2024 14:40) (106 - 106)  ABP: 180/64 (20 Sep 2024 19:00) (162/51 - 200/72)  ABP(mean): 108 (20 Sep 2024 19:00) (94 - 123)  RR: 18 (20 Sep 2024 19:00) (15 - 19)  SpO2: 99% (20 Sep 2024 19:00) (95% - 100%)      24 @ 07:01  -  24 @ 07:00  --------------------------------------------------------  IN: 3080 mL / OUT: 3567 mL / NET: -487 mL    24 @ 07:01  -  24 @ 19:06  --------------------------------------------------------  IN: 850 mL / OUT: 1556 mL / NET: -706 mL            acetaminophen     Tablet .. 650 milliGRAM(s) Oral every 6 hours PRN  ascorbic acid 500 milliGRAM(s) Oral <User Schedule>  bisacodyl 5 milliGRAM(s) Oral at bedtime  chlorhexidine 2% Cloths 1 Application(s) Topical <User Schedule>  enoxaparin Injectable 40 milliGRAM(s) SubCutaneous every 12 hours  ferrous    sulfate Liquid 300 milliGRAM(s) Oral <User Schedule>  fludroCORTISONE 0.2 milliGRAM(s) Oral two times a day  hydrALAZINE Injectable 10 milliGRAM(s) IV Push once  influenza  Vaccine (HIGH DOSE) 0.5 milliLiter(s) IntraMuscular once  levETIRAcetam 1000 milliGRAM(s) Oral two times a day  ondansetron Injectable 4 milliGRAM(s) IV Push every 6 hours PRN  polyethylene glycol 3350 17 Gram(s) Oral at bedtime  senna 2 Tablet(s) Oral at bedtime  sodium chloride 0.9% Bolus 500 milliLiter(s) IV Bolus once  sodium chloride 3%. 500 milliLiter(s) (50 mL/Hr) IV Continuous <Continuous>      LABS:  Na: 137 ( @ 18:15), 136 (:07), 136 ( 04:04), 137 (:56), 138 ( 13:20), 135 ( 05:14), 136 (:34), 138 (09-18 @ 15:48), 143 ( 05:30), 140 ( @ 23:40)  K: 4.0 ( 18:15), 3.8 ( 09:07), 3.9 ( 04:04), 4.0 (:56), 4.2 ( 13:20), 3.8 ( 05:14), 3.7 (:34), 3.9 (09-18 @ 15:48), 3.8 ( 05:30), 3.6 ( @ 23:40)  Cl: 106 ( 18:15), 104 ( 09:07), 104 ( @ 04:04), 105 (:56), 105 ( 13:20), 105 ( 05:14), 102 ( @ 21:34), 104 ( @ 15:48), 110 ( @ 05:30), 109 ( @ 23:40)  CO2: 24 ( @ 18:15), 24 ( @ 09:07), 26 ( @ 04:04), 24 ( @ 19:56), 25 ( @ 13:20), 25 ( @ 05:14), 25 ( @ 21:34), 25 ( @ 15:48), 25 ( @ 05:30), 24 ( @ 23:40)  BUN: 8 ( @ 18:15), 8 ( @ 09:07), 7 ( @ 04:04), 8 ( @ 19:56), 7 ( @ 13:20), 9 ( @ 05:14), 9 ( @ 21:34), 8 ( @ 15:48), 10 ( @ 05:30), 11 ( @ 23:40)  Cr: 0.72 ( @ 18:15), 0.66 ( @ 09:07), 0.70 ( @ 04:04), 0.69 ( @ 19:56), 0.70 ( @ 13:20), 0.72 ( @ 05:14), 0.71 ( @ 21:34), 0.65 ( @ 15:48), 0.70 ( @ 05:30), 0.74 ( @ 23:40)  Glu: 116( @ 18:15), 113( @ 09:07), 108( @ 04:04), 126( @ 19:56), 127( @ 13:20), 108( @ 05:14), 123( @ 21:34), 112( @ 15:48), 110( @ 05:30), 109( @ 23:40)    Hgb: 7.4 ( @ 04:04), 7.7 ( @ 05:14), 7.7 ( @ 21:34), 7.5 ( @ 05:30)  Hct: 25.8 ( @ 04:04), 26.3 ( @ 05:14), 26.6 (:34), 26.0 (:30)  WBC: 8.27 ( @ 04:04), 6.91 ( @ 05:14), 9.34 (:34), 5.83 (:30)  Plt: 249 ( @ 04:04), 235 ( @ 05:14), 236 (:34), 207 ( @ :30)      LIVER FUNCTIONS - ( 20 Sep 2024 18:15 )  Alb: 3.6 g/dL / Pro: 6.5 g/dL / ALK PHOS: 100 U/L / ALT: 28 U/L / AST: 21 U/L / GGT: x

## 2024-09-20 NOTE — PROGRESS NOTE ADULT - SUBJECTIVE AND OBJECTIVE BOX
HPI:  63F PMHx uncontrolled HT, R samuels's palsy, was BIBEMS to [facility] 9/8 c/o sudden onset severe HA. CTH showed SAH HH: 2, MF: 3. NIHSS: 0. CTA neg for aneurysm. Transferred to [facility] for further mgmt. S/p DSA negative for anueyrsm (9/8/24). S/p R frontal EVD (9/8/24). S/p angiogram showing mild R LILLY/R MCA spasm with IA verapamil (9/12), s/p  angiogram with 15 mg IA verapamil for R ICA spasm (9/16).     S/Overnight events: No events overnight.    Hospital Course:   9/8: Transfer to [facility] for further mgmt of SAH. Patient taken urgently to angio. ET tube pulled back 2cm. EVD placed, open @ 10. Stability scan stable, EVD in position, NGT placed. Started nimodipine 60q4. Extubated to face mask. POD 0 DSA neg for aneurysm.   9/9: BD2. POD 1 angio. Started iron/vit C q other day for microcytic anermia. Started lisinopril 10mg daily. 1.L liter bolus for euvolemia. Ambriz removed, f/u TOV, started on prophylactic SQL 40mg BID (weight based). Passed TOV. Off cardene gtt. cardene gtt resumed. started hydral 25 q8, inc lisinopril dose from 10mg to 20mg daily.   9/10: BD3. POD 2 angio. BILL overnight. TTE showing EF 70%, mild symmetric LVH. DC cardene.   9/11: BD 4. POD 3 angio. BILL overnight. SBP 160s, given hydralazine 10mg IVP x1. Given ducolax suppository. SBP liberalized 100-160. , given labetolol 5mg IVP. NS bolus 500cc for euvolemia. Lactulose for AM. PO hydral switched to clonidine.   9/12: BD 5. POD 4 angio. BILL overnight. Anti-Xa within prophylactic range. , given labetolol 10mg IVP. 1L NS for euvolemia. lisinopril increased 40, norvasc 10 added. cardene started. change in exam: JAIME/LL 4/5 w/ drift, CTH/CTA performed showing severe spasm. febrile 101, pan cx sent. POD0 angio for spasm tx. Dc'd propofol. Extubated to face mask. SBP 190s, started on cardene gtt  9/13: BD 6, POD 5 diagnostic angio, POD 1 angio for spasm tx. Changed NS to LR.   9/14: BD 7, POD 6, POD 2 angio for spasm. Pt febrile ovn, f/u cultures. Start bromocriptine. Resume lisinopril. 1L bolus, f/u Na studies. Notified by primary RN of exam change: new dysarthria, L facial droop, LUE/LLLE withdrawing to noxious stim. Stroke code called, with high suspicion for vasospasm. Levo gtt started for SBP goal 180. CTA/CTP/CT performed - R sided spasm, no perfusion defect. Exam improved with higher BP, dysarthria and facial droop improving and L side 4/5. Discussed with Dr. Chao, Dr. Fuentes, and Dr. Butler - will keep SBP goal 160-200 and consider angiogram if not improving further.  9/15: BD 8, POD 7, POD 3 angio. Given 250 cc albumin bolus followed by 500 cc NS. Repeat BMP Na 134. S/p 1 L bolus for euvolemia. Full stregnth b/l, no drift; no repeat angio, regular diet. Given 250 albumin and 1 L NS for net negative volume.   9/16: BD 9, POD 8, POD 4 angio. Remains npo after midnight. Given 500 cc bolus NS for euvolemia. 1L bolus for euvolemia. EVD lowered to 5 i/s/o spasm. added fludrocortisone 0.1 BID, salt tabs increased to 3q6hr. increased bromocriptine to 10 q8. POD0 repeat angio 15 mg verapamil injected to R ICA. SBP parameters liberalized 160-200, off levo. 5 cc EVD output during angio, drained 22 cc's post-op, ICPs single digits. emesis s/p salt tabs, florinef rpt dose, salt tabs dc'd. 1L NS for BP 150s. POD 0 s/p angiogram showing R ICA spasm with IA verapamil.   9/17: BD 10. POD 9. POD 5 angio with spasm. POD 1 angio with spasm. BILL o/n. Started on levo gtt. Na 141, weaned 3% to 50cc/hr. Surveillance dopplers neg for. DVT. 500cc bolus for euvolemia.   9/18: BD 11. POD 10. POD 6 angio with spasm. POD 2 angio with spasm. BILL o/n. SBP liberalized to 120-200, exam stable. 3% decreased to 30cc/hr. BMP pend 4pm. Given 5mg hydralazine x 1 for . decreased bromo to 5mgq8. BM today. given 5mg hydral for .   9/19: BD 12. POD 11. POD 7 angio with spasm. POD 3 angio with spasm. BILL o/n. Given 1L for euvolemia. 3% increased to 50 cc/hr, Na 138>135. Given florinef 0.1x1, increased florinef to 0.2mg BID starting in AM. Dc'd bromocriptine.     Vital Signs Last 24 Hrs  T(C): 37.2 (19 Sep 2024 21:50), Max: 37.2 (19 Sep 2024 21:50)  T(F): 99 (19 Sep 2024 21:50), Max: 99 (19 Sep 2024 21:50)  HR: 64 (20 Sep 2024 00:00) (58 - 89)  BP: 176/72 (19 Sep 2024 12:15) (176/72 - 204/98)  BP(mean): 103 (19 Sep 2024 12:15) (103 - 141)  RR: 18 (20 Sep 2024 00:00) (15 - 20)  SpO2: 96% (20 Sep 2024 00:00) (96% - 100%)    Parameters below as of 20 Sep 2024 00:00  Patient On (Oxygen Delivery Method): room air        I&O's Detail    18 Sep 2024 07:01  -  19 Sep 2024 07:00  --------------------------------------------------------  IN:    IV PiggyBack: 50 mL    Oral Fluid: 900 mL    Sodium Chloride 0.9% Bolus: 1000 mL    sodium chloride 3%: 760 mL  Total IN: 2710 mL    OUT:    Emesis (mL): 0 mL    External Ventricular Device (mL): 258 mL    Voided (mL): 2500 mL  Total OUT: 2758 mL    Total NET: -48 mL      19 Sep 2024 07:01  -  20 Sep 2024 00:23  --------------------------------------------------------  IN:    Oral Fluid: 590 mL    Sodium Chloride 0.9% Bolus: 500 mL    sodium chloride 3%: 90 mL    sodium chloride 3%: 700 mL  Total IN: 1880 mL    OUT:    External Ventricular Device (mL): 128 mL    Voided (mL): 2150 mL  Total OUT: 2278 mL    Total NET: -398 mL        I&O's Summary    18 Sep 2024 07:01  -  19 Sep 2024 07:00  --------------------------------------------------------  IN: 2710 mL / OUT: 2758 mL / NET: -48 mL    19 Sep 2024 07:01  -  20 Sep 2024 00:23  --------------------------------------------------------  IN: 1880 mL / OUT: 2278 mL / NET: -398 mL        PHYSICAL EXAM:  Constitutional: NAD, resting comfortably in bed.   HEENT: Pupils equal, round, reactive to light. EOMI. R facial droop  Respiratory: No respiratory distress, lungs clear to auscultation bilaterally.   Cardiovascular: RRR, S1, S2.   Gastrointestinal: Abdomen soft, non tender, nondistended.  Neurological:  AAOX3. Opens eyes. Follows commands. Speech clear.  Cranial Nerves: II-XII intact  Motor: 5/5 power in b/l UE and LE  Sensation: intact to light touch in all extremities  Pronator Drift: none  Dysmetria: none  Extremities: Warm, well perfused.  Wounds: R groin puncture site, c/d/i, soft, no hematoma. R frontal EVD.    TUBES/LINES:  [] CVC  [x] A-line  [] Lumbar Drain  [x] Ventriculostomy  [] Ambriz  [] Other    DIET:  [] NPO  [x] Mechanical  [] Tube feeds    LABS:                        7.7    6.91  )-----------( 235      ( 19 Sep 2024 05:14 )             26.3     09-19    137  |  105  |  8   ----------------------------<  126[H]  4.0   |  24  |  0.69    Ca    8.4      19 Sep 2024 19:56  Phos  3.6     09-19  Mg     1.9     09-19        Urinalysis Basic - ( 19 Sep 2024 19:56 )    Color: x / Appearance: x / SG: x / pH: x  Gluc: 126 mg/dL / Ketone: x  / Bili: x / Urobili: x   Blood: x / Protein: x / Nitrite: x   Leuk Esterase: x / RBC: x / WBC x   Sq Epi: x / Non Sq Epi: x / Bacteria: x          CAPILLARY BLOOD GLUCOSE          Drug Levels: [] N/A    CSF Analysis: [] N/A      Allergies    No Known Allergies    Intolerances      MEDICATIONS:  Antibiotics:    Neuro:  acetaminophen     Tablet .. 650 milliGRAM(s) Oral every 6 hours PRN  levETIRAcetam 1000 milliGRAM(s) Oral two times a day  ondansetron Injectable 4 milliGRAM(s) IV Push every 6 hours PRN  oxyCODONE    IR 5 milliGRAM(s) Oral every 4 hours PRN    Anticoagulation:  enoxaparin Injectable 40 milliGRAM(s) SubCutaneous every 12 hours    OTHER:  bisacodyl 5 milliGRAM(s) Oral at bedtime  chlorhexidine 2% Cloths 1 Application(s) Topical <User Schedule>  fludroCORTISONE 0.2 milliGRAM(s) Oral two times a day  influenza  Vaccine (HIGH DOSE) 0.5 milliLiter(s) IntraMuscular once  polyethylene glycol 3350 17 Gram(s) Oral two times a day  senna 2 Tablet(s) Oral at bedtime    IVF:  ascorbic acid 500 milliGRAM(s) Oral <User Schedule>  ferrous    sulfate Liquid 300 milliGRAM(s) Oral <User Schedule>  sodium chloride 3%. 500 milliLiter(s) IV Continuous <Continuous>    CULTURES:  Culture Results:   No growth at 5 days (09-13 @ 23:05)  Culture Results:   No growth at 5 days (09-12 @ 16:54)    RADIOLOGY & ADDITIONAL TESTS:      ASSESSMENT:  63F PMHx uncontrolled HT, R samuels's palsy, was BIBEMS to [facility] 9/8 c/o sudden onset severe HA. CTH showed SAH HH: 2, MF: 3. NIHSS: 0. CTA neg for aneurysm. Transferred to [facility] for further mgmt. S/p DSA negative for anueyrsm (9/8/24). S/p R frontal EVD (9/8/24). S/p angiogram showing mild R LILLY/R MCA spasm with IA verapamil (9/12), s/p  angiogram with 15 mg IA verapamil for R ICA spasm (9/16).     Neuro:  - Neuro/vitals q1hr  - Seizure prophylaxis: Keppra 1g BID   - R frontal EVD @ 5cmH2O, monitor output   - DCI prophylaxis: Nimodipime 60q4 (holding while on pressors)   - Pain control: Tylenol, oxy 5 prn  - stability CTH post-EVD complete 9/8, stable, CTH 9/12 stable   - CTA 9/12: b/l LILLY spasm, b/l MCA R>L  - MR brain and C spine w/wo contrast complete 9/13    Cards:   - -200  - TTE 9/10: EF 70%, mild symmetric LVH    Pulm:   - RA  - IS    GI:  - Regular diet   - Bowel regimen, LBM 9/16  - Nausea: Zofran 4q6 prn     Renal:  - NaCl 3% @ 50cc/hr; fludrocortisone 0.2 mg BID   - Na goal 140-145   - voiding   - euvolemic goal     Endo:  - A1c 5.8    Heme:   - DVT prophylaxis: SCDs to BL LE, ppx SQL 40 BID (weight based)   - Anti Xa 9/12: 0.31  LE dopplers 9/9: R posterior tibial DVT on admission (9/9)- surveillace dopplers 9/17 neg for DVT  - Microcytic anemia: iron/vit C q other day     ID:   - pan cx 9/13, f/u cx    Dispo: NSICU, full code, PT/OT rec AR    Discussed with Dr. Butler and Dr. Chao

## 2024-09-20 NOTE — PROGRESS NOTE ADULT - SUBJECTIVE AND OBJECTIVE BOX
=================================  NEUROCRITICAL CARE ATTENDING NOTE  =================================    KARRIE MORALES   MRN-[id number]  Summary:  63y/F  with uncontrolled HTN, does not take any medications at home presented to [facility] after developing acute onset severe HA. Once arrived, patient had episode of nausea with emesis. CTH showed SAH, HH: 2, MF: 3. NIHSS: 0. CTA neg for aneurysm. Transferred to [facility] for further mgmt. Upon arrival to [facility], NIHSS noted to be 0. Patient reports a headache rated at 7/10 in severity. Of note, patient's daughter notes pt seems off and is sleepier than her baseline. Pt denies SOB, chest pain, vision changes, nausea, weakness/numbness/tingling, dizziness, photophobia.  (08 Sep 2024 13:29)    COURSE IN THE HOSPITAL:   s/p angiogram, carotid & cerebral, b/l (2024, Alfredo Lomas), no aneurysm seen; kept intubated post procedure, EVD inserted   Tmax 38.5 L UE drift overnight; increased SBP goals to 180-200; angio mild VSP   No significant events overnight.    No significant events overnight. vomit x1 this AM   Tmax 38.1  No significant events overnight. -200      Past Medical History: No pertinent past medical history Hypertension  Allergies:  No Known Allergies  Home meds:     PHYSICAL EXAMINATION  T(C): 36.4 (24 @ 05:44), Max: 37.2 (24 @ 21:50) HR: 61 (24 @ 06:00) (59 - 89) BP: 176/72 (24 @ 12:15) (176/72 - 204/98) ABP: 180/52 (24 @ 06:00) (172/55 - 249/110) RR: 18 (24 @ 06:00) (15 - 19) SpO2: 97% (24 @ 06:00) (96% - 100%)  NEUROLOGIC EXAMINATION:  Patient AOx3 FC BUE / LE 5/ no drift, R facial (h/o Arreaga's)  GENERAL:  room air   EENT:  anicteric  CARDIOVASCULAR: (+) S1 S2, normal rate and regular rhythm  PULMONARY: clear to auscultation bilaterally  ABDOMEN: soft, nontender with normoactive bowel sounds  EXTREMITIES: no edema  SKIN: no rash    LABS:   CAPILLARY BLOOD GLUCOSE    (6.91)  7.4  (7.7)  8.27  )-----------( 249      ( 20 Sep 2024 04:04 )             25.8      136  |  104  |  7   ----------------------------<  108[H]  3.9   |  26  |  0.70    Ca    8.2[L]      20 Sep 2024 04:04  Phos  3.7       Mg     2.0      @ 07: @ 07:00  --------------------------------------------------------  IN: 3080 mL / OUT: 3567 mL / NET: -487 mL     @ 07:01  -   @ 07:31  --------------------------------------------------------  IN: 50 mL / OUT: 0 mL / NET: 50 mL     Bacteriology:  CSF studies:  EEG:  [id number] 09:46AM	CT BRAIN           	Diffuse SAH, mild midline shift, ventr dilation  2024 10:12AM	CT ANGIO NECK	No high-grade stenosis or occlusion  2024 10:12AM	CT ANGIO BRAIN	Mild right ICA stenosis, no large occlusion  2024 10:12AM	CT PERFUSION     	4ml core infarct, 4ml penumbra    Other imaging:    MEDICATIONS: 09-20    ·	enoxaparin Injectable 40 SubCutaneous every 12 hours  ·	levETIRAcetam 1000 Oral two times a day  ·	bisacodyl 5 Oral at bedtime  ·	polyethylene glycol 3350 17 Oral two times a day  ·	senna 2 Oral at bedtime  ·	fludroCORTISONE 0.2 Oral two times a day  ·	ascorbic acid 500 Oral <User Schedule>  ·	ferrous    sulfate Liquid 300 Oral <User Schedule>  ·	acetaminophen     Tablet .. 650 Oral every 6 hours PRN  ·	ondansetron Injectable 4 IV Push every 6 hours PRN    IV FLUIDS: 3%@30  DRIPS:  DIET: regular diet   Lines: L papito Rachel  Drains:     EVD @5cm H20 ICPs <11 output 190/24h   EVD @5cm H20 ICPs <11 output 216 /24h   EVD @5cm H20 ICPs <10 output 219/24h   EVD@5cm H20 ICPs 1-8 244/24h  Wounds:    CODE STATUS:  Full Code                       GOALS OF CARE:  aggressive                      DISPOSITION:  ICU =================================  NEUROCRITICAL CARE ATTENDING NOTE  =================================    KARRIE MORALES   MRN-[id number]  Summary:  63y/F  with uncontrolled HTN, does not take any medications at home presented to [facility] after developing acute onset severe HA. Once arrived, patient had episode of nausea with emesis. CTH showed SAH, HH: 2, MF: 3. NIHSS: 0. CTA neg for aneurysm. Transferred to [facility] for further mgmt. Upon arrival to [facility], NIHSS noted to be 0. Patient reports a headache rated at 7/10 in severity. Of note, patient's daughter notes pt seems off and is sleepier than her baseline. Pt denies SOB, chest pain, vision changes, nausea, weakness/numbness/tingling, dizziness, photophobia.  (08 Sep 2024 13:29)    COURSE IN THE HOSPITAL:   s/p angiogram, carotid & cerebral, b/l (2024, Alfredo Lomas), no aneurysm seen; kept intubated post procedure, EVD inserted   Tmax 38.5 L UE drift overnight; increased SBP goals to 180-200; angio mild VSP   No significant events overnight.    No significant events overnight. vomit x1 this AM   Tmax 38.1  No significant events overnight. -200   Tmax 37.2 EVD raised to 10    Past Medical History: No pertinent past medical history Hypertension  Allergies:  No Known Allergies  Home meds:     PHYSICAL EXAMINATION  T(C): 36.4 (24 @ 05:44), Max: 37.2 (24 @ 21:50) HR: 61 (24 @ 06:00) (59 - 89) ABP: 180/52 (24 @ 06:00) (172/55 - 249/110) RR: 18 (24 @ 06:00) (15 - 19) SpO2: 97% (24 @ 06:00) (96% - 100%)  NEUROLOGIC EXAMINATION:  Patient AOx3 FC BUE / LE 5/5 no drift, R facial (h/o Arreaga's)  GENERAL:  room air   EENT:  anicteric  CARDIOVASCULAR: (+) S1 S2, normal rate and regular rhythm  PULMONARY: clear to auscultation bilaterally  ABDOMEN: soft, nontender with normoactive bowel sounds  EXTREMITIES: no edema  SKIN: no rash    LABS:     (6.91)  7.4  (7.7)  8.27  )-----------( 249      ( 20 Sep 2024 04:04 )             25.8      136  |  104  |  7   ----------------------------<  108[H]  3.9   |  26  |  0.70    Ca    8.2[L]      20 Sep 2024 04:04  Phos  3.7       Mg     2.0      @ 07:01   @ 07:00  --------------------------------------------------------  IN: 3080 mL / OUT: 3567 mL / NET: -487 mL     @ 07:01  -   @ 07:31  --------------------------------------------------------  IN: 50 mL / OUT: 0 mL / NET: 50 mL     Bacteriology:  CSF studies:  EEG:  [id number] 09:46AM	CT BRAIN           	Diffuse SAH, mild midline shift, ventr dilation  2024 10:12AM	CT ANGIO NECK	No high-grade stenosis or occlusion  2024 10:12AM	CT ANGIO BRAIN	Mild right ICA stenosis, no large occlusion  2024 10:12AM	CT PERFUSION     	4ml core infarct, 4ml penumbra    Other imaging:    MEDICATIONS:     ·	enoxaparin Injectable 40 SubCutaneous every 12 hours  ·	levETIRAcetam 1000 Oral two times a day  ·	bisacodyl 5 Oral at bedtime  ·	polyethylene glycol 3350 17 Oral two times a day  ·	senna 2 Oral at bedtime  ·	fludroCORTISONE 0.2 Oral two times a day  ·	ascorbic acid 500 Oral <User Schedule>  ·	ferrous    sulfate Liquid 300 Oral <User Schedule>  ·	acetaminophen     Tablet .. 650 Oral every 6 hours PRN  ·	ondansetron Injectable 4 IV Push every 6 hours PRN    IV FLUIDS: 3%@50  DRIPS:  DIET: regular diet   Lines: KORINA Ramos  Drains:     EVD @5cm H20 ICPs <11 output 190/24h   EVD @5cm H20 ICPs <11 output 216 /24h   EVD @5cm H20 ICPs <10 output 219/24h   EVD@5cm H20 ICPs 1-8 244/24h   EVD@10cm H20 ICPs <10   Wounds:    CODE STATUS:  Full Code                       GOALS OF CARE:  aggressive                      DISPOSITION:  ICU

## 2024-09-20 NOTE — PROGRESS NOTE ADULT - ASSESSMENT
63y/F with  subarachnoid hemorrhage HH2 MF3, brain compression, cerebral edema s/p angiogram, carotid & cerebral, b/l (09/08/2024, Alfredo Lomas)  Hypertension    PLAN:   NEURO: neurochecks q1h, PRN pain meds with acetaminophen  no nimodipine   EVD@5cm H20   continue bromocriptine - 5mg q8h   seizure prophylaxis:  levetiracetam 1000mg PO BID, as per neurosurgery   REHAB:  physical therapy evaluation and management    EARLY MOB:  HOB up OOB to c hair    PULM:  room air, incentive spirometry   CARDIO:  SBP goal 120-200 mm Hg; EF   ENDO:  Blood sugar goals 140-180 mg/dL  GI:  bowel regimen   DIET: regular diet  RENAL:  3%@50cc/hr, cont fludrocortisone; Check BMP this afternoon; Na goal 135-145  HEM/ONC: Hb stable, continue iron / Vit C  VTE Prophylaxis: SCDs, SQL 40 BID; R LE DVT - repeat doppler NEG  ID: low grade fevder no leukocytosis  Social: will update family     Active issues:  What's keeping patient in the ICU? EVD, vasospasm   What is this patient's dispo plan?    ATTENDING ATTESTATION:  I was physically present for the key portions of the evaluation and management (E/M) service provided.  I agree with the above history, physical and plan, which I have reviewed and edited where appropriate.    Patient at high risk for neurological deterioration or death due to:  ICU delirium, aspiration PNA, DVT / PE.  Critical care time:  I have personally provided 45 minutes of critical care time, excluding time spent on separate procedures.      Plan discussed with RN, house staff. 63y/F with  subarachnoid hemorrhage HH2 MF3, brain compression, cerebral edema s/p angiogram, carotid & cerebral, b/l (09/08/2024, Alfredo Lomas)  Hypertension    PLAN:   NEURO: neurochecks q1h, PRN pain meds with acetaminophen  no nimodipine   EVD@10cm H20   continue bromocriptine - 5mg q8h   seizure prophylaxis:  levetiracetam 1000mg PO BID, as per neurosurgery   REHAB:  physical therapy evaluation and management    EARLY MOB:  HOB up OOB to c hair    PULM:  room air, incentive spirometry   CARDIO:  SBP goal 120-200 mm Hg; EF   ENDO:  Blood sugar goals 140-180 mg/dL  GI: d/c bowel regimen   DIET: regular diet  RENAL:  3%@50cc/hr, cont fludrocortisone; check BMP this afternoon; Na goal 135-145  HEM/ONC: Hb stable, continue iron / Vit C  VTE Prophylaxis: SCDs, SQL 40 BID; R LE DVT - repeat doppler NEG  ID: low grade fever no leukocytosis  Social: will update family     Active issues:  What's keeping patient in the ICU? EVD, vasospasm   What is this patient's dispo plan?    ATTENDING ATTESTATION:  I was physically present for the key portions of the evaluation and management (E/M) service provided.  I agree with the above history, physical and plan, which I have reviewed and edited where appropriate.    Patient at high risk for neurological deterioration or death due to:  ICU delirium, aspiration PNA, DVT / PE.  Critical care time:  I have personally provided 45 minutes of critical care time, excluding time spent on separate procedures.      Plan discussed with RN, house staff.

## 2024-09-21 LAB
ANION GAP SERPL CALC-SCNC: 13 MMOL/L — SIGNIFICANT CHANGE UP (ref 5–17)
ANION GAP SERPL CALC-SCNC: 6 MMOL/L — SIGNIFICANT CHANGE UP (ref 5–17)
ANION GAP SERPL CALC-SCNC: 8 MMOL/L — SIGNIFICANT CHANGE UP (ref 5–17)
BUN SERPL-MCNC: 7 MG/DL — SIGNIFICANT CHANGE UP (ref 7–23)
BUN SERPL-MCNC: 7 MG/DL — SIGNIFICANT CHANGE UP (ref 7–23)
BUN SERPL-MCNC: 8 MG/DL — SIGNIFICANT CHANGE UP (ref 7–23)
CALCIUM SERPL-MCNC: 8 MG/DL — LOW (ref 8.4–10.5)
CALCIUM SERPL-MCNC: 8.3 MG/DL — LOW (ref 8.4–10.5)
CALCIUM SERPL-MCNC: 8.5 MG/DL — SIGNIFICANT CHANGE UP (ref 8.4–10.5)
CHLORIDE SERPL-SCNC: 104 MMOL/L — SIGNIFICANT CHANGE UP (ref 96–108)
CHLORIDE SERPL-SCNC: 105 MMOL/L — SIGNIFICANT CHANGE UP (ref 96–108)
CHLORIDE SERPL-SCNC: 105 MMOL/L — SIGNIFICANT CHANGE UP (ref 96–108)
CO2 SERPL-SCNC: 23 MMOL/L — SIGNIFICANT CHANGE UP (ref 22–31)
CO2 SERPL-SCNC: 24 MMOL/L — SIGNIFICANT CHANGE UP (ref 22–31)
CO2 SERPL-SCNC: 25 MMOL/L — SIGNIFICANT CHANGE UP (ref 22–31)
CREAT SERPL-MCNC: 0.7 MG/DL — SIGNIFICANT CHANGE UP (ref 0.5–1.3)
CREAT SERPL-MCNC: 0.72 MG/DL — SIGNIFICANT CHANGE UP (ref 0.5–1.3)
CREAT SERPL-MCNC: 0.72 MG/DL — SIGNIFICANT CHANGE UP (ref 0.5–1.3)
EGFR: 93 ML/MIN/1.73M2 — SIGNIFICANT CHANGE UP
EGFR: 93 ML/MIN/1.73M2 — SIGNIFICANT CHANGE UP
EGFR: 96 ML/MIN/1.73M2 — SIGNIFICANT CHANGE UP
GLUCOSE SERPL-MCNC: 103 MG/DL — HIGH (ref 70–99)
GLUCOSE SERPL-MCNC: 123 MG/DL — HIGH (ref 70–99)
GLUCOSE SERPL-MCNC: 126 MG/DL — HIGH (ref 70–99)
HCT VFR BLD CALC: 25.2 % — LOW (ref 34.5–45)
HCT VFR BLD CALC: 29.9 % — LOW (ref 34.5–45)
HGB BLD-MCNC: 7.2 G/DL — LOW (ref 11.5–15.5)
HGB BLD-MCNC: 8.4 G/DL — LOW (ref 11.5–15.5)
MAGNESIUM SERPL-MCNC: 1.9 MG/DL — SIGNIFICANT CHANGE UP (ref 1.6–2.6)
MCHC RBC-ENTMCNC: 20.5 PG — LOW (ref 27–34)
MCHC RBC-ENTMCNC: 20.6 PG — LOW (ref 27–34)
MCHC RBC-ENTMCNC: 28.1 GM/DL — LOW (ref 32–36)
MCHC RBC-ENTMCNC: 28.6 GM/DL — LOW (ref 32–36)
MCV RBC AUTO: 72 FL — LOW (ref 80–100)
MCV RBC AUTO: 73.1 FL — LOW (ref 80–100)
NRBC # BLD: 0 /100 WBCS — SIGNIFICANT CHANGE UP (ref 0–0)
NRBC # BLD: 0 /100 WBCS — SIGNIFICANT CHANGE UP (ref 0–0)
PHOSPHATE SERPL-MCNC: 3.4 MG/DL — SIGNIFICANT CHANGE UP (ref 2.5–4.5)
PLATELET # BLD AUTO: 291 K/UL — SIGNIFICANT CHANGE UP (ref 150–400)
PLATELET # BLD AUTO: 354 K/UL — SIGNIFICANT CHANGE UP (ref 150–400)
POTASSIUM SERPL-MCNC: 3.5 MMOL/L — SIGNIFICANT CHANGE UP (ref 3.5–5.3)
POTASSIUM SERPL-MCNC: 3.7 MMOL/L — SIGNIFICANT CHANGE UP (ref 3.5–5.3)
POTASSIUM SERPL-MCNC: 3.8 MMOL/L — SIGNIFICANT CHANGE UP (ref 3.5–5.3)
POTASSIUM SERPL-SCNC: 3.5 MMOL/L — SIGNIFICANT CHANGE UP (ref 3.5–5.3)
POTASSIUM SERPL-SCNC: 3.7 MMOL/L — SIGNIFICANT CHANGE UP (ref 3.5–5.3)
POTASSIUM SERPL-SCNC: 3.8 MMOL/L — SIGNIFICANT CHANGE UP (ref 3.5–5.3)
RBC # BLD: 3.5 M/UL — LOW (ref 3.8–5.2)
RBC # BLD: 4.09 M/UL — SIGNIFICANT CHANGE UP (ref 3.8–5.2)
RBC # FLD: 19.4 % — HIGH (ref 10.3–14.5)
RBC # FLD: 19.8 % — HIGH (ref 10.3–14.5)
SODIUM SERPL-SCNC: 136 MMOL/L — SIGNIFICANT CHANGE UP (ref 135–145)
SODIUM SERPL-SCNC: 137 MMOL/L — SIGNIFICANT CHANGE UP (ref 135–145)
SODIUM SERPL-SCNC: 140 MMOL/L — SIGNIFICANT CHANGE UP (ref 135–145)
WBC # BLD: 7.76 K/UL — SIGNIFICANT CHANGE UP (ref 3.8–10.5)
WBC # BLD: 8.29 K/UL — SIGNIFICANT CHANGE UP (ref 3.8–10.5)
WBC # FLD AUTO: 7.76 K/UL — SIGNIFICANT CHANGE UP (ref 3.8–10.5)
WBC # FLD AUTO: 8.29 K/UL — SIGNIFICANT CHANGE UP (ref 3.8–10.5)

## 2024-09-21 PROCEDURE — 99232 SBSQ HOSP IP/OBS MODERATE 35: CPT

## 2024-09-21 PROCEDURE — 99291 CRITICAL CARE FIRST HOUR: CPT

## 2024-09-21 RX ORDER — MAGNESIUM SULFATE 500 MG/ML
1 VIAL (ML) INJECTION ONCE
Refills: 0 | Status: COMPLETED | OUTPATIENT
Start: 2024-09-21 | End: 2024-09-21

## 2024-09-21 RX ORDER — HYDRALAZINE HYDROCHLORIDE 100 MG/1
10 TABLET ORAL ONCE
Refills: 0 | Status: COMPLETED | OUTPATIENT
Start: 2024-09-21 | End: 2024-09-21

## 2024-09-21 RX ADMIN — Medication 40 MILLIEQUIVALENT(S): at 18:07

## 2024-09-21 RX ADMIN — Medication 100 GRAM(S): at 04:01

## 2024-09-21 RX ADMIN — HYDRALAZINE HYDROCHLORIDE 10 MILLIGRAM(S): 100 TABLET ORAL at 13:00

## 2024-09-21 RX ADMIN — Medication 1000 MILLIGRAM(S): at 05:45

## 2024-09-21 RX ADMIN — Medication 650 MILLIGRAM(S): at 04:01

## 2024-09-21 RX ADMIN — FLUDROCORTISONE ACETATE 0.2 MILLIGRAM(S): 0.1 TABLET ORAL at 05:45

## 2024-09-21 RX ADMIN — Medication 40 MILLIEQUIVALENT(S): at 05:46

## 2024-09-21 RX ADMIN — ENOXAPARIN SODIUM 40 MILLIGRAM(S): 150 INJECTION SUBCUTANEOUS at 22:20

## 2024-09-21 RX ADMIN — CHLORHEXIDINE GLUCONATE ORAL RINSE 1 APPLICATION(S): 1.2 SOLUTION DENTAL at 05:46

## 2024-09-21 RX ADMIN — Medication 650 MILLIGRAM(S): at 05:21

## 2024-09-21 RX ADMIN — Medication 1000 MILLIGRAM(S): at 18:08

## 2024-09-21 RX ADMIN — FLUDROCORTISONE ACETATE 0.2 MILLIGRAM(S): 0.1 TABLET ORAL at 18:07

## 2024-09-21 RX ADMIN — HYDRALAZINE HYDROCHLORIDE 10 MILLIGRAM(S): 100 TABLET ORAL at 18:21

## 2024-09-21 RX ADMIN — ENOXAPARIN SODIUM 40 MILLIGRAM(S): 150 INJECTION SUBCUTANEOUS at 09:53

## 2024-09-21 NOTE — PROGRESS NOTE ADULT - SUBJECTIVE AND OBJECTIVE BOX
=================================  NEUROCRITICAL CARE ATTENDING NOTE  =================================    KARRIE MORALES   MRN-[id number]  Summary:  63y/F  with uncontrolled HTN, does not take any medications at home presented to [facility] after developing acute onset severe HA. Once arrived, patient had episode of nausea with emesis. CTH showed SAH, HH: 2, MF: 3. NIHSS: 0. CTA neg for aneurysm. Transferred to [facility] for further mgmt. Upon arrival to [facility], NIHSS noted to be 0. Patient reports a headache rated at 7/10 in severity. Of note, patient's daughter notes pt seems off and is sleepier than her baseline. Pt denies SOB, chest pain, vision changes, nausea, weakness/numbness/tingling, dizziness, photophobia.  (08 Sep 2024 13:29)    COURSE IN THE HOSPITAL:   s/p angiogram, carotid & cerebral, b/l (2024, Alfredo Lomas), no aneurysm seen; kept intubated post procedure, EVD inserted   Tmax 38.5 L UE drift overnight; increased SBP goals to 180-200; angio mild VSP   No significant events overnight.    No significant events overnight. vomit x1 this AM   Tmax 38.1  No significant events overnight. -200   Tmax 37.2 EVD raised to 10   Tmax 37.1    Past Medical History: No pertinent past medical history Hypertension  Allergies:  No Known Allergies  Home meds:     PHYSICAL EXAMINATION  T(C): 36.6 (24 @ 05:28), Max: 37.1 (24 @ 00:46) HR: 71 (24 @ 07:00) (66 - 89) BP: 176/74 (24 @ 14:40) (176/74 - 176/74) ABP: 173/62 (24 @ 07:00) (136/43 - 200/72) RR: 16 (24 @ 07:00) (16 - 19) SpO2: 100% (24 @ 07:00) (95% - 100%)  NEUROLOGIC EXAMINATION:  Patient AOx3 FC BUE / LE 5/5 no drift, R facial (h/o Arreaga's)  GENERAL:  room air   EENT:  anicteric  CARDIOVASCULAR: (+) S1 S2, normal rate and regular rhythm  PULMONARY: clear to auscultation bilaterally  ABDOMEN: soft, nontender with normoactive bowel sounds  EXTREMITIES: no edema  SKIN: no rash    LABS:              (8.27)  7.2  (7.4)  7.76  )-----------( 291      ( 21 Sep 2024 02:20 )             25.2     136  |  105  |  8   ----------------------------<  103[H]  3.5   |  25  |  0.72    Ca    8.0[L]      21 Sep 2024 02:20  Phos  3.4       Mg     1.9         TPro  6.5  /  Alb  3.6  /  TBili  0.2  /  DBili  x   /  AST  21  /  ALT  28  /  AlkPhos  100   @ 07:01  -   @ 07:00  --------------------------------------------------------  IN: 2550 mL / OUT: 2545 mL / NET: 5 mL     @ 07:01  -   @ 08:01  --------------------------------------------------------  IN: 50 mL / OUT: 0 mL / NET: 50 mL     Bacteriology:  CSF studies:  EEG:  [email or web address] 09:46AM	CT BRAIN           	Diffuse SAH, mild midline shift, ventr dilation  2024 10:12AM	CT ANGIO NECK	No high-grade stenosis or occlusion  2024 10:12AM	CT ANGIO BRAIN	Mild right ICA stenosis, no large occlusion  2024 10:12AM	CT PERFUSION     	4ml core infarct, 4ml penumbra    Other imaging:    MEDICATIONS:     ·	enoxaparin Injectable 40 SubCutaneous every 12 hours  ·	levETIRAcetam 1000 Oral two times a day  ·	bisacodyl 5 Oral at bedtime  ·	polyethylene glycol 3350 17 Oral at bedtime  ·	senna 2 Oral at bedtime  ·	fludroCORTISONE 0.2 Oral two times a day  ·	ascorbic acid 500 Oral <User Schedule>  ·	ferrous    sulfate Liquid 300 Oral <User Schedule>  ·	acetaminophen     Tablet .. 650 Oral every 6 hours PRN  ·	ondansetron Injectable 4 IV Push every 6 hours PRN    IV FLUIDS: 3%@50  DRIPS:  DIET: regular diet   Lines: L brachial [place]  Drains:     EVD @5cm H20 ICPs <11 output 190/24h   EVD @5cm H20 ICPs <11 output 216 /24h   EVD @5cm H20 ICPs <10 output 219/24h   EVD@5cm H20 ICPs 1-8 244/24h   EVD@10cm H20 ICPs <10   Wounds:    CODE STATUS:  Full Code                       GOALS OF CARE:  aggressive                      DISPOSITION:  ICU =================================  NEUROCRITICAL CARE ATTENDING NOTE  =================================    KARRIE MORALES   MRN-[id number]  Summary:  63y/F  with uncontrolled HTN, does not take any medications at home presented to [facility] after developing acute onset severe HA. Once arrived, patient had episode of nausea with emesis. CTH showed SAH, HH: 2, MF: 3. NIHSS: 0. CTA neg for aneurysm. Transferred to [facility] for further mgmt. Upon arrival to [facility], NIHSS noted to be 0. Patient reports a headache rated at 7/10 in severity. Of note, patient's daughter notes pt seems off and is sleepier than her baseline. Pt denies SOB, chest pain, vision changes, nausea, weakness/numbness/tingling, dizziness, photophobia.  (08 Sep 2024 13:29)    COURSE IN THE HOSPITAL:   s/p angiogram, carotid & cerebral, b/l (2024, Alfredo Lomas), no aneurysm seen; kept intubated post procedure, EVD inserted   Tmax 38.5 L UE drift overnight; increased SBP goals to 180-200; angio mild VSP   No significant events overnight.    No significant events overnight. vomit x1 this AM   Tmax 38.1  No significant events overnight. -200   Tmax 37.2 EVD raised to 10   BD14 Tmax 37.1    Past Medical History: No pertinent past medical history Hypertension  Allergies:  No Known Allergies  Home meds:     PHYSICAL EXAMINATION  T(C): 36.6 (24 @ 05:28), Max: 37.1 (24 @ 00:46) HR: 71 (24 @ 07:00) (66 - 89) ABP: 173/62 (24 @ 07:00) (136/43 - 200/72) RR: 16 (24 @ 07:00) (16 - 19) SpO2: 100% (09-21-24 @ 07:00) (95% - 100%)  NEUROLOGIC EXAMINATION:  Patient AOx3 FC BUE / LE 5/5 no drift, R facial (h/o Arreaga's)  GENERAL:  room air   EENT:  anicteric  CARDIOVASCULAR: (+) S1 S2, normal rate and regular rhythm  PULMONARY: clear to auscultation bilaterally  ABDOMEN: soft, nontender with normoactive bowel sounds  EXTREMITIES: no edema  SKIN: no rash    LABS:              (8.27)  7.2  (7.4)  7.76  )-----------( 291      ( 21 Sep 2024 02:20 )             25.2     136  |  105  |  8   ----------------------------<  103[H]  3.5   |  25  |  0.72    Ca    8.0[L]      21 Sep 2024 02:20  Phos  3.4       Mg     1.9         TPro  6.5  /  Alb  3.6  /  TBili  0.2  /  DBili  x   /  AST  21  /  ALT  28  /  AlkPhos  100   @ 07:  -   @ 07:00  --------------------------------------------------------  IN: 2550 mL / OUT: 2545 mL / NET: 5 mL     @ 07: @ 08:01  --------------------------------------------------------  IN: 50 mL / OUT: 0 mL / NET: 50 mL     Bacteriology:  CSF studies:  EEG:  [email or web address] 09:46AM	CT BRAIN           	Diffuse SAH, mild midline shift, ventr dilation  2024 10:12AM	CT ANGIO NECK	No high-grade stenosis or occlusion  2024 10:12AM	CT ANGIO BRAIN	Mild right ICA stenosis, no large occlusion  2024 10:12AM	CT PERFUSION     	4ml core infarct, 4ml penumbra    Other imaging:    MEDICATIONS:     ·	enoxaparin Injectable 40 SubCutaneous every 12 hours  ·	levETIRAcetam 1000 Oral two times a day  ·	bisacodyl 5 Oral at bedtime  ·	polyethylene glycol 3350 17 Oral at bedtime  ·	senna 2 Oral at bedtime  ·	fludroCORTISONE 0.2 Oral two times a day  ·	ascorbic acid 500 Oral <User Schedule>  ·	ferrous    sulfate Liquid 300 Oral <User Schedule>  ·	acetaminophen     Tablet .. 650 Oral every 6 hours PRN  ·	ondansetron Injectable 4 IV Push every 6 hours PRN    IV FLUIDS: 3%@50  DRIPS:  DIET: regular diet   Lines: L brachial [place]  Drains:     EVD @5cm H20 ICPs <11 output 190/24h   EVD @5cm H20 ICPs <11 output 216 /24h   EVD @5cm H20 ICPs <10 output 219/24h   EVD@5cm H20 ICPs 1-8 244/24h   EVD@10cm H20 ICPs <10    EVD@10cm H20 ICPs <10   Wounds:    CODE STATUS:  Full Code                       GOALS OF CARE:  aggressive                      DISPOSITION:  ICU

## 2024-09-21 NOTE — PROGRESS NOTE ADULT - ASSESSMENT
63f hx HTN, bells palsy, iron deficiency anemia admit for DSA negative SAH [id number] c/b vasospasm s/p IA therapy     -Ncq1, VSq1   -EVD management @[id number]; monitor output and ICP; up to 30cc/hr drainage permitted   -c/w AEDs  -SBP goal 120-200;   -c/w keppra 1g bid  -c/w fludrocortisone to 0.2mg BID, monitor for hypokalemia; 3% HTS @ 30cc/hr cbc q8 Na goal 140-145; keep euvolemic   -dvt ppx w/ SQL

## 2024-09-21 NOTE — PROGRESS NOTE ADULT - ASSESSMENT
S/Overnight events:  BD 14. POD 13 DSA. POD 9/5 angio for spasm tx. BILL overnight.     Hospital Course:   : Transfer to [facility] for further mgmt of SAH. Patient taken urgently to angio. ET tube pulled back 2cm. EVD placed, open @ 10. Stability scan stable, EVD in position, NGT placed. Started nimodipine 60q4. Extubated to face mask. POD 0 DSA neg for aneurysm.   : BD2. POD 1 angio. Started iron/vit C q other day for microcytic anermia. Started lisinopril 10mg daily. 1.L liter bolus for euvolemia. Ambriz removed, f/u TOV, started on prophylactic SQL 40mg BID (weight based). Passed TOV. Off cardene gtt. cardene gtt resumed. started hydral 25 q8, inc lisinopril dose from 10mg to 20mg daily.   9/10: BD3. POD 2 angio. BILL overnight. TTE showing EF 70%, mild symmetric LVH. DC cardene.   : BD 4. POD 3 angio. BILL overnight. SBP 160s, given hydralazine 10mg IVP x1. Given ducolax suppository. SBP liberalized 100-160. , given labetolol 5mg IVP. NS bolus 500cc for euvolemia. Lactulose for AM. PO hydral switched to clonidine.   : BD 5. POD 4 angio. BILL overnight. Anti-Xa within prophylactic range. , given labetolol 10mg IVP. 1L NS for euvolemia. lisinopril increased 40, norvasc 10 added. cardene started. change in exam: JAIME/LL 4/5 w/ drift, CTH/CTA performed showing severe spasm. febrile 101, pan cx sent. POD0 angio for spasm tx. Dc'd propofol. Extubated to face mask. SBP 190s, started on cardene gtt  : BD 6, POD 5 diagnostic angio, POD 1 angio for spasm tx. Changed NS to LR.   : BD 7, POD 6, POD 2 angio for spasm. Pt febrile ovn, f/u cultures. Start bromocriptine. Resume lisinopril. 1L bolus, f/u Na studies. Notified by primary RN of exam change: new dysarthria, L facial droop, LUE/LLLE withdrawing to noxious stim. Stroke code called, with high suspicion for vasospasm. Levo gtt started for SBP goal 180. CTA/CTP/CT performed - R sided spasm, no perfusion defect. Exam improved with higher BP, dysarthria and facial droop improving and L side 4/5. Discussed with Dr. Chao, Dr. Fuentes, and Dr. Butler - will keep SBP goal 160-200 and consider angiogram if not improving further.  9/15: BD 8, POD 7, POD 3 angio. Given 250 cc albumin bolus followed by 500 cc NS. Repeat BMP Na 134. S/p 1 L bolus for euvolemia. Full stregnth b/l, no drift; no repeat angio, regular diet. Given 250 albumin and 1 L NS for net negative volume.   : BD 9, POD 8, POD 4 angio. Remains npo after midnight. Given 500 cc bolus NS for euvolemia. 1L bolus for euvolemia. EVD lowered to 5 i/s/o spasm. added fludrocortisone 0.1 BID, salt tabs increased to 3q6hr. increased bromocriptine to 10 q8. POD0 repeat angio 15 mg verapamil injected to R ICA. SBP parameters liberalized 160-200, off levo. 5 cc EVD output during angio, drained 22 cc's post-op, ICPs single digits. emesis s/p salt tabs, florinef rpt dose, salt tabs dc'd. 1L NS for BP 150s. POD 0 s/p angiogram showing R ICA spasm with IA verapamil.   : BD 10. POD 9. POD 5 angio with spasm. POD 1 angio with spasm. BILL o/n. Started on levo gtt. Na 141, weaned 3% to 50cc/hr. Surveillance dopplers neg for. DVT. 500cc bolus for euvolemia.   : BD 11. POD 10. POD 6 angio with spasm. POD 2 angio with spasm. BILL o/n. SBP liberalized to 120-200, exam stable. 3% decreased to 30cc/hr. BMP pend 4pm. Given 5mg hydralazine x 1 for . decreased bromo to 5mgq8. BM today. given 5mg hydral for .   : BD 12. POD 11. POD 7 angio with spasm. POD 3 angio with spasm. BILL o/n. Given 1L for euvolemia. 3% increased to 50 cc/hr, Na 138>135. Given florinef 0.1x1, increased florinef to 0.2mg BID starting in AM. Dc'd bromocriptine.   : BD 13. POD 12. POD 8 angio with spasm. POD 4 angio with spasm. BILL overnight. EVD raised to 10, decreased miralax to qhs. Na goal relaxed to normal, given 1 push hydral for SBP 220s.       Vital Signs Last 24 Hrs  T(C): 37.1 (21 Sep 2024 00:46), Max: 37.1 (21 Sep 2024 00:46)  T(F): 98.8 (21 Sep 2024 00:46), Max: 98.8 (21 Sep 2024 00:46)  HR: 72 (21 Sep 2024 01:00) (59 - 89)  BP: 176/74 (20 Sep 2024 14:40) (176/74 - 176/74)  BP(mean): 106 (20 Sep 2024 14:40) (106 - 106)  RR: 18 (21 Sep 2024 01:00) (15 - 19)  SpO2: 97% (21 Sep 2024 01:00) (95% - 100%)    Parameters below as of 21 Sep 2024 01:00  Patient On (Oxygen Delivery Method): room air      I&O's Detail    19 Sep 2024 07:01  -  20 Sep 2024 07:00  --------------------------------------------------------  IN:    Oral Fluid: 1440 mL    Sodium Chloride 0.9% Bolus: 500 mL    sodium chloride 3%: 90 mL    sodium chloride 3%: 1050 mL  Total IN: 3080 mL    OUT:    External Ventricular Device (mL): 167 mL    Voided (mL): 3400 mL  Total OUT: 3567 mL    Total NET: -487 mL      20 Sep 2024 07:01  -  21 Sep 2024 02:17  --------------------------------------------------------  IN:    Oral Fluid: 250 mL    Sodium Chloride 0.9% Bolus: 1000 mL    sodium chloride 3%: 950 mL  Total IN: 2200 mL    OUT:    External Ventricular Device (mL): 106 mL    Voided (mL): 2200 mL  Total OUT: 2306 mL    Total NET: -106 mL        I&O's Summary    19 Sep 2024 07:01  -  20 Sep 2024 07:00  --------------------------------------------------------  IN: 3080 mL / OUT: 3567 mL / NET: -487 mL    20 Sep 2024 07:01  -  21 Sep 2024 02:17  --------------------------------------------------------  IN: 2200 mL / OUT: 2306 mL / NET: -106 mL      PHYSICAL EXAM:  General: Pt is sitting up comfortably in bed, in NAD, on RA  HEENT: PERRL 3mm, EOMI B/L, +Right facial droop (baseline), +EVD in place  Cardiovascular: RRR, normal S1 and S2   Respiratory: non-labored breathing, symmetric chest rise   GI: abd soft, NTND   Neuro: A&O x 3, no aphasia, speech clear, no dysmetria, no pronator drift  Strength 5/5 throughout all 4 extremities  Sensation intact to light touch throughout   Vascular: Distal pulses 2+ x4, no calf edema or erythema  Wounds: R groin C/D/I, no evidence of hematoma; EVD insertion site and incision C/D/I with chlorhexidine dressing in place     TUBES/LINES:  [] CVC  [] A-line  [] Lumbar Drain  [X] Ventriculostomy, open at [id number]  [] Other    DIET:  [] NPO  [X] Mechanical  [] Tube feeds    LABS:    Urinalysis Basic - ( 20 Sep 2024 18:15 )    Color: x / Appearance: x / SG: x / pH: x  Gluc: 116 mg/dL / Ketone: x  / Bili: x / Urobili: x   Blood: x / Protein: x / Nitrite: x   Leuk Esterase: x / RBC: x / WBC x   Sq Epi: x / Non Sq Epi: x / Bacteria: x    Allergies    No Known Allergies    Intolerances      MEDICATIONS:  Antibiotics:    Neuro:  acetaminophen     Tablet .. 650 milliGRAM(s) Oral every 6 hours PRN  levETIRAcetam 1000 milliGRAM(s) Oral two times a day  ondansetron Injectable 4 milliGRAM(s) IV Push every 6 hours PRN    Anticoagulation:  enoxaparin Injectable 40 milliGRAM(s) SubCutaneous every 12 hours    OTHER:  bisacodyl 5 milliGRAM(s) Oral at bedtime  chlorhexidine 2% Cloths 1 Application(s) Topical <User Schedule>  fludroCORTISONE 0.2 milliGRAM(s) Oral two times a day  influenza  Vaccine (HIGH DOSE) 0.5 milliLiter(s) IntraMuscular once  polyethylene glycol 3350 17 Gram(s) Oral at bedtime  senna 2 Tablet(s) Oral at bedtime    IVF:  ascorbic acid 500 milliGRAM(s) Oral <User Schedule>  ferrous    sulfate Liquid 300 milliGRAM(s) Oral <User Schedule>  sodium chloride 3%. 500 milliLiter(s) IV Continuous <Continuous>    CULTURES:  Culture Results:   No growth at 5 days ( @ 23:05)  Culture Results:   No growth at 5 days ( @ 16:54)    ASSESSMENT:  63F PMHx uncontrolled HT, R samuels's palsy, was BIBEMS to [facility]  c/o sudden onset severe HA. CTH showed SAH HH: 2, MF: 3. NIHSS: 0. CTA neg for aneurysm. Transferred to [facility] for further mgmt. S/p DSA negative for anueyrsm (24). S/p R frontal EVD (24). S/p angiogram showing mild R LILLY/R MCA spasm with IA verapamil (), s/p  angiogram with 15 mg IA verapamil for R ICA spasm ().     HTN    No pertinent family history in first degree relatives    Handoff    No pertinent past medical history    Hypertension    SAH (subarachnoid hemorrhage)    Cerebral vasospasm    SAH (subarachnoid hemorrhage)    Cerebral vasospasm    SAH (subarachnoid hemorrhage)    Subarachnoid hemorrhage    Angiogram, carotid and cerebral, bilateral    Insertion of intravenous catheter with ultrasound guidance    No significant past surgical history    No significant past surgical history    H/O  section    HTN    Hypertension    [email or web address]    PLAN:  Neuro:  - Neuro/vitals q1hr  - Seizure prophylaxis: Keppra 1g BID   - R frontal EVD @ [id number], monitor output   - DCI prophylaxis: HOLD nimodipine 60q4 / vasospasm  - Pain control: Tylenol, Oxy 5 prn  - stability CTH post-EVD complete , stable, CTH  stable   - CTA : b/l LILLY spasm, b/l MCA R>L  - MR brain and C spine w/wo contrast complete     Cards:   - -200  - TTE 9/10: EF 70%, mild symmetric LVH    Pulm:   - RA  - IS    GI:  - Regular diet   - Bowel regimen, LBM   - Nausea: Zofran 4q6 prn     Renal:  - NaCl 3% @ 50cc/hr; fludrocortisone 0.2 mg BID   - Normonatremia goal  - voiding   - euvolemic goal     Endo:  - A1c 5.8    Heme:   - DVT prophylaxis: SCDs to BL LE, ppx SQL 40 BID (weight based)   - Anti Xa : 0.31  - LE dopplers : R posterior tibial DVT on admission ()- surveillace dopplers  neg for DVT  - Microcytic anemia: iron/vit C q other day     ID:   - pan cx , f/u cx    Dispo: NSICU, full code, PT/OT rec AR    Discussed with Dr. Butler and Dr. Chao

## 2024-09-21 NOTE — PROGRESS NOTE ADULT - SUBJECTIVE AND OBJECTIVE BOX
INTERVAL HISTORY: HPI:  63F PMHx uncontrolled HTN, does not take any medications at home presented to [facility] after developing acute onset severe HA. Once arrived, patient had episode of nausea with emesis. CTH showed SAH, HH: 2, MF: 3. NIHSS: 0. CTA neg for aneurysm. Transferred to [facility] for further mgmt. Upon arrival to [facility], NIHSS noted to be 0. Patient reports a headache rated at 7/10 in severity. Of note, patient's daughter notes pt seems off and is sleepier than her baseline. Pt denies SOB, chest pain, vision changes, nausea, weakness/numbness/tingling, dizziness, photophobia.  (08 Sep 2024 13:29)    Drug Dosing Weight  Height (cm): 161.3 (16 Sep 2024 09:01)  Weight (kg): 115 (16 Sep 2024 09:01)  BMI (kg/m2): 44.2 (16 Sep 2024 09:01)  BSA (m2): 2.15 (16 Sep 2024 09:01)    PAST MEDICAL & SURGICAL HISTORY:  Hypertension  H/O  section    REVIEW OF SYSTEMS: [ ] Unable to Assess due to neurologic exam   [ ] All ROS addressed below are non-contributory, except:  Neuro: [ ] Headache [ ] Back pain [ ] Numbness [ ] Weakness [ ] Ataxia [ ] Dizziness [ ] Aphasia [ ] Dysarthria [ ] Visual disturbance  Resp: [ ] Shortness of breath/dyspnea, [ ] Orthopnea [ ] Cough  CV: [ ] Chest pain [ ] Palpitation [ ] Lightheadedness [ ] Syncope  Renal: [ ] Thirst [ ] Edema  GI: [ ] Nausea [ ] Emesis [ ] Abdominal pain [ ] Constipation [ ] Diarrhea  Hem: [ ] Hematemesis [ ] bright red blood per rectum  ID: [ ] Fever [ ] Chills [ ] Dysuria  ENT: [ ] Rhinorrhea    PHYSICAL EXAM:    General: No Acute Distress   Neurological: Awake, alert oriented to person, place and time, Following Commands, PERRL, EOMI, baseline R facial, Speech Fluent, Moving all extremities, Muscle Strength normal in all four extremities, No Drift, Sensation to Light Touch Intact  Pulmonary: Clear to Auscultation, No Rales, No Rhonchi, No Wheezes   Cardiovascular: S1, S2, Regular Rte and Rhythm   Gastrointestinal: Soft, Nontender, Nondistended   Extremities: No calf tenderness   Incision: EVD in place @ 15cm    ICU Vital Signs Last 24 Hrs  T(C): 36.8 (21 Sep 2024 18:02), Max: 37.1 (21 Sep 2024 00:46)  T(F): 98.3 (21 Sep 2024 18:02), Max: 98.8 (21 Sep 2024 00:46)  HR: 95 (21 Sep 2024 22:00) (66 - 99)  ABP: 184/66 (21 Sep 2024 22:00) (136/43 - 196/70)  ABP(mean): 114 (21 Sep 2024 22:00) (76 - 130)  RR: 17 (21 Sep 2024 22:00) (16 - 19)  SpO2: 98% (21 Sep 2024 22:) (97% - 100%)      24 @ 07:01  -  24 @ 07:00  --------------------------------------------------------  IN: 2550 mL / OUT: 2545 mL / NET: 5 mL    24 @ 07:01  -  24 @ 22:07  --------------------------------------------------------  IN: 1430 mL / OUT: 1352 mL / NET: 78 mL      acetaminophen     Tablet .. 650 milliGRAM(s) Oral every 6 hours PRN  ascorbic acid 500 milliGRAM(s) Oral <User Schedule>  chlorhexidine 2% Cloths 1 Application(s) Topical <User Schedule>  enoxaparin Injectable 40 milliGRAM(s) SubCutaneous every 12 hours  ferrous    sulfate Liquid 300 milliGRAM(s) Oral <User Schedule>  fludroCORTISONE 0.2 milliGRAM(s) Oral two times a day  influenza  Vaccine (HIGH DOSE) 0.5 milliLiter(s) IntraMuscular once  levETIRAcetam 1000 milliGRAM(s) Oral two times a day  ondansetron Injectable 4 milliGRAM(s) IV Push every 6 hours PRN  sodium chloride 3%. 500 milliLiter(s) (30 mL/Hr) IV Continuous <Continuous>      LABS:  Na: 140 ( @ 14:43), 136 ( @ 02:20), 137 ( @ 18:15), 136 ( @ 09:07), 136 ( @ 04:04), 137 ( 19:56), 138 ( @ 13:20), 135 (:14)  K: 3.7 (:43), 3.5 ( @ 02:20), 4.0 ( @ 18:15), 3.8 (:07), 3.9 ( @ 04:04), 4.0 (:56), 4.2 ( 13:20), 3.8 (:14)  Cl: 104 ( 14:43), 105 ( @ 02:20), 106 ( @ 18:15), 104 ( @ 09:07), 104 ( @ 04:04), 105 (:56), 105 ( @ 13:20), 105 (:14)  CO2: 23 (:43), 25 ( @ 02:20), 24 ( @ 18:15), 24 ( 09:07), 26 ( @ 04:04), 24 ( 19:56), 25 ( @ 13:20), 25 (:14)  BUN: 7 ( @ :43), 8 ( @ 02:20), 8 ( @ 18:15), 8 ( @ 09:07), 7 ( @ 04:04), 8 ( @ 19:56), 7 ( @ 13:20), 9 (:14)  Cr: 0.72 ( @ 14:43), 0.72 ( @ 02:20), 0.72 ( @ 18:15), 0.66 ( 09:07), 0.70 ( @ 04:04), 0.69 ( @ 19:56), 0.70 ( @ 13:20), 0.72 ( 05:14)  Glu: 126( 14:43), 103( @ 02:20), 116( @ 18:15), 113( 09:07), 108( @ 04:04), 126( 19:56), 127( 13:20), 108(:14)    Hgb: 8.4 ( @ 14:43), 7.2 ( 02:20), 7.4 ( @ 04:04), 7.7 (:14)  Hct: 29.9 ( 14:43), 25.2 ( @ 02:20), 25.8 ( @ 04:04), 26.3 (:14)  WBC: 8.29 ( 14:43), 7.76 ( @ 02:20), 8.27 ( @ 04:04), 6.91 (:14)  Plt: 354 ( 14:43), 291 ( @ 02:20), 249 ( @ 04:04), 235 ( 05:14)      LIVER FUNCTIONS - ( 20 Sep 2024 18:15 )  Alb: 3.6 g/dL / Pro: 6.5 g/dL / ALK PHOS: 100 U/L / ALT: 28 U/L / AST: 21 U/L / GGT: x

## 2024-09-21 NOTE — PROGRESS NOTE ADULT - ASSESSMENT
63y/F with  subarachnoid hemorrhage HH2 MF3, brain compression, cerebral edema s/p angiogram, carotid & cerebral, b/l (09/08/2024, Alfredo Lomas)  Hypertension    PLAN:   NEURO: neurochecks q1h, PRN pain meds with acetaminophen  no nimodipine   EVD@10cm H20   continue bromocriptine - 5mg q8h   seizure prophylaxis:  levetiracetam 1000mg PO BID, as per neurosurgery   REHAB:  physical therapy evaluation and management    EARLY MOB:  HOB up OOB to c hair    PULM:  room air, incentive spirometry   CARDIO:  SBP goal 120-200 mm Hg; EF   ENDO:  Blood sugar goals 140-180 mg/dL  GI: d/c bowel regimen   DIET: regular diet  RENAL:  3%@50cc/hr, cont fludrocortisone; check BMP this afternoon; Na goal 135-145  HEM/ONC: Hb stable, continue iron / Vit C  VTE Prophylaxis: SCDs, SQL 40 BID; R LE DVT - repeat doppler NEG  ID: low grade fever no leukocytosis  Social: will update family     Active issues:  What's keeping patient in the ICU? EVD, vasospasm   What is this patient's dispo plan?    ATTENDING ATTESTATION:  I was physically present for the key portions of the evaluation and management (E/M) service provided.  I agree with the above history, physical and plan, which I have reviewed and edited where appropriate.    Patient at high risk for neurological deterioration or death due to:  ICU delirium, aspiration PNA, DVT / PE.  Critical care time:  I have personally provided 45 minutes of critical care time, excluding time spent on separate procedures.      Plan discussed with RN, house staff. 63y/F with  subarachnoid hemorrhage HH2 MF3, brain compression, cerebral edema s/p angiogram, carotid & cerebral, b/l (09/08/2024, Alfredo Lomas)  Hypertension    PLAN:   NEURO: neurochecks q1h, PRN pain meds with acetaminophen  no nimodipine   EVD - raise to 15  seizure prophylaxis:  levetiracetam 1000mg PO BID, as per neurosurgery   REHAB:  physical therapy evaluation and management    EARLY MOB:  HOB up OOB to chair    PULM:  room air, incentive spirometry   CARDIO:  SBP goal 120-200 mm Hg; EF   ENDO:  Blood sugar goals 140-180 mg/dL  GI: d/c bowel regimen   DIET: regular diet  RENAL:  3%@50cc/hr, cont fludrocortisone; check BMP this afternoon; Na goal 135-145  HEM/ONC: Hb stable, continue iron / Vit C; CBC, check FOBT  VTE Prophylaxis: SCDs, SQL 40 BID; R LE DVT - repeat doppler NEG  ID: no fever no leukocytosis; off bromocriptine  Social: will update family     Active issues:  What's keeping patient in the ICU? EVD  What is this patient's dispo plan?    ATTENDING ATTESTATION:  I was physically present for the key portions of the evaluation and management (E/M) service provided.  I agree with the above history, physical and plan, which I have reviewed and edited where appropriate.    Patient at high risk for neurological deterioration or death due to:  ICU delirium, aspiration PNA, DVT / PE.  Critical care time:  I have personally provided 45 minutes of critical care time, excluding time spent on separate procedures.      Plan discussed with RN, house staff.

## 2024-09-22 LAB
ANION GAP SERPL CALC-SCNC: 8 MMOL/L — SIGNIFICANT CHANGE UP (ref 5–17)
ANION GAP SERPL CALC-SCNC: 8 MMOL/L — SIGNIFICANT CHANGE UP (ref 5–17)
ANION GAP SERPL CALC-SCNC: 9 MMOL/L — SIGNIFICANT CHANGE UP (ref 5–17)
BUN SERPL-MCNC: 7 MG/DL — SIGNIFICANT CHANGE UP (ref 7–23)
BUN SERPL-MCNC: 8 MG/DL — SIGNIFICANT CHANGE UP (ref 7–23)
BUN SERPL-MCNC: 8 MG/DL — SIGNIFICANT CHANGE UP (ref 7–23)
CALCIUM SERPL-MCNC: 8.2 MG/DL — LOW (ref 8.4–10.5)
CALCIUM SERPL-MCNC: 8.3 MG/DL — LOW (ref 8.4–10.5)
CALCIUM SERPL-MCNC: 8.5 MG/DL — SIGNIFICANT CHANGE UP (ref 8.4–10.5)
CHLORIDE SERPL-SCNC: 103 MMOL/L — SIGNIFICANT CHANGE UP (ref 96–108)
CHLORIDE SERPL-SCNC: 105 MMOL/L — SIGNIFICANT CHANGE UP (ref 96–108)
CHLORIDE SERPL-SCNC: 108 MMOL/L — SIGNIFICANT CHANGE UP (ref 96–108)
CO2 SERPL-SCNC: 25 MMOL/L — SIGNIFICANT CHANGE UP (ref 22–31)
CREAT SERPL-MCNC: 0.77 MG/DL — SIGNIFICANT CHANGE UP (ref 0.5–1.3)
CREAT SERPL-MCNC: 0.78 MG/DL — SIGNIFICANT CHANGE UP (ref 0.5–1.3)
CREAT SERPL-MCNC: 0.8 MG/DL — SIGNIFICANT CHANGE UP (ref 0.5–1.3)
EGFR: 82 ML/MIN/1.73M2 — SIGNIFICANT CHANGE UP
EGFR: 84 ML/MIN/1.73M2 — SIGNIFICANT CHANGE UP
EGFR: 86 ML/MIN/1.73M2 — SIGNIFICANT CHANGE UP
GLUCOSE SERPL-MCNC: 101 MG/DL — HIGH (ref 70–99)
GLUCOSE SERPL-MCNC: 110 MG/DL — HIGH (ref 70–99)
GLUCOSE SERPL-MCNC: 113 MG/DL — HIGH (ref 70–99)
HCT VFR BLD CALC: 25.2 % — LOW (ref 34.5–45)
HGB BLD-MCNC: 7.3 G/DL — LOW (ref 11.5–15.5)
MAGNESIUM SERPL-MCNC: 2 MG/DL — SIGNIFICANT CHANGE UP (ref 1.6–2.6)
MCHC RBC-ENTMCNC: 21.6 PG — LOW (ref 27–34)
MCHC RBC-ENTMCNC: 29 GM/DL — LOW (ref 32–36)
MCV RBC AUTO: 74.6 FL — LOW (ref 80–100)
NRBC # BLD: 0 /100 WBCS — SIGNIFICANT CHANGE UP (ref 0–0)
PHOSPHATE SERPL-MCNC: 3.8 MG/DL — SIGNIFICANT CHANGE UP (ref 2.5–4.5)
PLATELET # BLD AUTO: 301 K/UL — SIGNIFICANT CHANGE UP (ref 150–400)
POTASSIUM SERPL-MCNC: 3.8 MMOL/L — SIGNIFICANT CHANGE UP (ref 3.5–5.3)
POTASSIUM SERPL-MCNC: 3.8 MMOL/L — SIGNIFICANT CHANGE UP (ref 3.5–5.3)
POTASSIUM SERPL-MCNC: 3.9 MMOL/L — SIGNIFICANT CHANGE UP (ref 3.5–5.3)
POTASSIUM SERPL-SCNC: 3.8 MMOL/L — SIGNIFICANT CHANGE UP (ref 3.5–5.3)
POTASSIUM SERPL-SCNC: 3.8 MMOL/L — SIGNIFICANT CHANGE UP (ref 3.5–5.3)
POTASSIUM SERPL-SCNC: 3.9 MMOL/L — SIGNIFICANT CHANGE UP (ref 3.5–5.3)
RBC # BLD: 3.38 M/UL — LOW (ref 3.8–5.2)
RBC # FLD: 19.9 % — HIGH (ref 10.3–14.5)
SODIUM SERPL-SCNC: 136 MMOL/L — SIGNIFICANT CHANGE UP (ref 135–145)
SODIUM SERPL-SCNC: 139 MMOL/L — SIGNIFICANT CHANGE UP (ref 135–145)
SODIUM SERPL-SCNC: 141 MMOL/L — SIGNIFICANT CHANGE UP (ref 135–145)
WBC # BLD: 7.16 K/UL — SIGNIFICANT CHANGE UP (ref 3.8–10.5)
WBC # FLD AUTO: 7.16 K/UL — SIGNIFICANT CHANGE UP (ref 3.8–10.5)

## 2024-09-22 PROCEDURE — 99291 CRITICAL CARE FIRST HOUR: CPT

## 2024-09-22 PROCEDURE — 99232 SBSQ HOSP IP/OBS MODERATE 35: CPT

## 2024-09-22 RX ORDER — SENNOSIDES 8.6 MG
2 TABLET ORAL AT BEDTIME
Refills: 0 | Status: DISCONTINUED | OUTPATIENT
Start: 2024-09-22 | End: 2024-09-23

## 2024-09-22 RX ADMIN — SODIUM CHLORIDE 30 MILLILITER(S): 5 INJECTION, SOLUTION INTRAVENOUS at 10:57

## 2024-09-22 RX ADMIN — Medication 20 MILLIEQUIVALENT(S): at 23:46

## 2024-09-22 RX ADMIN — ENOXAPARIN SODIUM 40 MILLIGRAM(S): 150 INJECTION SUBCUTANEOUS at 09:43

## 2024-09-22 RX ADMIN — FLUDROCORTISONE ACETATE 0.2 MILLIGRAM(S): 0.1 TABLET ORAL at 06:55

## 2024-09-22 RX ADMIN — CHLORHEXIDINE GLUCONATE ORAL RINSE 1 APPLICATION(S): 1.2 SOLUTION DENTAL at 06:55

## 2024-09-22 RX ADMIN — Medication 20 MILLIEQUIVALENT(S): at 00:09

## 2024-09-22 RX ADMIN — Medication 1000 MILLIGRAM(S): at 17:21

## 2024-09-22 RX ADMIN — Medication 1000 MILLIGRAM(S): at 06:55

## 2024-09-22 RX ADMIN — Medication 20 MILLIEQUIVALENT(S): at 06:54

## 2024-09-22 RX ADMIN — Medication 500 MILLIGRAM(S): at 06:55

## 2024-09-22 RX ADMIN — FLUDROCORTISONE ACETATE 0.2 MILLIGRAM(S): 0.1 TABLET ORAL at 17:21

## 2024-09-22 RX ADMIN — Medication 300 MILLIGRAM(S): at 06:55

## 2024-09-22 NOTE — PROGRESS NOTE ADULT - SUBJECTIVE AND OBJECTIVE BOX
=================================  NEUROCRITICAL CARE ATTENDING NOTE  =================================    KARRIE MORALES   MRN-[id number]  Summary:  63y/F  with uncontrolled HTN, does not take any medications at home presented to [facility] after developing acute onset severe HA. Once arrived, patient had episode of nausea with emesis. CTH showed SAH, HH: 2, MF: 3. NIHSS: 0. CTA neg for aneurysm. Transferred to [facility] for further mgmt. Upon arrival to [facility], NIHSS noted to be 0. Patient reports a headache rated at 7/10 in severity. Of note, patient's daughter notes pt seems off and is sleepier than her baseline. Pt denies SOB, chest pain, vision changes, nausea, weakness/numbness/tingling, dizziness, photophobia.  (08 Sep 2024 13:29)    COURSE IN THE HOSPITAL:   s/p angiogram, carotid & cerebral, b/l (2024, Alfredo Lomas), no aneurysm seen; kept intubated post procedure, EVD inserted   Tmax 38.5 L UE drift overnight; increased SBP goals to 180-200; angio mild VSP   No significant events overnight.    No significant events overnight. vomit x1 this AM   Tmax 38.1  No significant events overnight. -200   Tmax 37.2 EVD raised to 10   BD14 Tmax 37.1 EVD raised to 15   BD15     Past Medical History: No pertinent past medical history Hypertension  Allergies:  No Known Allergies  Home meds:     PHYSICAL EXAMINATION  T(C): 36.7 (24 @ 06:43), Max: 37.2 (24 @ 22:24) HR: 81 (24 @ 06:00) (69 - 99) ABP: 167/58 (24 @ 06:00) (135/47 - 196/70) RR: 17 (24 @ 06:00) (16 - 19) SpO2: 97% (24 @ 06:00) (97% - 100%)  NEUROLOGIC EXAMINATION:  Patient AOx3 FC BUE / LE 5/5 no drift, R facial (h/o Arreaga's)  GENERAL:  room air   EENT:  anicteric  CARDIOVASCULAR: (+) S1 S2, normal rate and regular rhythm  PULMONARY: clear to auscultation bilaterally  ABDOMEN: soft, nontender with normoactive bowel sounds  EXTREMITIES: no edema  SKIN: no rash    LABS:     (8.29)  7.3  (8.4)  7.16  )-----------( 301      ( 22 Sep 2024 05:16 )             25.2     141  |  108  |  8   ----------------------------<  101[H]  3.8   |  25  |  0.77    Ca    8.2[L]      22 Sep 2024 05:16  Phos  3.8       Mg     2.0         TPro  6.5  /  Alb  3.6  /  TBili  0.2  /  DBili  x   /  AST  21  /  ALT  28  /  AlkPhos  100   @ 07:01  -   @ 07:00  --------------------------------------------------------  IN: 1700 mL / OUT: 1392 mL / NET: 308 mL    Bacteriology:  CSF studies:  EEG:  [email or web address] 09:46AM	CT BRAIN           	Diffuse SAH, mild midline shift, ventr dilation  2024 10:12AM	CT ANGIO NECK	No high-grade stenosis or occlusion  2024 10:12AM	CT ANGIO BRAIN	Mild right ICA stenosis, no large occlusion  2024 10:12AM	CT PERFUSION     	4ml core infarct, 4ml penumbra    Other imaging:    MEDICATIONS:     ·	enoxaparin Injectable 40 SubCutaneous every 12 hours  ·	levETIRAcetam 1000 Oral two times a day  ·	fludroCORTISONE 0.2 Oral two times a day  ·	ascorbic acid 500 Oral <User Schedule>  ·	ferrous    sulfate Liquid 300 Oral <User Schedule>  ·	sodium chloride 3%. 500 IV Continuous <Continuous>  ·	acetaminophen     Tablet .. 650 Oral every 6 hours PRN  ·	ondansetron Injectable 4 IV Push every 6 hours PRN    IV FLUIDS: 3%@50  DRIPS:  DIET: regular diet   Lines: KORINA Ramos  Drains:     EVD @5cm H20 ICPs <11 output 190/24h   EVD @5cm H20 ICPs <11 output 216 /24h   EVD @5cm H20 ICPs <10 output 219/24h   EVD@5cm H20 ICPs 1-8 244/24h   EVD@10cm H20 ICPs <10    EVD@10cm H20 ICPs <10    EVD@15cm H20   Wounds:    CODE STATUS:  Full Code                       GOALS OF CARE:  aggressive                      DISPOSITION:  ICU =================================  NEUROCRITICAL CARE ATTENDING NOTE  =================================    KARRIE MORALES   MRN-[id number]  Summary:  63y/F  with uncontrolled HTN, does not take any medications at home presented to [facility] after developing acute onset severe HA. Once arrived, patient had episode of nausea with emesis. CTH showed SAH, HH: 2, MF: 3. NIHSS: 0. CTA neg for aneurysm. Transferred to [facility] for further mgmt. Upon arrival to [facility], NIHSS noted to be 0. Patient reports a headache rated at 7/10 in severity. Of note, patient's daughter notes pt seems off and is sleepier than her baseline. Pt denies SOB, chest pain, vision changes, nausea, weakness/numbness/tingling, dizziness, photophobia.  (08 Sep 2024 13:29)    COURSE IN THE HOSPITAL:   s/p angiogram, carotid & cerebral, b/l (2024, Alfredo Lomas), no aneurysm seen; kept intubated post procedure, EVD inserted   Tmax 38.5 L UE drift overnight; increased SBP goals to 180-200; angio mild VSP   No significant events overnight.    No significant events overnight. vomit x1 this AM   Tmax 38.1  No significant events overnight. -200   Tmax 37.2 EVD raised to 10   BD14 Tmax 37.1 EVD raised to 15   BD15 Tmax 37.2; ICP elevation to 19 once, resolved; EVD clamped this morning    Past Medical History: No pertinent past medical history Hypertension  Allergies:  No Known Allergies  Home meds:     PHYSICAL EXAMINATION  T(C): 36.7 (24 @ 06:43), Max: 37.2 (24 @ 22:24) HR: 81 (24 @ 06:00) (69 - 99) ABP: 167/58 (24 @ 06:00) (135/47 - 196/70) RR: 17 (24 @ 06:00) (16 - 19) SpO2: 97% (24 @ 06:00) (97% - 100%)  NEUROLOGIC EXAMINATION:  Patient AOx3 FC BUE / LE 5/5 no drift, R facial (h/o Arreaga's)  GENERAL:  room air   EENT:  anicteric  CARDIOVASCULAR: (+) S1 S2, normal rate and regular rhythm  PULMONARY: clear to auscultation bilaterally  ABDOMEN: soft, nontender with normoactive bowel sounds  EXTREMITIES: no edema  SKIN: no rash    LABS:     (8.29)  7.3  (8.4, 7.2)  7.16  )-----------( 301      ( 22 Sep 2024 05:16 )             25.2     141  |  108  |  8   ----------------------------<  101[H]  3.8   |  25  |  0.77    Ca    8.2[L]      22 Sep 2024 05:16  Phos  3.8       Mg     2.0         TPro  6.5  /  Alb  3.6  /  TBili  0.2  /  DBili  x   /  AST  21  /  ALT  28  /  AlkPhos  100   @ 07:01  -   @ 07:00  --------------------------------------------------------  IN: 1700 mL / OUT: 1392 mL / NET: 308 mL    Bacteriology:  CSF studies:  EEG:  [email or web address] 09:46AM	CT BRAIN           	Diffuse SAH, mild midline shift, ventr dilation  2024 10:12AM	CT ANGIO NECK	No high-grade stenosis or occlusion  2024 10:12AM	CT ANGIO BRAIN	Mild right ICA stenosis, no large occlusion  2024 10:12AM	CT PERFUSION     	4ml core infarct, 4ml penumbra    Other imaging:    MEDICATIONS:     ·	enoxaparin Injectable 40 SubCutaneous every 12 hours  ·	levETIRAcetam 1000 Oral two times a day  ·	fludroCORTISONE 0.2 Oral two times a day  ·	ascorbic acid 500 Oral <User Schedule>  ·	ferrous    sulfate Liquid 300 Oral <User Schedule>  ·	acetaminophen     Tablet .. 650 Oral every 6 hours PRN  ·	ondansetron Injectable 4 IV Push every 6 hours PRN    IV FLUIDS: 3%@30  DRIPS:  DIET: regular diet   Lines: KORINA Ramos  Drains:     EVD @5cm H20 ICPs <11 output 190/24h   EVD @5cm H20 ICPs <11 output 216 /24h   EVD @5cm H20 ICPs <10 output 219/24h   EVD@5cm H20 ICPs 1-8 244/24h   EVD@10cm H20 ICPs <10    EVD@10cm H20 ICPs <10  --- raised to 15   EVD@15cm H20  - clamped this AM; <10 except from 2-3 a.m. mid teens  Wounds:    CODE STATUS:  Full Code                       GOALS OF CARE:  aggressive                      DISPOSITION:  ICU

## 2024-09-22 NOTE — PROGRESS NOTE ADULT - SUBJECTIVE AND OBJECTIVE BOX
S/Overnight events:  BD 15. POD 14 DSA. POD 10/6 angio neg for spasm. BILL overnight.     Hospital Course:   : Transfer to [facility] for further mgmt of SAH. Patient taken urgently to angio. ET tube pulled back 2cm. EVD placed, open @ 10. Stability scan stable, EVD in position, NGT placed. Started nimodipine 60q4. Extubated to face mask. POD 0 DSA neg for aneurysm.   : BD2. POD 1 angio. Started iron/vit C q other day for microcytic anermia. Started lisinopril 10mg daily. 1.L liter bolus for euvolemia. Ambriz removed, f/u TOV, started on prophylactic SQL 40mg BID (weight based). Passed TOV. Off cardene gtt. cardene gtt resumed. started hydral 25 q8, inc lisinopril dose from 10mg to 20mg daily.   9/10: BD3. POD 2 angio. BILL overnight. TTE showing EF 70%, mild symmetric LVH. DC cardene.   : BD 4. POD 3 angio. BILL overnight. SBP 160s, given hydralazine 10mg IVP x1. Given ducolax suppository. SBP liberalized 100-160. , given labetolol 5mg IVP. NS bolus 500cc for euvolemia. Lactulose for AM. PO hydral switched to clonidine.   : BD 5. POD 4 angio. BILL overnight. Anti-Xa within prophylactic range. , given labetolol 10mg IVP. 1L NS for euvolemia. lisinopril increased 40, norvasc 10 added. cardene started. change in exam: JAIME/LL 4/5 w/ drift, CTH/CTA performed showing severe spasm. febrile 101, pan cx sent. POD0 angio for spasm tx. Dc'd propofol. Extubated to face mask. SBP 190s, started on cardene gtt  : BD 6, POD 5 diagnostic angio, POD 1 angio for spasm tx. Changed NS to LR.   : BD 7, POD 6, POD 2 angio for spasm. Pt febrile ovn, f/u cultures. Start bromocriptine. Resume lisinopril. 1L bolus, f/u Na studies. Notified by primary RN of exam change: new dysarthria, L facial droop, LUE/LLLE withdrawing to noxious stim. Stroke code called, with high suspicion for vasospasm. Levo gtt started for SBP goal 180. CTA/CTP/CT performed - R sided spasm, no perfusion defect. Exam improved with higher BP, dysarthria and facial droop improving and L side 4/5. Discussed with Dr. Chao, Dr. Fuentes, and Dr. Butler - will keep SBP goal 160-200 and consider angiogram if not improving further.  9/15: BD 8, POD 7, POD 3 angio. Given 250 cc albumin bolus followed by 500 cc NS. Repeat BMP Na 134. S/p 1 L bolus for euvolemia. Full stregnth b/l, no drift; no repeat angio, regular diet. Given 250 albumin and 1 L NS for net negative volume.   : BD 9, POD 8, POD 4 angio. Remains npo after midnight. Given 500 cc bolus NS for euvolemia. 1L bolus for euvolemia. EVD lowered to 5 i/s/o spasm. added fludrocortisone 0.1 BID, salt tabs increased to 3q6hr. increased bromocriptine to 10 q8. POD0 repeat angio 15 mg verapamil injected to R ICA. SBP parameters liberalized 160-200, off levo. 5 cc EVD output during angio, drained 22 cc's post-op, ICPs single digits. emesis s/p salt tabs, florinef rpt dose, salt tabs dc'd. 1L NS for BP 150s. POD 0 s/p angiogram showing R ICA spasm with IA verapamil.   : BD 10. POD 9. POD 5 angio with spasm. POD 1 angio with spasm. BILL o/n. Started on levo gtt. Na 141, weaned 3% to 50cc/hr. Surveillance dopplers neg for. DVT. 500cc bolus for euvolemia.   : BD 11. POD 10. POD 6 angio with spasm. POD 2 angio with spasm. BILL o/n. SBP liberalized to 120-200, exam stable. 3% decreased to 30cc/hr. BMP pend 4pm. Given 5mg hydralazine x 1 for . decreased bromo to 5mgq8. BM today. given 5mg hydral for .   : BD 12. POD 11. POD 7 angio with spasm. POD 3 angio with spasm. BILL o/n. Given 1L for euvolemia. 3% increased to 50 cc/hr, Na 138>135. Given florinef 0.1x1, increased florinef to 0.2mg BID starting in AM. Dc'd bromocriptine.   : BD 13. POD 12. POD 8 angio with spasm. POD 4 angio with spasm. BILL overnight. EVD raised to 10, decreased miralax to qhs. Na goal relaxed to normal, given 1 push hydral for SBP 220s.   : BD 14. POD 13 DSA. POD 9/5 angio for spasm tx. BILL overnight. Raised EVD to 15. D/c'd bowel reg. Sending FOB. Decreased rate of 3% from 50 to 30. hydralazine 10mg x2.    Vital Signs Last 24 Hrs  T(C): 37.2 (21 Sep 2024 22:24), Max: 37.2 (21 Sep 2024 22:24)  T(F): 99 (21 Sep 2024 22:24), Max: 99 (21 Sep 2024 22:24)  HR: 76 (21 Sep 2024 23:00) (66 - 99)  BP: --  BP(mean): --  RR: 17 (21 Sep 2024 23:00) (16 - 19)  SpO2: 97% (21 Sep 2024 23:00) (97% - 100%)    Parameters below as of 21 Sep 2024 23:00  Patient On (Oxygen Delivery Method): room air        I&O's Detail    20 Sep 2024 07:01  -  21 Sep 2024 07:00  --------------------------------------------------------  IN:    IV PiggyBack: 100 mL    Oral Fluid: 250 mL    Sodium Chloride 0.9% Bolus: 1000 mL    sodium chloride 3%: 1200 mL  Total IN: 2550 mL    OUT:    External Ventricular Device (mL): 145 mL    Voided (mL): 2400 mL  Total OUT: 2545 mL    Total NET: 5 mL      21 Sep 2024 07:01  -  22 Sep 2024 00:12  --------------------------------------------------------  IN:    Oral Fluid: 800 mL    sodium chloride 3%: 660 mL  Total IN: 1460 mL    OUT:    External Ventricular Device (mL): 57 mL    Voided (mL): 1300 mL  Total OUT: 1357 mL    Total NET: 103 mL      I&O's Summary    20 Sep 2024 07:  -  21 Sep 2024 07:00  --------------------------------------------------------  IN: 2550 mL / OUT: 2545 mL / NET: 5 mL    21 Sep 2024 07:  -  22 Sep 2024 00:12  --------------------------------------------------------  IN: 1460 mL / OUT: 1357 mL / NET: 103 mL    PHYSICAL EXAM:  General: Pt is sitting up comfortably in bed, in NAD, on RA  HEENT: PERRL 3mm, EOMI B/L, +Right facial droop (baseline), +EVD in place  Cardiovascular: RRR, normal S1 and S2   Respiratory: non-labored breathing, symmetric chest rise   GI: abd soft, NTND   Neuro: A&O x 3, no aphasia, speech clear, no dysmetria, no pronator drift  Strength 5/5 throughout all 4 extremities  Sensation intact to light touch throughout   Vascular: Distal pulses 2+ x4, no calf edema or erythema  Wounds: R groin C/D/I, no evidence of hematoma; EVD insertion site and incision C/D/I with chlorhexidine dressing in place     TUBES/LINES:  [] CVC  [X] A-line  [] Lumbar Drain  [X] Ventriculostomy open @ [id number]  [] Other    DIET:  [] NPO  [X] Mechanical  [] Tube feeds    LABS:    Urinalysis Basic - ( 21 Sep 2024 22:32 )    Color: x / Appearance: x / SG: x / pH: x  Gluc: 123 mg/dL / Ketone: x  / Bili: x / Urobili: x   Blood: x / Protein: x / Nitrite: x   Leuk Esterase: x / RBC: x / WBC x   Sq Epi: x / Non Sq Epi: x / Bacteria: x    Allergies    No Known Allergies    Intolerances      MEDICATIONS:  Antibiotics:    Neuro:  acetaminophen     Tablet .. 650 milliGRAM(s) Oral every 6 hours PRN  levETIRAcetam 1000 milliGRAM(s) Oral two times a day  ondansetron Injectable 4 milliGRAM(s) IV Push every 6 hours PRN    Anticoagulation:  enoxaparin Injectable 40 milliGRAM(s) SubCutaneous every 12 hours    OTHER:  chlorhexidine 2% Cloths 1 Application(s) Topical <User Schedule>  fludroCORTISONE 0.2 milliGRAM(s) Oral two times a day  influenza  Vaccine (HIGH DOSE) 0.5 milliLiter(s) IntraMuscular once    IVF:  ascorbic acid 500 milliGRAM(s) Oral <User Schedule>  ferrous    sulfate Liquid 300 milliGRAM(s) Oral <User Schedule>  sodium chloride 3%. 500 milliLiter(s) IV Continuous <Continuous>    CULTURES:  Culture Results:   No growth at 5 days ( @ 23:05)  Culture Results:   No growth at 5 days ( @ 16:54)      ASSESSMENT:  63F PMHx uncontrolled HT, R samuels's palsy, was BIBEMS to [facility]  c/o sudden onset severe HA. CTH showed SAH HH: 2, MF: 3. NIHSS: 0. CTA neg for aneurysm. Transferred to [facility] for further mgmt. S/p DSA negative for anueyrsm (24). S/p R frontal EVD (24). S/p angiogram showing mild R LILLY/R MCA spasm with IA verapamil (), s/p  angiogram with 15 mg IA verapamil for R ICA spasm ().     HTN    No pertinent family history in first degree relatives    Handoff    No pertinent past medical history    Hypertension    SAH (subarachnoid hemorrhage)    Cerebral vasospasm    SAH (subarachnoid hemorrhage)    Cerebral vasospasm    SAH (subarachnoid hemorrhage)    Subarachnoid hemorrhage    Angiogram, carotid and cerebral, bilateral    Insertion of intravenous catheter with ultrasound guidance    No significant past surgical history    No significant past surgical history    H/O  section    HTN    Hypertension    [email or web address]    PLAN:  Neuro:  - Neuro/vitals q1hr  - Seizure prophylaxis: Keppra 1g BID   - R frontal EVD @ 15 cmH2O, monitor output   - DCI prophylaxis: HOLD nimodipine 60q4  vasospasm  - Pain control: Tylenol, Oxy 5 prn  - stability CTH post-EVD complete , stable, CTH  stable   - CTA : b/l LILLY spasm, b/l MCA R>L  - MR brain and C spine w/wo contrast complete     Cards:   - -200  - TTE 9/10: EF 70%, mild symmetric LVH    Pulm:   - RA  - IS    GI:  - Regular diet   - LBM   - Nausea: Zofran 4q6 prn     Renal:  - NaCl 3% @ 30 cc/hr; fludrocortisone 0.2 mg BID   - Normonatremia goal  - voiding   - euvolemic goal     Endo:  - A1c 5.8    Heme:   - DVT prophylaxis: SCDs to BL LE, ppx SQL 40 BID (weight based)   - Anti Xa : 0.31  - LE dopplers : R posterior tibial DVT on admission ()- surveillace dopplers  neg for DVT  - Microcytic anemia: iron/vit C q other day     ID:   - pan cx     Dispo: NSICU, full code, PT/OT rec AR    Discussed with Dr. Butler and Dr. Chao

## 2024-09-22 NOTE — PROGRESS NOTE ADULT - ASSESSMENT
63y/F with  subarachnoid hemorrhage HH2 MF3, brain compression, cerebral edema s/p angiogram, carotid & cerebral, b/l (09/08/2024, Alfredo Lomas)  Hypertension    PLAN:   NEURO: neurochecks q1h, PRN pain meds with acetaminophen  no nimodipine   EVD - raise to 15  seizure prophylaxis:  levetiracetam 1000mg PO BID, as per neurosurgery   REHAB:  physical therapy evaluation and management    EARLY MOB:  HOB up OOB to chair    PULM:  room air, incentive spirometry   CARDIO:  SBP goal 120-200 mm Hg; EF   ENDO:  Blood sugar goals 140-180 mg/dL  GI: d/c bowel regimen   DIET: regular diet  RENAL:  3%@50cc/hr, cont fludrocortisone; check BMP this afternoon; Na goal 135-145  HEM/ONC: Hb stable, continue iron / Vit C; CBC, check FOBT  VTE Prophylaxis: SCDs, SQL 40 BID; R LE DVT - repeat doppler NEG  ID: no fever no leukocytosis; off bromocriptine  Social: will update family     Active issues:  What's keeping patient in the ICU? EVD  What is this patient's dispo plan?    ATTENDING ATTESTATION:  I was physically present for the key portions of the evaluation and management (E/M) service provided.  I agree with the above history, physical and plan, which I have reviewed and edited where appropriate.    Patient at high risk for neurological deterioration or death due to:  ICU delirium, aspiration PNA, DVT / PE.  Critical care time:  I have personally provided 45 minutes of critical care time, excluding time spent on separate procedures.      Plan discussed with RN, house staff. 63y/F with  subarachnoid hemorrhage HH2 MF3, brain compression, cerebral edema s/p angiogram, carotid & cerebral, b/l (09/08/2024, Alfredo Lomas)  Hypertension    PLAN:   NEURO: neurochecks q1h, PRN pain meds with acetaminophen  no nimodipine   EVD - raise clamping - CT scan tomorrow  seizure prophylaxis:  levetiracetam 1000mg PO BID, as per neurosurgery   REHAB:  physical therapy evaluation and management    EARLY MOB:  HOB up OOB to chair    PULM:  room air, incentive spirometry   CARDIO:  SBP goal 120-200 mm Hg; EF   ENDO:  Blood sugar goals 140-180 mg/dL  GI: off bowel regimen  DIET: regular diet  RENAL:  3%@30cc/hr, cont fludrocortisone; check BMP this afternoon; Na goal 135-145  HEM/ONC: Hb stable, continue iron / Vit C; CBC, check FOBT  VTE Prophylaxis: SCDs, SQL 40 BID; R LE DVT - repeat doppler NEG  ID: no fever no leukocytosis; off bromocriptine  Social: will update family     Active issues:  What's keeping patient in the ICU? EVD  What is this patient's dispo plan?    ATTENDING ATTESTATION:  I was physically present for the key portions of the evaluation and management (E/M) service provided.  I agree with the above history, physical and plan, which I have reviewed and edited where appropriate.    Patient at high risk for neurological deterioration or death due to:  ICU delirium, aspiration PNA, DVT / PE.  Critical care time:  I have personally provided 45 minutes of critical care time, excluding time spent on separate procedures.      Plan discussed with RN, house staff.

## 2024-09-22 NOTE — PROGRESS NOTE ADULT - ASSESSMENT
63f hx HTN, bells palsy, iron deficiency anemia admit for DSA negative SAH hh2mf3 c/b vasospasm s/p IA therapy     -Ncq1, VSq1   -EVD management clamped  @ 9am; -d/c SQL as EVD to be pulled in AM   -c/w AEDs  -SBP goal 120-200;   -c/w keppra 1g bid  -c/w fludrocortisone to 0.2mg BID, monitor for hypokalemia; currently off HTS

## 2024-09-22 NOTE — PROGRESS NOTE ADULT - SUBJECTIVE AND OBJECTIVE BOX
INTERVAL HISTORY: HPI:  63F PMHx uncontrolled HTN, does not take any medications at home presented to [facility] after developing acute onset severe HA. Once arrived, patient had episode of nausea with emesis. CTH showed SAH, HH: 2, MF: 3. NIHSS: 0. CTA neg for aneurysm. Transferred to [facility] for further mgmt. Upon arrival to [facility], NIHSS noted to be 0. Patient reports a headache rated at 7/10 in severity. Of note, patient's daughter notes pt seems off and is sleepier than her baseline. Pt denies SOB, chest pain, vision changes, nausea, weakness/numbness/tingling, dizziness, photophobia.  (08 Sep 2024 13:29)    Drug Dosing Weight  Height (cm): 161.3 (16 Sep 2024 09:01)  Weight (kg): 115 (16 Sep 2024 09:01)  BMI (kg/m2): 44.2 (16 Sep 2024 09:01)  BSA (m2): 2.15 (16 Sep 2024 09:01)    PAST MEDICAL & SURGICAL HISTORY:  Hypertension  H/O  section    REVIEW OF SYSTEMS: [ ] Unable to Assess due to neurologic exam   [ ] All ROS addressed below are non-contributory, except:  Neuro: [ ] Headache [ ] Back pain [ ] Numbness [ ] Weakness [ ] Ataxia [ ] Dizziness [ ] Aphasia [ ] Dysarthria [ ] Visual disturbance  Resp: [ ] Shortness of breath/dyspnea, [ ] Orthopnea [ ] Cough  CV: [ ] Chest pain [ ] Palpitation [ ] Lightheadedness [ ] Syncope  Renal: [ ] Thirst [ ] Edema  GI: [ ] Nausea [ ] Emesis [ ] Abdominal pain [ ] Constipation [ ] Diarrhea  Hem: [ ] Hematemesis [ ] bright red blood per rectum  ID: [ ] Fever [ ] Chills [ ] Dysuria  ENT: [ ] Rhinorrhea    PHYSICAL EXAM:    General: No Acute Distress   Neurological: Awake, alert oriented to person, place and time, Following Commands, PERRL, EOMI, baseline R facial, Speech Fluent, Moving all extremities, Muscle Strength normal in all four extremities, No Drift, Sensation to Light Touch Intact  Pulmonary: Clear to Auscultation, No Rales, No Rhonchi, No Wheezes   Cardiovascular: S1, S2, Regular Rte and Rhythm   Gastrointestinal: Soft, Nontender, Nondistended   Extremities: No calf tenderness   Incision: EVD in place @ 15cm            ICU Vital Signs Last 24 Hrs  T(C): 37.1 (22 Sep 2024 17:52), Max: 37.2 (21 Sep 2024 22:24)  T(F): 98.8 (22 Sep 2024 17:52), Max: 99 (21 Sep 2024 22:24)  HR: 75 (22 Sep 2024 21:00) (68 - 95)  BP: --  BP(mean): --  ABP: 182/56 (22 Sep 2024 21:00) (135/47 - 191/63)  ABP(mean): 104 (22 Sep 2024 21:00) (79 - 122)  RR: 17 (22 Sep 2024 21:00) (15 - 18)  SpO2: 98% (22 Sep 2024 21:00) (97% - 100%)      24 @ 07:01  -  24 @ 07:00  --------------------------------------------------------  IN: 1700 mL / OUT: 1992 mL / NET: -292 mL    24 @ 07:01  -  24 @ 21:43  --------------------------------------------------------  IN: 1140 mL / OUT: 803 mL / NET: 337 mL            acetaminophen     Tablet .. 650 milliGRAM(s) Oral every 6 hours PRN  ascorbic acid 500 milliGRAM(s) Oral <User Schedule>  chlorhexidine 2% Cloths 1 Application(s) Topical <User Schedule>  enoxaparin Injectable 40 milliGRAM(s) SubCutaneous every 12 hours  ferrous    sulfate Liquid 300 milliGRAM(s) Oral <User Schedule>  fludroCORTISONE 0.2 milliGRAM(s) Oral two times a day  influenza  Vaccine (HIGH DOSE) 0.5 milliLiter(s) IntraMuscular once  levETIRAcetam 1000 milliGRAM(s) Oral two times a day  ondansetron Injectable 4 milliGRAM(s) IV Push every 6 hours PRN  polyethylene glycol 3350 17 Gram(s) Oral daily  senna 2 Tablet(s) Oral at bedtime      LABS:  Na: 139 ( @ 13:19), 141 ( @ :16), 137 ( @ 22:32), 140 ( @ 14:43), 136 ( 02:20), 137 ( @ 18:15), 136 (:07), 136 ( 04:04)  K: 3.9 (:19), 3.8 (:16), 3.8 (:32), 3.7 (:43), 3.5 (:20), 4.0 ( @ 18:15), 3.8 (:07), 3.9 (:04)  Cl: 105 (:19), 108 (:16), 105 (:32), 104 ( 14:43), 105 (:20), 106 ( @ 18:15), 104 (:07), 104 (:04)  CO2: 25 (:19), 25 (:16), 24 (:32), 23 ( @ :43), 25 ( @ :20), 24 ( @ 18:15), 24 (:07), 26 ( @ 04:04)  BUN: 7 (:19), 8 ( @ 05:16), 7 ( @ :32), 7 ( @ 14:43), 8 ( @ :20), 8 ( @ 18:15), 8 ( 09:07), 7 (09-20 @ 04:04)  Cr: 0.78 ( @ 13:19), 0.77 ( @ 05:16), 0.70 ( @ 22:32), 0.72 ( @ 14:43), 0.72 ( @ 02:20), 0.72 ( @ 18:15), 0.66 ( @ 09:07), 0.70 ( @ 04:04)  Glu: 113( @ 13:19), 101( @ 05:16), 123( @ 22:32), 126( @ 14:43), 103( @ 02:20), 116( @ 18:15), 113( @ 09:07), 108( @ 04:04)    Hgb: 7.3 ( @ 05:16), 8.4 ( @ 14:43), 7.2 ( @ 02:20), 7.4 ( @ 04:04)  Hct: 25.2 ( @ 05:16), 29.9 ( @ 14:43), 25.2 ( @ 02:20), 25.8 ( @ 04:04)  WBC: 7.16 ( @ 05:16), 8.29 ( @ 14:43), 7.76 ( @ 02:20), 8.27 ( @ 04:04)  Plt: 301 ( @ 05:16), 354 ( @ 14:43), 291 ( @ 02:20), 249 ( @ 04:04)

## 2024-09-23 LAB
ANION GAP SERPL CALC-SCNC: 9 MMOL/L — SIGNIFICANT CHANGE UP (ref 5–17)
BUN SERPL-MCNC: 7 MG/DL — SIGNIFICANT CHANGE UP (ref 7–23)
CALCIUM SERPL-MCNC: 8.6 MG/DL — SIGNIFICANT CHANGE UP (ref 8.4–10.5)
CHLORIDE SERPL-SCNC: 102 MMOL/L — SIGNIFICANT CHANGE UP (ref 96–108)
CO2 SERPL-SCNC: 26 MMOL/L — SIGNIFICANT CHANGE UP (ref 22–31)
CREAT SERPL-MCNC: 0.72 MG/DL — SIGNIFICANT CHANGE UP (ref 0.5–1.3)
EGFR: 93 ML/MIN/1.73M2 — SIGNIFICANT CHANGE UP
GLUCOSE SERPL-MCNC: 98 MG/DL — SIGNIFICANT CHANGE UP (ref 70–99)
HCT VFR BLD CALC: 27.2 % — LOW (ref 34.5–45)
HGB BLD-MCNC: 7.9 G/DL — LOW (ref 11.5–15.5)
MAGNESIUM SERPL-MCNC: 2 MG/DL — SIGNIFICANT CHANGE UP (ref 1.6–2.6)
MCHC RBC-ENTMCNC: 21.5 PG — LOW (ref 27–34)
MCHC RBC-ENTMCNC: 29 GM/DL — LOW (ref 32–36)
MCV RBC AUTO: 73.9 FL — LOW (ref 80–100)
NRBC # BLD: 0 /100 WBCS — SIGNIFICANT CHANGE UP (ref 0–0)
PHOSPHATE SERPL-MCNC: 4.3 MG/DL — SIGNIFICANT CHANGE UP (ref 2.5–4.5)
PLATELET # BLD AUTO: 342 K/UL — SIGNIFICANT CHANGE UP (ref 150–400)
POTASSIUM SERPL-MCNC: 3.8 MMOL/L — SIGNIFICANT CHANGE UP (ref 3.5–5.3)
POTASSIUM SERPL-SCNC: 3.8 MMOL/L — SIGNIFICANT CHANGE UP (ref 3.5–5.3)
RBC # BLD: 3.68 M/UL — LOW (ref 3.8–5.2)
RBC # FLD: 19.8 % — HIGH (ref 10.3–14.5)
SODIUM SERPL-SCNC: 137 MMOL/L — SIGNIFICANT CHANGE UP (ref 135–145)
WBC # BLD: 7.68 K/UL — SIGNIFICANT CHANGE UP (ref 3.8–10.5)
WBC # FLD AUTO: 7.68 K/UL — SIGNIFICANT CHANGE UP (ref 3.8–10.5)

## 2024-09-23 PROCEDURE — 99232 SBSQ HOSP IP/OBS MODERATE 35: CPT

## 2024-09-23 PROCEDURE — 99291 CRITICAL CARE FIRST HOUR: CPT

## 2024-09-23 PROCEDURE — 70450 CT HEAD/BRAIN W/O DYE: CPT | Mod: 26

## 2024-09-23 RX ORDER — HYDRALAZINE HYDROCHLORIDE 100 MG/1
10 TABLET ORAL ONCE
Refills: 0 | Status: COMPLETED | OUTPATIENT
Start: 2024-09-23 | End: 2024-09-23

## 2024-09-23 RX ORDER — SODIUM CHLORIDE 0.9 % (FLUSH) 0.9 %
1000 SYRINGE (ML) INJECTION ONCE
Refills: 0 | Status: COMPLETED | OUTPATIENT
Start: 2024-09-23 | End: 2024-09-23

## 2024-09-23 RX ORDER — SODIUM CHLORIDE 0.9 % (FLUSH) 0.9 %
500 SYRINGE (ML) INJECTION ONCE
Refills: 0 | Status: COMPLETED | OUTPATIENT
Start: 2024-09-23 | End: 2024-09-23

## 2024-09-23 RX ORDER — LABETALOL HYDROCHLORIDE 200 MG/1
10 TABLET, FILM COATED ORAL ONCE
Refills: 0 | Status: COMPLETED | OUTPATIENT
Start: 2024-09-23 | End: 2024-09-23

## 2024-09-23 RX ORDER — FLUDROCORTISONE ACETATE 0.1 MG/1
0.1 TABLET ORAL
Refills: 0 | Status: DISCONTINUED | OUTPATIENT
Start: 2024-09-23 | End: 2024-09-24

## 2024-09-23 RX ORDER — SENNOSIDES 8.6 MG
2 TABLET ORAL EVERY 12 HOURS
Refills: 0 | Status: DISCONTINUED | OUTPATIENT
Start: 2024-09-23 | End: 2024-09-27

## 2024-09-23 RX ORDER — BISACODYL 5 MG/1
10 TABLET, COATED ORAL DAILY
Refills: 0 | Status: DISCONTINUED | OUTPATIENT
Start: 2024-09-23 | End: 2024-09-27

## 2024-09-23 RX ADMIN — Medication 1000 MILLILITER(S): at 06:11

## 2024-09-23 RX ADMIN — Medication 17 GRAM(S): at 06:10

## 2024-09-23 RX ADMIN — Medication 650 MILLIGRAM(S): at 13:35

## 2024-09-23 RX ADMIN — Medication 1000 MILLILITER(S): at 10:01

## 2024-09-23 RX ADMIN — Medication 17 GRAM(S): at 17:06

## 2024-09-23 RX ADMIN — Medication 1000 MILLIGRAM(S): at 06:11

## 2024-09-23 RX ADMIN — Medication 20 MILLIEQUIVALENT(S): at 08:03

## 2024-09-23 RX ADMIN — Medication 650 MILLIGRAM(S): at 14:15

## 2024-09-23 RX ADMIN — FLUDROCORTISONE ACETATE 0.1 MILLIGRAM(S): 0.1 TABLET ORAL at 17:06

## 2024-09-23 RX ADMIN — CHLORHEXIDINE GLUCONATE ORAL RINSE 1 APPLICATION(S): 1.2 SOLUTION DENTAL at 06:12

## 2024-09-23 RX ADMIN — HYDRALAZINE HYDROCHLORIDE 10 MILLIGRAM(S): 100 TABLET ORAL at 21:05

## 2024-09-23 RX ADMIN — Medication 2 TABLET(S): at 17:06

## 2024-09-23 RX ADMIN — FLUDROCORTISONE ACETATE 0.2 MILLIGRAM(S): 0.1 TABLET ORAL at 06:10

## 2024-09-23 RX ADMIN — Medication 650 MILLIGRAM(S): at 23:22

## 2024-09-23 RX ADMIN — Medication 250 MILLILITER(S): at 16:59

## 2024-09-23 RX ADMIN — LABETALOL HYDROCHLORIDE 10 MILLIGRAM(S): 200 TABLET, FILM COATED ORAL at 10:58

## 2024-09-23 RX ADMIN — Medication 1000 MILLIGRAM(S): at 17:06

## 2024-09-23 RX ADMIN — Medication 650 MILLIGRAM(S): at 22:46

## 2024-09-23 NOTE — PROCEDURE NOTE - ADDITIONAL PROCEDURE DETAILS
PowerGlide midline
Site prepped and cleaned in sterile fashion. Dressing and anchoring sutures removed. EVD removed without resistance, tip of catheter visualized. 1 staple placed over EVD exit site. Patient tolerated procedure well, no complaints.
Rikki

## 2024-09-23 NOTE — PROGRESS NOTE ADULT - ASSESSMENT
ASSESSMENT:  62y/o F with  Subarachnoid hemorrhage HH2 MF3, brain compression, cerebral edema s/p angiogram, carotid & cerebral, b/l (09/08/2024, Alfredo Lomas)  Hypertension    PLAN:   NEURO:   Neurochecks Q1h  Dced nimodipine   EVD clamped since   CT scan 9/23  Seizure prophylaxis:  levetiracetam 1000mg PO BID per neurosurgery   Activity:    PULM:   Room air, incentive spirometry     CARDIO:   SBP goal 120-200 mm Hg  TTE- EF     ENDO:  Blood sugar goals 140-180 mg/dL    GI:   DIET: regular diet    RENAL:   Na goal 135-145  3% DCed  Continue fludrocortisone    HEM/ONC:  Hb stable, continue iron / Vit C; CBC  FOBT  VTE Prophylaxis: SCDs, SQL 40mg BID; R LE DVT - repeat doppler negative    ID:   Afebrile, no leukocytosis; off bromocriptine    MISC:    SOCIAL/FAMILY:  [] awaiting [] updated at bedside [] family meeting    CODE STATUS:  [] Full Code [] DNR [] DNI [] Palliative/Comfort Care    DISPOSITION:  [] ICU [] Stroke Unit [] Floor [] EMU [] RCU [] PCU    Time spent: ___ [] critical care minutes           ASSESSMENT:  62 y/o F with:  Subarachnoid hemorrhage HH2 MF3, brain compression, cerebral edema s/p angiogram, carotid & cerebral, b/l   Hypertension    PLAN:   NEURO:   Neurochecks Q1h  Dced nimodipine   EVD clamped since (9am 9/22)  CT scan pending 9/23 with likely DC EVD  Seizure prophylaxis:  levetiracetam 1000mg PO BID per neurosurgery   Activity: OOB as tolerated    PULM:   Room air, incentive spirometry     CARDIO:   SBP goal 120-200 mm Hg  TTE: EF 70%. Normal systolic and diastolic function    ENDO:  Blood sugar goals 140-180 mg/dL    GI:   DIET: regular diet  LBM: 9/19. Augment bowel regimen    RENAL:   Na goal 135-145  3% DCed  Continue fludrocortisone; wean as tolerated. Bolus for euvolemia    HEM/ONC: Anemia  Continue iron / Vit C  Monitor H/H, FOBT pending.   VTE Prophylaxis: SCDs, SQL 40mg BID; R LE DVT - repeat doppler negative    ID:   Afebrile, no leukocytosis  Cultures: NGTD    MISC:    SOCIAL/FAMILY:  [x] awaiting [] updated at bedside [] family meeting    CODE STATUS:  [x] Full Code [] DNR [] DNI [] Palliative/Comfort Care    DISPOSITION:  [x] ICU [] Stroke Unit [] Floor [] EMU [] RCU [] PCU    Time spent: 60 critical care minutes

## 2024-09-23 NOTE — PROGRESS NOTE ADULT - SUBJECTIVE AND OBJECTIVE BOX
============================  NEUROCRITICAL CARE ATTENDING NOTE  ============================    HPI:  Patient is a 62y/o F  with uncontrolled HTN, does not take any medications at home presented to [facility] after developing acute onset severe HA. Once arrived, patient had episode of nausea with emesis. CTH showed SAH, HH: 2, MF: 3. NIHSS: 0. CTA neg for aneurysm. Transferred to [facility] for further mgmt. Upon arrival to [facility], NIHSS noted to be 0. Patient reports a headache rated at 7/10 in severity. Of note, patient's daughter notes pt seems off and is sleepier than her baseline. Pt denies SOB, chest pain, vision changes, nausea, weakness/numbness/tingling, dizziness, photophobia.  (08 Sep 2024 13:29)    COURSE IN THE HOSPITAL:  09/08 s/p angiogram, carotid & cerebral, b/l (09/08/2024, Alfredo Lomas), no aneurysm seen; kept intubated post procedure, EVD inserted  09/16 Tmax 38.5 L UE drift overnight; increased SBP goals to 180-200; angio mild VSP  09/17 No significant events overnight.   09/18 No significant events overnight. vomit x1 this AM  09/19 Tmax 38.1  No significant events overnight. -200  09/20 Tmax 37.2 EVD raised to 10  09/21 BD14 Tmax 37.1 EVD raised to 15  09/22 BD15 Tmax 37.2; ICP elevation to 19 once, resolved; EVD clamped this morning      ICU Vital Signs Last 24 Hrs  T(C): 37.1 (23 Sep 2024 05:01), Max: 37.1 (22 Sep 2024 17:52)  T(F): 98.7 (23 Sep 2024 05:01), Max: 98.8 (22 Sep 2024 17:52)  HR: 72 (23 Sep 2024 07:00) (64 - 86)  ABP: 200/69 (23 Sep 2024 07:00) (155/80 - 202/68)  ABP(mean): 123 (23 Sep 2024 07:00) (94 - 123)  RR: 18 (23 Sep 2024 07:00) (15 - 19)  SpO2: 99% (23 Sep 2024 07:00) (97% - 100%)      09-22-24 @ 07:01  -  09-23-24 @ 07:00  --------------------------------------------------------  IN: 2490 mL / OUT: 3503 mL / NET: -1013 mL    09-23-24 @ 07:01  -  09-23-24 @ 08:03  --------------------------------------------------------  IN: 0 mL / OUT: 700 mL / NET: -700 mL      PHYSICAL EXAMINATION  NEURO: Patient AOx3 FC BUE / LE 5/5 no drift, R facial (h/o Bell's)  GENERAL: Calm  EENT: Anicteric  CARDIOVASCULAR: S1/S2, normal rate and regular rhythm  PULMONARY: Clear to auscultation bilaterally  ABDOMEN: Soft, nontender with normoactive bowel sounds  EXTREMITIES: No edema  SKIN: No rash      MEDICATIONS:  acetaminophen     Tablet .. 650 milliGRAM(s) Oral every 6 hours PRN  ascorbic acid 500 milliGRAM(s) Oral <User Schedule>  chlorhexidine 2% Cloths 1 Application(s) Topical <User Schedule>  ferrous    sulfate Liquid 300 milliGRAM(s) Oral <User Schedule>  fludroCORTISONE 0.2 milliGRAM(s) Oral two times a day  influenza  Vaccine (HIGH DOSE) 0.5 milliLiter(s) IntraMuscular once  levETIRAcetam 1000 milliGRAM(s) Oral two times a day  ondansetron Injectable 4 milliGRAM(s) IV Push every 6 hours PRN  polyethylene glycol 3350 17 Gram(s) Oral daily  potassium chloride    Tablet ER 20 milliEquivalent(s) Oral once  senna 2 Tablet(s) Oral at bedtime    LABS:                     7.9    7.68  )-----------( 342      ( 23 Sep 2024 05:30 )             27.2     09-23    137  |  102  |  7   ----------------------------<  98  3.8   |  26  |  0.72    Ca    8.6      23 Sep 2024 05:30  Phos  4.3     09-23  Mg     2.0     09-23                   ============================  NEUROCRITICAL CARE ATTENDING NOTE  ============================    HPI:  Patient is a 64y/o F  with uncontrolled HTN, does not take any medications at home presented to [facility] after developing acute onset severe HA. Once arrived, patient had episode of nausea with emesis. CTH showed SAH, HH: 2, MF: 3. NIHSS: 0. CTA neg for aneurysm. Transferred to [facility] for further mgmt. Upon arrival to [facility], NIHSS noted to be 0. Patient reports a headache rated at 7/10 in severity. Of note, patient's daughter notes pt seems off and is sleepier than her baseline. Pt denies SOB, chest pain, vision changes, nausea, weakness/numbness/tingling, dizziness, photophobia.  (08 Sep 2024 13:29)    COURSE IN THE HOSPITAL:  09/08 s/p angiogram, carotid & cerebral, b/l (09/08/2024, Alfredo Lomas), no aneurysm seen; kept intubated post procedure, EVD inserted  09/16 Tmax 38.5 L UE drift overnight; increased SBP goals to 180-200; angio mild VSP  09/17 No significant events overnight.   09/18 No significant events overnight. Vomit x1 this AM  09/19 Tmax 38.1. No significant events overnight. -200  09/20 Tmax 37.2 EVD raised to 10  09/21 BD14 Tmax 37.1 EVD raised to 15  09/22 BD15 Tmax 37.2; ICP elevation to 19 once, resolved; EVD clamped this morning      ICU Vital Signs Last 24 Hrs  T(C): 37.1 (23 Sep 2024 05:01), Max: 37.1 (22 Sep 2024 17:52)  T(F): 98.7 (23 Sep 2024 05:01), Max: 98.8 (22 Sep 2024 17:52)  HR: 72 (23 Sep 2024 07:00) (64 - 86)  ABP: 200/69 (23 Sep 2024 07:00) (155/80 - 202/68)  ABP(mean): 123 (23 Sep 2024 07:00) (94 - 123)  RR: 18 (23 Sep 2024 07:00) (15 - 19)  SpO2: 99% (23 Sep 2024 07:00) (97% - 100%)      09-22-24 @ 07:01  -  09-23-24 @ 07:00  --------------------------------------------------------  IN: 2490 mL / OUT: 3503 mL / NET: -1013 mL    09-23-24 @ 07:01  -  09-23-24 @ 08:03  --------------------------------------------------------  IN: 0 mL / OUT: 700 mL / NET: -700 mL      PHYSICAL EXAMINATION  NEURO: Patient AOx3, follows commands, R facial (h/o Bell's), B/L upper extremities and B/L LE 5/5 no drift  GENERAL: Calm  EENT: Anicteric  CARDIOVASCULAR: S1/S2, normal rate and regular rhythm  PULMONARY: Clear to auscultation bilaterally  ABDOMEN: Soft, nontender with normoactive bowel sounds  EXTREMITIES: No edema  SKIN: No rash      MEDICATIONS:  acetaminophen     Tablet .. 650 milliGRAM(s) Oral every 6 hours PRN  ascorbic acid 500 milliGRAM(s) Oral <User Schedule>  chlorhexidine 2% Cloths 1 Application(s) Topical <User Schedule>  ferrous    sulfate Liquid 300 milliGRAM(s) Oral <User Schedule>  fludroCORTISONE 0.2 milliGRAM(s) Oral two times a day  influenza  Vaccine (HIGH DOSE) 0.5 milliLiter(s) IntraMuscular once  levETIRAcetam 1000 milliGRAM(s) Oral two times a day  ondansetron Injectable 4 milliGRAM(s) IV Push every 6 hours PRN  polyethylene glycol 3350 17 Gram(s) Oral daily  potassium chloride    Tablet ER 20 milliEquivalent(s) Oral once  senna 2 Tablet(s) Oral at bedtime    LABS:                     7.9    7.68  )-----------( 342      ( 23 Sep 2024 05:30 )             27.2     09-23    137  |  102  |  7   ----------------------------<  98  3.8   |  26  |  0.72    Ca    8.6      23 Sep 2024 05:30  Phos  4.3     09-23  Mg     2.0     09-23

## 2024-09-23 NOTE — PROCEDURE NOTE - NSPROCNAME_GEN_A_CORE
General
Midline Insertion
Arterial Puncture/Cannulation
Peripheral Line Insertion
External Ventricular Drain Insertion

## 2024-09-23 NOTE — PROCEDURE NOTE - NSINFORMCONSENT_GEN_A_CORE
Benefits, risks, and possible complications of procedure explained to patient/caregiver who verbalized understanding and gave verbal consent.
Benefits, risks, and possible complications of procedure explained to patient/caregiver who verbalized understanding and gave written consent.
family at bedside/Benefits, risks, and possible complications of procedure explained to patient/caregiver who verbalized understanding and gave verbal consent.

## 2024-09-23 NOTE — PROGRESS NOTE ADULT - SUBJECTIVE AND OBJECTIVE BOX
S/Overnight events:  BD 16. POD 15 DSA. POD 11/7 angio neg for spasm. BILL overnight.     Hospital Course:   : Transfer to [facility] for further mgmt of SAH. Patient taken urgently to angio. ET tube pulled back 2cm. EVD placed, open @ 10. Stability scan stable, EVD in position, NGT placed. Started nimodipine 60q4. Extubated to face mask. POD 0 DSA neg for aneurysm.   : BD2. POD 1 angio. Started iron/vit C q other day for microcytic anermia. Started lisinopril 10mg daily. 1.L liter bolus for euvolemia. Ambriz removed, f/u TOV, started on prophylactic SQL 40mg BID (weight based). Passed TOV. Off cardene gtt. cardene gtt resumed. started hydral 25 q8, inc lisinopril dose from 10mg to 20mg daily.   9/10: BD3. POD 2 angio. BILL overnight. TTE showing EF 70%, mild symmetric LVH. DC cardene.   : BD 4. POD 3 angio. BILL overnight. SBP 160s, given hydralazine 10mg IVP x1. Given ducolax suppository. SBP liberalized 100-160. , given labetolol 5mg IVP. NS bolus 500cc for euvolemia. Lactulose for AM. PO hydral switched to clonidine.   : BD 5. POD 4 angio. BILL overnight. Anti-Xa within prophylactic range. , given labetolol 10mg IVP. 1L NS for euvolemia. lisinopril increased 40, norvasc 10 added. cardene started. change in exam: JAIME/LL 4/5 w/ drift, CTH/CTA performed showing severe spasm. febrile 101, pan cx sent. POD0 angio for spasm tx. Dc'd propofol. Extubated to face mask. SBP 190s, started on cardene gtt  : BD 6, POD 5 diagnostic angio, POD 1 angio for spasm tx. Changed NS to LR.   : BD 7, POD 6, POD 2 angio for spasm. Pt febrile ovn, f/u cultures. Start bromocriptine. Resume lisinopril. 1L bolus, f/u Na studies. Notified by primary RN of exam change: new dysarthria, L facial droop, LUE/LLLE withdrawing to noxious stim. Stroke code called, with high suspicion for vasospasm. Levo gtt started for SBP goal 180. CTA/CTP/CT performed - R sided spasm, no perfusion defect. Exam improved with higher BP, dysarthria and facial droop improving and L side 4/5. Discussed with Dr. Chao, Dr. Fuentes, and Dr. Butler - will keep SBP goal 160-200 and consider angiogram if not improving further.  9/15: BD 8, POD 7, POD 3 angio. Given 250 cc albumin bolus followed by 500 cc NS. Repeat BMP Na 134. S/p 1 L bolus for euvolemia. Full stregnth b/l, no drift; no repeat angio, regular diet. Given 250 albumin and 1 L NS for net negative volume.   : BD 9, POD 8, POD 4 angio. Remains npo after midnight. Given 500 cc bolus NS for euvolemia. 1L bolus for euvolemia. EVD lowered to 5 i/s/o spasm. added fludrocortisone 0.1 BID, salt tabs increased to 3q6hr. increased bromocriptine to 10 q8. POD0 repeat angio 15 mg verapamil injected to R ICA. SBP parameters liberalized 160-200, off levo. 5 cc EVD output during angio, drained 22 cc's post-op, ICPs single digits. emesis s/p salt tabs, florinef rpt dose, salt tabs dc'd. 1L NS for BP 150s. POD 0 s/p angiogram showing R ICA spasm with IA verapamil.   : BD 10. POD 9. POD 5 angio with spasm. POD 1 angio with spasm. BILL o/n. Started on levo gtt. Na 141, weaned 3% to 50cc/hr. Surveillance dopplers neg for. DVT. 500cc bolus for euvolemia.   : BD 11. POD 10. POD 6 angio with spasm. POD 2 angio with spasm. BILL o/n. SBP liberalized to 120-200, exam stable. 3% decreased to 30cc/hr. BMP pend 4pm. Given 5mg hydralazine x 1 for . decreased bromo to 5mgq8. BM today. given 5mg hydral for .   : BD 12. POD 11. POD 7 angio with spasm. POD 3 angio with spasm. BILL o/n. Given 1L for euvolemia. 3% increased to 50 cc/hr, Na 138>135. Given florinef 0.1x1, increased florinef to 0.2mg BID starting in AM. Dc'd bromocriptine.   : BD 13. POD 12. POD 8 angio with spasm. POD 4 angio with spasm. BILL overnight. EVD raised to 10, decreased miralax to qhs. Na goal relaxed to normal, given 1 push hydral for SBP 220s.   : BD 14. POD 13 DSA. POD 9/5 angio for spasm tx. BILL overnight. Raised EVD to 15. D/c'd bowel reg. Sending FOB. Decreased rate of 3% from 50 to 30. hydralazine 10mg x2.  : BD 15. POD 14 DSA. POD 10/6 angio neg for spasm. BILL overnight. EVD clamped. Repeat BMP @ 2pm. 3% dc'd. Repeat @ 10pm. +bowel regimen.     Vital Signs Last 24 Hrs  T(C): 36.9 (22 Sep 2024 21:59), Max: 37.2 (22 Sep 2024 01:01)  T(F): 98.5 (22 Sep 2024 21:59), Max: 98.9 (22 Sep 2024 01:01)  HR: 64 (23 Sep 2024 00:00) (64 - 87)  BP: --  BP(mean): --  RR: 18 (23 Sep 2024 00:00) (15 - 18)  SpO2: 98% (23 Sep 2024 00:00) (97% - 100%)    Parameters below as of 23 Sep 2024 00:00  Patient On (Oxygen Delivery Method): room air      I&O's Detail    21 Sep 2024 07:01  -  22 Sep 2024 07:00  --------------------------------------------------------  IN:    Oral Fluid: 800 mL    sodium chloride 3%: 900 mL  Total IN: 1700 mL    OUT:    External Ventricular Device (mL): 92 mL    Voided (mL): 1900 mL  Total OUT:  mL    Total NET: -292 mL      22 Sep 2024 07:  -  23 Sep 2024 00:24  --------------------------------------------------------  IN:    Oral Fluid: 900 mL    sodium chloride 3%: 240 mL  Total IN: 1140 mL    OUT:    External Ventricular Device (mL): 3 mL    Incontinent per Collection Bag (mL): 1400 mL  Total OUT: 1403 mL    Total NET: -263 mL      I&O's Summary    21 Sep 2024 07:  -  22 Sep 2024 07:00  --------------------------------------------------------  IN: 1700 mL / OUT:  mL / NET: -292 mL    22 Sep 2024 07:01  -  23 Sep 2024 00:24  --------------------------------------------------------  IN: 1140 mL / OUT: 1403 mL / NET: -263 mL    PHYSICAL EXAM:  General: Pt is sitting up comfortably in bed, in NAD, on RA  HEENT: PERRL 3mm, EOMI B/L, +Right facial droop (baseline), +EVD in place  Cardiovascular: RRR, normal S1 and S2   Respiratory: non-labored breathing, symmetric chest rise   GI: abd soft, NTND   Neuro: A&O x 3, no aphasia, speech clear, no dysmetria, no pronator drift  Strength 5/5 throughout all 4 extremities  Sensation intact to light touch throughout   Vascular: Distal pulses 2+ x4, no calf edema or erythema, +skin changes from suspected vascular disease   Wounds: R groin C/D/I, no evidence of hematoma; EVD insertion site and incision C/D/I with chlorhexidine dressing in place     TUBES/LINES:  [] CVC  [X] A-line  [X] Lumbar Drain  [] Ventriculostomy  [] Other    DIET:  [] NPO  [X] Mechanical  [] Tube feeds    LABS:    Urinalysis Basic - ( 22 Sep 2024 21:54 )    Color: x / Appearance: x / SG: x / pH: x  Gluc: 110 mg/dL / Ketone: x  / Bili: x / Urobili: x   Blood: x / Protein: x / Nitrite: x   Leuk Esterase: x / RBC: x / WBC x   Sq Epi: x / Non Sq Epi: x / Bacteria: x      Allergies    No Known Allergies    Intolerances      MEDICATIONS:  Antibiotics:    Neuro:  acetaminophen     Tablet .. 650 milliGRAM(s) Oral every 6 hours PRN  levETIRAcetam 1000 milliGRAM(s) Oral two times a day  ondansetron Injectable 4 milliGRAM(s) IV Push every 6 hours PRN    Anticoagulation:    OTHER:  chlorhexidine 2% Cloths 1 Application(s) Topical <User Schedule>  fludroCORTISONE 0.2 milliGRAM(s) Oral two times a day  influenza  Vaccine (HIGH DOSE) 0.5 milliLiter(s) IntraMuscular once  polyethylene glycol 3350 17 Gram(s) Oral daily  senna 2 Tablet(s) Oral at bedtime    IVF:  ascorbic acid 500 milliGRAM(s) Oral <User Schedule>  ferrous    sulfate Liquid 300 milliGRAM(s) Oral <User Schedule>    CULTURES:  Culture Results:   No growth at 5 days ( @ 23:05)  Culture Results:   No growth at 5 days ( @ 16:54)      ASSESSMENT:  63F PMHx uncontrolled HT, R samuels's palsy, was BIBEMS to [facility]  c/o sudden onset severe HA. CTH showed SAH HH: 2, MF: 3. NIHSS: 0. CTA neg for aneurysm. Transferred to [facility] for further mgmt. S/p DSA negative for anueyrsm (24). S/p R frontal EVD (24). S/p angiogram showing mild R LILLY/R MCA spasm with IA verapamil (), s/p  angiogram with 15 mg IA verapamil for R ICA spasm ().       HTN    No pertinent family history in first degree relatives    Handoff    No pertinent past medical history    Hypertension    SAH (subarachnoid hemorrhage)    Cerebral vasospasm    SAH (subarachnoid hemorrhage)    Cerebral vasospasm    SAH (subarachnoid hemorrhage)    Subarachnoid hemorrhage    Angiogram, carotid and cerebral, bilateral    Insertion of intravenous catheter with ultrasound guidance    No significant past surgical history    No significant past surgical history    H/O  section    HTN    Hypertension    [email or web address]      PLAN:  Neuro:  - Neuro/vitals q1hr  - Seizure prophylaxis: Keppra 1g BID   - R frontal EVD clamped  9am- monitor ICPs  - DCI prophylaxis: HOLD nimodipine 60q4  vasospasm  - Pain control: Tylenol, Oxy 5 prn  - stability CTH post-EVD complete , stable, CTH  stable   - CTA : b/l LILLY spasm, b/l MCA R>L  - MR brain and C spine w/wo contrast complete     Cards:   - -200  - TTE 9/10: EF 70%, mild symmetric LVH    Pulm:   - RA  - IS    GI:  - Regular diet   - Bowel reg, LBM   - Nausea: Zofran 4q6 prn     Renal:  - 3% dc'd, fludrocortisone 0.2 mg BID   - Normonatremia goal  - voiding   - euvolemic goal     Endo:  - A1c 5.8    Heme:   - DVT prophylaxis: SCDs to BL LE, SQL held for possible EVD pull  - LE dopplers : R posterior tibial DVT on admission ()- surveillace dopplers  neg for DVT  - Microcytic anemia: iron/vit C q other day     ID:   - pan cx     Dispo: NSICU, full code, PT/OT rec AR    Discussed with Dr. Butler and Dr. Chao

## 2024-09-24 LAB
ANION GAP SERPL CALC-SCNC: 10 MMOL/L — SIGNIFICANT CHANGE UP (ref 5–17)
BUN SERPL-MCNC: 7 MG/DL — SIGNIFICANT CHANGE UP (ref 7–23)
CALCIUM SERPL-MCNC: 8.5 MG/DL — SIGNIFICANT CHANGE UP (ref 8.4–10.5)
CHLORIDE SERPL-SCNC: 104 MMOL/L — SIGNIFICANT CHANGE UP (ref 96–108)
CO2 SERPL-SCNC: 26 MMOL/L — SIGNIFICANT CHANGE UP (ref 22–31)
CREAT SERPL-MCNC: 0.67 MG/DL — SIGNIFICANT CHANGE UP (ref 0.5–1.3)
EGFR: 97 ML/MIN/1.73M2 — SIGNIFICANT CHANGE UP
GLUCOSE SERPL-MCNC: 132 MG/DL — HIGH (ref 70–99)
HCT VFR BLD CALC: 26.7 % — LOW (ref 34.5–45)
HGB BLD-MCNC: 7.8 G/DL — LOW (ref 11.5–15.5)
MAGNESIUM SERPL-MCNC: 2 MG/DL — SIGNIFICANT CHANGE UP (ref 1.6–2.6)
MCHC RBC-ENTMCNC: 21.7 PG — LOW (ref 27–34)
MCHC RBC-ENTMCNC: 29.2 GM/DL — LOW (ref 32–36)
MCV RBC AUTO: 74.2 FL — LOW (ref 80–100)
NRBC # BLD: 0 /100 WBCS — SIGNIFICANT CHANGE UP (ref 0–0)
PHOSPHATE SERPL-MCNC: 4.4 MG/DL — SIGNIFICANT CHANGE UP (ref 2.5–4.5)
PLATELET # BLD AUTO: 374 K/UL — SIGNIFICANT CHANGE UP (ref 150–400)
POTASSIUM SERPL-MCNC: 3.5 MMOL/L — SIGNIFICANT CHANGE UP (ref 3.5–5.3)
POTASSIUM SERPL-SCNC: 3.5 MMOL/L — SIGNIFICANT CHANGE UP (ref 3.5–5.3)
RBC # BLD: 3.6 M/UL — LOW (ref 3.8–5.2)
RBC # FLD: 19.6 % — HIGH (ref 10.3–14.5)
SODIUM SERPL-SCNC: 140 MMOL/L — SIGNIFICANT CHANGE UP (ref 135–145)
WBC # BLD: 7.22 K/UL — SIGNIFICANT CHANGE UP (ref 3.8–10.5)
WBC # FLD AUTO: 7.22 K/UL — SIGNIFICANT CHANGE UP (ref 3.8–10.5)

## 2024-09-24 PROCEDURE — 99232 SBSQ HOSP IP/OBS MODERATE 35: CPT

## 2024-09-24 RX ORDER — AMLODIPINE BESYLATE 5 MG
10 TABLET ORAL DAILY
Refills: 0 | Status: DISCONTINUED | OUTPATIENT
Start: 2024-09-24 | End: 2024-09-27

## 2024-09-24 RX ORDER — ENOXAPARIN SODIUM 150 MG/ML
40 INJECTION SUBCUTANEOUS EVERY 12 HOURS
Refills: 0 | Status: DISCONTINUED | OUTPATIENT
Start: 2024-09-24 | End: 2024-09-27

## 2024-09-24 RX ADMIN — Medication 500 MILLIGRAM(S): at 05:41

## 2024-09-24 RX ADMIN — ENOXAPARIN SODIUM 40 MILLIGRAM(S): 150 INJECTION SUBCUTANEOUS at 21:44

## 2024-09-24 RX ADMIN — Medication 40 MILLIEQUIVALENT(S): at 09:10

## 2024-09-24 RX ADMIN — CHLORHEXIDINE GLUCONATE ORAL RINSE 1 APPLICATION(S): 1.2 SOLUTION DENTAL at 05:39

## 2024-09-24 RX ADMIN — Medication 1000 MILLIGRAM(S): at 17:27

## 2024-09-24 RX ADMIN — Medication 10 MILLIGRAM(S): at 09:13

## 2024-09-24 RX ADMIN — Medication 40 MILLIGRAM(S): at 09:11

## 2024-09-24 RX ADMIN — Medication 17 GRAM(S): at 17:27

## 2024-09-24 RX ADMIN — Medication 2 TABLET(S): at 17:27

## 2024-09-24 RX ADMIN — FLUDROCORTISONE ACETATE 0.1 MILLIGRAM(S): 0.1 TABLET ORAL at 05:41

## 2024-09-24 RX ADMIN — Medication 1000 MILLIGRAM(S): at 05:41

## 2024-09-24 RX ADMIN — Medication 300 MILLIGRAM(S): at 05:41

## 2024-09-24 NOTE — PROGRESS NOTE ADULT - SUBJECTIVE AND OBJECTIVE BOX
NEUROCRITICAL CARE PROGRESS NOTE    KARRIE MORALES   MRN-[id number]  Summary:  /  HPI:  63F PMHx uncontrolled HTN, does not take any medications at home presented to [facility] after developing acute onset severe HA. Once arrived, patient had episode of nausea with emesis. CTH showed SAH, HH: 2, MF: 3. NIHSS: 0. CTA neg for aneurysm. Transferred to [facility] for further mgmt. Upon arrival to [facility], NIHSS noted to be 0. Patient reports a headache rated at 7/10 in severity. Of note, patient's daughter notes pt seems off and is sleepier than her baseline. Pt denies SOB, chest pain, vision changes, nausea, weakness/numbness/tingling, dizziness, photophobia.  (08 Sep 2024 13:29)      S/Overnight events:   BD 17. POD 16 DSA. POD 12/8 angio neg for spasm. BILL overnight. Neuro stable.     Hospital Course:    9/8: Transfer to [facility] for further mgmt of SAH. Patient taken urgently to angio. ET tube pulled back 2cm. EVD placed, open @ 10. Stability scan stable, EVD in position, NGT placed. Started nimodipine 60q4. Extubated to face mask. POD 0 DSA neg for aneurysm.   9/9: BD2. POD 1 angio. Started iron/vit C q other day for microcytic anermia. Started lisinopril 10mg daily. 1.L liter bolus for euvolemia. Ambriz removed, f/u TOV, started on prophylactic SQL 40mg BID (weight based). Passed TOV. Off cardene gtt. cardene gtt resumed. started hydral 25 q8, inc lisinopril dose from 10mg to 20mg daily.   9/10: BD3. POD 2 angio. BILL overnight. TTE showing EF 70%, mild symmetric LVH. DC cardene.   9/11: BD 4. POD 3 angio. BILL overnight. SBP 160s, given hydralazine 10mg IVP x1. Given ducolax suppository. SBP liberalized 100-160. , given labetolol 5mg IVP. NS bolus 500cc for euvolemia. Lactulose for AM. PO hydral switched to clonidine.   9/12: BD 5. POD 4 angio. BILL overnight. Anti-Xa within prophylactic range. , given labetolol 10mg IVP. 1L NS for euvolemia. lisinopril increased 40, norvasc 10 added. cardene started. change in exam: JAIME/LL 4/5 w/ drift, CTH/CTA performed showing severe spasm. febrile 101, pan cx sent. POD0 angio for spasm tx. Dc'd propofol. Extubated to face mask. SBP 190s, started on cardene gtt  9/13: BD 6, POD 5 diagnostic angio, POD 1 angio for spasm tx. Changed NS to LR.   9/14: BD 7, POD 6, POD 2 angio for spasm. Pt febrile ovn, f/u cultures. Start bromocriptine. Resume lisinopril. 1L bolus, f/u Na studies. Notified by primary RN of exam change: new dysarthria, L facial droop, LUE/LLLE withdrawing to noxious stim. Stroke code called, with high suspicion for vasospasm. Levo gtt started for SBP goal 180. CTA/CTP/CT performed - R sided spasm, no perfusion defect. Exam improved with higher BP, dysarthria and facial droop improving and L side 4/5. Discussed with Dr. Chao, Dr. Fuentes, and Dr. Butler - will keep SBP goal 160-200 and consider angiogram if not improving further.  9/15: BD 8, POD 7, POD 3 angio. Given 250 cc albumin bolus followed by 500 cc NS. Repeat BMP Na 134. S/p 1 L bolus for euvolemia. Full stregnth b/l, no drift; no repeat angio, regular diet. Given 250 albumin and 1 L NS for net negative volume.   9/16: BD 9, POD 8, POD 4 angio. Remains npo after midnight. Given 500 cc bolus NS for euvolemia. 1L bolus for euvolemia. EVD lowered to 5 i/s/o spasm. added fludrocortisone 0.1 BID, salt tabs increased to 3q6hr. increased bromocriptine to 10 q8. POD0 repeat angio 15 mg verapamil injected to R ICA. SBP parameters liberalized 160-200, off levo. 5 cc EVD output during angio, drained 22 cc's post-op, ICPs single digits. emesis s/p salt tabs, florinef rpt dose, salt tabs dc'd. 1L NS for BP 150s. POD 0 s/p angiogram showing R ICA spasm with IA verapamil.   9/17: BD 10. POD 9. POD 5 angio with spasm. POD 1 angio with spasm. BILL o/n. Started on levo gtt. Na 141, weaned 3% to 50cc/hr. Surveillance dopplers neg for. DVT. 500cc bolus for euvolemia.   9/18: BD 11. POD 10. POD 6 angio with spasm. POD 2 angio with spasm. BILL o/n. SBP liberalized to 120-200, exam stable. 3% decreased to 30cc/hr. BMP pend 4pm. Given 5mg hydralazine x 1 for . decreased bromo to 5mgq8. BM today. given 5mg hydral for .   9/19: BD 12. POD 11. POD 7 angio with spasm. POD 3 angio with spasm. BILL o/n. Given 1L for euvolemia. 3% increased to 50 cc/hr, Na 138>135. Given florinef 0.1x1, increased florinef to 0.2mg BID starting in AM. Dc'd bromocriptine.   9/20: BD 13. POD 12. POD 8 angio with spasm. POD 4 angio with spasm. BILL overnight. EVD raised to 10, decreased miralax to qhs. Na goal relaxed to normal, given 1 push hydral for SBP 220s.   9/21: BD 14. POD 13 DSA. POD 9/5 angio for spasm tx. BILL overnight. Raised EVD to 15. D/c'd bowel reg. Sending FOB. Decreased rate of 3% from 50 to 30. hydralazine 10mg x2.  9/22: BD 15. POD 14 DSA. POD 10/6 angio neg for spasm. BILL overnight. EVD clamped. Repeat BMP @ 2pm. 3% dc'd. Repeat @ 10pm. +bowel regimen.   9/23: BD 16. POD 15 DSA. POD 11/7 angio neg for spasm. BILL overnight. 500cc bolus given for euvolemia. Increased bowel regimen. CTH complete, vent size stable. EVD removed. OOB with PT/OT. Labetalol 10 mg IVP x 1 for SBP 200s. Florinef decreased to 0.1 mg BID. 500 cc bolus for euvolemia given.  9/24: BD 17. POD 16 DSA. POD 12/8 angio neg for spasm. BILL overnight. Neuro stable.     Vital Signs Last 24 Hrs  T(C): 36.7 (23 Sep 2024 21:51), Max: 37.2 (23 Sep 2024 17:53)  T(F): 98 (23 Sep 2024 21:51), Max: 99 (23 Sep 2024 17:53)  HR: 76 (23 Sep 2024 20:00) (64 - 81)  BP: 182/122 (23 Sep 2024 12:08) (163/70 - 199/84)  BP(mean): 147 (23 Sep 2024 12:08) (101 - 147)  RR: 16 (23 Sep 2024 20:00) (15 - 19)  SpO2: 100% (23 Sep 2024 20:00) (93% - 100%)    Parameters below as of 23 Sep 2024 20:00  Patient On (Oxygen Delivery Method): room air            I&O's Detail    22 Sep 2024 07:01  -  23 Sep 2024 07:00  --------------------------------------------------------  IN:    Oral Fluid: 1250 mL    Sodium Chloride 0.9% Bolus: 1000 mL    sodium chloride 3%: 240 mL  Total IN: 2490 mL    OUT:    External Ventricular Device (mL): 3 mL    Incontinent per Collection Bag (mL): 3500 mL  Total OUT: 3503 mL    Total NET: -1013 mL      23 Sep 2024 07:01  -  24 Sep 2024 00:10  --------------------------------------------------------  IN:    Oral Fluid: 800 mL    Sodium Chloride 0.9% Bolus: 500 mL  Total IN: 1300 mL    OUT:    External Ventricular Device (mL): 0 mL    Incontinent per Collection Bag (mL): 1900 mL  Total OUT: 1900 mL    Total NET: -600 mL    PHYSICAL EXAM:  General: Pt is sitting up comfortably in bed, in NAD, on RA  HEENT: PERRL 3mm, EOMI B/L, +Right facial droop (baseline)  Cardiovascular: RRR, normal S1 and S2   Respiratory: non-labored breathing, symmetric chest rise   GI: abd soft, NTND   Neuro: A&O x 3, no aphasia, speech clear, no dysmetria, no pronator drift  Strength 5/5 throughout all 4 extremities  Sensation intact to light touch throughout   Vascular: Distal pulses 2+ x4, no calf edema or erythema, +skin changes from suspected vascular disease   Wounds: R groin C/D/I, no evidence of hematoma; EVD insertion site with staple in place, open to air    LABS:                        7.9    7.68  )-----------( 342      ( 23 Sep 2024 05:30 )             27.2     09-23    137  |  102  |  7   ----------------------------<  98  3.8   |  26  |  0.72    Ca    8.6      23 Sep 2024 05:30  Phos  4.3     09-23  Mg     2.0     09-23        Urinalysis Basic - ( 23 Sep 2024 05:30 )    Color: x / Appearance: x / SG: x / pH: x  Gluc: 98 mg/dL / Ketone: x  / Bili: x / Urobili: x   Blood: x / Protein: x / Nitrite: x   Leuk Esterase: x / RBC: x / WBC x   Sq Epi: x / Non Sq Epi: x / Bacteria: x          CAPILLARY BLOOD GLUCOSE          Drug Levels: [] N/A    CSF Analysis: [] N/A      Allergies    No Known Allergies    Intolerances      MEDICATIONS:  Antibiotics:    Neuro:  acetaminophen     Tablet .. 650 milliGRAM(s) Oral every 6 hours PRN  levETIRAcetam 1000 milliGRAM(s) Oral two times a day  ondansetron Injectable 4 milliGRAM(s) IV Push every 6 hours PRN    Anticoagulation:    OTHER:  bisacodyl Suppository 10 milliGRAM(s) Rectal daily PRN  chlorhexidine 2% Cloths 1 Application(s) Topical <User Schedule>  fludroCORTISONE 0.1 milliGRAM(s) Oral two times a day  influenza  Vaccine (HIGH DOSE) 0.5 milliLiter(s) IntraMuscular once  polyethylene glycol 3350 17 Gram(s) Oral two times a day  senna 2 Tablet(s) Oral every 12 hours    IVF:  ascorbic acid 500 milliGRAM(s) Oral <User Schedule>  ferrous    sulfate Liquid 300 milliGRAM(s) Oral <User Schedule>    CULTURES:  Culture Results:   No growth at 5 days (09-13 @ 23:05)  Culture Results:   No growth at 5 days (09-12 @ 16:54)    ASSESSMENT:  63F PMHx uncontrolled HT, R samuels's palsy, was BIBEMS to [facility] 9/8 c/o sudden onset severe HA. CTH showed SAH HH: 2, MF: 3. NIHSS: 0. CTA neg for aneurysm. Transferred to [facility] for further mgmt. S/p DSA negative for anueyrsm (9/8/24). S/p R frontal EVD (9/8/24). S/p angiogram showing mild R LILLY/R MCA spasm with IA verapamil (9/12), s/p  angiogram with 15 mg IA verapamil for R ICA spasm (9/16).     Neuro:  - Neuro q 2/vitals q 1hr   - Seizure prophylaxis: Keppra 1g BID   - R frontal EVD d/c'd 9/23  - DCI prophylaxis: HOLD nimodipine 60q4 2/2 vasospasm  - Pain control: Tylenol, Oxy 5 prn   - stability CTH post-EVD complete 9/8, stable, CTH 9/12 stable   - CTA 9/12: b/l LILLY spasm, b/l MCA R>L   - MR brain and C spine w/wo contrast complete 9/13    Cards:   - -200   - TTE 9/10: EF 70%, mild symmetric LVH    Pulm:   - RA  - IS    GI:  - Regular diet   - Bowel reg, LBM 9/23  - Nausea: Zofran 4q6 prn     Renal:  - IVL, voiding   - Normonatremia goal: fludrocortisone 0.1 mg BID   - euvolemic goal      Endo:  - A1c 5.8     Heme:   - DVT prophylaxis: SCDs to BL LE, SQL held for EVD pull, consider restarting 9/24  - LE dopplers 9/9: R posterior tibial DVT on admission (9/9)- surveillace dopplers 9/17 neg for DVT  - Microcytic anemia: iron/vit C q other day     ID:   - pan cx 9/13    Dispo:   - NSICU, full code  - PT/OT rec AR     Discussed with Dr. Butler and Dr. Morse

## 2024-09-24 NOTE — PROGRESS NOTE ADULT - SUBJECTIVE AND OBJECTIVE BOX
============================  NEUROCRITICAL CARE ATTENDING NOTE  ============================    HPI:  Patient is a 64y/o F  with uncontrolled HTN, does not take any medications at home presented to [facility] after developing acute onset severe HA. Once arrived, patient had episode of nausea with emesis. CTH showed SAH, HH: 2, MF: 3. NIHSS: 0. CTA neg for aneurysm. Transferred to [facility] for further mgmt. Upon arrival to [facility], NIHSS noted to be 0. Patient reports a headache rated at 7/10 in severity. Of note, patient's daughter notes pt seems off and is sleepier than her baseline. Pt denies SOB, chest pain, vision changes, nausea, weakness/numbness/tingling, dizziness, photophobia.  (08 Sep 2024 13:29)    COURSE IN THE HOSPITAL:  09/08 s/p angiogram, carotid & cerebral, b/l (09/08/2024, Alfredo Lomas), no aneurysm seen; kept intubated post procedure, EVD inserted  09/16 Tmax 38.5 L UE drift overnight; increased SBP goals to 180-200; angio mild VSP  09/17 No significant events overnight.   09/18 No significant events overnight. Vomit x1 this AM  09/19 Tmax 38.1. No significant events overnight. -200  09/20 Tmax 37.2 EVD raised to 10  09/21 BD14 Tmax 37.1 EVD raised to 15  09/22 BD15 Tmax 37.2; ICP elevation to 19 once, resolved; EVD clamped this morning  09/23: EVD removed.       ICU Vital Signs Last 24 Hrs  T(C): 36.8 (24 Sep 2024 05:10), Max: 37.2 (23 Sep 2024 17:53)  T(F): 98.2 (24 Sep 2024 05:10), Max: 99 (23 Sep 2024 17:53)  HR: 74 (24 Sep 2024 07:00) (68 - 93)  BP: 182/122 (23 Sep 2024 12:08) (163/70 - 199/84)  BP(mean): 147 (23 Sep 2024 12:08) (101 - 147)  ABP: 172/64 (24 Sep 2024 07:00) (142/46 - 205/73)  ABP(mean): 106 (24 Sep 2024 07:00) (80 - 124)  RR: 16 (24 Sep 2024 07:00) (15 - 18)  SpO2: 100% (24 Sep 2024 07:00) (93% - 100%)      09-23-24 @ 07:01  -  09-24-24 @ 07:00  --------------------------------------------------------  IN: 1300 mL / OUT: 4400 mL / NET: -3100 mL      MEDICATIONS:  acetaminophen     Tablet .. 650 milliGRAM(s) Oral every 6 hours PRN  ascorbic acid 500 milliGRAM(s) Oral <User Schedule>  bisacodyl Suppository 10 milliGRAM(s) Rectal daily PRN  chlorhexidine 2% Cloths 1 Application(s) Topical <User Schedule>  enoxaparin Injectable 40 milliGRAM(s) SubCutaneous every 12 hours  ferrous    sulfate Liquid 300 milliGRAM(s) Oral <User Schedule>  fludroCORTISONE 0.1 milliGRAM(s) Oral two times a day  influenza  Vaccine (HIGH DOSE) 0.5 milliLiter(s) IntraMuscular once  levETIRAcetam 1000 milliGRAM(s) Oral two times a day  ondansetron Injectable 4 milliGRAM(s) IV Push every 6 hours PRN  polyethylene glycol 3350 17 Gram(s) Oral two times a day  senna 2 Tablet(s) Oral every 12 hours                          7.8    7.22  )-----------( 374      ( 24 Sep 2024 05:30 )             26.7     09-23    137  |  102  |  7   ----------------------------<  98  3.8   |  26  |  0.72    Ca    8.6      23 Sep 2024 05:30  Phos  4.3     09-23  Mg     2.0     09-23                PHYSICAL EXAMINATION  NEURO: Patient AOx3, follows commands, R facial (h/o Bell's), B/L upper extremities and B/L LE 5/5 no drift  GENERAL: Calm  EENT: Anicteric  CARDIOVASCULAR: S1/S2, normal rate and regular rhythm  PULMONARY: Clear to auscultation bilaterally  ABDOMEN: Soft, nontender with normoactive bowel sounds  EXTREMITIES: No edema  SKIN: No rash                   ============================  NEUROCRITICAL CARE ATTENDING NOTE  ============================    HPI:  Patient is a 64y/o F  with uncontrolled HTN, does not take any medications at home presented to [facility] after developing acute onset severe HA. Once arrived, patient had episode of nausea with emesis. CTH showed SAH, HH: 2, MF: 3. NIHSS: 0. CTA neg for aneurysm. Transferred to [facility] for further mgmt. Upon arrival to [facility], NIHSS noted to be 0. Patient reports a headache rated at 7/10 in severity. Of note, patient's daughter notes pt seems off and is sleepier than her baseline. Pt denies SOB, chest pain, vision changes, nausea, weakness/numbness/tingling, dizziness, photophobia.  (08 Sep 2024 13:29)    COURSE IN THE HOSPITAL:  09/08 Status post angiogram, carotid and cerebral, b/l (09/08/2024, Alfredo Lomas), no aneurysm seen; kept intubated post procedure, EVD inserted  09/16 Tmax 38.5 L UE drift overnight; increased SBP goals to 180-200; angio mild VSP  09/17 No significant events overnight.   09/18 No significant events overnight. Vomit x1 this AM  09/19 Tmax 38.1. No significant events overnight. -200  09/20 Tmax 37.2 EVD raised to 10  09/21 BD14 Tmax 37.1 EVD raised to 15  09/22 BD15 Tmax 37.2; ICP elevation to 19 once, resolved; EVD clamped this morning  09/23: EVD removed.       ICU Vital Signs Last 24 Hrs  T(C): 36.8 (24 Sep 2024 05:10), Max: 37.2 (23 Sep 2024 17:53)  T(F): 98.2 (24 Sep 2024 05:10), Max: 99 (23 Sep 2024 17:53)  HR: 74 (24 Sep 2024 07:00) (68 - 93)  BP: 182/122 (23 Sep 2024 12:08) (163/70 - 199/84)  BP(mean): 147 (23 Sep 2024 12:08) (101 - 147)  ABP: 172/64 (24 Sep 2024 07:00) (142/46 - 205/73)  ABP(mean): 106 (24 Sep 2024 07:00) (80 - 124)  RR: 16 (24 Sep 2024 07:00) (15 - 18)  SpO2: 100% (24 Sep 2024 07:00) (93% - 100%)      09-23-24 @ 07:01  -  09-24-24 @ 07:00  --------------------------------------------------------  IN: 1300 mL / OUT: 4400 mL / NET: -3100 mL      PHYSICAL EXAMINATION  NEURO: Patient AOx3, follows commands, R facial (h/o Bell's), B/L upper extremities and B/L LE 5/5 no drift  GENERAL: Calm  EENT: Anicteric  CARDIOVASCULAR: S1/S2, normal rate and regular rhythm  PULMONARY: Clear to auscultation bilaterally  ABDOMEN: Soft, nontender with normoactive bowel sounds  EXTREMITIES: No edema  SKIN: No rash      MEDICATIONS:  acetaminophen     Tablet .. 650 milliGRAM(s) Oral every 6 hours PRN  ascorbic acid 500 milliGRAM(s) Oral <User Schedule>  bisacodyl Suppository 10 milliGRAM(s) Rectal daily PRN  chlorhexidine 2% Cloths 1 Application(s) Topical <User Schedule>  enoxaparin Injectable 40 milliGRAM(s) SubCutaneous every 12 hours  ferrous    sulfate Liquid 300 milliGRAM(s) Oral <User Schedule>  fludroCORTISONE 0.1 milliGRAM(s) Oral two times a day  influenza  Vaccine (HIGH DOSE) 0.5 milliLiter(s) IntraMuscular once  levETIRAcetam 1000 milliGRAM(s) Oral two times a day  ondansetron Injectable 4 milliGRAM(s) IV Push every 6 hours PRN  polyethylene glycol 3350 17 Gram(s) Oral two times a day  senna 2 Tablet(s) Oral every 12 hours                          7.8    7.22  )-----------( 374      ( 24 Sep 2024 05:30 )             26.7     09-23    137  |  102  |  7   ----------------------------<  98  3.8   |  26  |  0.72    Ca    8.6      23 Sep 2024 05:30  Phos  4.3     09-23  Mg     2.0     09-23

## 2024-09-24 NOTE — PROGRESS NOTE ADULT - ASSESSMENT
ASSESSMENT:  62 y/o F with:  Subarachnoid hemorrhage HH2 MF3, brain compression, cerebral edema s/p angiogram, carotid & cerebral, b/l   Bleed day  Hypertension    PLAN:   NEURO:   Neurochecks Q2h  Dced nimodipine   EVD removed  Seizure prophylaxis:  levetiracetam 1000mg PO BID per neurosurgery   Activity: OOB as tolerated    PULM:   Room air, incentive spirometry     CARDIO:   SBP goal 120-200 mm Hg  TTE: EF 70%. Normal systolic and diastolic function  AntiHTN    ENDO:  Blood sugar goals 140-180 mg/dL    GI:   DIET: regular diet  LBM: 9/19. Augment bowel regimen    RENAL:   Na goal 135-145  3% DCed  Continue fludrocortisone; wean as tolerated. Bolus for euvolemia    HEM/ONC: Anemia  Continue iron / Vit C  Monitor H/H, FOBT pending.   VTE Prophylaxis: SCDs, SQL 40mg BID; R LE DVT - repeat doppler negative    ID:   Afebrile, no leukocytosis  Cultures: NGTD    MISC:    SOCIAL/FAMILY:  [x] awaiting [] updated at bedside [] family meeting    CODE STATUS:  [x] Full Code [] DNR [] DNI [] Palliative/Comfort Care    DISPOSITION:  [x] ICU [] Stroke Unit [] Floor [] EMU [] RCU [] PCU    Time spent: 60 critical care minutes           ASSESSMENT:  62 y/o F with:  Subarachnoid hemorrhage HH2 MF3, brain compression, cerebral edema s/p angiogram, carotid & cerebral, b/l   Bleed day 17  Hypertension    PLAN:   NEURO:   Neurochecks Q4h  Dced nimodipine   Communicating hydrocephalus: Resolved. EVD removed 9/23  Seizure prophylaxis:  levetiracetam 1000mg PO BID per neurosurgery. Consider DC  Activity: OOB as tolerated    PULM:   Room air, incentive spirometry     CARDIO:   SBP goal 100-  mm Hg  TTE: EF 70%. Normal systolic and diastolic function  AntiHTN    ENDO:  Blood sugar goals 140-180 mg/dL    GI:   DIET: regular diet  LBM: 9/24    RENAL:   Na goal 135-145  3% DCed. Wean fludrocortisone to off 9/24    HEM/ONC: Anemia  Continue iron / Vit C  Monitor H/H, FOBT pending.   VTE Prophylaxis: SCDs, SQL 40mg BID; R LE DVT - repeat doppler negative    ID:   Afebrile, no leukocytosis  Cultures: NGTD    MISC:    SOCIAL/FAMILY:  [x] awaiting [] updated at bedside [] family meeting    CODE STATUS:  [x] Full Code [] DNR [] DNI [] Palliative/Comfort Care    DISPOSITION:  [x] ICU [] Stroke Unit [] Floor [] EMU [] RCU [] PCU    Time spent: 60 critical care minutes

## 2024-09-25 LAB
ANION GAP SERPL CALC-SCNC: 6 MMOL/L — SIGNIFICANT CHANGE UP (ref 5–17)
BUN SERPL-MCNC: 8 MG/DL — SIGNIFICANT CHANGE UP (ref 7–23)
CALCIUM SERPL-MCNC: 8.7 MG/DL — SIGNIFICANT CHANGE UP (ref 8.4–10.5)
CHLORIDE SERPL-SCNC: 104 MMOL/L — SIGNIFICANT CHANGE UP (ref 96–108)
CO2 SERPL-SCNC: 29 MMOL/L — SIGNIFICANT CHANGE UP (ref 22–31)
CREAT SERPL-MCNC: 0.79 MG/DL — SIGNIFICANT CHANGE UP (ref 0.5–1.3)
EGFR: 83 ML/MIN/1.73M2 — SIGNIFICANT CHANGE UP
GLUCOSE SERPL-MCNC: 106 MG/DL — HIGH (ref 70–99)
HCT VFR BLD CALC: 25.9 % — LOW (ref 34.5–45)
HGB BLD-MCNC: 7.3 G/DL — LOW (ref 11.5–15.5)
MAGNESIUM SERPL-MCNC: 2.2 MG/DL — SIGNIFICANT CHANGE UP (ref 1.6–2.6)
MCHC RBC-ENTMCNC: 20.6 PG — LOW (ref 27–34)
MCHC RBC-ENTMCNC: 28.2 GM/DL — LOW (ref 32–36)
MCV RBC AUTO: 73 FL — LOW (ref 80–100)
NRBC # BLD: 0 /100 WBCS — SIGNIFICANT CHANGE UP (ref 0–0)
PHOSPHATE SERPL-MCNC: 4.4 MG/DL — SIGNIFICANT CHANGE UP (ref 2.5–4.5)
PLATELET # BLD AUTO: 396 K/UL — SIGNIFICANT CHANGE UP (ref 150–400)
POTASSIUM SERPL-MCNC: 3.8 MMOL/L — SIGNIFICANT CHANGE UP (ref 3.5–5.3)
POTASSIUM SERPL-SCNC: 3.8 MMOL/L — SIGNIFICANT CHANGE UP (ref 3.5–5.3)
RBC # BLD: 3.55 M/UL — LOW (ref 3.8–5.2)
RBC # FLD: 19.4 % — HIGH (ref 10.3–14.5)
SODIUM SERPL-SCNC: 139 MMOL/L — SIGNIFICANT CHANGE UP (ref 135–145)
WBC # BLD: 7.09 K/UL — SIGNIFICANT CHANGE UP (ref 3.8–10.5)
WBC # FLD AUTO: 7.09 K/UL — SIGNIFICANT CHANGE UP (ref 3.8–10.5)

## 2024-09-25 PROCEDURE — 99222 1ST HOSP IP/OBS MODERATE 55: CPT | Mod: GC

## 2024-09-25 PROCEDURE — 99232 SBSQ HOSP IP/OBS MODERATE 35: CPT

## 2024-09-25 RX ORDER — NIMODIPINE 30 MG/1
60 CAPSULE, LIQUID FILLED ORAL EVERY 4 HOURS
Refills: 0 | Status: DISCONTINUED | OUTPATIENT
Start: 2024-09-25 | End: 2024-09-26

## 2024-09-25 RX ADMIN — NIMODIPINE 60 MILLIGRAM(S): 30 CAPSULE, LIQUID FILLED ORAL at 21:08

## 2024-09-25 RX ADMIN — Medication 650 MILLIGRAM(S): at 01:11

## 2024-09-25 RX ADMIN — NIMODIPINE 60 MILLIGRAM(S): 30 CAPSULE, LIQUID FILLED ORAL at 10:20

## 2024-09-25 RX ADMIN — Medication 17 GRAM(S): at 21:08

## 2024-09-25 RX ADMIN — CHLORHEXIDINE GLUCONATE ORAL RINSE 1 APPLICATION(S): 1.2 SOLUTION DENTAL at 05:48

## 2024-09-25 RX ADMIN — Medication 40 MILLIEQUIVALENT(S): at 05:52

## 2024-09-25 RX ADMIN — Medication 1000 MILLIGRAM(S): at 17:52

## 2024-09-25 RX ADMIN — NIMODIPINE 60 MILLIGRAM(S): 30 CAPSULE, LIQUID FILLED ORAL at 13:42

## 2024-09-25 RX ADMIN — ENOXAPARIN SODIUM 40 MILLIGRAM(S): 150 INJECTION SUBCUTANEOUS at 09:51

## 2024-09-25 RX ADMIN — Medication 10 MILLIGRAM(S): at 05:48

## 2024-09-25 RX ADMIN — Medication 650 MILLIGRAM(S): at 02:00

## 2024-09-25 RX ADMIN — Medication 1000 MILLIGRAM(S): at 05:48

## 2024-09-25 RX ADMIN — NIMODIPINE 60 MILLIGRAM(S): 30 CAPSULE, LIQUID FILLED ORAL at 17:52

## 2024-09-25 RX ADMIN — Medication 40 MILLIGRAM(S): at 05:48

## 2024-09-25 RX ADMIN — Medication 2 TABLET(S): at 21:08

## 2024-09-25 RX ADMIN — ENOXAPARIN SODIUM 40 MILLIGRAM(S): 150 INJECTION SUBCUTANEOUS at 21:08

## 2024-09-25 NOTE — PROGRESS NOTE ADULT - ASSESSMENT
ASSESSMENT:  62 y/o F with:  Subarachnoid hemorrhage HH2 MF3, brain compression, cerebral edema s/p angiogram, carotid & cerebral, b/l   Bleed day 18  Hypertension    PLAN:   NEURO:   Neurochecks Q4  Numodipine DCed   Communicating hydrocephalus: Resolved. EVD removed 9/23  Seizure prophylaxis:  levetiracetam 1000mg PO BID per neurosurgery. Consider DC  Activity: OOB as tolerated/ PT/OT    PULM:   Room air, incentive spirometry     CARDIO:   SBP goal 100-  mm Hg  TTE: EF 70%. Normal systolic and diastolic function  AntiHTN: Amlodipine 10mg, lisinopril 40mg. Add hydralazine 50mg tid if further control needed    ENDO:  Blood sugar goals 140-180 mg/dL    GI:   DIET: Regular diet  LBM: 9/24    RENAL: CSW - resolved  Na goal 135-145; currently 139  3% DCed. Dced fludrocortisone to off 9/24    HEM/ONC: Anemia  Continue iron / Vit C  Monitor H/H, FOBT pending.   VTE Prophylaxis: SCDs, SQL 40mg BID; R LE DVT - repeat doppler negative    ID:   Low grade temp, no leukocytosis  Cultures: NGTD    MISC:    SOCIAL/FAMILY:  [x] awaiting [] updated at bedside [] family meeting    CODE STATUS:  [x] Full Code [] DNR [] DNI [] Palliative/Comfort Care    DISPOSITION:  [] ICU [] Stroke Unit [] Floor [] EMU [] RCU [] PCU    Not critically ill           ASSESSMENT:  64 y/o F with:  Subarachnoid hemorrhage HH2 MF3, brain compression, cerebral edema s/p angiogram, carotid & cerebral, b/l   Bleed day 18  Hypertension    PLAN:   NEURO:   Neurochecks Q4  Nimodipine 60mg Q4 for 21 days   Communicating hydrocephalus: Resolved. EVD removed 9/23  Seizure prophylaxis:  levetiracetam 1000mg PO BID per neurosurgery. Consider DC  Activity: OOB as tolerated/ PT/OT    PULM:   Room air, incentive spirometry     CARDIO:   SBP goal 100-  mm Hg  TTE: EF 70%. Normal systolic and diastolic function  AntiHTN: Amlodipine 10mg, lisinopril 40mg, nimodipine 60mg Q4    ENDO:  Blood sugar goals 140-180 mg/dL    GI:   DIET: Regular diet  LBM: 9/24    RENAL: CSW - resolved  Na goal 135-145; currently 139  3% DCed. Dced fludrocortisone 9/24    HEM/ONC: Anemia  Continue iron / Vit C  Monitor H/H, FOBT pending.   VTE Prophylaxis: SCDs, SQL 40mg BID; R LE DVT - repeat doppler negative    ID:   Low grade temp, no leukocytosis  Cultures: NGTD    MISC:    SOCIAL/FAMILY:  [x] awaiting [] updated at bedside [] family meeting    CODE STATUS:  [x] Full Code [] DNR [] DNI [] Palliative/Comfort Care    DISPOSITION:  [] ICU [] Stroke Unit [] Floor [] EMU [] RCU [] PCU    Not critically ill    ICU stepdown Checklist:    Completed: 09-25 @ 09:50    [X] hemodynamically stable – VS WNL and stable x 24hours, UO adequate  [n/a ] if  previously on HDA - off pressors x 24h with stable neuro exam    [X] no new symptoms x 24h (i.e. new fever, new-onset nausea/vomiting)  [X] stable labs: (i.e. WBC not rising, sodium not dropping)  [X] patient not at high risk for aspiration, if high risk then:                  [ ] should have definitive plans for trach/PEG (alternative option is to discharge from ICU to facilty)                  [ ] stepdown to bed close to nurse’s station  [n/a] low suctioning requirements (i.e. q4h or less)  [X] sign-off from primary RN*  [X] drains do not require ICU level of care  [X] if patient previously agitated or with behavioral issues – controlled   [X] pain controlled

## 2024-09-25 NOTE — DIETITIAN NUTRITION RISK NOTIFICATION - ADDITIONAL COMMENTS/DIETITIAN RECOMMENDATIONS
1. Continue with current diet as tolerated: Regular   2. Encourage pt to meet nutritional needs as able  3. Monitor PO intakes, trend weights (weekly), monitor skin integrity, monitor labs (electrolytes, CMP), monitor GI function  4. Encourage adherence to diet education (reinforce as able)  5. Continue ascorbic acid supplementation  6. Pain and bowel regimen per team  7. Will continue to assess/honor preferences as able   8. Align nutrition interventions with goals of care at all times

## 2024-09-25 NOTE — CONSULT NOTE ADULT - SUBJECTIVE AND OBJECTIVE BOX
HPI:  "63F PMHx uncontrolled HTN, does not take any medications at home presented to [facility] after developing acute onset severe HA. Once arrived, patient had episode of nausea with emesis. CTH showed SAH, HH: 2, MF: 3. NIHSS: 0. CTA neg for aneurysm. Transferred to [facility] for further mgmt. Upon arrival to [facility], NIHSS noted to be 0. Patient reports a headache rated at 7/10 in severity. Of note, patient's daughter notes pt seems off and is sleepier than her baseline. Pt denies SOB, chest pain, vision changes, nausea, weakness/numbness/tingling, dizziness, photophobia.  (08 Sep 2024 13:29)"     S/p DSA negative for anueyrsm (24). S/p R frontal EVD (24). S/p angiogram showing mild R LILLY/R MCA spasm with IA verapamil (), s/p  angiogram with 15 mg IA verapamil for R ICA spasm ().     -----------------------------------------------------------------------  SUBJECTIVE:      -----------------------------------------------------------------------  REVIEW OF SYSTEMS  Constitutional - No fever,  +fatigue  Neurological -   Pain -   Bowel -   Bladder -   -----------------------------------------------------------------------  FUNCTIONAL HISTORY:      CURRENT FUNCTIONAL STATUS:    Physical Therapy:      Occupational Therapy:      Speech Therapy:    -----------------------------------------------------------------------  PAST MEDICAL & SURGICAL HISTORY  No pertinent past medical history    Hypertension    No significant past surgical history    No significant past surgical history    H/O  section      FAMILY HISTORY   No pertinent family history in first degree relatives      SOCIAL HISTORY  As above  -----------------------------------------------------------------------  VITALS  T(C): 36.7 (24 @ 09:04), Max: 38.1 (24 @ 01:08)  HR: 70 (24 @ 09:00) (69 - 92)  BP: 150/65 (24 @ 17:34) (150/65 - 170/70)  RR: 18 (24 @ 09:00) (16 - 18)  SpO2: 100% (24 @ 09:00) (95% - 100%)  Wt(kg): --    PHYSICAL EXAM    -----------------------------------------------------------------------  RECENT LABS  CBC Full  -  ( 25 Sep 2024 04:17 )  WBC Count : 7.09 K/uL  RBC Count : 3.55 M/uL  Hemoglobin : 7.3 g/dL  Hematocrit : 25.9 %  Platelet Count - Automated : 396 K/uL  Mean Cell Volume : 73.0 fl  Mean Cell Hemoglobin : 20.6 pg  Mean Cell Hemoglobin Concentration : 28.2 gm/dL  Auto Neutrophil # : x  Auto Lymphocyte # : x  Auto Monocyte # : x  Auto Eosinophil # : x  Auto Basophil # : x  Auto Neutrophil % : x  Auto Lymphocyte % : x  Auto Monocyte % : x  Auto Eosinophil % : x  Auto Basophil % : x        139  |  104  |  8   ----------------------------<  106[H]  3.8   |  29  |  0.79    Ca    8.7      25 Sep 2024 04:17  Phos  4.4       Mg     2.2           Urinalysis Basic - ( 25 Sep 2024 04:17 )    Color: x / Appearance: x / SG: x / pH: x  Gluc: 106 mg/dL / Ketone: x  / Bili: x / Urobili: x   Blood: x / Protein: x / Nitrite: x   Leuk Esterase: x / RBC: x / WBC x   Sq Epi: x / Non Sq Epi: x / Bacteria: x      -----------------------------------------------------------------------  IMAGING:    -----------------------------------------------------------------------  ALLERGIES  No Known Allergies      MEDICATIONS   acetaminophen     Tablet .. 650 milliGRAM(s) Oral every 6 hours PRN  amLODIPine   Tablet 10 milliGRAM(s) Oral daily  ascorbic acid 500 milliGRAM(s) Oral <User Schedule>  bisacodyl Suppository 10 milliGRAM(s) Rectal daily PRN  chlorhexidine 2% Cloths 1 Application(s) Topical <User Schedule>  enoxaparin Injectable 40 milliGRAM(s) SubCutaneous every 12 hours  ferrous    sulfate Liquid 300 milliGRAM(s) Oral <User Schedule>  influenza  Vaccine (HIGH DOSE) 0.5 milliLiter(s) IntraMuscular once  levETIRAcetam 1000 milliGRAM(s) Oral two times a day  lisinopril 40 milliGRAM(s) Oral daily  niMODipine 60 milliGRAM(s) Oral every 4 hours  ondansetron Injectable 4 milliGRAM(s) IV Push every 6 hours PRN  polyethylene glycol 3350 17 Gram(s) Oral two times a day  senna 2 Tablet(s) Oral every 12 hours    -----------------------------------------------------------------------   HPI:  "63F PMHx uncontrolled HTN, does not take any medications at home presented to [facility] after developing acute onset severe HA. Once arrived, patient had episode of nausea with emesis. CTH showed SAH, HH: 2, MF: 3. NIHSS: 0. CTA neg for aneurysm. Transferred to [facility] for further mgmt. Upon arrival to [facility], NIHSS noted to be 0. Patient reports a headache rated at 7/10 in severity. Of note, patient's daughter notes pt seems off and is sleepier than her baseline. Pt denies SOB, chest pain, vision changes, nausea, weakness/numbness/tingling, dizziness, photophobia.  (08 Sep 2024 13:29)"     S/p DSA negative for anueyrsm (24). S/p R frontal EVD (24). S/p angiogram showing mild R LILLY/R MCA spasm with IA verapamil (), s/p  angiogram with 15 mg IA verapamil for R ICA spasm ().     -----------------------------------------------------------------------  SUBJECTIVE:  Patient seen and evaluated at bedside in NSICU, accompanied by her partner. She is doing well overall. Denies headaches. Working with therapy without issues. Motivated to go to [place]. Case management following due to insurance barriers.   -----------------------------------------------------------------------  REVIEW OF SYSTEMS  Constitutional - No fever,  +fatigue  Neurological - stable and improving  Pain - denies, mild pain at drain site  Bowel - [facility]   Bladder - voiding   -----------------------------------------------------------------------  FUNCTIONAL HISTORY:  [place] in [place]. First floor. Have about 5 WHITNEY. PTA she was independent with ADLs and functional mobility.     CURRENT FUNCTIONAL STATUS:    Physical Therapy:     Progress  Sit-Stand Transfer Training  Transfer Training Sit-to-Stand Transfer: contact guard;  verbal cues;  1 person assist;  full weight-bearing   rolling walker  Transfer Training Stand-to-Sit Transfer: contact guard;  verbal cues;  1 person assist;  full weight-bearing   rolling walker  Sit-to-Stand Transfer Training Transfer Safety Analysis: impaired balance    Gait Training  Gait Training: contact guard;  verbal cues;  1 person assist;  full weight-bearing   rolling walker;  50 feet  Gait Analysis: 2-point gait   decreased alvin;  decreased weight-shifting ability;  impaired balance;  50 feet;  rolling walker  Gait Number of Times:: x 2      Occupational Therapy:   Progress    Sit-Stand Transfer Training  Transfer Training Sit-to-Stand Transfer: progressed from min A to CGA +RW;  minimum assist (75% patient effort);  1 person assist;  rolling walker  Transfer Training Stand-to-Sit Transfer: minimum assist (75% patient effort);  1 person assist;  rolling walker  Sit-to-Stand Transfer Training Transfer Safety Analysis: decreased balance;  decreased strength    Therapeutic Exercise  Therapeutic Exercise Detail: Pt performed 35 ftx2 functional mob with CGA +chair follow +RW     OT Cognitive Treatment  OT Treatment: Cognitive Charges: Pt demonstrated 100% multistep command follow       Speech Therapy:    -----------------------------------------------------------------------  PAST MEDICAL & SURGICAL HISTORY  No pertinent past medical history    Hypertension    No significant past surgical history    No significant past surgical history    H/O  section      FAMILY HISTORY   No pertinent family history in first degree relatives      SOCIAL HISTORY  As above  -----------------------------------------------------------------------  VITALS  T(C): 36.7 (24 @ 09:04), Max: 38.1 (24 @ 01:08)  HR: 70 (24 @ 09:00) (69 - 92)  BP: 150/65 (24 @ 17:34) (150/65 - 170/70)  RR: 18 (24 @ 09:00) (16 - 18)  SpO2: 100% (24 @ 09:00) (95% - 100%)  Wt(kg): --    PHYSICAL EXAM  Constitutional - NAD, Comfortable  HEENT - NCAT  Neck - Supple, No limited ROM  Chest - Breathing comfortably, No Respiratory distress  Cardiovascular - Regular pulse  Abdomen - No visible abdominal distension  Extremities - No deformities of upper or lower limbs. No calf tenderness   MSK - ROM within functional limits  Neurologic Exam -                    Cognitive - Awake, Alert, AAO to self, place, date, year, situation; follows commands     Communication - Fluent, No dysarthria, repetition intact, attention/concentration intact     Cranial Nerves -  EOMI, chronic R facial droop     Motor -                     LEFT    UE - ShAB 5/5, EF 5/5, EE 5/5, WE 5/5,  5/5                    LEFT    LE - HF 4/5, KE 4/5, DF 5/5, PF 5/5                    RIGHT UE - ShAB 5/5, EF 5/5, EE 5/5, WE 5/5,  5/5                    RIGHT LE - HF 4/5, KE 4/5, DF 5/5, PF 5/5        Sensory - grossly intact to LT     Reflexes - no clonus     Coordination - FTN intact  Psychiatric - Mood stable, Affect WNL   -----------------------------------------------------------------------  RECENT LABS  CBC Full  -  ( 25 Sep 2024 04:17 )  WBC Count : 7.09 K/uL  RBC Count : 3.55 M/uL  Hemoglobin : 7.3 g/dL  Hematocrit : 25.9 %  Platelet Count - Automated : 396 K/uL  Mean Cell Volume : 73.0 fl  Mean Cell Hemoglobin : 20.6 pg  Mean Cell Hemoglobin Concentration : 28.2 gm/dL  Auto Neutrophil # : x  Auto Lymphocyte # : x  Auto Monocyte # : x  Auto Eosinophil # : x  Auto Basophil # : x  Auto Neutrophil % : x  Auto Lymphocyte % : x  Auto Monocyte % : x  Auto Eosinophil % : x  Auto Basophil % : x        139  |  104  |  8   ----------------------------<  106[H]  3.8   |  29  |  0.79    Ca    8.7      25 Sep 2024 04:17  Phos  4.4       Mg     2.2           Urinalysis Basic - ( 25 Sep 2024 04:17 )    Color: x / Appearance: x / SG: x / pH: x  Gluc: 106 mg/dL / Ketone: x  / Bili: x / Urobili: x   Blood: x / Protein: x / Nitrite: x   Leuk Esterase: x / RBC: x / WBC x   Sq Epi: x / Non Sq Epi: x / Bacteria: x      -----------------------------------------------------------------------  IMAGING:  < from: US Duplex Venous Lower Ext Complete, Bilateral (24 @ 15:03) >  IMPRESSION:  No evidence of deep venous thrombosis in either lower extremity.   Previously seen right popliteal vein thrombosis no longer visualized.    < end of copied text >      < from: CT Head No Cont (24 @ 10:32) >  IMPRESSION:    Ventricles similar in size and minimally dilated.    Decreased subarachnoid hemorrhage in the prepontine and interpeduncular   fossa cisterns.    Minimal residual right anterolateral parietal sulcal subarachnoid   hemorrhage.    < end of copied text >      -----------------------------------------------------------------------  ALLERGIES  No Known Allergies      MEDICATIONS   acetaminophen     Tablet .. 650 milliGRAM(s) Oral every 6 hours PRN  amLODIPine   Tablet 10 milliGRAM(s) Oral daily  ascorbic acid 500 milliGRAM(s) Oral <User Schedule>  bisacodyl Suppository 10 milliGRAM(s) Rectal daily PRN  chlorhexidine 2% Cloths 1 Application(s) Topical <User Schedule>  enoxaparin Injectable 40 milliGRAM(s) SubCutaneous every 12 hours  ferrous    sulfate Liquid 300 milliGRAM(s) Oral <User Schedule>  influenza  Vaccine (HIGH DOSE) 0.5 milliLiter(s) IntraMuscular once  levETIRAcetam 1000 milliGRAM(s) Oral two times a day  lisinopril 40 milliGRAM(s) Oral daily  niMODipine 60 milliGRAM(s) Oral every 4 hours  ondansetron Injectable 4 milliGRAM(s) IV Push every 6 hours PRN  polyethylene glycol 3350 17 Gram(s) Oral two times a day  senna 2 Tablet(s) Oral every 12 hours    -----------------------------------------------------------------------

## 2024-09-25 NOTE — CHART NOTE - NSCHARTNOTEFT_GEN_A_CORE
Admitting Diagnosis:   Patient is a 63y old  Female who presents with a chief complaint of Subarachnoid hemorrhage (19 Sep 2024 00:51)  PAST MEDICAL & SURGICAL HISTORY:  Hypertension  History of  section    Current Nutrition Order: regular diet    PO Intake: Good (%) [   ]  Fair (50-75%) [   ] Poor (<25%) [   ] *fair to good PO, ~75% overall*    GI Issues: no noted nausea or emesis; no diarrhea or constipation noted; BM on 24; no noted distention    Pain: complaints of pain (level 5)    Skin Integrity: right groin and right head surgical incisions remain; EVD noted; no pressure ulcers; bilateral foot trace edema;      24 @ 07:01  -  24 @ 07:00  --------------------------------------------------------  IN: 2090 mL / OUT: 1743 mL / NET: 347 mL    24 @ 07:01  -  24 @ 06:43  --------------------------------------------------------  IN: 2260 mL / OUT: 2594 mL / NET: -334 mL      Labs:       135  |  105  |  9   ----------------------------<  108[H]  3.8   |  25  |  0.72    Ca    8.2[L]      19 Sep 2024 05:14  Phos  3.6       Mg     1.9         CAPILLARY BLOOD GLUCOSE    POCT Blood Glucose.: 277 mg/dL (18 Sep 2024 21:20)    Medications:  MEDICATIONS  (STANDING):  ascorbic acid 500 milliGRAM(s) Oral <User Schedule>  bisacodyl 5 milliGRAM(s) Oral at bedtime  bromocriptine Capsule 5 milliGRAM(s) Oral every 8 hours  chlorhexidine 2% Cloths 1 Application(s) Topical <User Schedule>  enoxaparin Injectable 40 milliGRAM(s) SubCutaneous every 12 hours  ferrous    sulfate Liquid 300 milliGRAM(s) Oral <User Schedule>  fludroCORTISONE 0.1 milliGRAM(s) Oral two times a day  influenza  Vaccine (HIGH DOSE) 0.5 milliLiter(s) IntraMuscular once  levETIRAcetam 1000 milliGRAM(s) Oral two times a day  polyethylene glycol 3350 17 Gram(s) Oral two times a day  senna 2 Tablet(s) Oral at bedtime  sodium chloride 3%. 500 milliLiter(s) (30 mL/Hr) IV Continuous <Continuous>    MEDICATIONS  (PRN):  acetaminophen     Tablet .. 650 milliGRAM(s) Oral every 6 hours PRN Temp greater or equal to 38C (100.4F), Mild Pain (1 - 3), Moderate Pain (4 - 6)  ondansetron Injectable 4 milliGRAM(s) IV Push every 6 hours PRN Nausea and/or Vomiting  oxyCODONE    IR 5 milliGRAM(s) Oral every 4 hours PRN Severe Pain (7 - 10)    Dosing Anthropometrics:   Height for BMI (FEET)	5 Feet  Height for BMI (INCHES)	3.5 Inch(s)  Height for BMI (CM)	161.29 Centimeter(s)  Weight for BMI (lbs)	253.5 lb  Weight for BMI (kg)	115 kg  Body Mass Index	              44.2  ideal body weight:               117.5 pounds, pt is 216% of ideal body weight    Weight Change: no new weights noted in electronic medical records; recommend that nursing obtain updated weights weekly prn. RD to monitor trends.     Estimated energy needs:  Weight used for calculations	ideal body weight  Estimated Energy Needs Weight (lbs)	117.5 lb  Estimated Energy Needs Weight (kg)	53.2 kg  Estimated Energy Needs From (quincy/kg)	25  Estimated Energy Needs To (quincy/kg)	30  Estimated Energy Needs Calculated From (quincy/kg)	1330  Estimated Energy Needs Calculated To (quincy/kg)	1596  Weight used for calculations	ideal body weight  Estimated Protein Needs Weight (lbs)	117.5 lb  Estimated Protein Needs Weight (kg)	53.2 kg  Estimated Protein Needs From (g/kg)	1.4  Estimated Protein Needs To (g/kg)	1.6  Estimated Protein Needs Calculated From (g/kg)	74.48  Estimated Protein Needs Calculated To (g/kg)	85.12  Estimated Fluid Needs Weight (lbs)	117.5 lb  Estimated Fluid Needs Weight (kg)	53.2 kg  Estimated Fluid Needs From (ml/kg)	25  Estimated Fluid Needs To (ml/kg)	30  Estimated Fluid Needs Calculated From (ml/kg)	1330  Estimated Fluid Needs Calculated To (ml/kg)	1596  Other Calculations	Pt is >100% ideal body weight, not-vented, in the ICU, thus ideal body weight used for all calculations. Needs adjusted for advanced age, clinical course.    Subjective: This is a 63 year old female with a past medical history of uncontrolled HTN, does not take any medications at home presented to [facility] after developing acute onset severe HA. Once arrived, patient had episode of nausea with emesis. CTH showed SAH, HH: 2, MF: 3. NIHSS: 0. CTA neg for aneurysm. Transferred to [facility] for further mgmt. Upon arrival to [facility], NIHSS noted to be 0. Patient reports a headache rated at 7/10 in severity. Of note, patient's daughter notes pt seems off and is sleepier than her baseline. Pt is status post DSA negative for anueyrsm (24), status post right frontal EVD (24), status post angiogram showing mild right LILLY/right MCA spasm with IA verapamil (), status post angiogram with 15 mg IA verapamil for right ICA spasm ().       RD spoke to pt and pt's daughter, Javad, at bedside for nutrition follow up evaluation. Pt on 8 east, no pressor support, no drips/gtts, receiving IV fluids (3% sodium), no intubation or sedation, on room air (SpO2 %), afebrile, MAPs trending >80, RD to monitor trends. Pt noted with a good appetite, and fair to good PO intakes overall, ~75%. Pt noted with no GI upset, BM on 24, no noted distention. Moderate pain (level 5) noted. Surgical incisions remain, EVD remains. Pt noted with no chewing/swallowing concerns/issues. Pt stated she is enjoying the food. Labs reviewed: elevated serum Glucose (108-123), POCT glucose (277), low calcium (8.2), medical team is aware, RD to monitor trends. RD reviewed pt's diet with her and her daughter, promoted nutrient-dense foods, healthy sources of fat, protein, starches, fiber, fluids; RD promoted protein-rich foods related to postoperative status. Pt appeared receptive, verbalized understanding. RD to remain available. Please see additional nutrition recommendations below.     Previous Nutrition Diagnosis: Food & Nutrition Related Knowledge Deficit  related to need for educational review of diet  as evidenced by pt requests for additional nutrition information on heart healthy, low sodium diet    Active [   ]  Resolved [ x ]    If resolved, new PES: Abnormal labs related to assumed excessive intakes of carbohydrates, sugar secondary to patient and family reports, as evidenced by elevated A1c level of 5.8 (within the prediabetes level)    Goal: Pt to consistently meet at least 75% of EEE via tolerated route that is consistent with goals of care during hospital stay    Nutrition Recommendations:  1. Continue with current diet order   **consider consistent carbohydrate restriction if pt's blood sugars are continually elevated**  2. Encourage pt to meet nutritional needs as able  3. Monitor PO intakes, trend weights (weekly), monitor skin integrity, monitor labs (electrolytes, CMP), monitor GI function  4. Encourage adherence to diet education (reinforce as able)  5. Continue ascorbic acid supplementation  6. Pain and bowel regimen per team  7. Will continue to assess/honor preferences as able   8. Align nutrition interventions with goals of care at all times    Education: RD reviewed pt's diet with her and her daughter, promoted nutrient-dense foods, healthy sources of fat, protein, starches, fiber, fluids; RD promoted protein-rich foods related to postoperative status. Pt appeared receptive, verbalized understanding.     Risk Level: High [   ] Moderate [ x ] Low [   ]
Notified by primary RN of exam change: new dysarthria, L facial droop, LUE/LLLE withdrawing to noxious stim. Stroke code called, with high suspicion for vasospasm. Levo gtt started for SBP goal 180. CTA/CTP/CT performed - R sided spasm, no perfusion defect. Exam improved with higher BP, dysarthria and facial droop improving and L side 4/5. Discussed with Dr. Chao, Dr. Fuentes, and Dr. Butler - will keep SBP goal 160-200 and consider angiogram if not improving further.
Admitting Diagnosis:   Patient is a 63y old  Female who presents with a chief complaint of Subarachnoid hemorrhage (25 Sep 2024 09:51)      PAST MEDICAL & SURGICAL HISTORY:  Hypertension  H/O  section      Current Nutrition Order: Diet, Regular (09-15-24 @ 07:47) [Active]     PO Intake: Good (%) [ X ]  Fair (50-75%) [   ] Poor (<25%) [   ]    GI Issues: No issues with nausea, vomiting, diarrhea, constipation reported at time of visit. Last BM noted on  . Pt is currently on a bowel regimen: miralax, senna, dulcolax,     Pain: 0/10 pain per flow sheets     Skin Integrity: No Pressure Injuries per flow sheets. Dannie Score: 21   Edema: No Edema per flow sheets.         24 @ 07:01  -  24 @ 07:00  --------------------------------------------------------  IN: 1574 mL / OUT: 2050 mL / NET: -476 mL    24 @ 07:01  -  24 @ 15:05  --------------------------------------------------------  IN: 437 mL / OUT: 1100 mL / NET: -663 mL        Labs:       139  |  104  |  8   ----------------------------<  106[H]  3.8   |  29  |  0.79    Ca    8.7      25 Sep 2024 04:17  Phos  4.4     -  Mg     2.2     -25      CAPILLARY BLOOD GLUCOSE          Medications:  MEDICATIONS  (STANDING):  amLODIPine   Tablet 10 milliGRAM(s) Oral daily  ascorbic acid 500 milliGRAM(s) Oral <User Schedule>  chlorhexidine 2% Cloths 1 Application(s) Topical <User Schedule>  enoxaparin Injectable 40 milliGRAM(s) SubCutaneous every 12 hours  ferrous    sulfate Liquid 300 milliGRAM(s) Oral <User Schedule>  influenza  Vaccine (HIGH DOSE) 0.5 milliLiter(s) IntraMuscular once  levETIRAcetam 1000 milliGRAM(s) Oral two times a day  lisinopril 40 milliGRAM(s) Oral daily  niMODipine 60 milliGRAM(s) Oral every 4 hours  polyethylene glycol 3350 17 Gram(s) Oral two times a day  senna 2 Tablet(s) Oral every 12 hours    MEDICATIONS  (PRN):  acetaminophen     Tablet .. 650 milliGRAM(s) Oral every 6 hours PRN Temp greater or equal to 38C (100.4F), Mild Pain (1 - 3), Moderate Pain (4 - 6)  bisacodyl Suppository 10 milliGRAM(s) Rectal daily PRN Constipation  ondansetron Injectable 4 milliGRAM(s) IV Push every 6 hours PRN Nausea and/or Vomiting      Weight:  Height for BMI (FEET)	5 Feet  Height for BMI (INCHES)	3.5 Inch(s)  Height for BMI (CM)	161.29 Centimeter(s)  Weight for BMI (lbs)	253.5 lb  Weight for BMI (kg)	115 kg  Body Mass Index	              44.2  ideal body weight:               117.5 pounds, pt is 216% of ideal body weight    Weight Change: no new weights noted in electronic medical records; recommend that nursing obtain updated weights weekly prn. RD to monitor trends.      Estimated energy needs:   Kcal: 25-30 kcal/kg = 8847-9932 kcal  Pro: 1.4-1.6 g pro/kg = 74-85 g pro   Fluids: 25-30 ml/kg = 9913-2403 ml     Ideal body weight (53.2kg) used to calculate energy needs due to pt's current body weight exceeds % (216%) ideal body weight. Needs adjusted for advanced age, clinical course.    Subjective:   "63F PMHx uncontrolled HT, R samuels's palsy, was BIBEMS to [facility]  c/o sudden onset severe HA. CTH showed SAH HH: 2, MF: 3. NIHSS: 0. CTA neg for aneurysm. Transferred to [facility] for further mgmt. S/p DSA negative for anueyrsm (24). S/p R frontal EVD (24). S/p angiogram showing mild R LILLY/R MCA spasm with IA verapamil (), s/p  angiogram with 15 mg IA verapamil for R ICA spasm ()."     Visited pt at bedside on 8EA, since then has been transferred to 8LA. Family present at bedside. Current diet: Regular. Per pt endorses great appetite, consuming 100% of meals at this time. Educated/Discussed on general healthy MyPlate guidelines. To consume 1/2 plate fruits and veggies, to consume 1/4 lean protein, and 1/4 whole grain at meals. Educated/Discussed the importance to prioritize protein at meal times. Reviewed good sources of protein on the menu. Pt and family receptive and verbalizes understanding. No issues with N/V/C/D at this time. Last BM noted  per pt. Meds reviewed. Vitamin C, ferrous sulfate. Nutritionally Pertinent Labs : Glucose: 106 (H). See Nutrition Recommendations below.     Previous Nutrition Diagnosis: Abnormal labs related to assumed excessive intakes of carbohydrates, sugar secondary to patient and family reports, as evidenced by elevated A1c level of 5.8 (within the prediabetes level)    Active [ X ]  Resolved [  ]    new PES: Increased nutrient needs: protein; related to increased physiological demand for nutrients as evidenced by subarachnoid hemorrhage    Goal: Pt to consistently meet at least 75% of EEE via tolerated route that is consistent with goals of care during hospital stay    Recommendations:  1. Continue with current diet as tolerated: Regular   2. Encourage pt to meet nutritional needs as able  3. Monitor PO intakes, trend weights (weekly), monitor skin integrity, monitor labs (electrolytes, CMP), monitor GI function  4. Encourage adherence to diet education (reinforce as able)  5. Continue ascorbic acid supplementation  6. Pain and bowel regimen per team  7. Will continue to assess/honor preferences as able   8. Align nutrition interventions with goals of care at all times    Education: Educated/Discussed on general healthy MyPlate guidelines. To consume 1/2 plate fruits and veggies, to consume 1/4 lean protein, and 1/4 whole grain at meals. Educated/Discussed the importance to prioritize protein at meal times. Reviewed good sources of protein on the menu. Pt and family receptive and verbalizes understanding.     Risk Level: High [   ] Moderate [ X ] Low [   ]

## 2024-09-25 NOTE — CONSULT NOTE ADULT - ASSESSMENT
63 year old with multiple medical co-morbidities (HTN, R samuels's palsy) with functional deficits, ADL, and gait impairments secondary to SAH in the setting of R LILLY/MCA spasm     63 year old with multiple medical co-morbidities (HTN, R samuels's palsy) with functional deficits, ADL, and gait impairments secondary to SAH in the setting of R LILLY/MCA spasm.      CVA/ SAH - EVD discontinued 9/23, Keppra seiz ppx  HTN - nimodipine, norvasc, lisinopril  Hx DVT (R posterior tibial) - surveillance doppler 9/17 neg  Microcytic anemia - iron/vit C        PLAN / RECOMMENDATIONS:     # Rehab / Mobilization:   - Initial therapy assessment: [X] PT  [X] OT   - Continue skilled therapy while admitted to prevent secondary complications of immobility focus on transfer training, bed mobility, progressive ambulation, and equipment evaluation.   - Educated patient and family members on post-acute rehabilitation. Discussed anticipated rehab course.  - OOB throughout the day with staff or OOB in chair with meals   - Encouraged HOB elevation to at least 30-40 deg to prevent orthostasis   - Therapy precautions: falls    # Bracing/Splinting:   - None indicated at this time      # Pain Management:   - Avoid/ monitor use sedating medications that may interfere with cognitive recovery     # Speech/ Swallow:   - Dysphagia screen [X]  - Diet:  reg + thins    # GI/ Bowel: regular  - Continue to monitor bowel patterns.   - Bowel Regimen: Dulcolax supp PRN, Senna, Miralax    # / Bladder: voiding  - Continue to monitor bladder patterns, avoid constipation.       # DVT Prophylaxis:   - SCDs, chemoprophylaxis - lovenox    # Disposition optimization:   - Disposition recommendation - Acute Inpatient Rehabilitation  - Case management following for insurance barriers. Applying to [facility]     Patient has significant functional impairments and would benefit from continued rehabilitation in the ACUTE inpatient rehab setting. She has both medical and functional needs appropriate for acute inpatient rehabilitation and would benefit from comprehensive interdisciplinary care, including a physiatrist, rehabilitation nursing, PT, OT, SLT and case management/social work. We anticipate that she would be able to tolerate 3 hours of PT/OT/SLT per day for 5 to 6 days per week to focus on mobility, transfers, gait training with assistive devices, ADL training, speech, swallow, cognition, and patient education/safety.

## 2024-09-25 NOTE — PROGRESS NOTE ADULT - SUBJECTIVE AND OBJECTIVE BOX
============================  NEUROCRITICAL CARE ATTENDING NOTE  ============================    HPI:  Patient is a 64y/o F  with uncontrolled HTN, does not take any medications at home presented to [facility] after developing acute onset severe HA. Once arrived, patient had episode of nausea with emesis. CTH showed SAH, HH: 2, MF: 3. NIHSS: 0. CTA neg for aneurysm. Transferred to [facility] for further mgmt. Upon arrival to [facility], NIHSS noted to be 0. Patient reports a headache rated at 7/10 in severity. Of note, patient's daughter notes pt seems off and is sleepier than her baseline. Pt denies SOB, chest pain, vision changes, nausea, weakness/numbness/tingling, dizziness, photophobia.  (08 Sep 2024 13:29)    COURSE IN THE HOSPITAL:  09/08 Status post angiogram, carotid and cerebral, b/l (09/08/2024, Alfredo Lomas), no aneurysm seen; kept intubated post procedure, EVD inserted  09/16 Tmax 38.5 L UE drift overnight; increased SBP goals to 180-200; angio mild VSP  09/17 No significant events overnight.   09/18 No significant events overnight. Vomit x1 this AM  09/19 Tmax 38.1. No significant events overnight. -200  09/20 Tmax 37.2 EVD raised to 10  09/21 BD14 Tmax 37.1 EVD raised to 15  09/22 BD15 Tmax 37.2; ICP elevation to 19 once, resolved; EVD clamped this morning  09/23: EVD removed.   09/24: BP control      ICU Vital Signs Last 24 Hrs  T(C): 36.7 (25 Sep 2024 05:14), Max: 38.1 (25 Sep 2024 01:08)  T(F): 98.1 (25 Sep 2024 05:14), Max: 100.5 (25 Sep 2024 01:08)  HR: 69 (25 Sep 2024 05:00) (69 - 92)  BP: 150/65 (24 Sep 2024 17:34) (150/65 - 170/70)  BP(mean): 94 (24 Sep 2024 17:34) (94 - 101)  ABP: 135/48 (25 Sep 2024 05:00) (128/45 - 197/75)  ABP(mean): 79 (25 Sep 2024 05:00) (73 - 122)  RR: 16 (25 Sep 2024 05:00) (16 - 18)  SpO2: 97% (25 Sep 2024 05:00) (95% - 100%)      09-24-24 @ 07:01  -  09-25-24 @ 07:00  --------------------------------------------------------  IN: 1574 mL / OUT: 2050 mL / NET: -476 mL      PHYSICAL EXAMINATION  NEURO: Patient AOx3, follows commands, R facial (h/o Bell's), B/L upper extremities and B/L LE 5/5 no drift  GENERAL: Calm  EENT: Anicteric  CARDIOVASCULAR: S1/S2, normal rate and regular rhythm  PULMONARY: Clear to auscultation bilaterally  ABDOMEN: Soft, nontender with normoactive bowel sounds  EXTREMITIES: No edema  SKIN: No rash      MEDICATIONS:  acetaminophen     Tablet .. 650 milliGRAM(s) Oral every 6 hours PRN  amLODIPine   Tablet 10 milliGRAM(s) Oral daily  ascorbic acid 500 milliGRAM(s) Oral <User Schedule>  bisacodyl Suppository 10 milliGRAM(s) Rectal daily PRN  chlorhexidine 2% Cloths 1 Application(s) Topical <User Schedule>  enoxaparin Injectable 40 milliGRAM(s) SubCutaneous every 12 hours  ferrous    sulfate Liquid 300 milliGRAM(s) Oral <User Schedule>  influenza  Vaccine (HIGH DOSE) 0.5 milliLiter(s) IntraMuscular once  levETIRAcetam 1000 milliGRAM(s) Oral two times a day  lisinopril 40 milliGRAM(s) Oral daily  ondansetron Injectable 4 milliGRAM(s) IV Push every 6 hours PRN  polyethylene glycol 3350 17 Gram(s) Oral two times a day  senna 2 Tablet(s) Oral every 12 hours      LABS:                      7.3    7.09  )-----------( 396      ( 25 Sep 2024 04:17 )             25.9     09-25    139  |  104  |  8   ----------------------------<  106[H]  3.8   |  29  |  0.79    Ca    8.7      25 Sep 2024 04:17  Phos  4.4     09-25  Mg     2.2     09-25                 ============================  NEUROCRITICAL CARE ATTENDING NOTE  ============================    HPI:  Patient is a 62y/o F  with uncontrolled HTN, does not take any medications at home presented to [facility] after developing acute onset severe HA. Once arrived, patient had episode of nausea with emesis. CT head showed SAH, HH: 2, MF: 3. NIHSS: 0. CTA neg for aneurysm. Transferred to [facility] for further mgmt. Upon arrival to [facility], NIHSS noted to be 0. Patient reports a headache rated at 7/10 in severity. Of note, patient's daughter notes pt seems off and is sleepier than her baseline. Pt denies SOB, chest pain, vision changes, nausea, weakness/numbness/tingling, dizziness, photophobia.  (08 Sep 2024 13:29)    COURSE IN THE HOSPITAL:  09/08 Status post angiogram, carotid and cerebral, b/l (09/08/2024, Alfredo Lomas), no aneurysm seen; kept intubated post procedure, EVD inserted  09/16 Tmax 38.5 L UE drift overnight; increased SBP goals to 180-200; angio mild VSP  09/17 No significant events overnight.   09/18 No significant events overnight. Vomit x1 this AM  09/19 Tmax 38.1. No significant events overnight. -200  09/20 Tmax 37.2 EVD raised to 10  09/21 BD14 Tmax 37.1 EVD raised to 15  09/22 BD15 Tmax 37.2; ICP elevation to 19 once, resolved; EVD clamped this morning  09/23: EVD removed.   09/24: BP control      ICU Vital Signs Last 24 Hrs  T(C): 36.7 (25 Sep 2024 05:14), Max: 38.1 (25 Sep 2024 01:08)  T(F): 98.1 (25 Sep 2024 05:14), Max: 100.5 (25 Sep 2024 01:08)  HR: 69 (25 Sep 2024 05:00) (69 - 92)  BP: 150/65 (24 Sep 2024 17:34) (150/65 - 170/70)  BP(mean): 94 (24 Sep 2024 17:34) (94 - 101)  ABP: 135/48 (25 Sep 2024 05:00) (128/45 - 197/75)  ABP(mean): 79 (25 Sep 2024 05:00) (73 - 122)  RR: 16 (25 Sep 2024 05:00) (16 - 18)  SpO2: 97% (25 Sep 2024 05:00) (95% - 100%)      09-24-24 @ 07:01  -  09-25-24 @ 07:00  --------------------------------------------------------  IN: 1574 mL / OUT: 2050 mL / NET: -476 mL      PHYSICAL EXAMINATION  NEURO: Patient AOx3, follows commands, R facial (h/o Bell's), B/L upper extremities and B/L LE 5/5 no drift  GENERAL: Calm  EENT: Anicteric  CARDIOVASCULAR: S1/S2, normal rate and regular rhythm  PULMONARY: Clear to auscultation bilaterally  ABDOMEN: Soft, nontender with normoactive bowel sounds  EXTREMITIES: No edema  SKIN: No rash      MEDICATIONS:  acetaminophen     Tablet .. 650 milliGRAM(s) Oral every 6 hours PRN  amLODIPine   Tablet 10 milliGRAM(s) Oral daily  ascorbic acid 500 milliGRAM(s) Oral <User Schedule>  bisacodyl Suppository 10 milliGRAM(s) Rectal daily PRN  chlorhexidine 2% Cloths 1 Application(s) Topical <User Schedule>  enoxaparin Injectable 40 milliGRAM(s) SubCutaneous every 12 hours  ferrous    sulfate Liquid 300 milliGRAM(s) Oral <User Schedule>  influenza  Vaccine (HIGH DOSE) 0.5 milliLiter(s) IntraMuscular once  levETIRAcetam 1000 milliGRAM(s) Oral two times a day  lisinopril 40 milliGRAM(s) Oral daily  ondansetron Injectable 4 milliGRAM(s) IV Push every 6 hours PRN  polyethylene glycol 3350 17 Gram(s) Oral two times a day  senna 2 Tablet(s) Oral every 12 hours      LABS:                      7.3    7.09  )-----------( 396      ( 25 Sep 2024 04:17 )             25.9     09-25    139  |  104  |  8   ----------------------------<  106[H]  3.8   |  29  |  0.79    Ca    8.7      25 Sep 2024 04:17  Phos  4.4     09-25  Mg     2.2     09-25

## 2024-09-25 NOTE — PROGRESS NOTE ADULT - SUBJECTIVE AND OBJECTIVE BOX
NEUROCRITICAL CARE PROGRESS NOTE    KARRIE MORALES   MRN-[id number]  Summary:  /  HPI:  63F PMHx uncontrolled HTN, does not take any medications at home presented to [facility] after developing acute onset severe HA. Once arrived, patient had episode of nausea with emesis. CTH showed SAH, HH: 2, MF: 3. NIHSS: 0. CTA neg for aneurysm. Transferred to [facility] for further mgmt. Upon arrival to [facility], NIHSS noted to be 0. Patient reports a headache rated at 7/10 in severity. Of note, patient's daughter notes pt seems off and is sleepier than her baseline. Pt denies SOB, chest pain, vision changes, nausea, weakness/numbness/tingling, dizziness, photophobia.  (08 Sep 2024 13:29)      S/Overnight events: BD 18. POD 17 DSA/ POD 13/9 angio for spasm treatment. BILL overnight. Neuro stable.     Hospital Course:  9/8: Transfer to [facility] for further mgmt of SAH. Patient taken urgently to angio. ET tube pulled back 2cm. EVD placed, open @ 10. Stability scan stable, EVD in position, NGT placed. Started nimodipine 60q4. Extubated to face mask. POD 0 DSA neg for aneurysm.   9/9: BD2. POD 1 angio. Started iron/vit C q other day for microcytic anermia. Started lisinopril 10mg daily. 1.L liter bolus for euvolemia. Ambriz removed, f/u TOV, started on prophylactic SQL 40mg BID (weight based). Passed TOV. Off cardene gtt. cardene gtt resumed. started hydral 25 q8, inc lisinopril dose from 10mg to 20mg daily.   9/10: BD3. POD 2 angio. BILL overnight. TTE showing EF 70%, mild symmetric LVH. DC cardene.   9/11: BD 4. POD 3 angio. BILL overnight. SBP 160s, given hydralazine 10mg IVP x1. Given ducolax suppository. SBP liberalized 100-160. , given labetolol 5mg IVP. NS bolus 500cc for euvolemia. Lactulose for AM. PO hydral switched to clonidine.   9/12: BD 5. POD 4 angio. BILL overnight. Anti-Xa within prophylactic range. , given labetolol 10mg IVP. 1L NS for euvolemia. lisinopril increased 40, norvasc 10 added. cardene started. change in exam: JAIME/LL 4/5 w/ drift, CTH/CTA performed showing severe spasm. febrile 101, pan cx sent. POD0 angio for spasm tx. Dc'd propofol. Extubated to face mask. SBP 190s, started on cardene gtt  9/13: BD 6, POD 5 diagnostic angio, POD 1 angio for spasm tx. Changed NS to LR.   9/14: BD 7, POD 6, POD 2 angio for spasm. Pt febrile ovn, f/u cultures. Start bromocriptine. Resume lisinopril. 1L bolus, f/u Na studies. Notified by primary RN of exam change: new dysarthria, L facial droop, LUE/LLLE withdrawing to noxious stim. Stroke code called, with high suspicion for vasospasm. Levo gtt started for SBP goal 180. CTA/CTP/CT performed - R sided spasm, no perfusion defect. Exam improved with higher BP, dysarthria and facial droop improving and L side 4/5. Discussed with Dr. Chao, Dr. Fuentes, and Dr. Butler - will keep SBP goal 160-200 and consider angiogram if not improving further.  9/15: BD 8, POD 7, POD 3 angio. Given 250 cc albumin bolus followed by 500 cc NS. Repeat BMP Na 134. S/p 1 L bolus for euvolemia. Full stregnth b/l, no drift; no repeat angio, regular diet. Given 250 albumin and 1 L NS for net negative volume.   9/16: BD 9, POD 8, POD 4 angio. Remains npo after midnight. Given 500 cc bolus NS for euvolemia. 1L bolus for euvolemia. EVD lowered to 5 i/s/o spasm. added fludrocortisone 0.1 BID, salt tabs increased to 3q6hr. increased bromocriptine to 10 q8. POD0 repeat angio 15 mg verapamil injected to R ICA. SBP parameters liberalized 160-200, off levo. 5 cc EVD output during angio, drained 22 cc's post-op, ICPs single digits. emesis s/p salt tabs, florinef rpt dose, salt tabs dc'd. 1L NS for BP 150s. POD 0 s/p angiogram showing R ICA spasm with IA verapamil.   9/17: BD 10. POD 9. POD 5 angio with spasm. POD 1 angio with spasm. BILL o/n. Started on levo gtt. Na 141, weaned 3% to 50cc/hr. Surveillance dopplers neg for. DVT. 500cc bolus for euvolemia.   9/18: BD 11. POD 10. POD 6 angio with spasm. POD 2 angio with spasm. BILL o/n. SBP liberalized to 120-200, exam stable. 3% decreased to 30cc/hr. BMP pend 4pm. Given 5mg hydralazine x 1 for . decreased bromo to 5mgq8. BM today. given 5mg hydral for .   9/19: BD 12. POD 11. POD 7 angio with spasm. POD 3 angio with spasm. BILL o/n. Given 1L for euvolemia. 3% increased to 50 cc/hr, Na 138>135. Given florinef 0.1x1, increased florinef to 0.2mg BID starting in AM. Dc'd bromocriptine.   9/20: BD 13. POD 12. POD 8 angio with spasm. POD 4 angio with spasm. BILL overnight. EVD raised to 10, decreased miralax to qhs. Na goal relaxed to normal, given 1 push hydral for SBP 220s.   9/21: BD 14. POD 13 DSA. POD 9/5 angio for spasm tx. BILL overnight. Raised EVD to 15. D/c'd bowel reg. Sending FOB. Decreased rate of 3% from 50 to 30. hydralazine 10mg x2.  9/22: BD 15. POD 14 DSA. POD 10/6 angio for spasm. BILL overnight. EVD clamped. Repeat BMP @ 2pm. 3% dc'd. Repeat @ 10pm. +bowel regimen.   9/23: BD 16. POD 15 DSA. POD 11/7 angio for spasm. BILL overnight. 500cc bolus given for euvolemia. Increased bowel regimen. CTH complete, vent size stable. EVD removed. OOB with PT/OT. Labetalol 10 mg IVP x 1 for SBP 200s. Florinef decreased to 0.1 mg BID. 500 cc bolus for euvolemia given.  9/24: BD 17. POD 16 DSA. POD 12/8 angio for spasm. BILL overnight. Neuro stable. Added Norvasc 10 and Lisinopril 40 for SBP control. Dc'd floronif. Resumed SQL for DVT ppx.   9/25: BD 18. POD 17 DSA/ POD 13/9 angio for spasm treatment. BILL overnight. Neuro stable.     Vital Signs Last 24 Hrs  T(C): 36.6 (24 Sep 2024 22:33), Max: 37.3 (24 Sep 2024 08:49)  T(F): 97.8 (24 Sep 2024 22:33), Max: 99.2 (24 Sep 2024 08:49)  HR: 74 (24 Sep 2024 22:00) (70 - 93)  BP: 150/65 (24 Sep 2024 17:34) (150/65 - 170/70)  BP(mean): 94 (24 Sep 2024 17:34) (94 - 101)  RR: 16 (24 Sep 2024 22:00) (16 - 18)  SpO2: 96% (24 Sep 2024 22:00) (96% - 100%)    Parameters below as of 24 Sep 2024 22:00  Patient On (Oxygen Delivery Method): room air            I&O's Detail    23 Sep 2024 07:01  -  24 Sep 2024 07:00  --------------------------------------------------------  IN:    Oral Fluid: 800 mL    Sodium Chloride 0.9% Bolus: 500 mL  Total IN: 1300 mL    OUT:    External Ventricular Device (mL): 0 mL    Incontinent per Collection Bag (mL): 4400 mL  Total OUT: 4400 mL    Total NET: -3100 mL      24 Sep 2024 07:01  -  25 Sep 2024 00:26  --------------------------------------------------------  IN:    Oral Fluid: 1574 mL  Total IN: 1574 mL    OUT:    Incontinent per Collection Bag (mL): 1000 mL    Voided (mL): 250 mL  Total OUT: 1250 mL    Total NET: 324 mL      PHYSICAL EXAM:  General: Pt is sitting up comfortably in bed, in NAD, on RA  HEENT: PERRL 3mm, EOMI B/L, +Right facial droop (baseline)  Cardiovascular: RRR, normal S1 and S2   Respiratory: non-labored breathing, symmetric chest rise   GI: abd soft, NTND   Neuro: A&O x 3, no aphasia, speech clear, no dysmetria, no pronator drift  Strength 5/5 throughout all 4 extremities  Sensation intact to light touch throughout   Vascular: Distal pulses 2+ x4, no calf edema or erythema, +skin changes from suspected vascular disease   Wounds: R groin C/D/I, no evidence of hematoma; EVD insertion site with staple in place, open to air      LABS:                        7.8    7.22  )-----------( 374      ( 24 Sep 2024 05:30 )             26.7     09-24    140  |  104  |  7   ----------------------------<  132[H]  3.5   |  26  |  0.67    Ca    8.5      24 Sep 2024 05:30  Phos  4.4     09-24  Mg     2.0     09-24        Urinalysis Basic - ( 24 Sep 2024 05:30 )    Color: x / Appearance: x / SG: x / pH: x  Gluc: 132 mg/dL / Ketone: x  / Bili: x / Urobili: x   Blood: x / Protein: x / Nitrite: x   Leuk Esterase: x / RBC: x / WBC x   Sq Epi: x / Non Sq Epi: x / Bacteria: x          CAPILLARY BLOOD GLUCOSE          Drug Levels: [] N/A    CSF Analysis: [] N/A      Allergies    No Known Allergies    Intolerances      MEDICATIONS:  Antibiotics:    Neuro:  acetaminophen     Tablet .. 650 milliGRAM(s) Oral every 6 hours PRN  levETIRAcetam 1000 milliGRAM(s) Oral two times a day  ondansetron Injectable 4 milliGRAM(s) IV Push every 6 hours PRN    Anticoagulation:  enoxaparin Injectable 40 milliGRAM(s) SubCutaneous every 12 hours    OTHER:  amLODIPine   Tablet 10 milliGRAM(s) Oral daily  bisacodyl Suppository 10 milliGRAM(s) Rectal daily PRN  chlorhexidine 2% Cloths 1 Application(s) Topical <User Schedule>  influenza  Vaccine (HIGH DOSE) 0.5 milliLiter(s) IntraMuscular once  lisinopril 40 milliGRAM(s) Oral daily  polyethylene glycol 3350 17 Gram(s) Oral two times a day  senna 2 Tablet(s) Oral every 12 hours    IVF:  ascorbic acid 500 milliGRAM(s) Oral <User Schedule>  ferrous    sulfate Liquid 300 milliGRAM(s) Oral <User Schedule>    CULTURES:  Culture Results:   No Growth at 10 Days (09-13 @ 23:05)  Culture Results:   No growth at 5 days (09-12 @ 16:54)    ASSESSMENT:  63F PMHx uncontrolled HT, R samuels's palsy, was BIBEMS to [facility] 9/8 c/o sudden onset severe HA. CTH showed SAH HH: 2, MF: 3. NIHSS: 0. CTA neg for aneurysm. Transferred to [facility] for further mgmt. S/p DSA negative for anueyrsm (9/8/24). S/p R frontal EVD (9/8/24). S/p angiogram showing mild R LILLY/R MCA spasm with IA verapamil (9/12), s/p  angiogram with 15 mg IA verapamil for R ICA spasm (9/16).     Neuro:  - Neuro q4/vitals q4hr   - Seizure prophylaxis: Keppra 1g BID   - R frontal EVD d/c'd 9/23  - DCI prophylaxis: HOLD nimodipine 60q4 2/2 vasospasm  - Pain control: Tylenol, Oxy 5 prn   - stability CTH post-EVD complete 9/8, stable, CTH 9/12 stable   - CTA 9/12: b/l LILLY spasm, b/l MCA R>L   - MR brain and C spine w/wo contrast complete 9/13    Cards:   - -170   - HTN: started norvasc 10mg daily and lisinopril 40mg daily 9/24  - TTE 9/10: EF 70%, mild symmetric LVH    Pulm:   - RA  - IS    GI:  - Regular diet   - Bowel reg, LBM 9/23  - Nausea: Zofran 4q6 prn     Renal:  - IVL, voiding   - euvolemic goal      Endo:  - A1c 5.8     Heme:   - DVT prophylaxis: SCDs, SQL  - LE dopplers 9/9: R posterior tibial DVT on admission (9/9)- surveillace dopplers 9/17 neg for DVT  - Microcytic anemia: iron/vit C q other day     ID:   - afebrile  - pan cx 9/13    Dispo:   - NSICU, full code  - PT/OT rec AR     Discussed with Dr. Butler and Dr. Morse

## 2024-09-26 ENCOUNTER — TRANSCRIPTION ENCOUNTER (OUTPATIENT)
Age: 64
End: 2024-09-26

## 2024-09-26 DIAGNOSIS — I87.2 VENOUS INSUFFICIENCY (CHRONIC) (PERIPHERAL): ICD-10-CM

## 2024-09-26 DIAGNOSIS — D50.9 IRON DEFICIENCY ANEMIA, UNSPECIFIED: ICD-10-CM

## 2024-09-26 DIAGNOSIS — I10 ESSENTIAL (PRIMARY) HYPERTENSION: ICD-10-CM

## 2024-09-26 LAB
ANION GAP SERPL CALC-SCNC: 11 MMOL/L — SIGNIFICANT CHANGE UP (ref 5–17)
BUN SERPL-MCNC: 12 MG/DL — SIGNIFICANT CHANGE UP (ref 7–23)
CALCIUM SERPL-MCNC: 8.8 MG/DL — SIGNIFICANT CHANGE UP (ref 8.4–10.5)
CHLORIDE SERPL-SCNC: 104 MMOL/L — SIGNIFICANT CHANGE UP (ref 96–108)
CO2 SERPL-SCNC: 27 MMOL/L — SIGNIFICANT CHANGE UP (ref 22–31)
CREAT SERPL-MCNC: 0.82 MG/DL — SIGNIFICANT CHANGE UP (ref 0.5–1.3)
EGFR: 79 ML/MIN/1.73M2 — SIGNIFICANT CHANGE UP
GLUCOSE SERPL-MCNC: 110 MG/DL — HIGH (ref 70–99)
HCT VFR BLD CALC: 29.4 % — LOW (ref 34.5–45)
HGB BLD-MCNC: 8.1 G/DL — LOW (ref 11.5–15.5)
MAGNESIUM SERPL-MCNC: 2.1 MG/DL — SIGNIFICANT CHANGE UP (ref 1.6–2.6)
MCHC RBC-ENTMCNC: 20.7 PG — LOW (ref 27–34)
MCHC RBC-ENTMCNC: 27.6 GM/DL — LOW (ref 32–36)
MCV RBC AUTO: 75 FL — LOW (ref 80–100)
NRBC # BLD: 0 /100 WBCS — SIGNIFICANT CHANGE UP (ref 0–0)
PHOSPHATE SERPL-MCNC: 4.2 MG/DL — SIGNIFICANT CHANGE UP (ref 2.5–4.5)
PLATELET # BLD AUTO: 418 K/UL — HIGH (ref 150–400)
POTASSIUM SERPL-MCNC: 4 MMOL/L — SIGNIFICANT CHANGE UP (ref 3.5–5.3)
POTASSIUM SERPL-SCNC: 4 MMOL/L — SIGNIFICANT CHANGE UP (ref 3.5–5.3)
RBC # BLD: 3.92 M/UL — SIGNIFICANT CHANGE UP (ref 3.8–5.2)
RBC # FLD: 19.9 % — HIGH (ref 10.3–14.5)
SODIUM SERPL-SCNC: 142 MMOL/L — SIGNIFICANT CHANGE UP (ref 135–145)
WBC # BLD: 6.45 K/UL — SIGNIFICANT CHANGE UP (ref 3.8–10.5)
WBC # FLD AUTO: 6.45 K/UL — SIGNIFICANT CHANGE UP (ref 3.8–10.5)

## 2024-09-26 PROCEDURE — 99232 SBSQ HOSP IP/OBS MODERATE 35: CPT

## 2024-09-26 PROCEDURE — 99223 1ST HOSP IP/OBS HIGH 75: CPT

## 2024-09-26 RX ADMIN — Medication 1000 MILLIGRAM(S): at 17:12

## 2024-09-26 RX ADMIN — ENOXAPARIN SODIUM 40 MILLIGRAM(S): 150 INJECTION SUBCUTANEOUS at 23:05

## 2024-09-26 RX ADMIN — Medication 40 MILLIGRAM(S): at 07:06

## 2024-09-26 RX ADMIN — Medication 300 MILLIGRAM(S): at 07:06

## 2024-09-26 RX ADMIN — NIMODIPINE 60 MILLIGRAM(S): 30 CAPSULE, LIQUID FILLED ORAL at 14:35

## 2024-09-26 RX ADMIN — CHLORHEXIDINE GLUCONATE ORAL RINSE 1 APPLICATION(S): 1.2 SOLUTION DENTAL at 07:23

## 2024-09-26 RX ADMIN — Medication 1000 MILLIGRAM(S): at 07:07

## 2024-09-26 RX ADMIN — Medication 10 MILLIGRAM(S): at 07:06

## 2024-09-26 RX ADMIN — ENOXAPARIN SODIUM 40 MILLIGRAM(S): 150 INJECTION SUBCUTANEOUS at 11:39

## 2024-09-26 RX ADMIN — NIMODIPINE 60 MILLIGRAM(S): 30 CAPSULE, LIQUID FILLED ORAL at 11:39

## 2024-09-26 RX ADMIN — NIMODIPINE 60 MILLIGRAM(S): 30 CAPSULE, LIQUID FILLED ORAL at 07:06

## 2024-09-26 RX ADMIN — Medication 500 MILLIGRAM(S): at 07:07

## 2024-09-26 RX ADMIN — Medication 2 TABLET(S): at 07:06

## 2024-09-26 NOTE — CONSULT NOTE ADULT - PROBLEM SELECTOR RECOMMENDATION 3
Chronic venous insufficiency. Of note, venous duplex 9/24/24 showed nonocclusive thrombosis of a right posterior tibial vein, repeat venous duplex 9/17/24 w/o DVT, prev right popliteal vein thrombus no longer visualized.   - cont LMWH for DVT ppx  - follow up/establish care with Dr. Kimberly Amezcua (vascular cardiology)

## 2024-09-26 NOTE — DISCHARGE NOTE PROVIDER - NSDCFUADDAPPT_GEN_ALL_CORE_FT
Advance Care Planning     Advance Care Planning (ACP) Physician/NP/PA Conversation    Date of Conversation: 6/21/2023  Conducted with: Patient with Decision Making Capacity    Healthcare Decision Maker:      Primary Decision Maker: Kasandra Márquez - [phone]    Secondary Decision Maker: Ap [place] Child - [phone]    Secondary Decision Maker: John Choi - Child - [phone]    Click here to complete Healthcare Decision Makers including selection of the Healthcare Decision Maker Relationship (ie \"Primary\")  Today we confirmed healthcare decision-makers to be her  and 2 children    Care Preferences:    Hospitalization: \"If your health worsens and it becomes clear that your chance of recovery is unlikely, what would be your preference regarding hospitalization? \"  The patient would prefer hospitalization. Ventilation: \"If you were unable to breath on your own and your chance of recovery was unlikely, what would be your preference about the use of a ventilator (breathing machine) if it was available to you? \"  The patient would desire the use of a ventilator. Resuscitation: \"In the event your heart stopped as a result of an underlying serious health condition, would you want attempts made to restart your heart, or would you prefer a natural death? \"  Yes, attempt to resuscitate.     treatment goals, benefit/burden of treatment options, ventilation preferences, hospitalization preferences, resuscitation preferences, and end of life care preferences (vegetative state/imminent death)    Conversation Outcomes / Follow-Up Plan:  ACP in process - information provided, considering goals and options  Reviewed DNR/DNI and patient elects Full Code (Attempt Resuscitation)    Length of Voluntary ACP Conversation in minutes:  16 minutes    Sreedhar Emanuel MD Please follow up with Dr. Butler    Please follow up with Dr. Lomas or Dr. Fuentes outpatient    Please follow up with your primary care physician  Please follow up with Dr. Butler, call the office to make/confirm appointment at [phone]    Please follow up with Dr. Kimberly Amezcua from Vascular Surgery outpatient     Please follow up with Dr. Lomas or Dr. Fuentes outpatient    Please follow up with your primary care physician  Resources:  [facility]  [address] [place] [id number]  [phone]

## 2024-09-26 NOTE — CONSULT NOTE ADULT - TIME BILLING
chart review, patient interview/exam, review of labs/imaging, management of medical conditions, counseling/educating patient/family, discussion with consultants, documentation; excludes teaching and separately reported services

## 2024-09-26 NOTE — DISCHARGE NOTE PROVIDER - NSDCCPCAREPLAN_GEN_ALL_CORE_FT
PRINCIPAL DISCHARGE DIAGNOSIS  Diagnosis: SAH (subarachnoid hemorrhage)  Assessment and Plan of Treatment: s/p cerebral angiogram showing no aneurysm, no vasculitis, or any other vascular abnormality on 9/8  s/p cerebral angiogram showing no aneurysm, AVM, or early venous shunting identified.  There is mild right LILLY and MCA vasospasm.  Intra arterial infusion of verapamil via right LILLY/A1 on 9/12  s/p cerebral angiogram showing no aneurysm, AVM, or early venous shunting. Mild right A 1 vasospasm.  15 mg of verapamil given intra arterially via right ICA on 9/16      SECONDARY DISCHARGE DIAGNOSES  Diagnosis: Hypertension  Assessment and Plan of Treatment: started on Amlodipine and Lisinopril    Diagnosis: Anemia  Assessment and Plan of Treatment: continue iron supplement     PRINCIPAL DISCHARGE DIAGNOSIS  Diagnosis: SAH (subarachnoid hemorrhage)  Assessment and Plan of Treatment: s/p cerebral angiogram showing no aneurysm, no vasculitis, or any other vascular abnormality on 9/8  s/p cerebral angiogram showing no aneurysm, AVM, or early venous shunting identified.  There is mild right LILLY and MCA vasospasm.  Intra arterial infusion of verapamil via right LILYL/A1 on 9/12  s/p cerebral angiogram showing no aneurysm, AVM, or early venous shunting. Mild right A 1 vasospasm.  15 mg of verapamil given intra arterially via right ICA on 9/16      SECONDARY DISCHARGE DIAGNOSES  Diagnosis: Hypertension  Assessment and Plan of Treatment: started on Amlodipine and Lisinopril  Follow up with a primary care doctor outpatient    Diagnosis: Anemia  Assessment and Plan of Treatment: continue iron supplement    Diagnosis: Chronic venous insufficiency  Assessment and Plan of Treatment: Follow up with Dr. Kimberly Amezcua from vascular surgery outpatient     PRINCIPAL DISCHARGE DIAGNOSIS  Diagnosis: SAH (subarachnoid hemorrhage)  Assessment and Plan of Treatment: s/p cerebral angiogram showing no aneurysm, no vasculitis, or any other vascular abnormality on 9/8  s/p cerebral angiogram showing no aneurysm, AVM, or early venous shunting identified.  There is mild right LILLY and MCA vasospasm.  Intra arterial infusion of verapamil via right LILLY/A1 on 9/12  s/p cerebral angiogram showing no aneurysm, AVM, or early venous shunting. Mild right A 1 vasospasm.  15 mg of verapamil given intra arterially via right ICA on 9/16      SECONDARY DISCHARGE DIAGNOSES  Diagnosis: Hypertension  Assessment and Plan of Treatment: started on Amlodipine and Lisinopril  Follow up with a primary care doctor outpatient    Diagnosis: Anemia  Assessment and Plan of Treatment: continue iron supplement    Diagnosis: Chronic venous insufficiency  Assessment and Plan of Treatment: Follow up with Dr. Kimberly Amezcua from vascular surgery outpatient    Diagnosis: Deep vein thrombosis (DVT)  Assessment and Plan of Treatment: lower extremity doppler on 9/9 showing Nonocclusive thrombosis of a right posterior tibial vein. 4.8 cm left Baker's cyst.  repeat lower extremity on 9/17 showing No evidence of deep venous thrombosis in either lower extremity. Previously seen right popliteal vein thrombosis no longer visualized

## 2024-09-26 NOTE — DISCHARGE NOTE PROVIDER - NSDCMRMEDTOKEN_GEN_ALL_CORE_FT
acetaminophen 325 mg oral tablet: 2 tab(s) orally every 6 hours As needed Temp greater or equal to 38C (100.4F), Mild Pain (1 - 3), Moderate Pain (4 - 6)  amLODIPine 10 mg oral tablet: 1 tab(s) orally once a day  ascorbic acid 500 mg oral tablet: 1 tab(s) orally every other day please take with iron supplement  ferrous sulfate 300 mg/5 mL (60 mg/5 mL elemental iron) oral liquid: 5 milliliter(s) orally every other day  levETIRAcetam 1000 mg oral tablet: 1 tab(s) orally 2 times a day  lisinopril 40 mg oral tablet: 1 tab(s) orally once a day  polyethylene glycol 3350 oral powder for reconstitution: 17 gram(s) orally 2 times a day as needed for  constipation   acetaminophen 325 mg oral tablet: 2 tab(s) orally every 6 hours As needed Temp greater or equal to 38C (100.4F), Mild Pain (1 - 3), Moderate Pain (4 - 6)  amLODIPine 10 mg oral tablet: 1 tab(s) orally once a day  ascorbic acid 500 mg oral tablet: 1 tab(s) orally every other day please take with iron supplement  ferrous sulfate 300 mg/5 mL (60 mg/5 mL elemental iron) oral liquid: 5 milliliter(s) orally every other day  levETIRAcetam 1000 mg oral tablet: 1 tab(s) orally 2 times a day  lisinopril 40 mg oral tablet: 1 tab(s) orally once a day  outpatient occupational therapy: outpatient occupational therapy  outpatient physical therapy: outpatient physical therapy  polyethylene glycol 3350 oral powder for reconstitution: 17 gram(s) orally 2 times a day as needed for  constipation

## 2024-09-26 NOTE — CONSULT NOTE ADULT - PROBLEM SELECTOR RECOMMENDATION 9
Sudden onset headache found to have diffuse SAH at [facility], transferred to [facility] and underwent emergent cerebral angiogram 9/8 that was non-revealing. NSICU course c/b vasospasm s/p cerebral angio x2 for IA verapamil (9/12, 9/16), central fevers s/p bromocriptine.   - management per NSG, neuro checks, nimodipine for vasospasm   - AED with seizure precautions: levetiracetam 1000mg BID  - post operative state  ---pain control with bowel regimen  ---strong pulm hygiene with freq IS use, chest PT  ---early mobilization and OOBTC as tolerated with fall precautions  ---chemical DVT ppx with LMWH

## 2024-09-26 NOTE — CONSULT NOTE ADULT - PROBLEM SELECTOR RECOMMENDATION 4
Reports history of chronic YANIQUE, TSAT 5% on admission. Last CLN two years ago - states non-cancerous polyps removed. No AUB.   - Hgb 10.3 --> 7-8, may have component of ABLA  - cont iron supplement q48hr  - close PCP follow up

## 2024-09-26 NOTE — DISCHARGE NOTE PROVIDER - DETAILS OF MALNUTRITION DIAGNOSIS/DIAGNOSES
This patient has been assessed with a concern for Malnutrition and was treated during this hospitalization for the following Nutrition diagnosis/diagnoses:     -  09/25/2024: Morbid obesity (BMI > 40)

## 2024-09-26 NOTE — CONSULT NOTE ADULT - SUBJECTIVE AND OBJECTIVE BOX
HPI:  63 YOF with PMH of uncontrolled HTN, chronic venous insufficiency, chronic YANIQUE presenting with sudden onset HA and was found to have SAH s/p cerebral angiogram  that was non-revealing. NSICU course c/b vasospasm s/p cerebral angio x2 for IA verapamil (, ), central fevers s/p bromocriptine. Patient seen this morning sitting up in chair with  at bedside. Feels well overall - denied headache, dizziness, hearing/vision changes, chest pain, sob, cough, abd pain, n/v/d, dysuria, rashes. Has chronic LE edema. Had BM this morning. States she has been iron deficient for many years, no AUB, last CLN two years ago (polyps non-cancerous).  Patient wondering when she will be able to return home.     ROS: negative except as per HPI    PMH: as per HPI  PSH: as per HPI  Medications: reviewed  Allergies: reviewed  SH:  FH:    PAST MEDICAL & SURGICAL HISTORY:  Hypertension      H/O  section        Home Medications:    Allergies    No Known Allergies    Intolerances      FAMILY HISTORY:    Social History:  Quit smoking 10 years ago  Works as  (08 Sep 2024 13:29)      Diet, Regular (09-15-24 @ 07:47) [Active]      MEDICATIONS:  MEDICATIONS  (STANDING):  amLODIPine   Tablet 10 milliGRAM(s) Oral daily  ascorbic acid 500 milliGRAM(s) Oral <User Schedule>  chlorhexidine 2% Cloths 1 Application(s) Topical <User Schedule>  enoxaparin Injectable 40 milliGRAM(s) SubCutaneous every 12 hours  ferrous    sulfate Liquid 300 milliGRAM(s) Oral <User Schedule>  influenza  Vaccine (HIGH DOSE) 0.5 milliLiter(s) IntraMuscular once  levETIRAcetam 1000 milliGRAM(s) Oral two times a day  lisinopril 40 milliGRAM(s) Oral daily  niMODipine 60 milliGRAM(s) Oral every 4 hours  polyethylene glycol 3350 17 Gram(s) Oral two times a day  senna 2 Tablet(s) Oral every 12 hours    MEDICATIONS  (PRN):  acetaminophen     Tablet .. 650 milliGRAM(s) Oral every 6 hours PRN Temp greater or equal to 38C (100.4F), Mild Pain (1 - 3), Moderate Pain (4 - 6)  bisacodyl Suppository 10 milliGRAM(s) Rectal daily PRN Constipation  ondansetron Injectable 4 milliGRAM(s) IV Push every 6 hours PRN Nausea and/or Vomiting      Allergies    No Known Allergies    Intolerances        OBJECTIVE:  Vital Signs Last 24 Hrs  T(C): 36.8 (26 Sep 2024 09:22), Max: 37 (26 Sep 2024 04:55)  T(F): 98.2 (26 Sep 2024 09:22), Max: 98.6 (26 Sep 2024 04:55)  HR: 82 (26 Sep 2024 11:35) (66 - 82)  BP: 168/63 (26 Sep 2024 11:35) (129/61 - 168/63)  BP(mean): 91 (26 Sep 2024 11:35) (88 - 112)  RR: 17 (26 Sep 2024 11:35) (17 - 18)  SpO2: 98% (26 Sep 2024 11:35) (95% - 100%)    Parameters below as of 26 Sep 2024 11:35  Patient On (Oxygen Delivery Method): room air      I&O's Summary    25 Sep 2024 07:01  -  26 Sep 2024 07:00  --------------------------------------------------------  IN: 437 mL / OUT: 1700 mL / NET: -1263 mL    26 Sep 2024 07:01  -  26 Sep 2024 13:14  --------------------------------------------------------  IN: 600 mL / OUT: 500 mL / NET: 100 mL        PHYSICAL EXAM:  Gen: appears stated age, resting comfortably, NAD  HEENT: NCAT, MMM  Neck: supple  CV: RRR, no m/r/g, peripheral pulses 2+  Pulm: CTAB, no increased work of breathing, no rales/rhonchi  Abd: soft, ND, NT, no rebound or guarding  Skin: warm and dry  Ext: BLE edema with chronic appearing skin changes (hyperpigmentation, induration), no erythema or warmth  Neuro: AOx3, speaking in full sentences  Psych: affect and behavior appropriate, pleasant at time of interview    LABS:                        8.1    6.45  )-----------( 418      ( 26 Sep 2024 05:30 )             29.4         142  |  104  |  12  ----------------------------<  110[H]  4.0   |  27  |  0.82    Ca    8.8      26 Sep 2024 05:30  Phos  4.2       Mg     2.1               CAPILLARY BLOOD GLUCOSE        Urinalysis Basic - ( 26 Sep 2024 05:30 )    Color: x / Appearance: x / SG: x / pH: x  Gluc: 110 mg/dL / Ketone: x  / Bili: x / Urobili: x   Blood: x / Protein: x / Nitrite: x   Leuk Esterase: x / RBC: x / WBC x   Sq Epi: x / Non Sq Epi: x / Bacteria: x        MICRODATA:      RADIOLOGY/OTHER STUDIES:  Reviewed

## 2024-09-26 NOTE — CONSULT NOTE ADULT - NSCONSULTADDITIONALINFOA_GEN_ALL_CORE
55 minutes spent on total encounter. The necessity of the time spent during the encounter on this date of service was due to:     Obtaining separately reported history including review of hospital course, relevant imaging, therapy notes, and consultant notes  Performing medically appropriate examination  Counseling and educating patient/ family/caregivers  Care coordination  Communication with primary team  Documenting clinical information
-    Dispo: anticipate home. Transfer to [facility].    Recommendations and plan discussed with patient, family, and primary team - all questions answered.

## 2024-09-26 NOTE — CONSULT NOTE ADULT - ASSESSMENT
63 YOF with PMH of uncontrolled HTN, chronic venous insufficiency, chronic YANIQUE presenting with sudden onset HA and was found to have SAH s/p cerebral angiogram 9/8 that was non-revealing. NSICU course c/b vasospasm s/p cerebral angio x2 for IA verapamil (9/12, 9/16), central fevers s/p bromocriptine.

## 2024-09-26 NOTE — DISCHARGE NOTE PROVIDER - PROVIDER TOKENS
PROVIDER:[TOKEN:[[id number]]],PROVIDER:[TOKEN:[[id number]]],PROVIDER:[TOKEN:[[id number]]] PROVIDER:[TOKEN:[[id number]]],PROVIDER:[TOKEN:[[id number]]],PROVIDER:[TOKEN:[[id number]]],PROVIDER:[TOKEN:[[id number]]]

## 2024-09-26 NOTE — DISCHARGE NOTE PROVIDER - NSDCCPTREATMENT_GEN_ALL_CORE_FT
PRINCIPAL PROCEDURE  Procedure: Angiogram, carotid and cerebral, bilateral  Findings and Treatment: IA Verapamil      SECONDARY PROCEDURE  Procedure: Insertion of intravenous catheter with ultrasound guidance  Findings and Treatment:

## 2024-09-26 NOTE — DISCHARGE NOTE PROVIDER - HOSPITAL COURSE
HPI:  63F PMHx uncontrolled HTN, does not take any medications at home presented to [facility] after developing acute onset severe HA. Once arrived, patient had episode of nausea with emesis. CTH showed SAH, HH: 2, MF: 3. NIHSS: 0. CTA neg for aneurysm. Transferred to [facility] for further mgmt. Upon arrival to [facility], NIHSS noted to be 0. Patient reports a headache rated at 7/10 in severity. Of note, patient's daughter notes pt seems off and is sleepier than her baseline. Pt denies SOB, chest pain, vision changes, nausea, weakness/numbness/tingling, dizziness, photophobia    Hospital Course:  9/8: Transfer to [facility] for further mgmt of SAH. Patient taken urgently to angio. ET tube pulled back 2cm. EVD placed, open @ 10. Stability scan stable, EVD in position, NGT placed. Started nimodipine 60q4. Extubated to face mask. POD 0 DSA neg for aneurysm.   9/9: BD2. POD 1 angio. Started iron/vit C q other day for microcytic anermia. Started lisinopril 10mg daily. 1.L liter bolus for euvolemia. Ambriz removed, f/u TOV, started on prophylactic SQL 40mg BID (weight based). Passed TOV. Off cardene gtt. cardene gtt resumed. started hydral 25 q8, inc lisinopril dose from 10mg to 20mg daily.   9/10: BD3. POD 2 angio. BILL overnight. TTE showing EF 70%, mild symmetric LVH. DC cardene.   9/11: BD 4. POD 3 angio. BILL overnight. SBP 160s, given hydralazine 10mg IVP x1. Given ducolax suppository. SBP liberalized 100-160. , given labetolol 5mg IVP. NS bolus 500cc for euvolemia. Lactulose for AM. PO hydral switched to clonidine.   9/12: BD 5. POD 4 angio. BILL overnight. Anti-Xa within prophylactic range. , given labetolol 10mg IVP. 1L NS for euvolemia. lisinopril increased 40, norvasc 10 added. cardene started. change in exam: JAIME/LL 4/5 w/ drift, CTH/CTA performed showing severe spasm. febrile 101, pan cx sent. POD0 angio for spasm tx. Dc'd propofol. Extubated to face mask. SBP 190s, started on cardene gtt  9/13: BD 6, POD 5 diagnostic angio, POD 1 angio for spasm tx. Changed NS to LR.   9/14: BD 7, POD 6, POD 2 angio for spasm. Pt febrile ovn, f/u cultures. Start bromocriptine. Resume lisinopril. 1L bolus, f/u Na studies. Notified by primary RN of exam change: new dysarthria, L facial droop, LUE/LLLE withdrawing to noxious stim. Stroke code called, with high suspicion for vasospasm. Levo gtt started for SBP goal 180. CTA/CTP/CT performed - R sided spasm, no perfusion defect. Exam improved with higher BP, dysarthria and facial droop improving and L side 4/5. Discussed with Dr. Chao, Dr. Fuentes, and Dr. Butler - will keep SBP goal 160-200 and consider angiogram if not improving further.  9/15: BD 8, POD 7, POD 3 angio. Given 250 cc albumin bolus followed by 500 cc NS. Repeat BMP Na 134. S/p 1 L bolus for euvolemia. Full stregnth b/l, no drift; no repeat angio, regular diet. Given 250 albumin and 1 L NS for net negative volume.   9/16: BD 9, POD 8, POD 4 angio. Remains npo after midnight. Given 500 cc bolus NS for euvolemia. 1L bolus for euvolemia. EVD lowered to 5 i/s/o spasm. added fludrocortisone 0.1 BID, salt tabs increased to 3q6hr. increased bromocriptine to 10 q8. POD0 repeat angio 15 mg verapamil injected to R ICA. SBP parameters liberalized 160-200, off levo. 5 cc EVD output during angio, drained 22 cc's post-op, ICPs single digits. emesis s/p salt tabs, florinef rpt dose, salt tabs dc'd. 1L NS for BP 150s. POD 0 s/p angiogram showing R ICA spasm with IA verapamil.   9/17: BD 10. POD 9. POD 5 angio with spasm. POD 1 angio with spasm. BILL o/n. Started on levo gtt. Na 141, weaned 3% to 50cc/hr. Surveillance dopplers neg for. DVT. 500cc bolus for euvolemia.   9/18: BD 11. POD 10. POD 6 angio with spasm. POD 2 angio with spasm. BILL o/n. SBP liberalized to 120-200, exam stable. 3% decreased to 30cc/hr. BMP pend 4pm. Given 5mg hydralazine x 1 for . decreased bromo to 5mgq8. BM today. given 5mg hydral for .   9/19: BD 12. POD 11. POD 7 angio with spasm. POD 3 angio with spasm. BILL o/n. Given 1L for euvolemia. 3% increased to 50 cc/hr, Na 138>135. Given florinef 0.1x1, increased florinef to 0.2mg BID starting in AM. Dc'd bromocriptine.   9/20: BD 13. POD 12. POD 8 angio with spasm. POD 4 angio with spasm. BILL overnight. EVD raised to 10, decreased miralax to qhs. Na goal relaxed to normal, given 1 push hydral for SBP 220s.   9/21: BD 14. POD 13 DSA. POD 9/5 angio for spasm tx. BILL overnight. Raised EVD to 15. D/c'd bowel reg. Sending FOB. Decreased rate of 3% from 50 to 30. hydralazine 10mg x2.  9/22: BD 15. POD 14 DSA. POD 10/6 angio for spasm. BILL overnight. EVD clamped. Repeat BMP @ 2pm. 3% dc'd. Repeat @ 10pm. +bowel regimen.   9/23: BD 16. POD 15 DSA. POD 11/7 angio for spasm. BILL overnight. 500cc bolus given for euvolemia. Increased bowel regimen. CTH complete, vent size stable. EVD removed. OOB with PT/OT. Labetalol 10 mg IVP x 1 for SBP 200s. Florinef decreased to 0.1 mg BID. 500 cc bolus for euvolemia given.  9/24: BD 17. POD 16 DSA. POD 12/8 angio for spasm. BILL overnight. Neuro stable. Added Norvasc 10 and Lisinopril 40 for SBP control. Dc'd floronif. Resumed SQL for DVT ppx.   9/25: BD 18. POD 17 DSA/ POD 13/9 angio for spasm treatment. BILL overnight. Neuro stable.   9/26: BD19. POD18 DSA/POD 14/10 angio for spasm tx. BILL ovn      Patient evaluated by PT/OT who recommended:  Patient is going home? rehab? hospice? Facility Name:     Hospital course c/b: Hydrocephalus (s/p EVD placement and then removal 9/23)     Exam on day of discharge:    Checklist:   - Obtained follow up appointment from NP  - Reviewed final recommendations of inpatient consults  - review discharge planning on provider handoff  - post op imaging completed  - Neurologically stable for discharge  - Vitals stable for discharge   - Afebrile for discharge  - WBC is stable  - Sodium level is normal  - Pain is adequately controlled  - Pt has PICC/walker/brace/collar   - LACE score (10 or > needs PCP apt)   - Needs PCP Follow up?   - stroke patient? Discharge NIHSS score   HPI:  63F PMHx uncontrolled HTN, does not take any medications at home presented to [facility] after developing acute onset severe HA. Once arrived, patient had episode of nausea with emesis. CTH showed SAH, HH: 2, MF: 3. NIHSS: 0. CTA neg for aneurysm. Transferred to [facility] for further mgmt. Upon arrival to [facility], NIHSS noted to be 0. Patient reports a headache rated at 7/10 in severity. Of note, patient's daughter notes pt seems off and is sleepier than her baseline. Pt denies SOB, chest pain, vision changes, nausea, weakness/numbness/tingling, dizziness, photophobia    Hospital Course:  9/8: Transfer to [facility] for further mgmt of SAH. Patient taken urgently to angio. ET tube pulled back 2cm. EVD placed, open @ 10. Stability scan stable, EVD in position, NGT placed. Started nimodipine 60q4. Extubated to face mask. POD 0 DSA neg for aneurysm.   9/9: BD2. POD 1 angio. Started iron/vit C q other day for microcytic anermia. Started lisinopril 10mg daily. 1.L liter bolus for euvolemia. Ambriz removed, f/u TOV, started on prophylactic SQL 40mg BID (weight based). Passed TOV. Off cardene gtt. cardene gtt resumed. started hydral 25 q8, inc lisinopril dose from 10mg to 20mg daily.   9/10: BD3. POD 2 angio. BILL overnight. TTE showing EF 70%, mild symmetric LVH. DC cardene.   9/11: BD 4. POD 3 angio. BILL overnight. SBP 160s, given hydralazine 10mg IVP x1. Given ducolax suppository. SBP liberalized 100-160. , given labetolol 5mg IVP. NS bolus 500cc for euvolemia. Lactulose for AM. PO hydral switched to clonidine.   9/12: BD 5. POD 4 angio. BILL overnight. Anti-Xa within prophylactic range. , given labetolol 10mg IVP. 1L NS for euvolemia. lisinopril increased 40, norvasc 10 added. cardene started. change in exam: JAIME/LL 4/5 w/ drift, CTH/CTA performed showing severe spasm. febrile 101, pan cx sent. POD0 angio for spasm tx. Dc'd propofol. Extubated to face mask. SBP 190s, started on cardene gtt  9/13: BD 6, POD 5 diagnostic angio, POD 1 angio for spasm tx. Changed NS to LR.   9/14: BD 7, POD 6, POD 2 angio for spasm. Pt febrile ovn, f/u cultures. Start bromocriptine. Resume lisinopril. 1L bolus, f/u Na studies. Notified by primary RN of exam change: new dysarthria, L facial droop, LUE/LLLE withdrawing to noxious stim. Stroke code called, with high suspicion for vasospasm. Levo gtt started for SBP goal 180. CTA/CTP/CT performed - R sided spasm, no perfusion defect. Exam improved with higher BP, dysarthria and facial droop improving and L side 4/5. Discussed with Dr. Chao, Dr. Fuentes, and Dr. Butler - will keep SBP goal 160-200 and consider angiogram if not improving further.  9/15: BD 8, POD 7, POD 3 angio. Given 250 cc albumin bolus followed by 500 cc NS. Repeat BMP Na 134. S/p 1 L bolus for euvolemia. Full stregnth b/l, no drift; no repeat angio, regular diet. Given 250 albumin and 1 L NS for net negative volume.   9/16: BD 9, POD 8, POD 4 angio. Remains npo after midnight. Given 500 cc bolus NS for euvolemia. 1L bolus for euvolemia. EVD lowered to 5 i/s/o spasm. added fludrocortisone 0.1 BID, salt tabs increased to 3q6hr. increased bromocriptine to 10 q8. POD0 repeat angio 15 mg verapamil injected to R ICA. SBP parameters liberalized 160-200, off levo. 5 cc EVD output during angio, drained 22 cc's post-op, ICPs single digits. emesis s/p salt tabs, florinef rpt dose, salt tabs dc'd. 1L NS for BP 150s. POD 0 s/p angiogram showing R ICA spasm with IA verapamil.   9/17: BD 10. POD 9. POD 5 angio with spasm. POD 1 angio with spasm. BILL o/n. Started on levo gtt. Na 141, weaned 3% to 50cc/hr. Surveillance dopplers neg for. DVT. 500cc bolus for euvolemia.   9/18: BD 11. POD 10. POD 6 angio with spasm. POD 2 angio with spasm. BILL o/n. SBP liberalized to 120-200, exam stable. 3% decreased to 30cc/hr. BMP pend 4pm. Given 5mg hydralazine x 1 for . decreased bromo to 5mgq8. BM today. given 5mg hydral for .   9/19: BD 12. POD 11. POD 7 angio with spasm. POD 3 angio with spasm. BILL o/n. Given 1L for euvolemia. 3% increased to 50 cc/hr, Na 138>135. Given florinef 0.1x1, increased florinef to 0.2mg BID starting in AM. Dc'd bromocriptine.   9/20: BD 13. POD 12. POD 8 angio with spasm. POD 4 angio with spasm. BILL overnight. EVD raised to 10, decreased miralax to qhs. Na goal relaxed to normal, given 1 push hydral for SBP 220s.   9/21: BD 14. POD 13 DSA. POD 9/5 angio for spasm tx. BILL overnight. Raised EVD to 15. D/c'd bowel reg. Sending FOB. Decreased rate of 3% from 50 to 30. hydralazine 10mg x2.  9/22: BD 15. POD 14 DSA. POD 10/6 angio for spasm. BILL overnight. EVD clamped. Repeat BMP @ 2pm. 3% dc'd. Repeat @ 10pm. +bowel regimen.   9/23: BD 16. POD 15 DSA. POD 11/7 angio for spasm. BILL overnight. 500cc bolus given for euvolemia. Increased bowel regimen. CTH complete, vent size stable. EVD removed. OOB with PT/OT. Labetalol 10 mg IVP x 1 for SBP 200s. Florinef decreased to 0.1 mg BID. 500 cc bolus for euvolemia given.  9/24: BD 17. POD 16 DSA. POD 12/8 angio for spasm. BILL overnight. Neuro stable. Added Norvasc 10 and Lisinopril 40 for SBP control. Dc'd floronif. Resumed SQL for DVT ppx.   9/25: BD 18. POD 17 DSA/ POD 13/9 angio for spasm treatment. BILL overnight. Neuro stable.   9/26: BD19. POD18 DSA/POD 14/10 angio for spasm tx. BILL ovn      Patient evaluated by PT/OT who recommended: home with home OT and outpatient PT  Patient is going home, provided rx for outpatient PT and outpatient OT    Hospital course c/b: Hydrocephalus (s/p EVD placement and then removal 9/23)     Exam on day of discharge:  NEURO: AOx3. FC, OE spont, speech intact, R facial. CNII-XII intact. PERRL, EOMI. No pronator drift. MAEx4. 5/5 strength throughout. SILT  Wound: staples at EVD site C/D/I      Patient is neuro stable, vitals stable, afebrile, medically ready for discharge     - LACE score (10 or > needs PCP apt)     Discharge NIHSS score = 1

## 2024-09-26 NOTE — DISCHARGE NOTE PROVIDER - NSDCFUADDINST_GEN_ALL_CORE_FT
Neurosurgery follow up appointment date/time:  - Follow up in the office for a wound check and staple removal   - please call the office to confirm appointment: [phone]    Groin Wound Care:  - steri strips at groin site will fall off on their own   - you may shower    Cranial Wound Care (EVD site)  - shower and wash hair daily with shampoo  - gently clean incision with soapy water  - pat dry incision with a clean towel after showering  - leave incision uncovered, open to air  - no picking at incision     Devices/Drains/Lines:   - Rolling walker for ambulation    Activity:  - fatigue is common after surgery, rest if you feel tired   - no bending, lifting, twisting   - walking is recommended, ambulate as tolerated  - you may shower at home/rehab, keep your groin and cranial site incision dry   - no soaking in a tub/pool/hot tub  - no driving within 24 hours of anesthesia or while taking prescription pain medications   - keep hydrated, drink plenty of water     Diet:  - You may resume your regular diet.  - Drinking extra fluids (water, juice) is encouraged.    Inpatient consults:  -     Please also follow up with your primary care doctor.     Pain Expectations:  - A mild pain at the puncture site is not unusual  - Pain at cranial site is expected   - You may take Tylenol 1-2 tabs every 4-6 hours as needed     Medications:  - changes to home meds (ex. AED's)?  - new meds?  - pain meds?  - adverse affects of meds discussed with patient   - pain medications can cause constipation, you should eat a high fiber diet and may take a stool softener while on pain meds     Call the office or come to the ED if:  - wound has drainage or bleeding, increased redness or pain at incision site, neurological change, fever (>101), chills, night sweats, syncope, nausea/vomiting, chest pain, shortness of breath    SPECIAL INSTRUCTIONS S/P ANGIOGRAPHY:  Signs and symptoms to look out for:    1. Rosalio bleeding from the puncture site is an emergency. Put direct pressure on the site and go directly to your local Emergency Room for treatment.    2. Bleeding under the skin may also occur and a small "black and blue" may be expected. If there appears to be an expanding mass or swelling around the puncture site, apply manual compression and go immediately to your local Emergency Room for treatment.    3. If your foot/leg (puncture site) becomes cool or blue and/or you are unable to move it, this must be treated as an emergency. Go directly to your local Emergency Room for treatment.    4. Excessive puncture site pain is abnormal and should be assessed.    5.  Look out for signs of infection in the puncture site: fever, red streaking of the leg or wrist, drainage, severe pain.    6.  Lack of adequate urine output, provided you are drinking enough fluids, may be cause for concern, notify us if you think this is the case.    Playback:  - see playback health for a copy of your discharge paperwork     WITHIN 24 HOURS OF DISCHARGE, PLEASE CONTACT NEURO PA  WITH ANY QUESTIONS OR CONCERNS: [phone]   OTHERWISE, PLEASE CALL THE OFFICE WITH ANY QUESTIONS OR CONCERNS: [phone] Neurosurgery follow up appointment date/time:  - Follow up in the office for a wound check and staple removal   - please call the office to confirm appointment: [phone]    Groin Wound Care:  - steri strips at groin site will fall off on their own   - you may shower    Cranial Wound Care (EVD site)  - shower and wash hair daily with shampoo  - gently clean incision with soapy water  - pat dry incision with a clean towel after showering  - leave incision uncovered, open to air  - no picking at incision     Devices/Drains/Lines:   - Rolling walker for ambulation    Activity:  - fatigue is common after surgery, rest if you feel tired   - no bending, lifting, twisting   - walking is recommended, ambulate as tolerated  - you may shower at home/rehab, keep your groin and cranial site incision dry   - no soaking in a tub/pool/hot tub  - no driving within 24 hours of anesthesia or while taking prescription pain medications   - keep hydrated, drink plenty of water     Diet:  - You may resume your regular diet.  - Drinking extra fluids (water, juice) is encouraged.    Please also follow up with your primary care doctor.     Pain Expectations:  - A mild pain at the puncture site is not unusual  - Pain at cranial site is expected   - You may take Tylenol 1-2 tabs every 4-6 hours as needed     Medications:  - new meds:  Keppra for seizure prevention (can cause sleepiness, agitation)   Amlodipine for high blood pressure (can cause dizziness)  Lisinopril for high blood pressure (can cause dizziness)  Iron supplement for anemia (can cause GI upset)  Vitamin C with iron supplement for absoprtion  - pain meds: Tylenol as needed (over the counter)  - adverse affects of meds discussed with patient   - pain medications can cause constipation, you should eat a high fiber diet and may take a stool softener while on pain meds     Call the office or come to the ED if:  - wound has drainage or bleeding, increased redness or pain at incision site, neurological change, fever (>101), chills, night sweats, syncope, nausea/vomiting, chest pain, shortness of breath    SPECIAL INSTRUCTIONS S/P ANGIOGRAPHY:  Signs and symptoms to look out for:    1. Rosalio bleeding from the puncture site is an emergency. Put direct pressure on the site and go directly to your local Emergency Room for treatment.    2. Bleeding under the skin may also occur and a small "black and blue" may be expected. If there appears to be an expanding mass or swelling around the puncture site, apply manual compression and go immediately to your local Emergency Room for treatment.    3. If your foot/leg (puncture site) becomes cool or blue and/or you are unable to move it, this must be treated as an emergency. Go directly to your local Emergency Room for treatment.    4. Excessive puncture site pain is abnormal and should be assessed.    5.  Look out for signs of infection in the puncture site: fever, red streaking of the leg or wrist, drainage, severe pain.    6.  Lack of adequate urine output, provided you are drinking enough fluids, may be cause for concern, notify us if you think this is the case.    Playback:  - see playback health for a copy of your discharge paperwork     WITHIN 24 HOURS OF DISCHARGE, PLEASE CONTACT NEURO PA  WITH ANY QUESTIONS OR CONCERNS: [phone]   OTHERWISE, PLEASE CALL THE OFFICE WITH ANY QUESTIONS OR CONCERNS: [phone]

## 2024-09-26 NOTE — PROGRESS NOTE ADULT - SUBJECTIVE AND OBJECTIVE BOX
HPI:  63F PMHx uncontrolled HTN, does not take any medications at home presented to [facility] after developing acute onset severe HA. Once arrived, patient had episode of nausea with emesis. CTH showed SAH, HH: 2, MF: 3. NIHSS: 0. CTA neg for aneurysm. Transferred to [facility] for further mgmt. Upon arrival to [facility], NIHSS noted to be 0. Patient reports a headache rated at 7/10 in severity. Of note, patient's daughter notes pt seems off and is sleepier than her baseline. Pt denies SOB, chest pain, vision changes, nausea, weakness/numbness/tingling, dizziness, photophobia.  (08 Sep 2024 13:29)    OVERNIGHT EVENTS: BILL    Hospital Course:   : Transfer to [facility] for further mgmt of SAH. Patient taken urgently to angio. ET tube pulled back 2cm. EVD placed, open @ 10. Stability scan stable, EVD in position, NGT placed. Started nimodipine 60q4. Extubated to face mask. POD 0 DSA neg for aneurysm.   : BD2. POD 1 angio. Started iron/vit C q other day for microcytic anermia. Started lisinopril 10mg daily. 1.L liter bolus for euvolemia. Ambriz removed, f/u TOV, started on prophylactic SQL 40mg BID (weight based). Passed TOV. Off cardene gtt. cardene gtt resumed. started hydral 25 q8, inc lisinopril dose from 10mg to 20mg daily.   9/10: BD3. POD 2 angio. BILL overnight. TTE showing EF 70%, mild symmetric LVH. DC cardene.   : BD 4. POD 3 angio. BILL overnight. SBP 160s, given hydralazine 10mg IVP x1. Given ducolax suppository. SBP liberalized 100-160. , given labetolol 5mg IVP. NS bolus 500cc for euvolemia. Lactulose for AM. PO hydral switched to clonidine.   : BD 5. POD 4 angio. BILL overnight. Anti-Xa within prophylactic range. , given labetolol 10mg IVP. 1L NS for euvolemia. lisinopril increased 40, norvasc 10 added. cardene started. change in exam: JAIME/LL 4/5 w/ drift, CTH/CTA performed showing severe spasm. febrile 101, pan cx sent. POD0 angio for spasm tx. Dc'd propofol. Extubated to face mask. SBP 190s, started on cardene gtt  : BD 6, POD 5 diagnostic angio, POD 1 angio for spasm tx. Changed NS to LR.   : BD 7, POD 6, POD 2 angio for spasm. Pt febrile ovn, f/u cultures. Start bromocriptine. Resume lisinopril. 1L bolus, f/u Na studies. Notified by primary RN of exam change: new dysarthria, L facial droop, LUE/LLLE withdrawing to noxious stim. Stroke code called, with high suspicion for vasospasm. Levo gtt started for SBP goal 180. CTA/CTP/CT performed - R sided spasm, no perfusion defect. Exam improved with higher BP, dysarthria and facial droop improving and L side 4/5. Discussed with Dr. Chao, Dr. Fuentes, and Dr. Butler - will keep SBP goal 160-200 and consider angiogram if not improving further.  9/15: BD 8, POD 7, POD 3 angio. Given 250 cc albumin bolus followed by 500 cc NS. Repeat BMP Na 134. S/p 1 L bolus for euvolemia. Full stregnth b/l, no drift; no repeat angio, regular diet. Given 250 albumin and 1 L NS for net negative volume.   : BD 9, POD 8, POD 4 angio. Remains npo after midnight. Given 500 cc bolus NS for euvolemia. 1L bolus for euvolemia. EVD lowered to 5 i/s/o spasm. added fludrocortisone 0.1 BID, salt tabs increased to 3q6hr. increased bromocriptine to 10 q8. POD0 repeat angio 15 mg verapamil injected to R ICA. SBP parameters liberalized 160-200, off levo. 5 cc EVD output during angio, drained 22 cc's post-op, ICPs single digits. emesis s/p salt tabs, florinef rpt dose, salt tabs dc'd. 1L NS for BP 150s. POD 0 s/p angiogram showing R ICA spasm with IA verapamil.   : BD 10. POD 9. POD 5 angio with spasm. POD 1 angio with spasm. BILL o/n. Started on levo gtt. Na 141, weaned 3% to 50cc/hr. Surveillance dopplers neg for. DVT. 500cc bolus for euvolemia.   : BD 11. POD 10. POD 6 angio with spasm. POD 2 angio with spasm. BILL o/n. SBP liberalized to 120-200, exam stable. 3% decreased to 30cc/hr. BMP pend 4pm. Given 5mg hydralazine x 1 for . decreased bromo to 5mgq8. BM today. given 5mg hydral for .   : BD 12. POD 11. POD 7 angio with spasm. POD 3 angio with spasm. BILL o/n. Given 1L for euvolemia. 3% increased to 50 cc/hr, Na 138>135. Given florinef 0.1x1, increased florinef to 0.2mg BID starting in AM. Dc'd bromocriptine.   : BD 13. POD 12. POD 8 angio with spasm. POD 4 angio with spasm. BILL overnight. EVD raised to 10, decreased miralax to qhs. Na goal relaxed to normal, given 1 push hydral for SBP 220s.   : BD 14. POD 13 DSA. POD 9/5 angio for spasm tx. BILL overnight. Raised EVD to 15. D/c'd bowel reg. Sending FOB. Decreased rate of 3% from 50 to 30. hydralazine 10mg x2.  : BD 15. POD 14 DSA. POD 10/6 angio for spasm. BILL overnight. EVD clamped. Repeat BMP @ 2pm. 3% dc'd. Repeat @ 10pm. +bowel regimen.   : BD 16. POD 15 DSA. POD 11/7 angio for spasm. BILL overnight. 500cc bolus given for euvolemia. Increased bowel regimen. CTH complete, vent size stable. EVD removed. OOB with PT/OT. Labetalol 10 mg IVP x 1 for SBP 200s. Florinef decreased to 0.1 mg BID. 500 cc bolus for euvolemia given.  : BD 17. POD 16 DSA. POD 12/8 angio for spasm. BILL overnight. Neuro stable. Added Norvasc 10 and Lisinopril 40 for SBP control. Dc'd floronif. Resumed SQL for DVT ppx.   : BD 18. POD 17 DSA/ POD 13/9 angio for spasm treatment. BILL overnight. Neuro stable.   : BD19. POD18 DSA/POD 14/10 angio for spasm tx. BILL ovn    Vital Signs Last 24 Hrs  T(C): 36.7 (25 Sep 2024 22:29), Max: 38.1 (25 Sep 2024 01:08)  T(F): 98.1 (25 Sep 2024 22:29), Max: 100.5 (25 Sep 2024 01:08)  HR: 72 (25 Sep 2024 20:25) (69 - 80)  BP: 153/68 (25 Sep 2024 20:25) (122/60 - 167/72)  BP(mean): 98 (25 Sep 2024 20:25) (78 - 98)  RR: 18 (25 Sep 2024 20:25) (16 - 18)  SpO2: 96% (25 Sep 2024 20:25) (95% - 100%)    Parameters below as of 25 Sep 2024 20:25  Patient On (Oxygen Delivery Method): room air        I&O's Summary    24 Sep 2024 07:  -  25 Sep 2024 07:00  --------------------------------------------------------  IN: 1574 mL / OUT: 2050 mL / NET: -476 mL    25 Sep 2024 07:01  -  26 Sep 2024 00:57  --------------------------------------------------------  IN: 437 mL / OUT: 1600 mL / NET: -1163 mL        PHYSICAL EXAM:  GEN: laying in bed, NAD  NEURO: AOx3. FC, OE spont, speech intact, +R facial. CNII-XII intact. PERRL, EOMI. No pronator drift. MAEx4. 5/5 strength throughout. SILT  CV: RRR +S1/S2  PULM: CTAB  GI: Abd soft, NT/ND  EXT: ext warm, dry, nontender    TUBES/LINES:  [] Ambriz  [] Lumbar Drain  [] Wound Drains  [] Others      DIET:  [] NPO  [x] Mechanical  [] Tube feeds    LABS:                        7.3    7.09  )-----------( 396      ( 25 Sep 2024 04:17 )             25.9         139  |  104  |  8   ----------------------------<  106[H]  3.8   |  29  |  0.79    Ca    8.7      25 Sep 2024 04:17  Phos  4.4       Mg     2.2             Urinalysis Basic - ( 25 Sep 2024 04:17 )    Color: x / Appearance: x / SG: x / pH: x  Gluc: 106 mg/dL / Ketone: x  / Bili: x / Urobili: x   Blood: x / Protein: x / Nitrite: x   Leuk Esterase: x / RBC: x / WBC x   Sq Epi: x / Non Sq Epi: x / Bacteria: x          CAPILLARY BLOOD GLUCOSE          Drug Levels: [] N/A    CSF Analysis: [] N/A      Allergies    No Known Allergies    Intolerances      MEDICATIONS:  Antibiotics:    Neuro:  acetaminophen     Tablet .. 650 milliGRAM(s) Oral every 6 hours PRN  levETIRAcetam 1000 milliGRAM(s) Oral two times a day  ondansetron Injectable 4 milliGRAM(s) IV Push every 6 hours PRN    Anticoagulation:  enoxaparin Injectable 40 milliGRAM(s) SubCutaneous every 12 hours    OTHER:  amLODIPine   Tablet 10 milliGRAM(s) Oral daily  bisacodyl Suppository 10 milliGRAM(s) Rectal daily PRN  chlorhexidine 2% Cloths 1 Application(s) Topical <User Schedule>  influenza  Vaccine (HIGH DOSE) 0.5 milliLiter(s) IntraMuscular once  lisinopril 40 milliGRAM(s) Oral daily  niMODipine 60 milliGRAM(s) Oral every 4 hours  polyethylene glycol 3350 17 Gram(s) Oral two times a day  senna 2 Tablet(s) Oral every 12 hours    IVF:  ascorbic acid 500 milliGRAM(s) Oral <User Schedule>  ferrous    sulfate Liquid 300 milliGRAM(s) Oral <User Schedule>    CULTURES:  Culture Results:   No Growth at 10 Days ( @ 23:05)  Culture Results:   No growth at 5 days ( @ 16:54)    RADIOLOGY & ADDITIONAL TESTS:      ASSESSMENT:  63F PMHx uncontrolled HT, R samuels's palsy, was BIBEMS to [facility]  c/o sudden onset severe HA. CTH showed SAH HH: 2, MF: 3. NIHSS: 0. CTA neg for aneurysm. Transferred to [facility] for further mgmt. S/p DSA negative for anueyrsm (24). S/p R frontal EVD (24). S/p angiogram showing mild R LILLY/R MCA spasm with IA verapamil (), s/p  angiogram with 15 mg IA verapamil for R ICA spasm ().     HTN    No pertinent family history in first degree relatives    Handoff    MEWS Score    No pertinent past medical history    Hypertension    SAH (subarachnoid hemorrhage)    Cerebral vasospasm    SAH (subarachnoid hemorrhage)    Cerebral vasospasm    SAH (subarachnoid hemorrhage)    Subarachnoid hemorrhage    Angiogram, carotid and cerebral, bilateral    Insertion of intravenous catheter with ultrasound guidance    No significant past surgical history    No significant past surgical history    H/O  section    HTN    Hypertension    SysAdmin_VisitLink        PLAN:  Neuro:  - Neuro q4/vitals q4hr   - Seizure prophylaxis: Keppra 1g BID   - R frontal EVD d/c'd   - Pain control: Tylenol, Oxy 5 prn   - stability CTH post-EVD complete , stable, CTH  stable   - CTA : b/l LILLY spasm, b/l MCA R>L   - MR brain and C spine w/wo contrast complete     Cards:   - -170   - HTN: start nimodipine 60q4 for 3 days (till BD 21), norvasc 10mg daily and lisinopril 40mg daily   - TTE 9/10: EF 70%, mild symmetric LVH    Pulm:   - RA  - IS    GI:  - Regular diet   - Bowel reg, LBM   - Nausea: Zofran 4q6 prn     Renal:  - IVL, voiding   - euvolemic goal      Endo:  - A1c 5.8     Heme:   - DVT prophylaxis: SCDs, SQL  - LE dopplers : R posterior tibial DVT on admission ()- surveillace dopplers  neg for DVT  - Microcytic anemia: iron/vit C q other day     ID:   - afebrile  - pan cx     Dispo:   - Step-down  - PT/OT rec AR     Discussed with Dr. Butler     Assessment:  Present when checked    []  GCS  E   V  M     Heart Failure: []Acute, [] acute on chronic , []chronic  Heart Failure:  [] Diastolic (HFpEF), [] Systolic (HFrEF), []Combined (HFpEF and HFrEF), [] RHF, [] Pulm HTN, [] Other    [] RODRIGO, [] ATN, [] AIN, [] other  [] CKD1, [] CKD2, [] CKD 3, [] CKD 4, [] CKD 5, []ESRD    Encephalopathy: [] Metabolic, [] Hepatic, [] toxic, [] Neurological, [] Other    Abnormal Nurtitional Status: [] malnurtition (see nutrition note), [ ]underweight: BMI < 19, [] morbid obesity: BMI >40, [] Cachexia    [] Sepsis  [] hypovolemic shock,[] cardiogenic shock, [] hemorrhagic shock, [] neuogenic shock  [] Acute Respiratory Failure  []Cerebral edema, [] Brain compression/ herniation,   [] Functional quadriplegia  [] Acute blood loss anemia

## 2024-09-26 NOTE — CONSULT NOTE ADULT - PROBLEM SELECTOR RECOMMENDATION 2
Uncontrolled, was not taking medications at home. TTE with mild symmetric LVH, normal systolic/diastolic function.  - SBP goal normotensive  - cont amlodipine 10mg, lisinopril 40mg qd

## 2024-09-26 NOTE — DISCHARGE NOTE PROVIDER - CARE PROVIDER_API CALL
Dimitrios Butler.  Neurosurgery  [address] [facility] [place] [address]  Phone: [phone]  Fax: [phone]  Follow Up Time:     Alfredo Lomas  Neurosurgery  [address] [place] [address]  Phone: [phone]  Fax: [phone]  Follow Up Time:     Real Fuentes  Interventional Neuroradiology  [address] [place] [address]  Phone: [phone]  Fax: [phone]  Follow Up Time:    Dimitrios Butler.  Neurosurgery  [address], [address] [place] [address]  Phone: [phone]  Fax: [phone]  Follow Up Time:     Alfredo Lomas  Neurosurgery  [address] [place] [address]  Phone: [phone]  Fax: [phone]  Follow Up Time:     Real Fuentes  Interventional Neuroradiology  [address] [place] [address]  Phone: [phone]  Fax: [phone]  Follow Up Time:     Kimberly Amezcua  Cardiovascular Disease  [address] [address]  Phone: [phone]  Fax: [phone]  Follow Up Time:

## 2024-09-27 ENCOUNTER — TRANSCRIPTION ENCOUNTER (OUTPATIENT)
Age: 64
End: 2024-09-27

## 2024-09-27 VITALS — TEMPERATURE: 98 F

## 2024-09-27 LAB — GLUCOSE BLDC GLUCOMTR-MCNC: 102 MG/DL — HIGH (ref 70–99)

## 2024-09-27 PROCEDURE — 87389 HIV-1 AG W/HIV-1&-2 AB AG IA: CPT

## 2024-09-27 PROCEDURE — 84466 ASSAY OF TRANSFERRIN: CPT

## 2024-09-27 PROCEDURE — 93306 TTE W/DOPPLER COMPLETE: CPT

## 2024-09-27 PROCEDURE — C1760: CPT

## 2024-09-27 PROCEDURE — 83880 ASSAY OF NATRIURETIC PEPTIDE: CPT

## 2024-09-27 PROCEDURE — 70498 CT ANGIOGRAPHY NECK: CPT | Mod: MC

## 2024-09-27 PROCEDURE — 84145 PROCALCITONIN (PCT): CPT

## 2024-09-27 PROCEDURE — 85347 COAGULATION TIME ACTIVATED: CPT

## 2024-09-27 PROCEDURE — C1894: CPT

## 2024-09-27 PROCEDURE — 85730 THROMBOPLASTIN TIME PARTIAL: CPT

## 2024-09-27 PROCEDURE — 99291 CRITICAL CARE FIRST HOUR: CPT | Mod: 25

## 2024-09-27 PROCEDURE — C1769: CPT

## 2024-09-27 PROCEDURE — 0225U NFCT DS DNA&RNA 21 SARSCOV2: CPT

## 2024-09-27 PROCEDURE — 0042T: CPT | Mod: MC

## 2024-09-27 PROCEDURE — 99232 SBSQ HOSP IP/OBS MODERATE 35: CPT

## 2024-09-27 PROCEDURE — 86705 HEP B CORE ANTIBODY IGM: CPT

## 2024-09-27 PROCEDURE — 86850 RBC ANTIBODY SCREEN: CPT

## 2024-09-27 PROCEDURE — 97535 SELF CARE MNGMENT TRAINING: CPT

## 2024-09-27 PROCEDURE — 89051 BODY FLUID CELL COUNT: CPT

## 2024-09-27 PROCEDURE — 84439 ASSAY OF FREE THYROXINE: CPT

## 2024-09-27 PROCEDURE — 82962 GLUCOSE BLOOD TEST: CPT

## 2024-09-27 PROCEDURE — 70450 CT HEAD/BRAIN W/O DYE: CPT | Mod: MC

## 2024-09-27 PROCEDURE — 97116 GAIT TRAINING THERAPY: CPT

## 2024-09-27 PROCEDURE — 97168 OT RE-EVAL EST PLAN CARE: CPT

## 2024-09-27 PROCEDURE — 97130 THER IVNTJ EA ADDL 15 MIN: CPT

## 2024-09-27 PROCEDURE — 97162 PT EVAL MOD COMPLEX 30 MIN: CPT

## 2024-09-27 PROCEDURE — 85610 PROTHROMBIN TIME: CPT

## 2024-09-27 PROCEDURE — 82728 ASSAY OF FERRITIN: CPT

## 2024-09-27 PROCEDURE — 84443 ASSAY THYROID STIM HORMONE: CPT

## 2024-09-27 PROCEDURE — 85025 COMPLETE CBC W/AUTO DIFF WBC: CPT

## 2024-09-27 PROCEDURE — 87070 CULTURE OTHR SPECIMN AEROBIC: CPT

## 2024-09-27 PROCEDURE — 85027 COMPLETE CBC AUTOMATED: CPT

## 2024-09-27 PROCEDURE — 84300 ASSAY OF URINE SODIUM: CPT

## 2024-09-27 PROCEDURE — 84480 ASSAY TRIIODOTHYRONINE (T3): CPT

## 2024-09-27 PROCEDURE — 83935 ASSAY OF URINE OSMOLALITY: CPT

## 2024-09-27 PROCEDURE — 87340 HEPATITIS B SURFACE AG IA: CPT

## 2024-09-27 PROCEDURE — 86706 HEP B SURFACE ANTIBODY: CPT

## 2024-09-27 PROCEDURE — 84436 ASSAY OF TOTAL THYROXINE: CPT

## 2024-09-27 PROCEDURE — 80061 LIPID PANEL: CPT

## 2024-09-27 PROCEDURE — C1887: CPT

## 2024-09-27 PROCEDURE — A9585: CPT

## 2024-09-27 PROCEDURE — 84157 ASSAY OF PROTEIN OTHER: CPT

## 2024-09-27 PROCEDURE — P9045: CPT

## 2024-09-27 PROCEDURE — 83540 ASSAY OF IRON: CPT

## 2024-09-27 PROCEDURE — 84484 ASSAY OF TROPONIN QUANT: CPT

## 2024-09-27 PROCEDURE — 82803 BLOOD GASES ANY COMBINATION: CPT

## 2024-09-27 PROCEDURE — 93970 EXTREMITY STUDY: CPT

## 2024-09-27 PROCEDURE — 36415 COLL VENOUS BLD VENIPUNCTURE: CPT

## 2024-09-27 PROCEDURE — 84100 ASSAY OF PHOSPHORUS: CPT

## 2024-09-27 PROCEDURE — 97165 OT EVAL LOW COMPLEX 30 MIN: CPT

## 2024-09-27 PROCEDURE — 87040 BLOOD CULTURE FOR BACTERIA: CPT

## 2024-09-27 PROCEDURE — 87015 SPECIMEN INFECT AGNT CONCNTJ: CPT

## 2024-09-27 PROCEDURE — 82570 ASSAY OF URINE CREATININE: CPT

## 2024-09-27 PROCEDURE — 81001 URINALYSIS AUTO W/SCOPE: CPT

## 2024-09-27 PROCEDURE — 97110 THERAPEUTIC EXERCISES: CPT

## 2024-09-27 PROCEDURE — 83930 ASSAY OF BLOOD OSMOLALITY: CPT

## 2024-09-27 PROCEDURE — 80053 COMPREHEN METABOLIC PANEL: CPT

## 2024-09-27 PROCEDURE — 86900 BLOOD TYPING SEROLOGIC ABO: CPT

## 2024-09-27 PROCEDURE — 83735 ASSAY OF MAGNESIUM: CPT

## 2024-09-27 PROCEDURE — 80048 BASIC METABOLIC PNL TOTAL CA: CPT

## 2024-09-27 PROCEDURE — 83550 IRON BINDING TEST: CPT

## 2024-09-27 PROCEDURE — 72156 MRI NECK SPINE W/O & W/DYE: CPT | Mod: MC

## 2024-09-27 PROCEDURE — 97530 THERAPEUTIC ACTIVITIES: CPT

## 2024-09-27 PROCEDURE — 82945 GLUCOSE OTHER FLUID: CPT

## 2024-09-27 PROCEDURE — 86704 HEP B CORE ANTIBODY TOTAL: CPT

## 2024-09-27 PROCEDURE — 97164 PT RE-EVAL EST PLAN CARE: CPT

## 2024-09-27 PROCEDURE — 84156 ASSAY OF PROTEIN URINE: CPT

## 2024-09-27 PROCEDURE — 86803 HEPATITIS C AB TEST: CPT

## 2024-09-27 PROCEDURE — 86709 HEPATITIS A IGM ANTIBODY: CPT

## 2024-09-27 PROCEDURE — 70553 MRI BRAIN STEM W/O & W/DYE: CPT | Mod: MC

## 2024-09-27 PROCEDURE — 86901 BLOOD TYPING SEROLOGIC RH(D): CPT

## 2024-09-27 PROCEDURE — 70496 CT ANGIOGRAPHY HEAD: CPT | Mod: MC

## 2024-09-27 PROCEDURE — 71045 X-RAY EXAM CHEST 1 VIEW: CPT

## 2024-09-27 PROCEDURE — 83036 HEMOGLOBIN GLYCOSYLATED A1C: CPT

## 2024-09-27 PROCEDURE — 94003 VENT MGMT INPAT SUBQ DAY: CPT

## 2024-09-27 PROCEDURE — 83010 ASSAY OF HAPTOGLOBIN QUANT: CPT

## 2024-09-27 PROCEDURE — 94002 VENT MGMT INPAT INIT DAY: CPT

## 2024-09-27 PROCEDURE — 87205 SMEAR GRAM STAIN: CPT

## 2024-09-27 PROCEDURE — 85520 HEPARIN ASSAY: CPT

## 2024-09-27 RX ORDER — AMLODIPINE BESYLATE 5 MG
1 TABLET ORAL
Qty: 30 | Refills: 0
Start: 2024-09-27 | End: 2024-10-26

## 2024-09-27 RX ORDER — LEVETIRACETAM 1000 MG
1 TABLET ORAL
Qty: 60 | Refills: 0
Start: 2024-09-27 | End: 2024-10-26

## 2024-09-27 RX ORDER — ACETAMINOPHEN 325 MG
2 TABLET ORAL
Qty: 0 | Refills: 0 | DISCHARGE
Start: 2024-09-27

## 2024-09-27 RX ORDER — FERROUS SULFATE 325(65) MG
5 TABLET ORAL
Qty: 0 | Refills: 0 | DISCHARGE
Start: 2024-09-27

## 2024-09-27 RX ADMIN — Medication 1000 MILLIGRAM(S): at 05:29

## 2024-09-27 RX ADMIN — Medication 10 MILLIGRAM(S): at 05:29

## 2024-09-27 RX ADMIN — CHLORHEXIDINE GLUCONATE ORAL RINSE 1 APPLICATION(S): 1.2 SOLUTION DENTAL at 05:05

## 2024-09-27 RX ADMIN — ENOXAPARIN SODIUM 40 MILLIGRAM(S): 150 INJECTION SUBCUTANEOUS at 11:01

## 2024-09-27 RX ADMIN — Medication 40 MILLIGRAM(S): at 05:29

## 2024-09-27 NOTE — PROGRESS NOTE ADULT - PROVIDER SPECIALTY LIST ADULT
NSICU
Neurosurgery
NSICU
Neurosurgery
NSICU
Neurosurgery
NSICU
Neurosurgery
NSICU
Hospitalist

## 2024-09-27 NOTE — DISCHARGE NOTE NURSING/CASE MANAGEMENT/SOCIAL WORK - PATIENT PORTAL LINK FT
You can access the FollowMyHealth Patient Portal offered by [facility] by registering at the following website: [email or web address]. By joining [employer]’s FollowMyHealth portal, you will also be able to view your health information using other applications (apps) compatible with our system.

## 2024-09-27 NOTE — PROGRESS NOTE ADULT - NSPROGADDITIONALINFOA_GEN_ALL_CORE
-    Dispo: anticipate home    Recommendations and plan discussed with patient, family, and primary team - all questions answered

## 2024-09-27 NOTE — DISCHARGE NOTE NURSING/CASE MANAGEMENT/SOCIAL WORK - NSDCPEFALRISK_GEN_ALL_CORE
For information on Fall & Injury Prevention, visit: [email or web address] OR  [email or web address] OR  https://www.cdc.gov/steadi/patient.html

## 2024-09-27 NOTE — PROGRESS NOTE ADULT - REASON FOR ADMISSION
Subarachnoid hemorrhage

## 2024-09-27 NOTE — PROGRESS NOTE ADULT - NUTRITIONAL ASSESSMENT
This patient has been assessed with a concern for Malnutrition and has been determined to have a diagnosis/diagnoses of Morbid obesity (BMI > 40).    This patient is being managed with:   Diet Regular-  Entered: Sep 15 2024  7:47AM  
This patient has been assessed with a concern for Malnutrition and has been determined to have a diagnosis/diagnoses of Morbid obesity (BMI > 40).    This patient is being managed with:   Diet Regular-  Entered: Sep 15 2024  7:47AM

## 2024-09-27 NOTE — PROGRESS NOTE ADULT - SUBJECTIVE AND OBJECTIVE BOX
HPI:  63F PMHx uncontrolled HTN, does not take any medications at home presented to [facility] after developing acute onset severe HA. Once arrived, patient had episode of nausea with emesis. CTH showed SAH, HH: 2, MF: 3. NIHSS: 0. CTA neg for aneurysm. Transferred to [facility] for further mgmt. Upon arrival to [facility], NIHSS noted to be 0. Patient reports a headache rated at 7/10 in severity. Of note, patient's daughter notes pt seems off and is sleepier than her baseline. Pt denies SOB, chest pain, vision changes, nausea, weakness/numbness/tingling, dizziness, photophobia.  (08 Sep 2024 13:29)    OVERNIGHT EVENTS: BILL    Hospital Course:   : Transfer to [facility] for further mgmt of SAH. Patient taken urgently to angio. ET tube pulled back 2cm. EVD placed, open @ 10. Stability scan stable, EVD in position, NGT placed. Started nimodipine 60q4. Extubated to face mask. POD 0 DSA neg for aneurysm.   : BD2. POD 1 angio. Started iron/vit C q other day for microcytic anermia. Started lisinopril 10mg daily. 1.L liter bolus for euvolemia. Ambriz removed, f/u TOV, started on prophylactic SQL 40mg BID (weight based). Passed TOV. Off cardene gtt. cardene gtt resumed. started hydral 25 q8, inc lisinopril dose from 10mg to 20mg daily.   9/10: BD3. POD 2 angio. BILL overnight. TTE showing EF 70%, mild symmetric LVH. DC cardene.   : BD 4. POD 3 angio. BILL overnight. SBP 160s, given hydralazine 10mg IVP x1. Given ducolax suppository. SBP liberalized 100-160. , given labetolol 5mg IVP. NS bolus 500cc for euvolemia. Lactulose for AM. PO hydral switched to clonidine.   : BD 5. POD 4 angio. BILL overnight. Anti-Xa within prophylactic range. , given labetolol 10mg IVP. 1L NS for euvolemia. lisinopril increased 40, norvasc 10 added. cardene started. change in exam: JAIME/LL 4/5 w/ drift, CTH/CTA performed showing severe spasm. febrile 101, pan cx sent. POD0 angio for spasm tx. Dc'd propofol. Extubated to face mask. SBP 190s, started on cardene gtt  : BD 6, POD 5 diagnostic angio, POD 1 angio for spasm tx. Changed NS to LR.   : BD 7, POD 6, POD 2 angio for spasm. Pt febrile ovn, f/u cultures. Start bromocriptine. Resume lisinopril. 1L bolus, f/u Na studies. Notified by primary RN of exam change: new dysarthria, L facial droop, LUE/LLLE withdrawing to noxious stim. Stroke code called, with high suspicion for vasospasm. Levo gtt started for SBP goal 180. CTA/CTP/CT performed - R sided spasm, no perfusion defect. Exam improved with higher BP, dysarthria and facial droop improving and L side 4/5. Discussed with Dr. Chao, Dr. Fuentes, and Dr. Butler - will keep SBP goal 160-200 and consider angiogram if not improving further.  9/15: BD 8, POD 7, POD 3 angio. Given 250 cc albumin bolus followed by 500 cc NS. Repeat BMP Na 134. S/p 1 L bolus for euvolemia. Full stregnth b/l, no drift; no repeat angio, regular diet. Given 250 albumin and 1 L NS for net negative volume.   : BD 9, POD 8, POD 4 angio. Remains npo after midnight. Given 500 cc bolus NS for euvolemia. 1L bolus for euvolemia. EVD lowered to 5 i/s/o spasm. added fludrocortisone 0.1 BID, salt tabs increased to 3q6hr. increased bromocriptine to 10 q8. POD0 repeat angio 15 mg verapamil injected to R ICA. SBP parameters liberalized 160-200, off levo. 5 cc EVD output during angio, drained 22 cc's post-op, ICPs single digits. emesis s/p salt tabs, florinef rpt dose, salt tabs dc'd. 1L NS for BP 150s. POD 0 s/p angiogram showing R ICA spasm with IA verapamil.   : BD 10. POD 9. POD 5 angio with spasm. POD 1 angio with spasm. BILL o/n. Started on levo gtt. Na 141, weaned 3% to 50cc/hr. Surveillance dopplers neg for. DVT. 500cc bolus for euvolemia.   : BD 11. POD 10. POD 6 angio with spasm. POD 2 angio with spasm. BILL o/n. SBP liberalized to 120-200, exam stable. 3% decreased to 30cc/hr. BMP pend 4pm. Given 5mg hydralazine x 1 for . decreased bromo to 5mgq8. BM today. given 5mg hydral for .   : BD 12. POD 11. POD 7 angio with spasm. POD 3 angio with spasm. BILL o/n. Given 1L for euvolemia. 3% increased to 50 cc/hr, Na 138>135. Given florinef 0.1x1, increased florinef to 0.2mg BID starting in AM. Dc'd bromocriptine.   : BD 13. POD 12. POD 8 angio with spasm. POD 4 angio with spasm. BILL overnight. EVD raised to 10, decreased miralax to qhs. Na goal relaxed to normal, given 1 push hydral for SBP 220s.   : BD 14. POD 13 DSA. POD 9/5 angio for spasm tx. BILL overnight. Raised EVD to 15. D/c'd bowel reg. Sending FOB. Decreased rate of 3% from 50 to 30. hydralazine 10mg x2.  : BD 15. POD 14 DSA. POD 10/6 angio for spasm. BILL overnight. EVD clamped. Repeat BMP @ 2pm. 3% dc'd. Repeat @ 10pm. +bowel regimen.   : BD 16. POD 15 DSA. POD 11/7 angio for spasm. BILL overnight. 500cc bolus given for euvolemia. Increased bowel regimen. CTH complete, vent size stable. EVD removed. OOB with PT/OT. Labetalol 10 mg IVP x 1 for SBP 200s. Florinef decreased to 0.1 mg BID. 500 cc bolus for euvolemia given.  : BD 17. POD 16 DSA. POD 12/8 angio for spasm. BILL overnight. Neuro stable. Added Norvasc 10 and Lisinopril 40 for SBP control. Dc'd floronif. Resumed SQL for DVT ppx.   : BD 18. POD 17 DSA/ POD 13/9 angio for spasm treatment. BILL overnight. Neuro stable.   : BD19. POD18 DSA/POD 14/10 angio for spasm tx. BILL ovn. Nimodipine complete.  : BD20. POD19 DSA, POD15/11 angio for spasm tx. BILL ovn     Vital Signs Last 24 Hrs  T(C): 36.8 (26 Sep 2024 22:00), Max: 37 (26 Sep 2024 04:55)  T(F): 98.3 (26 Sep 2024 22:00), Max: 98.6 (26 Sep 2024 04:55)  HR: 80 (27 Sep 2024 00:15) (68 - 82)  BP: 149/65 (27 Sep 2024 00:15) (134/62 - 168/63)  BP(mean): 93 (27 Sep 2024 00:15) (89 - 112)  RR: 18 (27 Sep 2024 00:15) (16 - 18)  SpO2: 98% (27 Sep 2024 00:15) (95% - 98%)    Parameters below as of 27 Sep 2024 00:15  Patient On (Oxygen Delivery Method): room air        I&O's Summary    25 Sep 2024 07:01  -  26 Sep 2024 07:00  --------------------------------------------------------  IN: 437 mL / OUT: 1700 mL / NET: -1263 mL    26 Sep 2024 07:01  -  27 Sep 2024 00:46  --------------------------------------------------------  IN: 850 mL / OUT: 1000 mL / NET: -150 mL        PHYSICAL EXAM:  GEN: laying in bed, NAD  NEURO: AOx3. FC, OE spont, speech intact, R facial. CNII-XII intact. PERRL, EOMI. No pronator drift. MAEx4. 5/5 strength throughout. SILT  CV: RRR +S1/S2  PULM: CTAB  GI: Abd soft, NT/ND  EXT: ext warm, dry, nontender    TUBES/LINES:  [] Ambriz  [] Lumbar Drain  [] Wound Drains  [] Others      DIET:  [] NPO  [x] Mechanical  [] Tube feeds    LABS:                        8.1    6.45  )-----------( 418      ( 26 Sep 2024 05:30 )             29.4         142  |  104  |  12  ----------------------------<  110[H]  4.0   |  27  |  0.82    Ca    8.8      26 Sep 2024 05:30  Phos  4.2       Mg     2.1             Urinalysis Basic - ( 26 Sep 2024 05:30 )    Color: x / Appearance: x / SG: x / pH: x  Gluc: 110 mg/dL / Ketone: x  / Bili: x / Urobili: x   Blood: x / Protein: x / Nitrite: x   Leuk Esterase: x / RBC: x / WBC x   Sq Epi: x / Non Sq Epi: x / Bacteria: x          CAPILLARY BLOOD GLUCOSE          Drug Levels: [] N/A    CSF Analysis: [] N/A      Allergies    No Known Allergies    Intolerances      MEDICATIONS:  Antibiotics:    Neuro:  acetaminophen     Tablet .. 650 milliGRAM(s) Oral every 6 hours PRN  levETIRAcetam 1000 milliGRAM(s) Oral two times a day  ondansetron Injectable 4 milliGRAM(s) IV Push every 6 hours PRN    Anticoagulation:  enoxaparin Injectable 40 milliGRAM(s) SubCutaneous every 12 hours    OTHER:  amLODIPine   Tablet 10 milliGRAM(s) Oral daily  bisacodyl Suppository 10 milliGRAM(s) Rectal daily PRN  chlorhexidine 2% Cloths 1 Application(s) Topical <User Schedule>  influenza  Vaccine (HIGH DOSE) 0.5 milliLiter(s) IntraMuscular once  lisinopril 40 milliGRAM(s) Oral daily  polyethylene glycol 3350 17 Gram(s) Oral two times a day  senna 2 Tablet(s) Oral every 12 hours    IVF:  ascorbic acid 500 milliGRAM(s) Oral <User Schedule>  ferrous    sulfate Liquid 300 milliGRAM(s) Oral <User Schedule>    CULTURES:  Culture Results:   No Growth at 10 Days ( @ 23:05)    RADIOLOGY & ADDITIONAL TESTS:      ASSESSMENT:  63F PMHx uncontrolled HT, R samuels's palsy, was BIBEMS to [facility]  c/o sudden onset severe HA. CTH showed SAH HH: 2, MF: 3. NIHSS: 0. CTA neg for aneurysm. Transferred to [facility] for further mgmt. S/p DSA negative for anueyrsm (24). S/p R frontal EVD (24). S/p angiogram showing mild R LILLY/R MCA spasm with IA verapamil (), s/p  angiogram with 15 mg IA verapamil for R ICA spasm ().     HTN    No pertinent family history in first degree relatives    Handoff    MEWS Score    No pertinent past medical history    Hypertension    SAH (subarachnoid hemorrhage)    Cerebral vasospasm    SAH (subarachnoid hemorrhage)    Cerebral vasospasm    Angiogram, carotid and cerebral, bilateral    SAH (subarachnoid hemorrhage)    Subarachnoid hemorrhage    Hypertension    Chronic venous insufficiency    YANIQUE (iron deficiency anemia)    Angiogram, carotid and cerebral, bilateral    Insertion of intravenous catheter with ultrasound guidance    No significant past surgical history    No significant past surgical history    H/O  section    HTN    Hypertension    Insertion of intravenous catheter with ultrasound guidance    Hypertension    Anemia    [email or web address]        PLAN:  Neuro:  - Neuro/vitals q8hr   - Seizure prophylaxis: Keppra 1g BID   - R frontal EVD d/c'd   - Pain control: Tylenol, Oxy 5 prn   - S/p nimodipine course   - stability CTH post-EVD complete , stable, CTH  stable   - CTA : b/l LILLY spasm, b/l MCA R>L   - MR brain and C spine w/wo contrast complete     Cards:   - -170   - HTN: norvasc 10mg daily and lisinopril 40mg daily   - TTE 9/10: EF 70%, mild symmetric LVH    Pulm:   - RA  - IS    GI:  - Regular diet   - Bowel reg, LBM   - Nausea: Zofran 4q6 prn     Renal:  - IVL, voiding   - euvolemic goal      Endo:  - A1c 5.8     Heme:   - DVT prophylaxis: SCDs, SQL  - LE dopplers : R posterior tibial DVT on admission ()- surveillace dopplers  neg for DVT  - Microcytic anemia: iron/vit C q other day     ID:   - afebrile  - pan cx     Dispo: SDU, full code, pending AR vs home    Discussed with Dr. Butler     Assessment:  Present when checked    []  GCS  E   V  M     Heart Failure: []Acute, [] acute on chronic , []chronic  Heart Failure:  [] Diastolic (HFpEF), [] Systolic (HFrEF), []Combined (HFpEF and HFrEF), [] RHF, [] Pulm HTN, [] Other    [] RODRIGO, [] ATN, [] AIN, [] other  [] CKD1, [] CKD2, [] CKD 3, [] CKD 4, [] CKD 5, []ESRD    Encephalopathy: [] Metabolic, [] Hepatic, [] toxic, [] Neurological, [] Other    Abnormal Nurtitional Status: [] malnurtition (see nutrition note), [ ]underweight: BMI < 19, [] morbid obesity: BMI >40, [] Cachexia    [] Sepsis  [] hypovolemic shock,[] cardiogenic shock, [] hemorrhagic shock, [] neuogenic shock  [] Acute Respiratory Failure  []Cerebral edema, [] Brain compression/ herniation,   [] Functional quadriplegia  [] Acute blood loss anemia

## 2024-09-27 NOTE — PROGRESS NOTE ADULT - PROBLEM SELECTOR PLAN 3
Chronic venous insufficiency. Of note, venous duplex 9/24/24 showed nonocclusive thrombosis of a right posterior tibial vein, repeat venous duplex 9/17/24 w/o DVT, prev right popliteal vein thrombus no longer visualized.   - cont LMWH for DVT ppx inpatient  - can provide information for Dr. Kimberly Amezcua (vascular cardiology)

## 2024-09-27 NOTE — PROGRESS NOTE ADULT - PROBLEM SELECTOR PLAN 2
Uncontrolled, was not taking medications at home (states stopped by herself because she felt medication was too strong, does not recall name). TTE with mild symmetric LVH, normal systolic/diastolic function.  - SBP goal normotensive  - cont amlodipine 10mg, lisinopril 40mg qd  - close PCP follow up - patient wishes to find a new PCP and is process of applying for medicare / is currently uninsured  -  provide with follow up information for [facility] in [facility]

## 2024-09-27 NOTE — DISCHARGE NOTE NURSING/CASE MANAGEMENT/SOCIAL WORK - NSDCFUADDAPPT_GEN_ALL_CORE_FT
Please follow up with Dr. Butler, call the office to make/confirm appointment at [phone]    Please follow up with Dr. Kimberly Amezcua from Vascular Surgery outpatient     Please follow up with Dr. Lomas or Dr. Fuentes outpatient    Please follow up with your primary care physician  Resources:  [facility]  [address] [place] [id number]  [phone]

## 2024-09-27 NOTE — PROGRESS NOTE ADULT - PROBLEM SELECTOR PLAN 1
Sudden onset headache found to have diffuse SAH at [facility], transferred to [facility] and underwent emergent cerebral angiogram 9/8 that was non-revealing. NSICU course c/b vasospasm s/p cerebral angio x2 for IA verapamil (9/12, 9/16), central fevers s/p bromocriptine.   - management per NSG, neuro checks, nimodipine for vasospasm   - AED with seizure precautions: levetiracetam 1000mg BID  - post operative state  ---pain control with bowel regimen  ---strong pulm hygiene with freq IS use, chest PT  ---early mobilization and OOBTC as tolerated with fall precautions  ---chemical DVT ppx with LMWH

## 2024-09-27 NOTE — PROGRESS NOTE ADULT - SUBJECTIVE AND OBJECTIVE BOX
SUBJECTIVE: NAEON. Patient seen this morning with  at bedside. Patient w/o acute complaints. States was able to work with PT and did well on stairs.       DIET:  Diet, Regular (09-15-24 @ 07:47) [Active]      MEDICATIONS:  MEDICATIONS  (STANDING):  amLODIPine   Tablet 10 milliGRAM(s) Oral daily  ascorbic acid 500 milliGRAM(s) Oral <User Schedule>  chlorhexidine 2% Cloths 1 Application(s) Topical <User Schedule>  enoxaparin Injectable 40 milliGRAM(s) SubCutaneous every 12 hours  ferrous    sulfate Liquid 300 milliGRAM(s) Oral <User Schedule>  influenza  Vaccine (HIGH DOSE) 0.5 milliLiter(s) IntraMuscular once  levETIRAcetam 1000 milliGRAM(s) Oral two times a day  lisinopril 40 milliGRAM(s) Oral daily  polyethylene glycol 3350 17 Gram(s) Oral two times a day  senna 2 Tablet(s) Oral every 12 hours    MEDICATIONS  (PRN):  acetaminophen     Tablet .. 650 milliGRAM(s) Oral every 6 hours PRN Temp greater or equal to 38C (100.4F), Mild Pain (1 - 3), Moderate Pain (4 - 6)  bisacodyl Suppository 10 milliGRAM(s) Rectal daily PRN Constipation  ondansetron Injectable 4 milliGRAM(s) IV Push every 6 hours PRN Nausea and/or Vomiting      Allergies    No Known Allergies    Intolerances        OBJECTIVE:  Vital Signs Last 24 Hrs  T(C): 36.8 (27 Sep 2024 14:00), Max: 36.9 (26 Sep 2024 18:00)  T(F): 98.3 (27 Sep 2024 14:00), Max: 98.5 (26 Sep 2024 18:00)  HR: 74 (27 Sep 2024 08:45) (68 - 80)  BP: 156/65 (27 Sep 2024 08:45) (139/65 - 169/72)  BP(mean): 93 (27 Sep 2024 08:45) (93 - 106)  RR: 18 (27 Sep 2024 08:45) (16 - 18)  SpO2: 100% (27 Sep 2024 08:45) (95% - 100%)    Parameters below as of 27 Sep 2024 08:45  Patient On (Oxygen Delivery Method): room air      I&O's Summary    26 Sep 2024 07:01  -  27 Sep 2024 07:00  --------------------------------------------------------  IN: 850 mL / OUT: 1700 mL / NET: -850 mL    27 Sep 2024 07:01  -  27 Sep 2024 13:34  --------------------------------------------------------  IN: 300 mL / OUT: 0 mL / NET: 300 mL        PHYSICAL EXAM:  Gen: appears stated age, resting comfortably, NAD  HEENT: NCAT, MMM  Neck: supple  CV: RRR, no m/r/g, peripheral pulses 2+  Pulm: CTAB, no increased work of breathing, no rales/rhonchi  Abd: soft, ND, NT, no rebound or guarding  Skin: warm and dry  Ext: BLE edema with chronic appearing skin changes (hyperpigmentation, induration), no erythema or warmth  Neuro: AOx3, speaking in full sentences  Psych: affect and behavior appropriate, pleasant at time of interview    LABS:                        8.1    6.45  )-----------( 418      ( 26 Sep 2024 05:30 )             29.4     09-26    142  |  104  |  12  ----------------------------<  110[H]  4.0   |  27  |  0.82    Ca    8.8      26 Sep 2024 05:30  Phos  4.2     09-26  Mg     2.1     09-26          CAPILLARY BLOOD GLUCOSE      POCT Blood Glucose.: 102 mg/dL (27 Sep 2024 12:17)    Urinalysis Basic - ( 26 Sep 2024 05:30 )    Color: x / Appearance: x / SG: x / pH: x  Gluc: 110 mg/dL / Ketone: x  / Bili: x / Urobili: x   Blood: x / Protein: x / Nitrite: x   Leuk Esterase: x / RBC: x / WBC x   Sq Epi: x / Non Sq Epi: x / Bacteria: x        MICRODATA:      RADIOLOGY/OTHER STUDIES:  Reviewed

## 2024-09-27 NOTE — PROGRESS NOTE ADULT - THIS PATIENT HAS THE FOLLOWING CONDITION(S)/DIAGNOSES ON THIS ADMISSION:
None

## 2024-09-28 LAB
CULTURE RESULTS: SIGNIFICANT CHANGE UP
SPECIMEN SOURCE: SIGNIFICANT CHANGE UP

## 2024-10-01 DIAGNOSIS — M71.22 SYNOVIAL CYST OF POPLITEAL SPACE [BAKER], LEFT KNEE: ICD-10-CM

## 2024-10-01 DIAGNOSIS — Z78.1 PHYSICAL RESTRAINT STATUS: ICD-10-CM

## 2024-10-01 DIAGNOSIS — I87.2 VENOUS INSUFFICIENCY (CHRONIC) (PERIPHERAL): ICD-10-CM

## 2024-10-01 DIAGNOSIS — R50.9 FEVER, UNSPECIFIED: ICD-10-CM

## 2024-10-01 DIAGNOSIS — I10 ESSENTIAL (PRIMARY) HYPERTENSION: ICD-10-CM

## 2024-10-01 DIAGNOSIS — G91.0 COMMUNICATING HYDROCEPHALUS: ICD-10-CM

## 2024-10-01 DIAGNOSIS — Z87.891 PERSONAL HISTORY OF NICOTINE DEPENDENCE: ICD-10-CM

## 2024-10-01 DIAGNOSIS — G93.6 CEREBRAL EDEMA: ICD-10-CM

## 2024-10-01 DIAGNOSIS — R47.1 DYSARTHRIA AND ANARTHRIA: ICD-10-CM

## 2024-10-01 DIAGNOSIS — E66.01 MORBID (SEVERE) OBESITY DUE TO EXCESS CALORIES: ICD-10-CM

## 2024-10-01 DIAGNOSIS — Z86.718 PERSONAL HISTORY OF OTHER VENOUS THROMBOSIS AND EMBOLISM: ICD-10-CM

## 2024-10-01 DIAGNOSIS — I60.9 NONTRAUMATIC SUBARACHNOID HEMORRHAGE, UNSPECIFIED: ICD-10-CM

## 2024-10-01 DIAGNOSIS — R29.700 NIHSS SCORE 0: ICD-10-CM

## 2024-10-01 DIAGNOSIS — G93.5 COMPRESSION OF BRAIN: ICD-10-CM

## 2024-10-01 DIAGNOSIS — D50.9 IRON DEFICIENCY ANEMIA, UNSPECIFIED: ICD-10-CM

## 2024-10-01 DIAGNOSIS — I82.441 ACUTE EMBOLISM AND THROMBOSIS OF RIGHT TIBIAL VEIN: ICD-10-CM

## 2024-10-01 DIAGNOSIS — R29.810 FACIAL WEAKNESS: ICD-10-CM

## 2024-10-01 DIAGNOSIS — I67.848 OTHER CEREBROVASCULAR VASOSPASM AND VASOCONSTRICTION: ICD-10-CM

## 2024-10-02 LAB — ISTAT ACTK (ACTIVATED CLOTTING TIME KAOLIN): 147 SEC — HIGH (ref 74–137)

## 2024-10-04 PROBLEM — I10 ESSENTIAL (PRIMARY) HYPERTENSION: Chronic | Status: ACTIVE | Noted: 2024-09-08

## 2024-10-07 PROBLEM — Z00.00 ENCOUNTER FOR PREVENTIVE HEALTH EXAMINATION: Status: ACTIVE | Noted: 2024-10-07

## 2024-10-13 PROBLEM — I67.848 VASOSPASM OF CEREBRAL ARTERY: Status: ACTIVE | Noted: 2024-10-13

## 2024-10-13 PROBLEM — I60.9 SUBARACHNOID HEMORRHAGE, NONTRAUMATIC: Status: ACTIVE | Noted: 2024-10-13

## 2024-10-13 PROBLEM — I10 HIGH BLOOD PRESSURE: Status: RESOLVED | Noted: 2024-10-13 | Resolved: 2024-10-13

## 2024-10-13 RX ORDER — AMLODIPINE BESYLATE 5 MG/1
TABLET ORAL
Refills: 0 | Status: ACTIVE | COMMUNITY

## 2024-10-13 RX ORDER — LEVETIRACETAM 1000 MG/1
1000 TABLET, FILM COATED ORAL
Refills: 0 | Status: ACTIVE | COMMUNITY

## 2024-10-13 RX ORDER — LISINOPRIL 2.5 MG/1
2.5 TABLET ORAL
Refills: 0 | Status: ACTIVE | COMMUNITY

## 2024-10-14 ENCOUNTER — APPOINTMENT (OUTPATIENT)
Dept: NEUROSURGERY | Facility: CLINIC | Age: 64
End: 2024-10-14
Payer: SELF-PAY

## 2024-10-14 VITALS
OXYGEN SATURATION: 97 % | RESPIRATION RATE: 18 BRPM | HEART RATE: 97 BPM | WEIGHT: 180 LBS | HEIGHT: 63.5 IN | SYSTOLIC BLOOD PRESSURE: 156 MMHG | DIASTOLIC BLOOD PRESSURE: 83 MMHG | BODY MASS INDEX: 31.5 KG/M2

## 2024-10-14 DIAGNOSIS — R26.89 OTHER ABNORMALITIES OF GAIT AND MOBILITY: ICD-10-CM

## 2024-10-14 DIAGNOSIS — I10 ESSENTIAL (PRIMARY) HYPERTENSION: ICD-10-CM

## 2024-10-14 DIAGNOSIS — I60.9 NONTRAUMATIC SUBARACHNOID HEMORRHAGE, UNSPECIFIED: ICD-10-CM

## 2024-10-14 DIAGNOSIS — Z78.9 OTHER SPECIFIED HEALTH STATUS: ICD-10-CM

## 2024-10-14 DIAGNOSIS — I67.848 OTHER CEREBROVASCULAR VASOSPASM AND VASOCONSTRICTION: ICD-10-CM

## 2024-10-14 PROCEDURE — 99215 OFFICE O/P EST HI 40 MIN: CPT

## 2024-10-14 RX ORDER — LEVETIRACETAM 500 MG/1
500 TABLET, FILM COATED ORAL
Qty: 21 | Refills: 0 | Status: ACTIVE | COMMUNITY
Start: 2024-10-14 | End: 1900-01-01